# Patient Record
Sex: FEMALE | Race: WHITE | NOT HISPANIC OR LATINO | Employment: FULL TIME | URBAN - METROPOLITAN AREA
[De-identification: names, ages, dates, MRNs, and addresses within clinical notes are randomized per-mention and may not be internally consistent; named-entity substitution may affect disease eponyms.]

---

## 2023-10-21 PROCEDURE — 87077 CULTURE AEROBIC IDENTIFY: CPT | Performed by: NURSE PRACTITIONER

## 2023-10-21 PROCEDURE — 87086 URINE CULTURE/COLONY COUNT: CPT | Performed by: NURSE PRACTITIONER

## 2023-10-21 PROCEDURE — 87186 SC STD MICRODIL/AGAR DIL: CPT | Performed by: NURSE PRACTITIONER

## 2023-11-12 PROCEDURE — 87186 SC STD MICRODIL/AGAR DIL: CPT | Performed by: NURSE PRACTITIONER

## 2023-11-12 PROCEDURE — 87088 URINE BACTERIA CULTURE: CPT | Performed by: NURSE PRACTITIONER

## 2023-11-12 PROCEDURE — 87086 URINE CULTURE/COLONY COUNT: CPT | Performed by: NURSE PRACTITIONER

## 2023-11-13 ENCOUNTER — APPOINTMENT (OUTPATIENT)
Dept: GENERAL RADIOLOGY | Facility: HOSPITAL | Age: 63
End: 2023-11-13
Payer: COMMERCIAL

## 2023-11-13 ENCOUNTER — HOSPITAL ENCOUNTER (OUTPATIENT)
Facility: HOSPITAL | Age: 63
Setting detail: OBSERVATION
Discharge: HOME | End: 2023-11-14
Attending: EMERGENCY MEDICINE | Admitting: INTERNAL MEDICINE
Payer: COMMERCIAL

## 2023-11-13 DIAGNOSIS — N39.0 URINARY TRACT INFECTION WITHOUT HEMATURIA, SITE UNSPECIFIED: Primary | ICD-10-CM

## 2023-11-13 DIAGNOSIS — A41.9 SEPSIS, DUE TO UNSPECIFIED ORGANISM, UNSPECIFIED WHETHER ACUTE ORGAN DYSFUNCTION PRESENT: ICD-10-CM

## 2023-11-13 LAB
ALBUMIN SERPL-MCNC: 3.1 G/DL (ref 3.5–5.2)
ALBUMIN/GLOB SERPL: 0.9 G/DL
ALP SERPL-CCNC: 96 U/L (ref 39–117)
ALT SERPL W P-5'-P-CCNC: 9 U/L (ref 1–33)
ANION GAP SERPL CALCULATED.3IONS-SCNC: 14 MMOL/L (ref 5–15)
ANION GAP SERPL CALCULATED.3IONS-SCNC: 14 MMOL/L (ref 5–15)
AST SERPL-CCNC: 15 U/L (ref 1–32)
BACTERIA UR QL AUTO: ABNORMAL /HPF
BILIRUB SERPL-MCNC: 0.4 MG/DL (ref 0–1.2)
BILIRUB UR QL STRIP: NEGATIVE
BUN SERPL-MCNC: 12 MG/DL (ref 8–23)
BUN SERPL-MCNC: 12 MG/DL (ref 8–23)
BUN/CREAT SERPL: 12.1 (ref 7–25)
BUN/CREAT SERPL: 12.8 (ref 7–25)
CALCIUM SPEC-SCNC: 8.8 MG/DL (ref 8.6–10.5)
CALCIUM SPEC-SCNC: 9.3 MG/DL (ref 8.6–10.5)
CHLORIDE SERPL-SCNC: 102 MMOL/L (ref 98–107)
CHLORIDE SERPL-SCNC: 99 MMOL/L (ref 98–107)
CLARITY UR: ABNORMAL
CO2 SERPL-SCNC: 19 MMOL/L (ref 22–29)
CO2 SERPL-SCNC: 22 MMOL/L (ref 22–29)
COLOR UR: ABNORMAL
CREAT SERPL-MCNC: 0.94 MG/DL (ref 0.57–1)
CREAT SERPL-MCNC: 0.99 MG/DL (ref 0.57–1)
D-LACTATE SERPL-SCNC: 1.9 MMOL/L (ref 0.3–2)
DEPRECATED RDW RBC AUTO: 45.9 FL (ref 37–54)
EGFRCR SERPLBLD CKD-EPI 2021: 64.6 ML/MIN/1.73
EGFRCR SERPLBLD CKD-EPI 2021: 68.7 ML/MIN/1.73
ERYTHROCYTE [DISTWIDTH] IN BLOOD BY AUTOMATED COUNT: 14.3 % (ref 12.3–15.4)
GLOBULIN UR ELPH-MCNC: 3.5 GM/DL
GLUCOSE SERPL-MCNC: 122 MG/DL (ref 65–99)
GLUCOSE SERPL-MCNC: 148 MG/DL (ref 65–99)
GLUCOSE UR STRIP-MCNC: NEGATIVE MG/DL
HCT VFR BLD AUTO: 33.6 % (ref 34–46.6)
HGB BLD-MCNC: 11.3 G/DL (ref 12–15.9)
HGB UR QL STRIP.AUTO: ABNORMAL
HOLD SPECIMEN: NORMAL
HYALINE CASTS UR QL AUTO: ABNORMAL /LPF
KETONES UR QL STRIP: ABNORMAL
LEUKOCYTE ESTERASE UR QL STRIP.AUTO: ABNORMAL
LYMPHOCYTES # BLD MANUAL: 0.98 10*3/MM3 (ref 0.7–3.1)
MCH RBC QN AUTO: 30.8 PG (ref 26.6–33)
MCHC RBC AUTO-ENTMCNC: 33.6 G/DL (ref 31.5–35.7)
MCV RBC AUTO: 91.7 FL (ref 79–97)
METAMYELOCYTES NFR BLD MANUAL: 3 % (ref 0–0)
NEUTROPHILS # BLD AUTO: 10.95 10*3/MM3 (ref 1.7–7)
NEUTROPHILS NFR BLD MANUAL: 79 % (ref 42.7–76)
NEUTS BAND NFR BLD MANUAL: 10 % (ref 0–5)
NITRITE UR QL STRIP: POSITIVE
PH UR STRIP.AUTO: 6.5 [PH] (ref 5–8)
PLATELET # BLD AUTO: 491 10*3/MM3 (ref 140–450)
PMV BLD AUTO: 6.7 FL (ref 6–12)
POLYCHROMASIA BLD QL SMEAR: ABNORMAL
POTASSIUM SERPL-SCNC: 3.8 MMOL/L (ref 3.5–5.2)
POTASSIUM SERPL-SCNC: 4.1 MMOL/L (ref 3.5–5.2)
PROT SERPL-MCNC: 6.6 G/DL (ref 6–8.5)
PROT UR QL STRIP: ABNORMAL
RBC # BLD AUTO: 3.66 10*6/MM3 (ref 3.77–5.28)
RBC # UR STRIP: ABNORMAL /HPF
REF LAB TEST METHOD: ABNORMAL
SCAN SLIDE: NORMAL
SMALL PLATELETS BLD QL SMEAR: ABNORMAL
SODIUM SERPL-SCNC: 135 MMOL/L (ref 136–145)
SODIUM SERPL-SCNC: 135 MMOL/L (ref 136–145)
SP GR UR STRIP: 1.01 (ref 1–1.03)
SQUAMOUS #/AREA URNS HPF: ABNORMAL /HPF
STOMATOCYTES BLD QL SMEAR: ABNORMAL
UROBILINOGEN UR QL STRIP: ABNORMAL
VARIANT LYMPHS NFR BLD MANUAL: 2 % (ref 0–5)
VARIANT LYMPHS NFR BLD MANUAL: 6 % (ref 19.6–45.3)
WBC # UR STRIP: ABNORMAL /HPF
WBC MORPH BLD: NORMAL
WBC NRBC COR # BLD: 12.3 10*3/MM3 (ref 3.4–10.8)
WHOLE BLOOD HOLD COAG: NORMAL
WHOLE BLOOD HOLD COAG: NORMAL
WHOLE BLOOD HOLD SPECIMEN: NORMAL

## 2023-11-13 PROCEDURE — 87040 BLOOD CULTURE FOR BACTERIA: CPT | Performed by: EMERGENCY MEDICINE

## 2023-11-13 PROCEDURE — 93005 ELECTROCARDIOGRAM TRACING: CPT | Performed by: EMERGENCY MEDICINE

## 2023-11-13 PROCEDURE — G0378 HOSPITAL OBSERVATION PER HR: HCPCS

## 2023-11-13 PROCEDURE — 25810000003 SODIUM CHLORIDE 0.9 % SOLUTION: Performed by: EMERGENCY MEDICINE

## 2023-11-13 PROCEDURE — 87086 URINE CULTURE/COLONY COUNT: CPT | Performed by: EMERGENCY MEDICINE

## 2023-11-13 PROCEDURE — 36415 COLL VENOUS BLD VENIPUNCTURE: CPT

## 2023-11-13 PROCEDURE — 81001 URINALYSIS AUTO W/SCOPE: CPT | Performed by: EMERGENCY MEDICINE

## 2023-11-13 PROCEDURE — 85025 COMPLETE CBC W/AUTO DIFF WBC: CPT | Performed by: EMERGENCY MEDICINE

## 2023-11-13 PROCEDURE — 83605 ASSAY OF LACTIC ACID: CPT

## 2023-11-13 PROCEDURE — 85007 BL SMEAR W/DIFF WBC COUNT: CPT | Performed by: EMERGENCY MEDICINE

## 2023-11-13 PROCEDURE — 96365 THER/PROPH/DIAG IV INF INIT: CPT

## 2023-11-13 PROCEDURE — 25010000002 CEFTRIAXONE PER 250 MG: Performed by: EMERGENCY MEDICINE

## 2023-11-13 PROCEDURE — 80053 COMPREHEN METABOLIC PANEL: CPT | Performed by: EMERGENCY MEDICINE

## 2023-11-13 PROCEDURE — 99284 EMERGENCY DEPT VISIT MOD MDM: CPT

## 2023-11-13 PROCEDURE — 71045 X-RAY EXAM CHEST 1 VIEW: CPT

## 2023-11-13 RX ORDER — IBUPROFEN 400 MG/1
400 TABLET ORAL EVERY 6 HOURS PRN
Status: DISCONTINUED | OUTPATIENT
Start: 2023-11-13 | End: 2023-11-14 | Stop reason: HOSPADM

## 2023-11-13 RX ORDER — POLYETHYLENE GLYCOL 3350 17 G/17G
17 POWDER, FOR SOLUTION ORAL DAILY PRN
Status: DISCONTINUED | OUTPATIENT
Start: 2023-11-13 | End: 2023-11-14 | Stop reason: HOSPADM

## 2023-11-13 RX ORDER — ONDANSETRON 2 MG/ML
4 INJECTION INTRAMUSCULAR; INTRAVENOUS EVERY 6 HOURS PRN
Status: DISCONTINUED | OUTPATIENT
Start: 2023-11-13 | End: 2023-11-14 | Stop reason: HOSPADM

## 2023-11-13 RX ORDER — BISACODYL 5 MG/1
5 TABLET, DELAYED RELEASE ORAL DAILY PRN
Status: DISCONTINUED | OUTPATIENT
Start: 2023-11-13 | End: 2023-11-14 | Stop reason: HOSPADM

## 2023-11-13 RX ORDER — SODIUM CHLORIDE 9 MG/ML
100 INJECTION, SOLUTION INTRAVENOUS CONTINUOUS
Status: DISCONTINUED | OUTPATIENT
Start: 2023-11-13 | End: 2023-11-14 | Stop reason: HOSPADM

## 2023-11-13 RX ORDER — IBUPROFEN 600 MG/1
600 TABLET ORAL ONCE
Status: COMPLETED | OUTPATIENT
Start: 2023-11-13 | End: 2023-11-13

## 2023-11-13 RX ORDER — CETIRIZINE HYDROCHLORIDE 10 MG/1
10 TABLET ORAL DAILY
Status: DISCONTINUED | OUTPATIENT
Start: 2023-11-13 | End: 2023-11-14 | Stop reason: HOSPADM

## 2023-11-13 RX ORDER — ACETAMINOPHEN 325 MG/1
TABLET ORAL
Status: COMPLETED
Start: 2023-11-13 | End: 2023-11-13

## 2023-11-13 RX ORDER — AMOXICILLIN 250 MG
2 CAPSULE ORAL 2 TIMES DAILY
Status: DISCONTINUED | OUTPATIENT
Start: 2023-11-13 | End: 2023-11-14 | Stop reason: HOSPADM

## 2023-11-13 RX ORDER — CETIRIZINE HYDROCHLORIDE 10 MG/1
10 TABLET ORAL DAILY
Status: ON HOLD | COMMUNITY

## 2023-11-13 RX ORDER — ONDANSETRON 4 MG/1
4 TABLET, FILM COATED ORAL EVERY 6 HOURS PRN
Status: DISCONTINUED | OUTPATIENT
Start: 2023-11-13 | End: 2023-11-14 | Stop reason: HOSPADM

## 2023-11-13 RX ORDER — SODIUM CHLORIDE 0.9 % (FLUSH) 0.9 %
10 SYRINGE (ML) INJECTION AS NEEDED
Status: DISCONTINUED | OUTPATIENT
Start: 2023-11-13 | End: 2023-11-14 | Stop reason: HOSPADM

## 2023-11-13 RX ORDER — ACETAMINOPHEN 325 MG/1
650 TABLET ORAL EVERY 6 HOURS PRN
Status: DISCONTINUED | OUTPATIENT
Start: 2023-11-13 | End: 2023-11-14 | Stop reason: HOSPADM

## 2023-11-13 RX ORDER — SODIUM CHLORIDE 9 MG/ML
40 INJECTION, SOLUTION INTRAVENOUS AS NEEDED
Status: DISCONTINUED | OUTPATIENT
Start: 2023-11-13 | End: 2023-11-14 | Stop reason: HOSPADM

## 2023-11-13 RX ORDER — IBUPROFEN 600 MG/1
TABLET ORAL
Status: COMPLETED
Start: 2023-11-13 | End: 2023-11-13

## 2023-11-13 RX ORDER — BISACODYL 10 MG
10 SUPPOSITORY, RECTAL RECTAL DAILY PRN
Status: DISCONTINUED | OUTPATIENT
Start: 2023-11-13 | End: 2023-11-14 | Stop reason: HOSPADM

## 2023-11-13 RX ORDER — PHENAZOPYRIDINE HYDROCHLORIDE 100 MG/1
100 TABLET, FILM COATED ORAL 3 TIMES DAILY
Status: DISCONTINUED | OUTPATIENT
Start: 2023-11-13 | End: 2023-11-14 | Stop reason: HOSPADM

## 2023-11-13 RX ORDER — HYDROCODONE BITARTRATE AND ACETAMINOPHEN 5; 325 MG/1; MG/1
1 TABLET ORAL EVERY 4 HOURS PRN
Status: DISCONTINUED | OUTPATIENT
Start: 2023-11-13 | End: 2023-11-14 | Stop reason: HOSPADM

## 2023-11-13 RX ORDER — SODIUM CHLORIDE 0.9 % (FLUSH) 0.9 %
10 SYRINGE (ML) INJECTION EVERY 12 HOURS SCHEDULED
Status: DISCONTINUED | OUTPATIENT
Start: 2023-11-13 | End: 2023-11-14 | Stop reason: HOSPADM

## 2023-11-13 RX ORDER — FAMOTIDINE 20 MG/1
40 TABLET, FILM COATED ORAL DAILY
Status: DISCONTINUED | OUTPATIENT
Start: 2023-11-13 | End: 2023-11-14 | Stop reason: HOSPADM

## 2023-11-13 RX ORDER — IBUPROFEN 400 MG/1
400 TABLET ORAL EVERY 6 HOURS PRN
Status: ON HOLD | COMMUNITY

## 2023-11-13 RX ADMIN — PHENAZOPYRIDINE HYDROCHLORIDE 100 MG: 100 TABLET ORAL at 20:20

## 2023-11-13 RX ADMIN — CETIRIZINE HYDROCHLORIDE 10 MG: 10 TABLET, FILM COATED ORAL at 16:23

## 2023-11-13 RX ADMIN — FAMOTIDINE 40 MG: 20 TABLET, FILM COATED ORAL at 16:23

## 2023-11-13 RX ADMIN — ACETAMINOPHEN 650 MG: 325 TABLET ORAL at 09:19

## 2023-11-13 RX ADMIN — ACETAMINOPHEN 650 MG: 325 TABLET, FILM COATED ORAL at 09:19

## 2023-11-13 RX ADMIN — PHENAZOPYRIDINE HYDROCHLORIDE 100 MG: 100 TABLET ORAL at 16:23

## 2023-11-13 RX ADMIN — CEFTRIAXONE 2 G: 2 INJECTION, POWDER, FOR SOLUTION INTRAMUSCULAR; INTRAVENOUS at 11:22

## 2023-11-13 RX ADMIN — SODIUM CHLORIDE 1000 ML: 9 INJECTION, SOLUTION INTRAVENOUS at 09:16

## 2023-11-13 RX ADMIN — Medication 10 ML: at 20:20

## 2023-11-13 RX ADMIN — IBUPROFEN 600 MG: 600 TABLET, FILM COATED ORAL at 13:18

## 2023-11-13 RX ADMIN — IBUPROFEN 600 MG: 600 TABLET ORAL at 13:18

## 2023-11-13 RX ADMIN — SODIUM CHLORIDE 1000 ML: 9 INJECTION, SOLUTION INTRAVENOUS at 13:08

## 2023-11-13 NOTE — H&P
[facility] Medicine Admission      Date of Admission: 11/13/2023      Primary Care Physician: Provider, No Known      Chief Complaint: dysuria     HPI:    63 yr old female with burning with urination.  She was being treated with cefdinir at home which she started yesterday.  She also has UTI last month as well. She denies any fevers.  She does have generalized weakness.       Past Medical History: Hypertension     Past Surgical History: no surgeries     Family History: father has hypertension    Social History:  reports that she has been smoking cigarettes. She has been smoking an average of .5 packs per day. She has never been exposed to tobacco smoke. She has never used smokeless tobacco. She reports that she does not drink alcohol.    Allergies:   Allergies   Allergen Reactions    Other Other (See Comments)     tetramycin    Tetracyclines & Related Swelling       Medications: Scheduled Meds:sodium chloride, 1,000 mL, Intravenous, Once      Continuous Infusions:   PRN Meds:.  acetaminophen    [COMPLETED] Insert Peripheral IV **AND** sodium chloride    sodium chloride  No current facility-administered medications on file prior to encounter.     Current Outpatient Medications on File Prior to Encounter   Medication Sig Dispense Refill    cefdinir (OMNICEF) 300 MG capsule Take 1 capsule by mouth 2 (Two) Times a Day for 7 days. 14 capsule 0    cetirizine (zyrTEC) 10 MG tablet Take 1 tablet by mouth Daily.      ibuprofen (ADVIL,MOTRIN) 400 MG tablet Take 1 tablet by mouth Every 6 (Six) Hours As Needed for Mild Pain.      phenazopyridine (Pyridium) 100 MG tablet Take 1 tablet by mouth 3 (Three) Times a Day. 6 tablet 0       Review of Systems:  Review of Systems   Respiratory:  Negative for shortness of breath.    Cardiovascular:  Negative for chest pain.      Otherwise complete ROS is negative except as mentioned above.    Physical Exam:   Temp:  [98.7 °F (37.1 °C)-101 °F (38.3 °C)] 101 °F  (38.3 °C)  Heart Rate:  [113-144] 135  Resp:  [20] 20  BP: (101-147)/(56-90) 101/56  Physical Exam  Vitals and nursing note reviewed.   Constitutional:       General: She is not in acute distress.     Appearance: She is well-developed. She is not diaphoretic.   HENT:      Head: Normocephalic and atraumatic.   Cardiovascular:      Rate and Rhythm: Normal rate.   Pulmonary:      Effort: Pulmonary effort is normal. No respiratory distress.      Breath sounds: No wheezing.   Abdominal:      General: There is no distension.      Palpations: Abdomen is soft.   Musculoskeletal:         General: Normal range of motion.   Skin:     General: Skin is warm and dry.   Neurological:      Mental Status: She is alert.      Cranial Nerves: No cranial nerve deficit.   Psychiatric:         Behavior: Behavior normal.         Thought Content: Thought content normal.         Judgment: Judgment normal.           Results Reviewed:  I have personally reviewed current lab, radiology, and data and agree with results.  Lab Results (last 24 hours)       Procedure Component Value Units Date/Time    Blood Culture - Blood, Arm, Left [[id number]] Collected: 11/13/23 1307    Specimen: Blood from Arm, Left Updated: 11/13/23 1316    Lavender Top [[id number]] Collected: 11/13/23 1307    Specimen: Blood Updated: 11/13/23 1314    Gold Top - [facility] [[id number]] Collected: 11/13/23 1307    Specimen: Blood Updated: 11/13/23 1314    Light Blue Top [[id number]] Collected: 11/13/23 1307    Specimen: Blood Updated: 11/13/23 1314    Comprehensive Metabolic Panel [[id number]] Collected: 11/13/23 1307    Specimen: Blood Updated: 11/13/23 1314    Economy Draw [[id number]] Collected: 11/13/23 1307    Specimen: Blood Updated: 11/13/23 1314    Narrative:      The following orders were created for panel order Economy Draw.  Procedure                               Abnormality         Status                     ---------                               -----------         ------                      Green Top (Gel)[[id number]]                                  In process                 Lavender Top[[id number]]                                     In process                 Gold Top - SST[[id number]]                                   In process                 Light Blue Top[[id number]]                                   In process                   Please view results for these tests on the individual orders.    Green Top (Gel) [[id number]] Collected: 11/13/23 1307    Specimen: Blood Updated: 11/13/23 1314    POC Lactate [[id number]]  (Normal) Collected: 11/13/23 1234    Specimen: Blood Updated: 11/13/23 1235     Lactate 1.9 mmol/L      Comment: Serial Number: [id number]:  [id number]       Blood Culture - Blood, Arm, Left [[id number]] Collected: 11/13/23 1231    Specimen: Blood from Arm, Left Updated: 11/13/23 1235    Extra Tubes [[id number]] Collected: 11/13/23 0909    Specimen: Blood from Arm, Right Updated: 11/13/23 1015    Narrative:      The following orders were created for panel order Extra Tubes.  Procedure                               Abnormality         Status                     ---------                               -----------         ------                     Gold Top - SST[[id number]]                                   Final result               Light Blue Top[[id number]]                                   Final result                 Please view results for these tests on the individual orders.    Gold Top - SST [[id number]] Collected: 11/13/23 0909    Specimen: Blood from Arm, Right Updated: 11/13/23 1015     Extra Tube Hold for add-ons.     Comment: Auto resulted.       Light Blue Top [[id number]] Collected: 11/13/23 0909    Specimen: Blood from Arm, Right Updated: 11/13/23 1015     Extra Tube Hold for add-ons.     Comment: Auto resulted       Urinalysis, Microscopic Only - Urine, Clean Catch [[id number]]  (Abnormal) Collected: 11/13/23 0905    Specimen: Urine, Clean Catch Updated:  11/13/23 0946     RBC, UA Too Numerous to Count /HPF      WBC, UA 6-10 /HPF      Bacteria, UA 1+ /HPF      Squamous Epithelial Cells, UA 13-20 /HPF      Hyaline Casts, UA 0-2 /LPF      Methodology Manual Light Microscopy    Urine Culture - Urine, Urine, Clean Catch [[id number]] Collected: 11/13/23 0905    Specimen: Urine, Clean Catch Updated: 11/13/23 0946    CBC & Differential [[id number]]  (Abnormal) Collected: 11/13/23 0909    Specimen: Blood from Arm, Right Updated: 11/13/23 0945    Narrative:      The following orders were created for panel order CBC & Differential.  Procedure                               Abnormality         Status                     ---------                               -----------         ------                     CBC Auto Differential[[id number]]        Abnormal            Final result               Scan Slide[[id number]]                                       Final result                 Please view results for these tests on the individual orders.    CBC Auto Differential [[id number]]  (Abnormal) Collected: 11/13/23 0909    Specimen: Blood from Arm, Right Updated: 11/13/23 0945     WBC 12.30 10*3/mm3      RBC 3.66 10*6/mm3      Hemoglobin 11.3 g/dL      Hematocrit 33.6 %      MCV 91.7 fL      MCH 30.8 pg      MCHC 33.6 g/dL      RDW 14.3 %      RDW-SD 45.9 fl      MPV 6.7 fL      Platelets 491 10*3/mm3     Narrative:      The previously reported component NRBC is no longer being reported. Previous result was 0.0 /100 WBC (Reference Range: 0.0-0.2 /100 WBC) on 11/13/2023 at 0918 EST.    Scan Slide [[id number]] Collected: 11/13/23 0909    Specimen: Blood from Arm, Right Updated: 11/13/23 0945     Scan Slide --     Comment: See Manual Differential Results       Manual Differential [[id number]]  (Abnormal) Collected: 11/13/23 0909    Specimen: Blood from Arm, Right Updated: 11/13/23 0945     Neutrophil % 79.0 %      Lymphocyte % 6.0 %      Bands %  10.0 %      Metamyelocyte % 3.0 %      Atypical  Lymphocyte % 2.0 %      Neutrophils Absolute 10.95 10*3/mm3      Lymphocytes Absolute 0.98 10*3/mm3      Polychromasia Slight/1+     Stomatocytes Slight/1+     WBC Morphology Normal     Platelet Estimate Increased    Basic Metabolic Panel [[id number]]  (Abnormal) Collected: 11/13/23 0909    Specimen: Blood from Arm, Right Updated: 11/13/23 0938     Glucose 148 mg/dL      BUN 12 mg/dL      Creatinine 0.99 mg/dL      Sodium 135 mmol/L      Potassium 4.1 mmol/L      Chloride 99 mmol/L      CO2 22.0 mmol/L      Calcium 9.3 mg/dL      BUN/Creatinine Ratio 12.1     Anion Gap 14.0 mmol/L      eGFR 64.6 mL/min/1.73     Narrative:      GFR Normal >60  Chronic Kidney Disease <60  Kidney Failure <15      Urinalysis With Culture If Indicated - Urine, Clean Catch [[id number]]  (Abnormal) Collected: 11/13/23 0905    Specimen: Urine, Clean Catch Updated: 11/13/23 0932     Color, UA Orange     Comment: Any Substance that causes an abnormal urine color can alter the accuracy of the chemical reactions.        Appearance, UA Cloudy     pH, UA 6.5     Specific Gravity, UA 1.013     Glucose, UA Negative     Ketones, UA Trace     Bilirubin, UA Negative     Blood, UA Large (3+)     Protein, UA 30 mg/dL (1+)     Leuk Esterase, UA Trace     Nitrite, UA Positive     Urobilinogen, UA 1.0 E.U./dL    Narrative:      In absence of clinical symptoms, the presence of pyuria, bacteria, and/or nitrites on the urinalysis result does not correlate with infection.          Imaging Results (Last 24 Hours)       Procedure Component Value Units Date/Time    XR Chest 1 View [[id number]] Collected: 11/13/23 1247     Updated: 11/13/23 1250    Narrative:      XR CHEST 1 VW    Date of Exam: 11/13/2023 12:45 PM EST    Indication: Fever    Comparison: None available.    Findings:  Mild cardiomegaly. Nonspecific diffuse mild interstitial opacities without discrete lobar consolidation. No large effusion or pneumothorax. Osseous structures grossly unremarkable.       Impression:      Impression:  Cardiomegaly with increased interstitial opacities, which can be seen with both edema and atypical infection. Negative for lobar consolidation.      Electronically Signed: Shane Cobos MD    11/13/2023 12:48 PM EST    Workstation ID: [id number]              Assessment:    Active Hospital Problems    Diagnosis     **Urinary tract infection          UTI/sepsis - IV fluids and IV rocephin started. Will monitor cultures    Tachycardia - will give IV fluids    DVT prophylaxis - SCD    Patient is full code    Gonzalo Mccullough MD  11/13/23  13:23 EST

## 2023-11-13 NOTE — ED PROVIDER NOTES
Subjective   History of Present Illness  Patient is a 61-year-old female who was diagnosed with UTI approximately 2 weeks ago.  She finished 1 week course of antibiotics.  She had recurrent symptoms over the past few days.  She was seen in the urgent care center yesterday and restarted on antibiotics.  She states she is feeling worse today.  She has no other complaints other than dysuria achiness and weakness.      Review of Systems    No past medical history on file.    Allergies   Allergen Reactions    Other Other (See Comments)     tetramycin       No past surgical history on file.    No family history on file.    Social History     Socioeconomic History    Marital status: Single   Tobacco Use    Smoking status: Every Day     Packs/day: .5     Types: Cigarettes     Passive exposure: Never    Smokeless tobacco: Never   Vaping Use    Vaping Use: Never used   Substance and Sexual Activity    Alcohol use: Never           Objective   Physical Exam  HEENT exam shows TMs to be clear.  Oropharynx clear.  Neck adenopathy.  Lungs are clear without retraction.  Abdomen is soft nontender.  Back has no CVA tenderness.  Lungs are clear.  Mental exam is unremarkable.  Procedures     My EKG interpretation shows sinus tachycardia at a rate of 135 with no acute ST change      ED Course      Results for orders placed or performed during the hospital encounter of 11/13/23   Basic Metabolic Panel    Specimen: Arm, Right; Blood   Result Value Ref Range    Glucose 148 (H) 65 - 99 mg/dL    BUN 12 8 - 23 mg/dL    Creatinine 0.99 0.57 - 1.00 mg/dL    Sodium 135 (L) 136 - 145 mmol/L    Potassium 4.1 3.5 - 5.2 mmol/L    Chloride 99 98 - 107 mmol/L    CO2 22.0 22.0 - 29.0 mmol/L    Calcium 9.3 8.6 - 10.5 mg/dL    BUN/Creatinine Ratio 12.1 7.0 - 25.0    Anion Gap 14.0 5.0 - 15.0 mmol/L    eGFR 64.6 >60.0 mL/min/1.73   Urinalysis With Culture If Indicated - Urine, Clean Catch    Specimen: Urine, Clean Catch   Result Value Ref Range    Color,  UA Orange (A) Yellow, Straw    Appearance, UA Cloudy (A) Clear    pH, UA 6.5 5.0 - 8.0    Specific Gravity, UA 1.013 1.005 - 1.030    Glucose, UA Negative Negative    Ketones, UA Trace (A) Negative    Bilirubin, UA Negative Negative    Blood, UA Large (3+) (A) Negative    Protein, UA 30 mg/dL (1+) (A) Negative    Leuk Esterase, UA Trace (A) Negative    Nitrite, UA Positive (A) Negative    Urobilinogen, UA 1.0 E.U./dL 0.2 - 1.0 E.U./dL   CBC Auto Differential    Specimen: Arm, Right; Blood   Result Value Ref Range    WBC 12.30 (H) 3.40 - 10.80 10*3/mm3    RBC 3.66 (L) 3.77 - 5.28 10*6/mm3    Hemoglobin 11.3 (L) 12.0 - 15.9 g/dL    Hematocrit 33.6 (L) 34.0 - 46.6 %    MCV 91.7 79.0 - 97.0 fL    MCH 30.8 26.6 - 33.0 pg    MCHC 33.6 31.5 - 35.7 g/dL    RDW 14.3 12.3 - 15.4 %    RDW-SD 45.9 37.0 - 54.0 fl    MPV 6.7 6.0 - 12.0 fL    Platelets 491 (H) 140 - 450 10*3/mm3   Scan Slide    Specimen: Arm, Right; Blood   Result Value Ref Range    Scan Slide     Urinalysis, Microscopic Only - Urine, Clean Catch    Specimen: Urine, Clean Catch   Result Value Ref Range    RBC, UA Too Numerous to Count (A) None Seen, 0-2 /HPF    WBC, UA 6-10 (A) None Seen, 0-2 /HPF    Bacteria, UA 1+ (A) None Seen /HPF    Squamous Epithelial Cells, UA 13-20 (A) None Seen, 0-2 /HPF    Hyaline Casts, UA 0-2 None Seen /LPF    Methodology Manual Light Microscopy    Manual Differential    Specimen: Arm, Right; Blood   Result Value Ref Range    Neutrophil % 79.0 (H) 42.7 - 76.0 %    Lymphocyte % 6.0 (L) 19.6 - 45.3 %    Bands %  10.0 (H) 0.0 - 5.0 %    Metamyelocyte % 3.0 (H) 0.0 - 0.0 %    Atypical Lymphocyte % 2.0 0.0 - 5.0 %    Neutrophils Absolute 10.95 (H) 1.70 - 7.00 10*3/mm3    Lymphocytes Absolute 0.98 0.70 - 3.10 10*3/mm3    Polychromasia Slight/1+ None Seen    Stomatocytes Slight/1+ None Seen    WBC Morphology Normal Normal    Platelet Estimate Increased Normal   POC Lactate    Specimen: Blood   Result Value Ref Range    Lactate 1.9 0.3 - 2.0  mmol/L   ECG 12 Lead Tachycardia   Result Value Ref Range    QT Interval 284 ms    QTC Interval 426 ms   Gold Top - SST   Result Value Ref Range    Extra Tube Hold for add-ons.    Light Blue Top   Result Value Ref Range    Extra Tube Hold for add-ons.      XR Chest 1 View    Result Date: 11/13/2023  Impression: Cardiomegaly with increased interstitial opacities, which can be seen with both edema and atypical infection. Negative for lobar consolidation. Electronically Signed: Shane Cobos MD  11/13/2023 12:48 PM EST  Workstation ID: [id number]                                        Medical Decision Making  Chest x-ray interpretation shows no infiltrate pleural effusion or other abnormality.  BMP shows no renal insufficiency or electrolyte abnormality.  UA shows 1+ bacteria.  Urine culture obtained.  Patient has a white count of 12,000 with differential showing left shift.  Blood cultures were obtained as the patient triggered simple sepsis.  She was given IV fluids 2 L IV fluid bolus.  Lactate is normal.  Blood cultures were obtained.  Patient was given IV Rocephin.  Patient will be admitted for further IV fluids and antibiotics.  I did speak the on-call hospitalist.    Amount and/or Complexity of Data Reviewed  Labs: ordered. Decision-making details documented in ED Course.  Radiology: ordered and independent interpretation performed.  ECG/medicine tests: ordered and independent interpretation performed.    Risk  OTC drugs.  Prescription drug management.  Decision regarding hospitalization.        Final diagnoses:   Urinary tract infection without hematuria, site unspecified   Sepsis, due to unspecified organism, unspecified whether acute organ dysfunction present       ED Disposition  ED Disposition       ED Disposition   Decision to Admit    Condition   --    Comment   --               No follow-up provider specified.       Medication List      No changes were made to your prescriptions during this visit.             Kev Hines MD  11/13/23 7056

## 2023-11-14 ENCOUNTER — TELEPHONE (OUTPATIENT)
Dept: FAMILY MEDICINE CLINIC | Facility: CLINIC | Age: 63
End: 2023-11-14

## 2023-11-14 VITALS
TEMPERATURE: 98.6 F | DIASTOLIC BLOOD PRESSURE: 85 MMHG | HEART RATE: 119 BPM | RESPIRATION RATE: 24 BRPM | SYSTOLIC BLOOD PRESSURE: 144 MMHG | OXYGEN SATURATION: 96 % | HEIGHT: 68 IN | BODY MASS INDEX: 33.18 KG/M2 | WEIGHT: 218.92 LBS

## 2023-11-14 PROBLEM — N39.0 UTI (URINARY TRACT INFECTION): Status: ACTIVE | Noted: 2023-11-14

## 2023-11-14 LAB
ANION GAP SERPL CALCULATED.3IONS-SCNC: 12 MMOL/L (ref 5–15)
BACTERIA SPEC AEROBE CULT: NO GROWTH
BASOPHILS # BLD AUTO: 0.1 10*3/MM3 (ref 0–0.2)
BASOPHILS NFR BLD AUTO: 0.6 % (ref 0–1.5)
BUN SERPL-MCNC: 13 MG/DL (ref 8–23)
BUN/CREAT SERPL: 12.7 (ref 7–25)
CALCIUM SPEC-SCNC: 9 MG/DL (ref 8.6–10.5)
CHLORIDE SERPL-SCNC: 104 MMOL/L (ref 98–107)
CO2 SERPL-SCNC: 21 MMOL/L (ref 22–29)
CREAT SERPL-MCNC: 1.02 MG/DL (ref 0.57–1)
DEPRECATED RDW RBC AUTO: 49.9 FL (ref 37–54)
EGFRCR SERPLBLD CKD-EPI 2021: 62.3 ML/MIN/1.73
EOSINOPHIL # BLD AUTO: 0.1 10*3/MM3 (ref 0–0.4)
EOSINOPHIL NFR BLD AUTO: 0.6 % (ref 0.3–6.2)
ERYTHROCYTE [DISTWIDTH] IN BLOOD BY AUTOMATED COUNT: 14.7 % (ref 12.3–15.4)
GLUCOSE SERPL-MCNC: 106 MG/DL (ref 65–99)
HCT VFR BLD AUTO: 31.8 % (ref 34–46.6)
HGB BLD-MCNC: 10.7 G/DL (ref 12–15.9)
LYMPHOCYTES # BLD AUTO: 1.7 10*3/MM3 (ref 0.7–3.1)
LYMPHOCYTES NFR BLD AUTO: 10.6 % (ref 19.6–45.3)
MAGNESIUM SERPL-MCNC: 2 MG/DL (ref 1.6–2.4)
MCH RBC QN AUTO: 31 PG (ref 26.6–33)
MCHC RBC AUTO-ENTMCNC: 33.6 G/DL (ref 31.5–35.7)
MCV RBC AUTO: 92.2 FL (ref 79–97)
MONOCYTES # BLD AUTO: 0.9 10*3/MM3 (ref 0.1–0.9)
MONOCYTES NFR BLD AUTO: 5.9 % (ref 5–12)
NEUTROPHILS NFR BLD AUTO: 13.2 10*3/MM3 (ref 1.7–7)
NEUTROPHILS NFR BLD AUTO: 82.3 % (ref 42.7–76)
NRBC BLD AUTO-RTO: 0.1 /100 WBC (ref 0–0.2)
PLATELET # BLD AUTO: 340 10*3/MM3 (ref 140–450)
PMV BLD AUTO: 7.8 FL (ref 6–12)
POTASSIUM SERPL-SCNC: 4.3 MMOL/L (ref 3.5–5.2)
QT INTERVAL: 284 MS
QTC INTERVAL: 426 MS
RBC # BLD AUTO: 3.45 10*6/MM3 (ref 3.77–5.28)
SODIUM SERPL-SCNC: 137 MMOL/L (ref 136–145)
WBC NRBC COR # BLD: 16 10*3/MM3 (ref 3.4–10.8)

## 2023-11-14 PROCEDURE — 25010000002 CEFTRIAXONE PER 250 MG: Performed by: FAMILY MEDICINE

## 2023-11-14 PROCEDURE — 96366 THER/PROPH/DIAG IV INF ADDON: CPT

## 2023-11-14 PROCEDURE — 80048 BASIC METABOLIC PNL TOTAL CA: CPT | Performed by: FAMILY MEDICINE

## 2023-11-14 PROCEDURE — 85025 COMPLETE CBC W/AUTO DIFF WBC: CPT | Performed by: FAMILY MEDICINE

## 2023-11-14 PROCEDURE — G0378 HOSPITAL OBSERVATION PER HR: HCPCS

## 2023-11-14 PROCEDURE — 83735 ASSAY OF MAGNESIUM: CPT | Performed by: FAMILY MEDICINE

## 2023-11-14 RX ADMIN — CETIRIZINE HYDROCHLORIDE 10 MG: 10 TABLET, FILM COATED ORAL at 09:50

## 2023-11-14 RX ADMIN — CEFTRIAXONE 2000 MG: 2 INJECTION, POWDER, FOR SOLUTION INTRAMUSCULAR; INTRAVENOUS at 11:59

## 2023-11-14 RX ADMIN — Medication 10 ML: at 09:50

## 2023-11-14 RX ADMIN — FAMOTIDINE 40 MG: 20 TABLET, FILM COATED ORAL at 09:50

## 2023-11-14 RX ADMIN — PHENAZOPYRIDINE HYDROCHLORIDE 100 MG: 100 TABLET ORAL at 09:50

## 2023-11-14 NOTE — TELEPHONE ENCOUNTER
Hub staff attempted to follow warm transfer process and was unsuccessful     Caller: Nicole Everett    Relationship to patient: Emergency Contact    Best call back number: [phone]    Patient is needing:     NEEDING TO SCHEDULE A HOSPITAL FOLLOW UP AND A NEW PATIENT APPOINTMENT.     COULD NOT FIND ANY APPT UNTIL 12.14.23.    PLEASE CALL TO SCHEDULE.     SEEN AT [facility] FOR REPEAT UTI'S D/C 11.14.23

## 2023-11-14 NOTE — DISCHARGE SUMMARY
Discharge Summary    Date of Service: 2023  Patient Name: Neva Kurtz  : 1960  MRN: [id number]    Date of Admission: 2023  Discharge Diagnosis recurrent UTI  Leukocytosis  Mild anemia  Date of Discharge:    Primary Care Physician: Provider, No Known      Presenting Problem:   Urinary tract infection [N39.0]  Urinary tract infection without hematuria, site unspecified [N39.0]  Sepsis, due to unspecified organism, unspecified whether acute organ dysfunction present [A41.9]    Active and Resolved Hospital Problems:  Active Hospital Problems    Diagnosis POA    **Urinary tract infection [N39.0] Yes      Resolved Hospital Problems   No resolved problems to display.         Hospital Course     Hospital Course:  Neva Kurtz is a 63 y.o. female admitted for recurrent UTI  Patient was kept in observation awaiting urine culture  Patient f started cefdinir at home the day prior to admission        DISCHARGE Follow Up Recommendations for labs and diagnostics: Follow-up with primary care physician in 3 to 5 days      Reasons For Change In Medications and Indications for New Medications:      Day of Discharge     Vital Signs:  Temp:  [97.8 °F (36.6 °C)-101 °F (38.3 °C)] 97.8 °F (36.6 °C)  Heart Rate:  [] 116  Resp:  [20-24] 20  BP: (101-150)/(56-90) 150/83  Flow (L/min):  [2] 2    Physical Exam:  Physical Exam   Awake alert oriented x3  HEENT exam is normal  Neck supple  Chest is clear to auscultation bilaterally  Heart S1-2 no murmur  Abdomen soft benign nontender bowel sounds positive  Extremities no edema      Pertinent  and/or Most Recent Results     LAB RESULTS:      Lab 23  1234 23  0909   WBC  --  12.30*   HEMOGLOBIN  --  11.3*   HEMATOCRIT  --  33.6*   PLATELETS  --  491*   NEUTROS ABS  --  10.95*   MCV  --  91.7   LACTATE 1.9  --          Lab 23  1307 23  0909   SODIUM 135* 135*   POTASSIUM 3.8 4.1   CHLORIDE 102 99   CO2 19.0* 22.0   ANION GAP 14.0  14.0   BUN 12 12   CREATININE 0.94 0.99   EGFR 68.7 64.6   GLUCOSE 122* 148*   CALCIUM 8.8 9.3         Lab 11/13/23  1307   TOTAL PROTEIN 6.6   ALBUMIN 3.1*   GLOBULIN 3.5   ALT (SGPT) 9   AST (SGOT) 15   BILIRUBIN 0.4   ALK PHOS 96                     Brief Urine Lab Results  (Last result in the past 365 days)        Color   Clarity   Blood   Leuk Est   Nitrite   Protein   CREAT   Urine HCG        11/13/23 0905 Orange  Comment: Any Substance that causes an abnormal urine color can alter the accuracy of the chemical reactions.   Cloudy   Large (3+)   Trace   Positive   30 mg/dL (1+)                 Microbiology Results (last 10 days)       Procedure Component Value - Date/Time    Urine Culture - Urine, Urine, Clean Catch [[id number]]  (Abnormal)  (Susceptibility) Collected: 11/12/23 1215    Lab Status: Final result Specimen: Urine, Clean Catch Updated: 11/14/23 0352     Urine Culture >100,000 CFU/mL Escherichia coli    Narrative:      Colonization of the urinary tract without infection is common. Treatment is discouraged unless the patient is symptomatic, pregnant, or undergoing an invasive urologic procedure.    Susceptibility        Escherichia coli      RAGHAVENDRA      Ampicillin Susceptible      Ampicillin + Sulbactam Susceptible      Cefazolin Susceptible      Cefepime Susceptible      Ceftazidime Susceptible      Ceftriaxone Susceptible      Gentamicin Susceptible      Levofloxacin Susceptible      Nitrofurantoin Susceptible      Piperacillin + Tazobactam Susceptible      Trimethoprim + Sulfamethoxazole Susceptible                                   XR Chest 1 View    Result Date: 11/13/2023  Impression: Impression: Cardiomegaly with increased interstitial opacities, which can be seen with both edema and atypical infection. Negative for lobar consolidation. Electronically Signed: Shane Cobos MD  11/13/2023 12:48 PM EST  Workstation ID: [id number]                 Labs Pending at Discharge:  Pending Labs       Order  Current Status    Blood Culture - Blood, Arm, Left In process    Blood Culture - Blood, Arm, Left In process    Urine Culture - Urine, Urine, Clean Catch In process            Procedures Performed           Consults:   Consults       Date and Time Order Name Status Description    11/13/2023 12:43 PM Hospitalist (on-call MD unless specified)                Discharge Details        Discharge Medications        Continue These Medications        Instructions Start Date   cefdinir 300 MG capsule  Commonly known as: OMNICEF   300 mg, Oral, 2 Times Daily      phenazopyridine 100 MG tablet  Commonly known as: Pyridium   100 mg, Oral, 3 Times Daily             ASK your doctor about these medications        Instructions Start Date   cetirizine 10 MG tablet  Commonly known as: zyrTEC   10 mg, Oral, Daily      ibuprofen 400 MG tablet  Commonly known as: ADVIL,MOTRIN   400 mg, Oral, Every 6 Hours PRN               Allergies   Allergen Reactions    Other Other (See Comments)     tetramycin    Tetracyclines & Related Swelling         Discharge Disposition: Home      Diet:  Hospital:  Diet Order   Procedures    Diet: Cardiac Diets; Healthy Heart (2-3 Na+); Texture: Regular Texture (IDDSI 7); Fluid Consistency: Thin (IDDSI 0)         Discharge Activity:     As tolerated    CODE STATUS:  Code Status and Medical Interventions:   Ordered at: 11/13/23 1321     Code Status (Patient has no pulse and is not breathing):    CPR (Attempt to Resuscitate)     Medical Interventions (Patient has pulse or is breathing):    Full Support         No future appointments.        Time spent on Discharge including face to face service:  24m minutes        Signature: Electronically signed by Chelsea Gallo MD, 11/14/23, 09:20 [facility]  [facility] Hospitalist Team

## 2023-11-14 NOTE — PLAN OF CARE
Goal Outcome Evaluation:         Patient is doing well. Has been up to the bathroom multiple times tonight. No complaints of pain. Care continuing.

## 2023-11-15 NOTE — CASE MANAGEMENT/SOCIAL WORK
Case Management Discharge Note      Final Note: Routine home         Selected Continued Care - Discharged on 11/14/2023 Admission date: 11/13/2023 - Discharge disposition: Home or Self Care         Transportation Services  Private: Car    Final Discharge Disposition Code: 01 - home or self-care

## 2023-11-18 LAB
BACTERIA SPEC AEROBE CULT: NORMAL
BACTERIA SPEC AEROBE CULT: NORMAL

## 2023-11-19 ENCOUNTER — ANESTHESIA (OUTPATIENT)
Dept: PERIOP | Facility: HOSPITAL | Age: 63
End: 2023-11-19
Payer: COMMERCIAL

## 2023-11-19 ENCOUNTER — APPOINTMENT (OUTPATIENT)
Dept: CARDIOLOGY | Facility: HOSPITAL | Age: 63
End: 2023-11-19
Payer: COMMERCIAL

## 2023-11-19 ENCOUNTER — APPOINTMENT (OUTPATIENT)
Dept: CT IMAGING | Facility: HOSPITAL | Age: 63
End: 2023-11-19
Payer: COMMERCIAL

## 2023-11-19 ENCOUNTER — ANESTHESIA EVENT (OUTPATIENT)
Dept: PERIOP | Facility: HOSPITAL | Age: 63
End: 2023-11-19
Payer: COMMERCIAL

## 2023-11-19 ENCOUNTER — HOSPITAL ENCOUNTER (INPATIENT)
Facility: HOSPITAL | Age: 63
LOS: 3 days | Discharge: HOME | End: 2023-11-22
Attending: EMERGENCY MEDICINE | Admitting: HOSPITALIST
Payer: COMMERCIAL

## 2023-11-19 ENCOUNTER — APPOINTMENT (OUTPATIENT)
Dept: GENERAL RADIOLOGY | Facility: HOSPITAL | Age: 63
End: 2023-11-19
Payer: COMMERCIAL

## 2023-11-19 DIAGNOSIS — I51.3 THROMBUS OF ATRIAL APPENDAGE: ICD-10-CM

## 2023-11-19 DIAGNOSIS — R09.02 HYPOXIA: ICD-10-CM

## 2023-11-19 DIAGNOSIS — N39.0 ACUTE UTI: ICD-10-CM

## 2023-11-19 DIAGNOSIS — R06.00 DYSPNEA, UNSPECIFIED TYPE: Primary | ICD-10-CM

## 2023-11-19 DIAGNOSIS — N30.00 ACUTE CYSTITIS WITHOUT HEMATURIA: ICD-10-CM

## 2023-11-19 DIAGNOSIS — J81.0 ACUTE PULMONARY EDEMA: ICD-10-CM

## 2023-11-19 PROBLEM — I50.9 ACUTE EXACERBATION OF CHF (CONGESTIVE HEART FAILURE): Status: ACTIVE | Noted: 2023-11-19

## 2023-11-19 LAB
ALBUMIN SERPL-MCNC: 3.5 G/DL (ref 3.5–5.2)
ALBUMIN/GLOB SERPL: 0.9 G/DL
ALP SERPL-CCNC: 92 U/L (ref 39–117)
ALT SERPL W P-5'-P-CCNC: 10 U/L (ref 1–33)
ANION GAP SERPL CALCULATED.3IONS-SCNC: 14 MMOL/L (ref 5–15)
APTT PPP: 26.9 SECONDS (ref 61–76.5)
AST SERPL-CCNC: 13 U/L (ref 1–32)
BACTERIA UR QL AUTO: NORMAL /HPF
BH CV LOWER VASCULAR LEFT COMMON FEMORAL AUGMENT: NORMAL
BH CV LOWER VASCULAR LEFT COMMON FEMORAL COMPETENT: NORMAL
BH CV LOWER VASCULAR LEFT COMMON FEMORAL COMPRESS: NORMAL
BH CV LOWER VASCULAR LEFT COMMON FEMORAL PHASIC: NORMAL
BH CV LOWER VASCULAR LEFT COMMON FEMORAL SPONT: NORMAL
BH CV LOWER VASCULAR LEFT DISTAL FEMORAL COMPRESS: NORMAL
BH CV LOWER VASCULAR LEFT GASTRONEMIUS COMPRESS: NORMAL
BH CV LOWER VASCULAR LEFT GREATER SAPH AK COMPRESS: NORMAL
BH CV LOWER VASCULAR LEFT GREATER SAPH BK COMPRESS: NORMAL
BH CV LOWER VASCULAR LEFT LESSER SAPH COMPRESS: NORMAL
BH CV LOWER VASCULAR LEFT MID FEMORAL AUGMENT: NORMAL
BH CV LOWER VASCULAR LEFT MID FEMORAL COMPETENT: NORMAL
BH CV LOWER VASCULAR LEFT MID FEMORAL COMPRESS: NORMAL
BH CV LOWER VASCULAR LEFT MID FEMORAL PHASIC: NORMAL
BH CV LOWER VASCULAR LEFT MID FEMORAL SPONT: NORMAL
BH CV LOWER VASCULAR LEFT PERONEAL COMPRESS: NORMAL
BH CV LOWER VASCULAR LEFT POPLITEAL AUGMENT: NORMAL
BH CV LOWER VASCULAR LEFT POPLITEAL COMPETENT: NORMAL
BH CV LOWER VASCULAR LEFT POPLITEAL COMPRESS: NORMAL
BH CV LOWER VASCULAR LEFT POPLITEAL PHASIC: NORMAL
BH CV LOWER VASCULAR LEFT POPLITEAL SPONT: NORMAL
BH CV LOWER VASCULAR LEFT POSTERIOR TIBIAL COMPRESS: NORMAL
BH CV LOWER VASCULAR LEFT PROXIMAL FEMORAL COMPRESS: NORMAL
BH CV LOWER VASCULAR LEFT SAPHENOFEMORAL JUNCTION COMPRESS: NORMAL
BH CV LOWER VASCULAR RIGHT COMMON FEMORAL AUGMENT: NORMAL
BH CV LOWER VASCULAR RIGHT COMMON FEMORAL COMPETENT: NORMAL
BH CV LOWER VASCULAR RIGHT COMMON FEMORAL COMPRESS: NORMAL
BH CV LOWER VASCULAR RIGHT COMMON FEMORAL PHASIC: NORMAL
BH CV LOWER VASCULAR RIGHT COMMON FEMORAL SPONT: NORMAL
BH CV LOWER VASCULAR RIGHT DISTAL FEMORAL COMPRESS: NORMAL
BH CV LOWER VASCULAR RIGHT GASTRONEMIUS COMPRESS: NORMAL
BH CV LOWER VASCULAR RIGHT GREATER SAPH AK COMPRESS: NORMAL
BH CV LOWER VASCULAR RIGHT GREATER SAPH BK COMPRESS: NORMAL
BH CV LOWER VASCULAR RIGHT LESSER SAPH COMPRESS: NORMAL
BH CV LOWER VASCULAR RIGHT MID FEMORAL AUGMENT: NORMAL
BH CV LOWER VASCULAR RIGHT MID FEMORAL COMPETENT: NORMAL
BH CV LOWER VASCULAR RIGHT MID FEMORAL COMPRESS: NORMAL
BH CV LOWER VASCULAR RIGHT MID FEMORAL PHASIC: NORMAL
BH CV LOWER VASCULAR RIGHT MID FEMORAL SPONT: NORMAL
BH CV LOWER VASCULAR RIGHT PERONEAL COMPRESS: NORMAL
BH CV LOWER VASCULAR RIGHT POPLITEAL AUGMENT: NORMAL
BH CV LOWER VASCULAR RIGHT POPLITEAL COMPETENT: NORMAL
BH CV LOWER VASCULAR RIGHT POPLITEAL COMPRESS: NORMAL
BH CV LOWER VASCULAR RIGHT POPLITEAL PHASIC: NORMAL
BH CV LOWER VASCULAR RIGHT POPLITEAL SPONT: NORMAL
BH CV LOWER VASCULAR RIGHT POSTERIOR TIBIAL COMPRESS: NORMAL
BH CV LOWER VASCULAR RIGHT PROXIMAL FEMORAL COMPRESS: NORMAL
BH CV LOWER VASCULAR RIGHT SAPHENOFEMORAL JUNCTION COMPRESS: NORMAL
BILIRUB SERPL-MCNC: 0.3 MG/DL (ref 0–1.2)
BILIRUB UR QL STRIP: NEGATIVE
BUN SERPL-MCNC: 13 MG/DL (ref 8–23)
BUN/CREAT SERPL: 11.8 (ref 7–25)
CALCIUM SPEC-SCNC: 9.4 MG/DL (ref 8.6–10.5)
CHLORIDE SERPL-SCNC: 102 MMOL/L (ref 98–107)
CLARITY UR: CLEAR
CO2 SERPL-SCNC: 23 MMOL/L (ref 22–29)
COLOR UR: ABNORMAL
CREAT SERPL-MCNC: 1.1 MG/DL (ref 0.57–1)
D-LACTATE SERPL-SCNC: 0.8 MMOL/L (ref 0.5–2)
D-LACTATE SERPL-SCNC: 0.9 MMOL/L (ref 0.3–2)
DEPRECATED RDW RBC AUTO: 46.8 FL (ref 37–54)
EGFRCR SERPLBLD CKD-EPI 2021: 56.9 ML/MIN/1.73
EOSINOPHIL # BLD MANUAL: 0.33 10*3/MM3 (ref 0–0.4)
EOSINOPHIL NFR BLD MANUAL: 2 % (ref 0.3–6.2)
ERYTHROCYTE [DISTWIDTH] IN BLOOD BY AUTOMATED COUNT: 14.7 % (ref 12.3–15.4)
GEN 5 2HR TROPONIN T REFLEX: 32 NG/L
GLOBULIN UR ELPH-MCNC: 3.7 GM/DL
GLUCOSE SERPL-MCNC: 152 MG/DL (ref 65–99)
GLUCOSE UR STRIP-MCNC: NEGATIVE MG/DL
HCT VFR BLD AUTO: 34.5 % (ref 34–46.6)
HGB BLD-MCNC: 11.5 G/DL (ref 12–15.9)
HGB UR QL STRIP.AUTO: NEGATIVE
HYALINE CASTS UR QL AUTO: NORMAL /LPF
INR PPP: 0.98 (ref 0.93–1.1)
KETONES UR QL STRIP: NEGATIVE
L PNEUMO1 AG UR QL IA: NEGATIVE
LEUKOCYTE ESTERASE UR QL STRIP.AUTO: ABNORMAL
LYMPHOCYTES # BLD MANUAL: 1.3 10*3/MM3 (ref 0.7–3.1)
LYMPHOCYTES NFR BLD MANUAL: 1 % (ref 5–12)
MCH RBC QN AUTO: 30.5 PG (ref 26.6–33)
MCHC RBC AUTO-ENTMCNC: 33.5 G/DL (ref 31.5–35.7)
MCV RBC AUTO: 91.3 FL (ref 79–97)
MONOCYTES # BLD: 0.16 10*3/MM3 (ref 0.1–0.9)
MRSA DNA SPEC QL NAA+PROBE: NORMAL
NEUTROPHILS # BLD AUTO: 14.51 10*3/MM3 (ref 1.7–7)
NEUTROPHILS NFR BLD MANUAL: 89 % (ref 42.7–76)
NITRITE UR QL STRIP: POSITIVE
NT-PROBNP SERPL-MCNC: 9877 PG/ML (ref 0–900)
PH UR STRIP.AUTO: 6.5 [PH] (ref 5–8)
PLATELET # BLD AUTO: 618 10*3/MM3 (ref 140–450)
PMV BLD AUTO: 7 FL (ref 6–12)
POTASSIUM SERPL-SCNC: 4 MMOL/L (ref 3.5–5.2)
PROCALCITONIN SERPL-MCNC: 1.41 NG/ML (ref 0–0.25)
PROT SERPL-MCNC: 7.2 G/DL (ref 6–8.5)
PROT UR QL STRIP: NEGATIVE
PROTHROMBIN TIME: 10.7 SECONDS (ref 9.6–11.7)
QT INTERVAL: 291 MS
QTC INTERVAL: 443 MS
RBC # BLD AUTO: 3.78 10*6/MM3 (ref 3.77–5.28)
RBC # UR STRIP: NORMAL /HPF
RBC MORPH BLD: NORMAL
REF LAB TEST METHOD: NORMAL
S PNEUM AG SPEC QL LA: NEGATIVE
SCAN SLIDE: NORMAL
SMALL PLATELETS BLD QL SMEAR: ABNORMAL
SODIUM SERPL-SCNC: 139 MMOL/L (ref 136–145)
SP GR UR STRIP: 1.02 (ref 1–1.03)
SQUAMOUS #/AREA URNS HPF: NORMAL /HPF
TROPONIN T DELTA: 5 NG/L
TROPONIN T SERPL HS-MCNC: 27 NG/L
TROPONIN T SERPL HS-MCNC: 27 NG/L
TROPONIN T SERPL HS-MCNC: 34 NG/L
TROPONIN T SERPL HS-MCNC: 53 NG/L
UROBILINOGEN UR QL STRIP: ABNORMAL
VARIANT LYMPHS NFR BLD MANUAL: 8 % (ref 19.6–45.3)
WBC # UR STRIP: NORMAL /HPF
WBC MORPH BLD: NORMAL
WBC NRBC COR # BLD AUTO: 16.3 10*3/MM3 (ref 3.4–10.8)

## 2023-11-19 PROCEDURE — 83605 ASSAY OF LACTIC ACID: CPT | Performed by: NURSE PRACTITIONER

## 2023-11-19 PROCEDURE — 25010000002 FENTANYL CITRATE (PF) 100 MCG/2ML SOLUTION: Performed by: ANESTHESIOLOGY

## 2023-11-19 PROCEDURE — 85025 COMPLETE CBC W/AUTO DIFF WBC: CPT | Performed by: NURSE PRACTITIONER

## 2023-11-19 PROCEDURE — 25810000003 SODIUM CHLORIDE 0.9 % SOLUTION 250 ML FLEX CONT: Performed by: UROLOGY

## 2023-11-19 PROCEDURE — 87086 URINE CULTURE/COLONY COUNT: CPT | Performed by: NURSE PRACTITIONER

## 2023-11-19 PROCEDURE — 71275 CT ANGIOGRAPHY CHEST: CPT

## 2023-11-19 PROCEDURE — 87040 BLOOD CULTURE FOR BACTERIA: CPT | Performed by: NURSE PRACTITIONER

## 2023-11-19 PROCEDURE — 87449 NOS EACH ORGANISM AG IA: CPT | Performed by: NURSE PRACTITIONER

## 2023-11-19 PROCEDURE — 85610 PROTHROMBIN TIME: CPT | Performed by: NURSE PRACTITIONER

## 2023-11-19 PROCEDURE — 93005 ELECTROCARDIOGRAM TRACING: CPT | Performed by: EMERGENCY MEDICINE

## 2023-11-19 PROCEDURE — 99285 EMERGENCY DEPT VISIT HI MDM: CPT

## 2023-11-19 PROCEDURE — 71045 X-RAY EXAM CHEST 1 VIEW: CPT

## 2023-11-19 PROCEDURE — 83605 ASSAY OF LACTIC ACID: CPT

## 2023-11-19 PROCEDURE — 81001 URINALYSIS AUTO W/SCOPE: CPT | Performed by: NURSE PRACTITIONER

## 2023-11-19 PROCEDURE — 0 IOHEXOL 300 MG/ML SOLUTION: Performed by: UROLOGY

## 2023-11-19 PROCEDURE — 85007 BL SMEAR W/DIFF WBC COUNT: CPT | Performed by: NURSE PRACTITIONER

## 2023-11-19 PROCEDURE — 36415 COLL VENOUS BLD VENIPUNCTURE: CPT

## 2023-11-19 PROCEDURE — 85730 THROMBOPLASTIN TIME PARTIAL: CPT | Performed by: NURSE PRACTITIONER

## 2023-11-19 PROCEDURE — BT141ZZ FLUOROSCOPY OF KIDNEYS, URETERS AND BLADDER USING LOW OSMOLAR CONTRAST: ICD-10-PCS | Performed by: UROLOGY

## 2023-11-19 PROCEDURE — 25010000002 CEFEPIME PER 500 MG: Performed by: ANESTHESIOLOGY

## 2023-11-19 PROCEDURE — 74176 CT ABD & PELVIS W/O CONTRAST: CPT

## 2023-11-19 PROCEDURE — 25010000002 FUROSEMIDE PER 20 MG: Performed by: NURSE PRACTITIONER

## 2023-11-19 PROCEDURE — 25010000002 VANCOMYCIN HCL IN NACL 1.75-0.9 GM/500ML-% SOLUTION: Performed by: NURSE PRACTITIONER

## 2023-11-19 PROCEDURE — 25010000002 VANCOMYCIN 750 MG RECONSTITUTED SOLUTION 1 EACH VIAL: Performed by: UROLOGY

## 2023-11-19 PROCEDURE — 84145 PROCALCITONIN (PCT): CPT | Performed by: NURSE PRACTITIONER

## 2023-11-19 PROCEDURE — 25010000002 MIDAZOLAM PER 1 MG: Performed by: ANESTHESIOLOGY

## 2023-11-19 PROCEDURE — 84484 ASSAY OF TROPONIN QUANT: CPT | Performed by: NURSE PRACTITIONER

## 2023-11-19 PROCEDURE — 80053 COMPREHEN METABOLIC PANEL: CPT | Performed by: NURSE PRACTITIONER

## 2023-11-19 PROCEDURE — 25510000001 IOPAMIDOL PER 1 ML: Performed by: EMERGENCY MEDICINE

## 2023-11-19 PROCEDURE — 0T788DZ DILATION OF BILATERAL URETERS WITH INTRALUMINAL DEVICE, VIA NATURAL OR ARTIFICIAL OPENING ENDOSCOPIC: ICD-10-PCS | Performed by: UROLOGY

## 2023-11-19 PROCEDURE — C2617 STENT, NON-COR, TEM W/O DEL: HCPCS | Performed by: UROLOGY

## 2023-11-19 PROCEDURE — 84484 ASSAY OF TROPONIN QUANT: CPT | Performed by: EMERGENCY MEDICINE

## 2023-11-19 PROCEDURE — 87899 AGENT NOS ASSAY W/OPTIC: CPT | Performed by: NURSE PRACTITIONER

## 2023-11-19 PROCEDURE — 36415 COLL VENOUS BLD VENIPUNCTURE: CPT | Performed by: NURSE PRACTITIONER

## 2023-11-19 PROCEDURE — 93970 EXTREMITY STUDY: CPT

## 2023-11-19 PROCEDURE — 25010000002 CEFEPIME PER 500 MG: Performed by: NURSE PRACTITIONER

## 2023-11-19 PROCEDURE — 25010000002 CEFEPIME PER 500 MG: Performed by: UROLOGY

## 2023-11-19 PROCEDURE — 85025 COMPLETE CBC W/AUTO DIFF WBC: CPT | Performed by: UROLOGY

## 2023-11-19 PROCEDURE — 25010000002 PROPOFOL 200 MG/20ML EMULSION: Performed by: ANESTHESIOLOGY

## 2023-11-19 PROCEDURE — 25810000003 LACTATED RINGERS PER 1000 ML: Performed by: ANESTHESIOLOGY

## 2023-11-19 PROCEDURE — 87641 MR-STAPH DNA AMP PROBE: CPT | Performed by: NURSE PRACTITIONER

## 2023-11-19 PROCEDURE — C1769 GUIDE WIRE: HCPCS | Performed by: UROLOGY

## 2023-11-19 PROCEDURE — 83880 ASSAY OF NATRIURETIC PEPTIDE: CPT | Performed by: NURSE PRACTITIONER

## 2023-11-19 DEVICE — URETERAL STENT
Type: IMPLANTABLE DEVICE | Site: URETER | Status: FUNCTIONAL
Brand: PERCUFLEX™ PLUS

## 2023-11-19 RX ORDER — BISACODYL 5 MG/1
5 TABLET, DELAYED RELEASE ORAL DAILY PRN
Status: DISCONTINUED | OUTPATIENT
Start: 2023-11-19 | End: 2023-11-22 | Stop reason: HOSPADM

## 2023-11-19 RX ORDER — FENTANYL CITRATE 50 UG/ML
INJECTION, SOLUTION INTRAMUSCULAR; INTRAVENOUS AS NEEDED
Status: DISCONTINUED | OUTPATIENT
Start: 2023-11-19 | End: 2023-11-19 | Stop reason: SURG

## 2023-11-19 RX ORDER — ENOXAPARIN SODIUM 100 MG/ML
40 INJECTION SUBCUTANEOUS DAILY
Status: DISCONTINUED | OUTPATIENT
Start: 2023-11-19 | End: 2023-11-21

## 2023-11-19 RX ORDER — IPRATROPIUM BROMIDE AND ALBUTEROL SULFATE 2.5; .5 MG/3ML; MG/3ML
3 SOLUTION RESPIRATORY (INHALATION) EVERY 4 HOURS PRN
Status: DISCONTINUED | OUTPATIENT
Start: 2023-11-19 | End: 2023-11-22 | Stop reason: HOSPADM

## 2023-11-19 RX ORDER — MULTIPLE VITAMINS W/ MINERALS TAB 9MG-400MCG
1 TAB ORAL DAILY
COMMUNITY

## 2023-11-19 RX ORDER — CEFEPIME HYDROCHLORIDE 2 G/1
INJECTION, POWDER, FOR SOLUTION INTRAVENOUS AS NEEDED
Status: DISCONTINUED | OUTPATIENT
Start: 2023-11-19 | End: 2023-11-19 | Stop reason: SURG

## 2023-11-19 RX ORDER — FUROSEMIDE 10 MG/ML
40 INJECTION INTRAMUSCULAR; INTRAVENOUS ONCE
Status: COMPLETED | OUTPATIENT
Start: 2023-11-19 | End: 2023-11-19

## 2023-11-19 RX ORDER — SODIUM CHLORIDE 0.9 % (FLUSH) 0.9 %
10 SYRINGE (ML) INJECTION AS NEEDED
Status: DISCONTINUED | OUTPATIENT
Start: 2023-11-19 | End: 2023-11-19 | Stop reason: SDUPTHER

## 2023-11-19 RX ORDER — SODIUM CHLORIDE, SODIUM LACTATE, POTASSIUM CHLORIDE, CALCIUM CHLORIDE 600; 310; 30; 20 MG/100ML; MG/100ML; MG/100ML; MG/100ML
INJECTION, SOLUTION INTRAVENOUS CONTINUOUS PRN
Status: DISCONTINUED | OUTPATIENT
Start: 2023-11-19 | End: 2023-11-19 | Stop reason: SURG

## 2023-11-19 RX ORDER — NICOTINE 21 MG/24HR
1 PATCH, TRANSDERMAL 24 HOURS TRANSDERMAL
Status: DISCONTINUED | OUTPATIENT
Start: 2023-11-19 | End: 2023-11-22 | Stop reason: HOSPADM

## 2023-11-19 RX ORDER — PROPOFOL 10 MG/ML
INJECTION, EMULSION INTRAVENOUS AS NEEDED
Status: DISCONTINUED | OUTPATIENT
Start: 2023-11-19 | End: 2023-11-19 | Stop reason: SURG

## 2023-11-19 RX ORDER — ACETAMINOPHEN 160 MG/5ML
650 SOLUTION ORAL EVERY 4 HOURS PRN
Status: DISCONTINUED | OUTPATIENT
Start: 2023-11-19 | End: 2023-11-22 | Stop reason: HOSPADM

## 2023-11-19 RX ORDER — ALUMINA, MAGNESIA, AND SIMETHICONE 2400; 2400; 240 MG/30ML; MG/30ML; MG/30ML
15 SUSPENSION ORAL EVERY 6 HOURS PRN
Status: DISCONTINUED | OUTPATIENT
Start: 2023-11-19 | End: 2023-11-22 | Stop reason: HOSPADM

## 2023-11-19 RX ORDER — VANCOMYCIN 1.75 GRAM/500 ML IN 0.9 % SODIUM CHLORIDE INTRAVENOUS
1750 ONCE
Qty: 500 ML | Refills: 0 | Status: COMPLETED | OUTPATIENT
Start: 2023-11-19 | End: 2023-11-19

## 2023-11-19 RX ORDER — SODIUM CHLORIDE 0.9 % (FLUSH) 0.9 %
10 SYRINGE (ML) INJECTION AS NEEDED
Status: DISCONTINUED | OUTPATIENT
Start: 2023-11-19 | End: 2023-11-22 | Stop reason: HOSPADM

## 2023-11-19 RX ORDER — CHOLECALCIFEROL (VITAMIN D3) 125 MCG
5 CAPSULE ORAL NIGHTLY PRN
Status: DISCONTINUED | OUTPATIENT
Start: 2023-11-19 | End: 2023-11-22 | Stop reason: HOSPADM

## 2023-11-19 RX ORDER — ACETAMINOPHEN 650 MG/1
650 SUPPOSITORY RECTAL EVERY 4 HOURS PRN
Status: DISCONTINUED | OUTPATIENT
Start: 2023-11-19 | End: 2023-11-22 | Stop reason: HOSPADM

## 2023-11-19 RX ORDER — POLYETHYLENE GLYCOL 3350 17 G/17G
17 POWDER, FOR SOLUTION ORAL DAILY PRN
Status: DISCONTINUED | OUTPATIENT
Start: 2023-11-19 | End: 2023-11-22 | Stop reason: HOSPADM

## 2023-11-19 RX ORDER — METOPROLOL TARTRATE 1 MG/ML
5 INJECTION, SOLUTION INTRAVENOUS ONCE
Status: COMPLETED | OUTPATIENT
Start: 2023-11-19 | End: 2023-11-19

## 2023-11-19 RX ORDER — BISACODYL 10 MG
10 SUPPOSITORY, RECTAL RECTAL DAILY PRN
Status: DISCONTINUED | OUTPATIENT
Start: 2023-11-19 | End: 2023-11-22 | Stop reason: HOSPADM

## 2023-11-19 RX ORDER — PHENYLEPHRINE HCL IN 0.9% NACL 1 MG/10 ML
SYRINGE (ML) INTRAVENOUS AS NEEDED
Status: DISCONTINUED | OUTPATIENT
Start: 2023-11-19 | End: 2023-11-19 | Stop reason: SURG

## 2023-11-19 RX ORDER — MIDAZOLAM HYDROCHLORIDE 1 MG/ML
INJECTION INTRAMUSCULAR; INTRAVENOUS AS NEEDED
Status: DISCONTINUED | OUTPATIENT
Start: 2023-11-19 | End: 2023-11-19 | Stop reason: SURG

## 2023-11-19 RX ORDER — SODIUM CHLORIDE 0.9 % (FLUSH) 0.9 %
10 SYRINGE (ML) INJECTION EVERY 12 HOURS SCHEDULED
Status: DISCONTINUED | OUTPATIENT
Start: 2023-11-19 | End: 2023-11-22 | Stop reason: HOSPADM

## 2023-11-19 RX ORDER — ONDANSETRON 4 MG/1
4 TABLET, FILM COATED ORAL EVERY 6 HOURS PRN
Status: DISCONTINUED | OUTPATIENT
Start: 2023-11-19 | End: 2023-11-22 | Stop reason: HOSPADM

## 2023-11-19 RX ORDER — ACETAMINOPHEN 325 MG/1
650 TABLET ORAL EVERY 4 HOURS PRN
Status: DISCONTINUED | OUTPATIENT
Start: 2023-11-19 | End: 2023-11-22 | Stop reason: HOSPADM

## 2023-11-19 RX ORDER — FUROSEMIDE 10 MG/ML
40 INJECTION INTRAMUSCULAR; INTRAVENOUS EVERY 12 HOURS
Status: DISCONTINUED | OUTPATIENT
Start: 2023-11-19 | End: 2023-11-20

## 2023-11-19 RX ORDER — FENTANYL CITRATE 50 UG/ML
50 INJECTION, SOLUTION INTRAMUSCULAR; INTRAVENOUS
Status: DISCONTINUED | OUTPATIENT
Start: 2023-11-19 | End: 2023-11-19 | Stop reason: HOSPADM

## 2023-11-19 RX ORDER — AMOXICILLIN 250 MG
2 CAPSULE ORAL 2 TIMES DAILY
Status: DISCONTINUED | OUTPATIENT
Start: 2023-11-19 | End: 2023-11-22 | Stop reason: HOSPADM

## 2023-11-19 RX ORDER — ONDANSETRON 2 MG/ML
4 INJECTION INTRAMUSCULAR; INTRAVENOUS EVERY 6 HOURS PRN
Status: DISCONTINUED | OUTPATIENT
Start: 2023-11-19 | End: 2023-11-22 | Stop reason: HOSPADM

## 2023-11-19 RX ORDER — SODIUM CHLORIDE 9 MG/ML
40 INJECTION, SOLUTION INTRAVENOUS AS NEEDED
Status: DISCONTINUED | OUTPATIENT
Start: 2023-11-19 | End: 2023-11-22 | Stop reason: HOSPADM

## 2023-11-19 RX ADMIN — Medication 10 ML: at 20:43

## 2023-11-19 RX ADMIN — CEFEPIME 2000 MG: 2 INJECTION, POWDER, FOR SOLUTION INTRAVENOUS at 04:01

## 2023-11-19 RX ADMIN — SODIUM CHLORIDE, SODIUM LACTATE, POTASSIUM CHLORIDE, AND CALCIUM CHLORIDE: .6; .31; .03; .02 INJECTION, SOLUTION INTRAVENOUS at 13:37

## 2023-11-19 RX ADMIN — VANCOMYCIN HYDROCHLORIDE 750 MG: 750 INJECTION, POWDER, LYOPHILIZED, FOR SOLUTION INTRAVENOUS at 20:43

## 2023-11-19 RX ADMIN — MIDAZOLAM 2 MG: 1 INJECTION INTRAMUSCULAR; INTRAVENOUS at 13:38

## 2023-11-19 RX ADMIN — FENTANYL CITRATE 100 MCG: 50 INJECTION, SOLUTION INTRAMUSCULAR; INTRAVENOUS at 13:38

## 2023-11-19 RX ADMIN — FUROSEMIDE 40 MG: 10 INJECTION, SOLUTION INTRAMUSCULAR; INTRAVENOUS at 04:01

## 2023-11-19 RX ADMIN — Medication 10 ML: at 08:58

## 2023-11-19 RX ADMIN — CEFEPIME 2000 MG: 2 INJECTION, POWDER, FOR SOLUTION INTRAVENOUS at 22:15

## 2023-11-19 RX ADMIN — SENNOSIDES AND DOCUSATE SODIUM 2 TABLET: 50; 8.6 TABLET ORAL at 20:55

## 2023-11-19 RX ADMIN — METOPROLOL TARTRATE 5 MG: 1 INJECTION, SOLUTION INTRAVENOUS at 04:46

## 2023-11-19 RX ADMIN — IOPAMIDOL 100 ML: 755 INJECTION, SOLUTION INTRAVENOUS at 03:18

## 2023-11-19 RX ADMIN — VANCOMYCIN 1.75 GRAM/500 ML IN 0.9 % SODIUM CHLORIDE INTRAVENOUS 1750 MG: at 05:01

## 2023-11-19 RX ADMIN — Medication 1 PATCH: at 08:58

## 2023-11-19 RX ADMIN — Medication 200 MCG: at 14:01

## 2023-11-19 RX ADMIN — CEFEPIME 2 G: 2 INJECTION, POWDER, FOR SOLUTION INTRAVENOUS at 13:45

## 2023-11-19 RX ADMIN — PROPOFOL 200 MG: 10 INJECTION, EMULSION INTRAVENOUS at 13:41

## 2023-11-19 NOTE — ANESTHESIA PROCEDURE NOTES
Airway  Date/Time: 11/19/2023 1:42 PM  Airway not difficult    General Information and Staff    Patient location during procedure: OR  Anesthesiologist: Hector Nguyen MD    Indications and Patient Condition  Indications for airway management: airway protection    Preoxygenated: yes  MILS maintained throughout  Mask difficulty assessment: 0 - not attempted    Final Airway Details  Final airway type: supraglottic airway      Successful airway: I-gel  Size 4     Number of attempts at approach: 1  Assessment: lips, teeth, and gum same as pre-op and atraumatic intubation

## 2023-11-19 NOTE — H&P
[facility] Medicine Services    Hospitalist History and Physical     Neva Kurtz : 1960 MRN:[id number] LOS:0 ROOM:      Reason for admission: New onset CHF, Sinus tachycardia, Pneumonia    Assessment / Plan     New onset CHF exacerbation  Sinus tachycardia  - CT PE Protocol: no PE, interlobular septal thickening, peribronchial thickening and mosaic attenuation in the lungs with small bilateral pleural effusions. This is favored to reflect pulmonary edema. There are a few focal areas of airspace disease in the lungs that could be related to edema or pneumonia.   - proBNP 9877, HS troponin 27, repeat troponins   - type of CHF unknown due to no previous echo  - 40mg IV lasix given in ED and will continue q12 hours  - give 5mg IV metoprolol now, discussed with attending  - pt has a history of SVT   - check 2D echo  - strict Is & Os  - check venous duplex r/o DVTs  - consult cardiology for further recommendations     Pneumonia  - CT as above showed there are a few focal areas of airspace disease in the lungs that could be related to edema or pneumonia.   - WBC 16.30  - subjective fever, no fever currently  - IV cefepime, vancomycin  - requiring 1L O2 via NC  - follow blood cultures, check procalcitonin, urine antigens   - continuous pulse oximetry  - bronchodilators     Severe left sided hydronephrosis  Obstructive uropathy  - CT: severe left-sided hydronephrosis and diminished enhancement of the left renal parenchyma, which is only partially seen on this study, but concerning for obstructive uropathy of indeterminate age.   - recent recurrent UTI, pansensitive treated with cefdinir  - IV antibiotics as above  - bladder scan only showed 160  - consult urology for further recommendations    Thrombocytosis  - plts 618  - start VTE lovenox    History of SVT  - pt is on no home medications for SVT as this was over 20 years ago and she had some type of procedure done for the SVT but unsure what. She  believes she was on metoprolol in the past      Tobacco abuse  - encourage cessation  - nicotine patch     DVT prophylaxis:  Medical DVT prophylaxis orders are present.     History of Present illness     Neva Kurtz is a 63 y.o. female with PMH of SVT not currently on medications who presented to [facility] ED 11/19/2023 with complaints of shortness of breath around 1am this morning. She woke up and felt like she could not take in a deep breath. She felt pressure in her mid chest. She was just discharged from here on 11/14 after treatment for a UTI. She has been taking her cefdinir. Daughter who works in healthcare at the bedside stated her HR was high during that admission 120s-130s and it was thought to be secondary to her infection. She has had lower extremity swelling since her discharge. She stated she started to urinate better after being discharged but then today had difficulty urinating again.   She has no PCP and does not routinely see a physician.     In the ED labs showed proBNP 9877, high-sensitivity troponin 27, creatinine 1.10, glucose 152, WBC 16.3, Hgb 11.5, platelets 618.  CXR showed increased interstitial disease that could reflect interstitial pulmonary edema.  CT PE protocol showed no PE, interlobular septal thickening, peribronchial thickening and mosaic attenuation in the lungs with small bilateral pleural effusions. This is favored to reflect pulmonary edema. There are a few focal areas of airspace disease in the lungs that could be related to edema or pneumonia. There is severe left-sided hydronephrosis and diminished enhancement of the left renal parenchyma, which is only partially seen on this study, but concerning for obstructive uropathy of indeterminate age. 5.28 mm left thyroid lobe nodule. This could be evaluated with ultrasound. Fat-containing hiatal hernia. Evidence of prior granulomatous infection.  EKG showed sinus tachycardia rate 139. She was afebrile but with chills, elevated  respiratory rate, blood pressure 150/92, mildly hypoxic placed on 1L O2 via nasal cannula with improvement in oxygen saturation 95%. She was given IV cefepime, IV vancomycin, 40 mg IV Lasix.  She was admitted for further treatment of pneumonia with hypoxia and new onset CHF.    Family history: Father  in his 40s from blood clot       Subjective / Review of systems     Review of Systems   Constitutional:  Positive for chills.   HENT: Negative.     Eyes: Negative.    Respiratory:  Positive for chest tightness and shortness of breath.    Cardiovascular:  Positive for chest pain, palpitations and leg swelling.   Gastrointestinal: Negative.    Genitourinary: Negative.    Musculoskeletal: Negative.    Skin: Negative.    Neurological: Negative.    Psychiatric/Behavioral: Negative.          Past Medical/Surgical/Social/Family History & Allergies     Past Medical History:   Diagnosis Date    SVT (supraventricular tachycardia)       Past Surgical History:   Procedure Laterality Date    BREAST BIOPSY        Social History     Socioeconomic History    Marital status: Single   Tobacco Use    Smoking status: Every Day     Packs/day: 1     Types: Cigarettes     Passive exposure: Never    Smokeless tobacco: Never   Vaping Use    Vaping Use: Never used   Substance and Sexual Activity    Alcohol use: Never    Drug use: Never    Sexual activity: Defer      No family history on file.   Allergies   Allergen Reactions    Other Other (See Comments)     tetramycin    Tetracyclines & Related Swelling      Social Determinants of Health     Tobacco Use: High Risk (2023)    Patient History     Smoking Tobacco Use: Every Day     Smokeless Tobacco Use: Never     Passive Exposure: Never   Alcohol Use: Not At Risk (2023)    AUDIT-C     Frequency of Alcohol Consumption: Never     Average Number of Drinks: Patient does not drink     Frequency of Binge Drinking: Never   Financial Resource Strain: Not on file   Food Insecurity: Not on  file   Transportation Needs: Not on file   Physical Activity: Not on file   Stress: Not on file   Social Connections: Unknown (10/12/2023)    Family and Community Support     Help with Day-to-Day Activities: Not on file     Lonely or Isolated: Not on file   Interpersonal Safety: Not At Risk (11/19/2023)    Abuse Screen     Unsafe at Home or Work/School: no     Feels Threatened by Someone?: no     Does Anyone Keep You from Contacting Others or Doint Things Outside the Home?: no     Physical Sign of Abuse Present: no   Depression: Not on file   Housing Stability: Unknown (11/13/2023)    Housing Stability     Current Living Arrangements: home     Potentially Unsafe Housing Conditions: Not on file   Utilities: Not on file   Health Literacy: Unknown (10/12/2023)    Education     Help with school or training?: Not on file     Preferred Language: Not on file   Employment: Unknown (10/12/2023)    Employment     Do you want help finding or keeping work or a job?: Not on file   Disabilities: Not At Risk (11/13/2023)    Disabilities     Concentrating, Remembering, or Making Decisions Difficulty: no     Doing Errands Independently Difficulty: no        Home Medications     Prior to Admission medications    Medication Sig Start Date End Date Taking? Authorizing Provider   cefdinir (OMNICEF) 300 MG capsule Take 1 capsule by mouth 2 (Two) Times a Day for 7 days. 11/12/23 11/19/23  Claudia Sr APRN   cetirizine (zyrTEC) 10 MG tablet Take 1 tablet by mouth Daily.    ProviderDenis MD   ibuprofen (ADVIL,MOTRIN) 400 MG tablet Take 1 tablet by mouth Every 6 (Six) Hours As Needed for Mild Pain.    ProviderDenis MD   phenazopyridine (Pyridium) 100 MG tablet Take 1 tablet by mouth 3 (Three) Times a Day. 11/12/23   Claudia Sr APRN        Objective / Physical Exam     Vital signs:  Temp: 98.6 °F (37 °C)  BP: 158/92  Heart Rate: (!) 150  Resp: 20  SpO2: 95 %  Weight: 99.2 kg (218 lb 11.1 oz)    Admission Weight:  Weight: 99.2 kg (218 lb 11.1 oz)    Physical Exam  Vitals and nursing note reviewed.   HENT:      Head: Normocephalic and atraumatic.   Eyes:      Extraocular Movements: Extraocular movements intact.      Pupils: Pupils are equal, round, and reactive to light.   Cardiovascular:      Rate and Rhythm: Regular rhythm. Tachycardia present.      Pulses: Normal pulses.      Heart sounds: Normal heart sounds.   Pulmonary:      Effort: Tachypnea present.      Breath sounds: Examination of the right-lower field reveals rales. Examination of the left-lower field reveals rales.   Abdominal:      General: Bowel sounds are normal.      Palpations: Abdomen is soft.      Tenderness: There is no abdominal tenderness.   Musculoskeletal:         General: Normal range of motion.      Right lower le+ Edema present.      Left lower le+ Edema present.   Skin:     General: Skin is warm and dry.   Neurological:      Mental Status: She is alert and oriented to person, place, and time.   Psychiatric:         Mood and Affect: Mood normal.         Behavior: Behavior normal.          Labs     Results from last 7 days   Lab Units 23  0207 23  1014 23  0909   WBC 10*3/mm3 16.30* 16.00* 12.30*   HEMOGLOBIN g/dL 11.5* 10.7* 11.3*   HEMATOCRIT % 34.5 31.8* 33.6*   PLATELETS 10*3/mm3 618* 340 491*      Results from last 7 days   Lab Units 23  0207 23  1307   ALK PHOS U/L 92 96   AST (SGOT) U/L 13 15   ALT (SGPT) U/L 10 9      Results from last 7 days   Lab Units 23  0207   PROTIME Seconds 10.7   INR  0.98   APTT seconds 26.9*      Results from last 7 days   Lab Units 23  0207 23  1014 23  1307 23  0909   SODIUM mmol/L 139 137 135* 135*   POTASSIUM mmol/L 4.0 4.3 3.8 4.1   CHLORIDE mmol/L 102 104 102 99   CO2 mmol/L 23.0 21.0* 19.0* 22.0   BUN mg/dL 13 13 12 12   CREATININE mg/dL 1.10* 1.02* 0.94 0.99   GLUCOSE mg/dL 152* 106* 122* 148*        Imaging     CT Angiogram Chest Pulmonary  Embolism    Result Date: 11/19/2023  CT ANGIOGRAM CHEST PULMONARY EMBOLISM Date of Exam: 11/19/2023 3:08 AM EST Indication: Pulmonary embolism (PE) suspected, high prob. Comparison: Chest radiograph performed earlier today. Technique: Axial CT images were obtained of the chest after the uneventful intravenous administration of iodinated contrast utilizing pulmonary embolism protocol.  Sagittal and coronal reconstructions were performed.  Automated exposure control and iterative reconstruction methods were used. Findings: There is interlobular septal thickening noted throughout the lungs. There is associated peribronchial thickening and mosaic attenuation in the lungs. There are small bilateral pleural effusions. There are a few focal areas of airspace disease in the lungs including in the right upper lobe on image 64, where there is a nodular density with central aeration or cavitation measuring 18 mm diameter. The left thyroid lobe appears enlarged likely due to a nodule measuring 28 mm. The aorta exhibits mild atherosclerotic plaque. There are a few shelflike plaques in the aorta most pronounced on image 142. The heart size is normal. No pericardial effusion.  There is a fat-containing hiatal hernia. There are multiple calcified mediastinal granulomas. There is no evidence of pulmonary embolism. There are mildly enlarged bilateral hilar and subcarinal lymph nodes. There are no acute findings in the superficial soft tissues. There is severe left-sided hydronephrosis and diminished enhancement of the left renal parenchyma, which is only partially seen on this study, but concerning for obstructive uropathy of indeterminate age. There are no acute osseous abnormalities or destructive bone lesions. There are mild diffuse thoracic degenerative changes.     Impression: 1.No evidence of pulmonary embolism. 2.There is interlobular septal thickening, peribronchial thickening and mosaic attenuation in the lungs with small  bilateral pleural effusions. This is favored to reflect pulmonary edema. 3.There are a few focal areas of airspace disease in the lungs that could be related to edema or pneumonia. 4.There is severe left-sided hydronephrosis and diminished enhancement of the left renal parenchyma, which is only partially seen on this study, but concerning for obstructive uropathy of indeterminate age. 5.28 mm left thyroid lobe nodule. This could be evaluated with ultrasound. 6.Fat-containing hiatal hernia. 7.Evidence of prior granulomatous infection. Electronically Signed: Salvador Love MD  11/19/2023 3:26 AM EST  Workstation ID: [id number]    XR Chest 1 View    Result Date: 11/19/2023  XR CHEST 1 VW Date of Exam: 11/19/2023 2:06 AM EST Indication: dyspnea Comparison: 11/13/2023. Findings: There is increased interstitial disease with curly B lines. Cardiac silhouette is unchanged. Pulmonary vasculature is partially indistinct. No pneumothorax or pleural effusion. No lobar consolidation. No acute osseous abnormality.     Impression: Increased interstitial disease that could reflect interstitial pulmonary edema. Electronically Signed: Salvador Love MD  11/19/2023 2:35 AM EST  Workstation ID: [id number]        Current Medications     Scheduled Meds:  enoxaparin, 40 mg, Subcutaneous, Daily  furosemide, 40 mg, Intravenous, Q12H  nicotine, 1 patch, Transdermal, Q24H  senna-docusate sodium, 2 tablet, Oral, BID  sodium chloride, 10 mL, Intravenous, Q12H  vancomycin, 1,750 mg, Intravenous, Once        ZAN Galdamez   [facility]  11/19/23   04:54 EST

## 2023-11-19 NOTE — ANESTHESIA POSTPROCEDURE EVALUATION
Patient: Neva Kurtz    Procedure Summary       Date: 11/19/23 Room / Location: [facility] OR 08 / [facility] MAIN OR    Anesthesia Start: 1337 Anesthesia Stop: 1407    Procedure: CYSTOSCOPY URETEROSCOPY RETROGRADE PYELOGRAM STENT INSERTION (Bilateral) Diagnosis:     Surgeons: Alan Rodriguez MD Provider: Hecotr Nguyen MD    Anesthesia Type: general ASA Status: 3            Anesthesia Type: general    Vitals  Vitals Value Taken Time   /57 11/19/23 1438   Temp 98.6 °F (37 °C) 11/19/23 1408   Pulse 84 11/19/23 1439   Resp 18 11/19/23 1423   SpO2 95 % 11/19/23 1439   Vitals shown include unfiled device data.        Post Anesthesia Care and Evaluation    Patient location during evaluation: PACU  Patient participation: complete - patient participated  Level of consciousness: awake  Pain scale: See nurse's notes for pain score.  Pain management: adequate    Airway patency: patent  Anesthetic complications: No anesthetic complications  PONV Status: none  Cardiovascular status: acceptable  Respiratory status: acceptable and spontaneous ventilation  Hydration status: acceptable    Comments: Patient seen and examined postoperatively; vital signs stable; SpO2 greater than or equal to 90%; cardiopulmonary status stable; nausea/vomiting adequately controlled; pain adequately controlled; no apparent anesthesia complications; patient discharged from anesthesia care when discharge criteria were met

## 2023-11-19 NOTE — OP NOTE
CYSTOSCOPY URETEROSCOPY RETROGRADE PYELOGRAM STENT INSERTION  Procedure Report    Patient Name:  Neva Kurtz  YOB: 1960    Date of Surgery:  11/19/2023     Indications: 63-year-old woman with bilateral hydronephrosis.  She has an apparent left UPJ obstruction on the left side.  There is abnormal insertion of the ureter on the right side with hydronephrosis all the way down to the bladder on the right side.    Pre-op Diagnosis:   Bilateral hydronephrosis       Post-Op Diagnosis Codes:  Bilateral hydronephrosis    Procedure/CPT® Codes:      Procedure(s):  CYSTOSCOPY, bilateral RETROGRADE PYELOGRAM, bilateral ureteral STENT INSERTION    Staff:  Surgeon(s):  Alan Rodriguez MD            was responsible for performing the following activities:  None  and their skilled assistance was necessary for the success of this case.    Anesthesia: General    Estimated Blood Loss: none    Implants:    Implant Name Type Inv. Item Serial No.  Lot No. LRB No. Used Action   STNT PERCUFLX NO GW [id number] - [id number] Stent STNT PERCUFLX NO GW [id number]  [employer] [id number] Right 1 Implanted   STNT PERCUFLX NO GW 6X26 - [id number] Stent STNT PERCUFLX NO GW [id number]  [employer] [id number] Left 1 Implanted       Specimen:          None      Findings: Severe pelvic prolapse.  Right retrograde pyelogram interpretation is severe hydroureteronephrosis down to the bladder likely due to to her severe prolapse.  Left retrograde pyelogram revealed severe left hydronephrosis to the UPJ where there is some narrowing.    Complications: None    Description of Procedure: Patient induced with general anesthesia and placed in dorsolithotomy position.  Prepped and draped in sterile fashion.  22 Moldovan cystoscope introduced under direct vision after the pelvic prolapse had been reduced using a sterile moistened towel.  Urethra is within normal limits.  Bladder itself appears normal with no tumor, stone, foreign  body.  Difficult to see the ureteral orifices because of prolapse but I was able to find them eventually.  Guidewires passed through the cystoscope and the right ureteral orifice intubated.  Opening catheter was passed over the wire and retrograde pyelogram was performed with the above-noted findings.  I was able to maneuver the wire into the right renal pelvis.  A 6 Bangladeshi by 26 cm ureteral stent was passed over the wire and up into the right kidney under fluoroscopy.  The wire was removed.  A good curl was seen in the right kidney under fluoroscopy good curl seen the bladder endoscopically.  Attention was then turned to the left side.  Again I was able to find the ureteral orifice and wire was used to intubate the left ureteral orifice.  The opening catheter was passed over the the wire and the retrograde pyelogram was performed with the above-noted findings.  The wire was advanced into the renal pelvis and there was drainage of a large amount of purulent urine.  A 6 Bangladeshi by 26 cm ureteral stent was passed over the wire and up in the left kidney under fluoroscopy.  The wire was removed.  A good curl was seen in the left kidney under fluoroscopy good curl seen in the bladder endoscopically.  Again there continue to be a large amount of purulent drainage from the left side.  Patient's bladder was emptied and the cystoscope was removed.  The Tylenol pessary was then removed.  The patient was taken out of the dorsolithotomy position and awakened from general anesthesia.  She was transported to the postanesthesia care unit in stable condition having tolerated the procedure well without any complications.    Patient will need to see gynecologist for a possible hysterectomy and repair of her prolapse.  She will then need further evaluation of her ureters particularly at the left ureteropelvic junction.  This would be part of a planned staged procedure.      Alan Rodriguez MD     Date: 11/19/2023  Time: 14:43  EST

## 2023-11-19 NOTE — PROGRESS NOTES
Hospital Medicine Services   Daily Progress Note    Patient Name: Neva Kurtz  : 1960  MRN: [id number]  Primary Care Physician:  Provider, No Known  Date of admission: 2023  Date of service 2023      Subjective      Chief Complaint: Shortness of breath    Patient Reportsmild shortness of breath.  Feeling better.  Shortness of breath is improved.  Mild leg swelling.  Leg swelling is improved.  No chest pain currently.  No nausea vomiting.  No dizziness no lightheadedness.    ROS A 12 point review of system was done and was negative except as mentioned above      Objective      Vitals:   Temp:  [98.6 °F (37 °C)] 98.6 °F (37 °C)  Heart Rate:  [104-150] 104  Resp:  [20] 20  BP: (156-168)/(82-92) 158/92    Physical Exam  Constitutional:       Appearance: Normal appearance.   HENT:      Head: Normocephalic and atraumatic.      Nose: Nose normal.      Mouth/Throat:      Mouth: Mucous membranes are moist.   Cardiovascular:      Rate and Rhythm: Normal rate.   Pulmonary:      Effort: Pulmonary effort is normal. No respiratory distress.      Breath sounds: Normal breath sounds. No stridor. No wheezing, rhonchi or rales.   Chest:      Chest wall: No tenderness.   Abdominal:      General: Abdomen is flat. There is no distension.      Palpations: Abdomen is soft. There is no mass.      Tenderness: There is no abdominal tenderness.   Musculoskeletal:      Right lower leg: Edema present.      Left lower leg: Edema present.      Comments: Trace edema present both lower legs.  No calf tenderness bilaterally.   Neurological:      General: No focal deficit present.      Mental Status: She is alert.             Result Review    Result Review:  I have personally reviewed the results from the time of this admission to 2023 10:54 EST and agree with these findings:  [x]  Laboratory  []  Microbiology  [x]  Radiology  [x]  EKG/Telemetry   []  Cardiology/Vascular   []  Pathology  []  Old records  []   Other:            Assessment & Plan      Brief Patient Summary:  Neva Kurtz is a 63 y.o. female with PMH of SVT not currently on medications who presented to [facility] ED 11/19/2023 with complaints of shortness of breath around 1am this morning. She woke up and felt like she could not take in a deep breath. She felt pressure in her mid chest. She was just discharged from here on 11/14 after treatment for a UTI. She has been taking her cefdinir. HR was high during that admission 120s-130s and it was thought to be secondary to her infection. She has had lower extremity swelling     cefepime, 2,000 mg, Intravenous, Q8H  enoxaparin, 40 mg, Subcutaneous, Daily  furosemide, 40 mg, Intravenous, Q12H  nicotine, 1 patch, Transdermal, Q24H  senna-docusate sodium, 2 tablet, Oral, BID  sodium chloride, 10 mL, Intravenous, Q12H  vancomycin, 750 mg, Intravenous, Q12H       Pharmacy to dose vancomycin,          Active Hospital Problems:  Active Hospital Problems    Diagnosis     **Acute exacerbation of CHF (congestive heart failure)      Plan:   Patient is secondary to CHF exacerbation  Sinus tachycardia  - CT PE Protocol: no PE, interlobular septal thickening, peribronchial thickening and mosaic attenuation in the lungs with small bilateral pleural effusions. This is favored to reflect pulmonary edema. There are a few focal areas of airspace disease in the lungs that could be related to edema or pneumonia.   - proBNP 9877, HS troponin 27, repeat troponins   - type of CHF unknown due to no previous echo  - 40mg IV lasix given in ED and will continue q12 hours  - give 5mg IV metoprolol now, discussed with attending  - pt has a history of SVT   - check 2D echo  - strict Is & Os  -Consulted cardiology     Pneumonia  - CT as above showed there are a few focal areas of airspace disease in the lungs that could be related to edema or pneumonia.   - WBC 16.30  - subjective fever, no fever currently  - IV antibiotics as ordered.  -  requiring 1L O2 via NC  - follow blood cultures, check procalcitonin, urine antigens   - continuous pulse oximetry  - bronchodilators follow-up chest x-ray  -     Severe left sided hydronephrosis  Obstructive uropathy  - CT: severe left-sided hydronephrosis and diminished enhancement of the left renal parenchyma, which is only partially seen on this study, but concerning for obstructive uropathy of indeterminate age.   - recent recurrent UTI, pansensitive treated with cefdinir  - IV antibiotics as above  - bladder scan only showed 160  - consulted urology for further recommendations     Thrombocytosis  - plts 618  - start VTE lovenox  -Follow-up CBC     History of SVT  - pt is on no home medications for SVT as this was over 20 years ago and she had some type of procedure done for the SVT but unsure what. She believes she was on metoprolol in the past    -Cardiology to see the patient     Tobacco abuse  - encourage cessation  - nicotine patch     DVT prophylaxis:  Medical DVT prophylaxis orders are present.    CODE STATUS:    Level Of Support Discussed With: Patient  Code Status (Patient has no pulse and is not breathing): CPR (Attempt to Resuscitate)  Medical Interventions (Patient has pulse or is breathing): Full Support      Disposition:  I expect patient to be discharged 1 to 2 days    Patient and daughter at bedside was updated with plan of care.  All questions answered      Electronically signed by Josh Robles MD, 11/19/23, 10:54 EST.  [facility]

## 2023-11-19 NOTE — PROGRESS NOTES
"Pharmacy Antimicrobial Dosing Service    Subjective:  Neva Kurtz is a 63 y.o.female admitted with SOA. Pharmacy has been consulted to dose Vancomycin for possible pneumonia.      Assessment/Plan    1. Day #1 Vancomycin: Goal -600 mcg*h/mL. 1750mg (~22mg/kg DBW) IV x1 dose followed by 750mg (~10mg/kg DBW) IV q12h.  Pk level ordered for 11/20 at 0900.  Tr level ordered for 11/20 at 1600 prior to fourth total dose.    2. Day #1 Cefepime: 2000mg IV q8h for estCrCl > 60 mL/min.    Will continue to monitor drug levels, renal function, culture and sensitivities, and patient clinical status.       Objective:  Relevant clinical data and objective history reviewed:  172.7 cm (68\")   99.2 kg (218 lb 11.1 oz)   Ideal body weight: 63.9 kg (140 lb 14 oz)  Adjusted ideal body weight: 78 kg (172 lb)  Body mass index is 33.25 kg/m².        Results from last 7 days   Lab Units 11/19/23  0207 11/14/23  1014 11/13/23  1307   CREATININE mg/dL 1.10* 1.02* 0.94     Estimated Creatinine Clearance: 65.3 mL/min (A) (by C-G formula based on SCr of 1.1 mg/dL (H)).  No intake/output data recorded.    Results from last 7 days   Lab Units 11/19/23  0207 11/14/23  1014 11/13/23  0909   WBC 10*3/mm3 16.30* 16.00* 12.30*     Temperature    11/19/23 0154   Temp: 98.6 °F (37 °C)     Baseline culture/source/susceptibility:  Microbiology Results (last 10 days)       Procedure Component Value - Date/Time    Blood Culture - Blood, Arm, Left [[id number]]  (Normal) Collected: 11/13/23 1307    Lab Status: Final result Specimen: Blood from Arm, Left Updated: 11/18/23 1317     Blood Culture No growth at 5 days    Blood Culture - Blood, Arm, Left [[id number]]  (Normal) Collected: 11/13/23 1231    Lab Status: Final result Specimen: Blood from Arm, Left Updated: 11/18/23 1246     Blood Culture No growth at 5 days    Narrative:      Less than seven (7) mL's of blood was collected.  Insufficient quantity may yield false negative results.    Urine Culture " - Urine, Urine, Clean Catch [[id number]]  (Normal) Collected: 11/13/23 0905    Lab Status: Final result Specimen: Urine, Clean Catch Updated: 11/14/23 1033     Urine Culture No growth    Urine Culture - Urine, Urine, Clean Catch [[id number]]  (Abnormal)  (Susceptibility) Collected: 11/12/23 1215    Lab Status: Final result Specimen: Urine, Clean Catch Updated: 11/14/23 0352     Urine Culture >100,000 CFU/mL Escherichia coli    Narrative:      Colonization of the urinary tract without infection is common. Treatment is discouraged unless the patient is symptomatic, pregnant, or undergoing an invasive urologic procedure.    Susceptibility        Escherichia coli      RAGHAVENDRA      Ampicillin Susceptible      Ampicillin + Sulbactam Susceptible      Cefazolin Susceptible      Cefepime Susceptible      Ceftazidime Susceptible      Ceftriaxone Susceptible      Gentamicin Susceptible      Levofloxacin Susceptible      Nitrofurantoin Susceptible      Piperacillin + Tazobactam Susceptible      Trimethoprim + Sulfamethoxazole Susceptible                                   James Coley  11/19/23 05:25 EST

## 2023-11-19 NOTE — CONSULTS
Cardiology [place]        Subjective:     Encounter Date:11/19/2023      Patient ID: Neva Kurtz is a 63 y.o. female.    Chief Complaint: shortness of breath  Cardiology Consult: CHF    Referring Physician: Aida Apple APRN     HPI:  Neva Kurtz is a 63 y.o. female who presents with shortness of breath. Ms. Kurtz does not rotuinely see a cardiologist. Pmh includes h/o SVT currently not on any cardiac medications. She is a smoker.     She was recently discharged from hospital with antibiotics tx for UTI. She presented back to ED with complaints of shortness of breath and increased lower extremity edema. She reprots around 1am she woke up and felt as if she coudlnt' get her breath.   Cardiology consulted with concern for CHF  CT scan showed severe right hydroureteronephrosis with suggestion of atypical attachment of right ureter to urinary bladder. There is also severe left hydronephrosis with abrupt transition to normal caliber left ureter, suggesting left UPJ stenosis. The connection of the left ureter to the urinary bladder is not well identified.  2.5 cm cystic lesion within left pelvis posterior to urinary bladder, of unclear etiology but could represent a ureterocele.  Urology seeing patient and planning stent placement today  Patient has been sinus tachycardia, she has leukocytosis and mildly elevated troponin, no chest pain.     Past Medical History:   Diagnosis Date    SVT (supraventricular tachycardia)        Past Surgical History:   Procedure Laterality Date    BREAST BIOPSY         No family history on file.    Social History     Socioeconomic History    Marital status: Single   Tobacco Use    Smoking status: Every Day     Packs/day: 1     Types: Cigarettes     Passive exposure: Never    Smokeless tobacco: Never   Vaping Use    Vaping Use: Never used   Substance and Sexual Activity    Alcohol use: Never    Drug use: Never    Sexual activity: Defer         Allergies   Allergen  "Reactions    Other Other (See Comments)     tetramycin    Tetracyclines & Related Swelling       Current Medications:   Scheduled Meds:cefepime, 2,000 mg, Intravenous, Q8H  enoxaparin, 40 mg, Subcutaneous, Daily  furosemide, 40 mg, Intravenous, Q12H  nicotine, 1 patch, Transdermal, Q24H  senna-docusate sodium, 2 tablet, Oral, BID  sodium chloride, 10 mL, Intravenous, Q12H  vancomycin, 750 mg, Intravenous, Q12H      Continuous Infusions:Pharmacy to dose vancomycin,         Review of Systems   Constitutional: Negative for chills, diaphoresis and malaise/fatigue.   Cardiovascular:  Positive for dyspnea on exertion, leg swelling and paroxysmal nocturnal dyspnea. Negative for chest pain, irregular heartbeat, near-syncope, orthopnea, palpitations and syncope.   Respiratory:  Positive for shortness of breath. Negative for cough, sleep disturbances due to breathing and sputum production.    Gastrointestinal:  Negative for change in bowel habit.   Genitourinary:  Negative for urgency.   Neurological:  Negative for dizziness and headaches.   Psychiatric/Behavioral:  Negative for altered mental status.             Objective:         /92   Pulse 104   Temp 98.6 °F (37 °C) (Oral)   Resp 20   Ht 172.7 cm (68\")   Wt 99.2 kg (218 lb 11.1 oz)   SpO2 93%   BMI 33.25 kg/m²     Physical Exam:  General Appearance:    Alert, cooperative, in no acute distress                                Head: Atraumatic, normocephalic, PERRLA               Neck:   supple, trachea midline, no thyromegaly, no carotid bruit, no JVD   Lungs:     Clear to auscultation,respirations regular, even and               unlabored    Heart:    Regular rhythm and tachycardic rate, normal S1 and S2   Abdomen:     Normal bowel sounds, no masses, no organomegaly, soft  nontender, nondistended, no guarding, no rebound  tenderness   Extremities:   Moves all extremities well, edema, no cyanosis, no  redness   Pulses:   Pulses palpable and equal bilaterally " "  Skin:   No bleeding, bruising or rash   Neurologic:   Awake, alert, oriented x3                 ASCVD Risk Score::  [unfilled]      Lab Review:     Results from last 7 days   Lab Units 11/19/23  0207 11/14/23  1014 11/13/23  1307   SODIUM mmol/L 139 137 135*   POTASSIUM mmol/L 4.0 4.3 3.8   CHLORIDE mmol/L 102 104 102   CO2 mmol/L 23.0 21.0* 19.0*   BUN mg/dL 13 13 12   CREATININE mg/dL 1.10* 1.02* 0.94   GLUCOSE mg/dL 152* 106* 122*   CALCIUM mg/dL 9.4 9.0 8.8   AST (SGOT) U/L 13  --  15   ALT (SGPT) U/L 10  --  9     Results from last 7 days   Lab Units 11/19/23  1003 11/19/23  0353 11/19/23  0207   HSTROP T ng/L 53* 32* 27*  27*     Results from last 7 days   Lab Units 11/19/23  0207 11/14/23  1014   WBC 10*3/mm3 16.30* 16.00*   HEMOGLOBIN g/dL 11.5* 10.7*   HEMATOCRIT % 34.5 31.8*   PLATELETS 10*3/mm3 618* 340     Results from last 7 days   Lab Units 11/19/23  0207   INR  0.98   APTT seconds 26.9*     Results from last 7 days   Lab Units 11/14/23  1014   MAGNESIUM mg/dL 2.0           Invalid input(s): \"LDLCALC\"  Results from last 7 days   Lab Units 11/19/23  0207   PROBNP pg/mL 9,877.0*           Recent Radiology:  Imaging Results (Most Recent)       Procedure Component Value Units Date/Time    CT Abdomen Pelvis Stone Protocol [[id number]] Collected: 11/19/23 0819     Updated: 11/19/23 0831    Narrative:      CT ABDOMEN PELVIS STONE PROTOCOL    Date of Exam: 11/19/2023 8:04 AM EST    Indication: Left hydronephrosis.    Comparison: CT chest from earlier today    Technique: Axial CT images were obtained of the abdomen and pelvis without the administration of contrast. Sagittal and coronal reconstructions were performed.  Automated exposure control and iterative reconstruction methods were used.      Findings:  Within the lung visualized lung bases is no significant change from recent prior CT chest.    The liver, gallbladder, adrenal glands, spleen, and pancreas are unremarkable. There is severe right " hydroureteronephrosis with suggestion of atypical attachment of the right ureter to the urinary bladder. There is severe left hydronephrosis with abrupt   transition to normal in caliber left ureter, with the connection of the left ureter to the urinary bladder not well identified. No obstructing ureteral stone is identified on either side.    The stomach appears normal. The small bowel appears normal in caliber and configuration. The colon appears normal. The appendix appears normal. There is no ascites or loculated collection. No abnormally enlarged lymph nodes are identified.    The rectum and urinary bladder are unremarkable. The uterus is surgically absent. There is a 5 cm cystic lesion within the left pelvis posterior to the urinary bladder. There is pelvic floor laxity.    No aggressive osseous lesions are identified.      Impression:      Impression:  1.Severe right hydroureteronephrosis with suggestion of atypical attachment of right ureter to urinary bladder. There is also severe left hydronephrosis with abrupt transition to normal caliber left ureter, suggesting left UPJ stenosis. The connection of   the left ureter to the urinary bladder is not well identified.  2.5 cm cystic lesion within left pelvis posterior to urinary bladder, of unclear etiology but could represent a ureterocele.            Electronically Signed: Naresh Dorantes MD    11/19/2023 8:29 AM EST    Workstation ID: [id number]    CT Angiogram Chest Pulmonary Embolism [[id number]] Collected: 11/19/23 0322     Updated: 11/19/23 0328    Narrative:      CT ANGIOGRAM CHEST PULMONARY EMBOLISM    Date of Exam: 11/19/2023 3:08 AM EST    Indication: Pulmonary embolism (PE) suspected, high prob.    Comparison: Chest radiograph performed earlier today.    Technique: Axial CT images were obtained of the chest after the uneventful intravenous administration of iodinated contrast utilizing pulmonary embolism protocol.  Sagittal and coronal reconstructions  were performed.  Automated exposure control and   iterative reconstruction methods were used.      Findings:  There is interlobular septal thickening noted throughout the lungs. There is associated peribronchial thickening and mosaic attenuation in the lungs. There are small bilateral pleural effusions. There are a few focal areas of airspace disease in the   lungs including in the right upper lobe on image 64, where there is a nodular density with central aeration or cavitation measuring 18 mm diameter.    The left thyroid lobe appears enlarged likely due to a nodule measuring 28 mm. The aorta exhibits mild atherosclerotic plaque. There are a few shelflike plaques in the aorta most pronounced on image 142. The heart size is normal. No pericardial effusion.   There is a fat-containing hiatal hernia. There are multiple calcified mediastinal granulomas. There is no evidence of pulmonary embolism. There are mildly enlarged bilateral hilar and subcarinal lymph nodes.    There are no acute findings in the superficial soft tissues. There is severe left-sided hydronephrosis and diminished enhancement of the left renal parenchyma, which is only partially seen on this study, but concerning for obstructive uropathy of   indeterminate age. There are no acute osseous abnormalities or destructive bone lesions. There are mild diffuse thoracic degenerative changes.      Impression:      Impression:  1.No evidence of pulmonary embolism.  2.There is interlobular septal thickening, peribronchial thickening and mosaic attenuation in the lungs with small bilateral pleural effusions. This is favored to reflect pulmonary edema.  3.There are a few focal areas of airspace disease in the lungs that could be related to edema or pneumonia.  4.There is severe left-sided hydronephrosis and diminished enhancement of the left renal parenchyma, which is only partially seen on this study, but concerning for obstructive uropathy of indeterminate  age.  5.28 mm left thyroid lobe nodule. This could be evaluated with ultrasound.  6.Fat-containing hiatal hernia.  7.Evidence of prior granulomatous infection.        Electronically Signed: Salvador Love MD    11/19/2023 3:26 AM EST    Workstation ID: [id number]    XR Chest 1 View [[id number]] Collected: 11/19/23 0234     Updated: 11/19/23 0237    Narrative:      XR CHEST 1 VW    Date of Exam: 11/19/2023 2:06 AM EST    Indication: dyspnea    Comparison: 11/13/2023.    Findings:  There is increased interstitial disease with curly B lines. Cardiac silhouette is unchanged. Pulmonary vasculature is partially indistinct. No pneumothorax or pleural effusion. No lobar consolidation. No acute osseous abnormality.      Impression:      Impression:  Increased interstitial disease that could reflect interstitial pulmonary edema.        Electronically Signed: Salvador Love MD    11/19/2023 2:35 AM EST    Workstation ID: [id number]              ECHOCARDIOGRAM:                    Assessment:         Active Hospital Problems    Diagnosis  POA    **Acute exacerbation of CHF (congestive heart failure) [I50.9]  Yes     Shortness of breath / lower extremity edema  Hydronephrosis  Elevated troponin (27, 32, 53)  LENO  Leukocytosis  Concern for pneumonia  H/o SVT  Tobacco use     Plan:   Check 2D ECHO  Patient was started on IV diuresis  She is going for stent placement today for hydronephrosis  No chest pain, consider ischemic evaluation after acute illness resolved  Sinus tachycardia likely compensatory for underlying infection, low dose beta blockers  Additional work up/ recommendations pending above testing               ZAN Monaco  11/19/23  11:25 EST

## 2023-11-19 NOTE — CONSULTS
Urology Consult Note    Patient:Neva Kurtz :1960  Room:  Admit Date2023  Age:63 y.o.     SEX:female     DOS:2023     MR:[id number]     Visit:[id number]       Attending: Josh Robles MD  Referring Provider: Dr Robles  Reason for Consultation: Left hydronephrosis    Patient Care Team:  Provider, No Known as PCP - General    Chief complaint shortness of breath    Subjective .     History of present illness: 63-year-old woman who presented with acute onset shortness of breath.  Concern for pulmonary embolus prompted CT scan.  Incidentally this shows severe left hydronephrosis but only shows the top of the kidney.  Patient does not report any history of difficulties with the kidneys.  She denies any voiding complaints.  As far she knows she has never had any evaluation of her kidneys radiographically.  Serum creatinine is mildly elevated to 1.10.    Review of Systems  10 point review of systems were reviewed and are negative except for:  Constitution:  positive for See HPI    History  Past Medical History:   Diagnosis Date    SVT (supraventricular tachycardia)      Past Surgical History:   Procedure Laterality Date    BREAST BIOPSY       Social History     Socioeconomic History    Marital status: Single   Tobacco Use    Smoking status: Every Day     Packs/day: 1     Types: Cigarettes     Passive exposure: Never    Smokeless tobacco: Never   Vaping Use    Vaping Use: Never used   Substance and Sexual Activity    Alcohol use: Never    Drug use: Never    Sexual activity: Defer     No family history on file.  Allergy  Allergies   Allergen Reactions    Other Other (See Comments)     tetramycin    Tetracyclines & Related Swelling     Prior to Admission medications    Medication Sig Start Date End Date Taking? Authorizing Provider   cefdinir (OMNICEF) 300 MG capsule Take 1 capsule by mouth 2 (Two) Times a Day for 7 days. 23 Yes Claudia Sr APRN   cetirizine (zyrTEC) 10 MG  tablet Take 1 tablet by mouth Daily.   Yes Provider, MD Denis   ibuprofen (ADVIL,MOTRIN) 400 MG tablet Take 1 tablet by mouth Every 6 (Six) Hours As Needed for Mild Pain.   Yes Provider, MD Denis   multivitamin with minerals tablet tablet Take 1 tablet by mouth Daily.   Yes Provider, MD Denis   phenazopyridine (Pyridium) 100 MG tablet Take 1 tablet by mouth 3 (Three) Times a Day. 23  Yes Claudia Sr APRN         Objective     tMax 24 hours:  Temp (24hrs), Av.6 °F (37 °C), Min:98.6 °F (37 °C), Max:98.6 °F (37 °C)    Vital Sign Ranges:  Temp:  [98.6 °F (37 °C)] 98.6 °F (37 °C)  Heart Rate:  [104-150] 104  Resp:  [20] 20  BP: (156-168)/(82-92) 158/92  Intake and Output Last 3 Shifts:  I/O last 3 completed shifts:  In: -   Out: 700 [Urine:700]      Physical Exam:   General Appearance: alert, appears stated age, and cooperative  Head: normocephalic, without obvious abnormality and atraumatic  Abdomen: soft non-tender, no guarding, and no rebound tenderness  Skin: no bleeding, bruising or rash  Neurologic: Mental Status orientated to person, place, time and situation    Results Review:     Lab Results (last 24 hours)       Procedure Component Value Units Date/Time    MRSA Screen, PCR (Inpatient) - Swab, Nares [[id number]]  (Normal) Collected: 23 0951    Specimen: Swab from Nares Updated: 23 1122     MRSA PCR No MRSA Detected    Narrative:      The negative predictive value of this diagnostic test is high and should only be used to consider de-escalating anti-MRSA therapy. A positive result may indicate colonization with MRSA and must be correlated clinically.    Lactic Acid, Plasma [[id number]]  (Normal) Collected: 23 1003    Specimen: Blood Updated: 23 1031     Lactate 0.8 mmol/L     High Sensitivity Troponin T [[id number]]  (Abnormal) Collected: 23 1003    Specimen: Blood Updated: 23 1030     HS Troponin T 53 ng/L     Narrative:      High Sensitive  "Troponin T Reference Range:  <14.0 ng/L- Negative Female for AMI  <22.0 ng/L- Negative Male for AMI  >=14 - Abnormal Female indicating possible myocardial injury.  >=22 - Abnormal Male indicating possible myocardial injury.   Clinicians would have to utilize clinical acumen, EKG, Troponin, and serial changes to determine if it is an Acute Myocardial Infarction or myocardial injury due to an underlying chronic condition.         Procalcitonin [[id number]]  (Abnormal) Collected: 11/19/23 0353    Specimen: Blood Updated: 11/19/23 0704     Procalcitonin 1.41 ng/mL     Narrative:      As a Marker for Sepsis (Non-Neonates):    1. <0.5 ng/mL represents a low risk of severe sepsis and/or septic shock.  2. >2 ng/mL represents a high risk of severe sepsis and/or septic shock.    As a Marker for Lower Respiratory Tract Infections that require antibiotic therapy:    PCT on Admission    Antibiotic Therapy       6-12 Hrs later    >0.5                Strongly Recommended  >0.25 - <0.5        Recommended   0.1 - 0.25          Discouraged              Remeasure/reassess PCT  <0.1                Strongly Discouraged     Remeasure/reassess PCT    As 28 day mortality risk marker: \"Change in Procalcitonin Result\" (>80% or <=80%) if Day 0 (or Day 1) and Day 4 values are available. Refer to http://www.[facility]-pct-calculator.com    Change in PCT <=80%  A decrease of PCT levels below or equal to 80% defines a positive change in PCT test result representing a higher risk for 28-day all-cause mortality of patients diagnosed with severe sepsis for septic shock.    Change in PCT >80%  A decrease of PCT levels of more than 80% defines a negative change in PCT result representing a lower risk for 28-day all-cause mortality of patients diagnosed with severe sepsis or septic shock.       S. Pneumo Ag Urine or CSF - Urine, Urine, Clean Catch [[id number]]  (Normal) Collected: 11/19/23 0403    Specimen: Urine, Clean Catch Updated: 11/19/23 0627     Strep " Pneumo Ag Negative    Legionella Antigen, Urine - Urine, Urine, Clean Catch [[id number]]  (Normal) Collected: 11/19/23 0403    Specimen: Urine, Clean Catch Updated: 11/19/23 0627     LEGIONELLA ANTIGEN, URINE Negative    High Sensitivity Troponin T 2Hr [[id number]]  (Abnormal) Collected: 11/19/23 0353    Specimen: Blood Updated: 11/19/23 0505     HS Troponin T 32 ng/L      Troponin T Delta 5 ng/L     Narrative:      High Sensitive Troponin T Reference Range:  <14.0 ng/L- Negative Female for AMI  <22.0 ng/L- Negative Male for AMI  >=14 - Abnormal Female indicating possible myocardial injury.  >=22 - Abnormal Male indicating possible myocardial injury.   Clinicians would have to utilize clinical acumen, EKG, Troponin, and serial changes to determine if it is an Acute Myocardial Infarction or myocardial injury due to an underlying chronic condition.         Urine Culture - Urine, Urine, Clean Catch [[id number]] Collected: 11/19/23 0403    Specimen: Urine, Clean Catch Updated: 11/19/23 0424    Urinalysis With Microscopic If Indicated (No Culture) - Urine, Clean Catch [[id number]]  (Abnormal) Collected: 11/19/23 0403    Specimen: Urine, Clean Catch Updated: 11/19/23 0416     Color, UA Dark Yellow     Appearance, UA Clear     pH, UA 6.5     Specific Gravity, UA 1.021     Glucose, UA Negative     Ketones, UA Negative     Bilirubin, UA Negative     Blood, UA Negative     Protein, UA Negative     Leuk Esterase, UA Trace     Nitrite, UA Positive     Urobilinogen, UA 0.2 E.U./dL    Urinalysis, Microscopic Only - Urine, Clean Catch [[id number]] Collected: 11/19/23 0403    Specimen: Urine, Clean Catch Updated: 11/19/23 0416     RBC, UA 0-2 /HPF      WBC, UA 0-2 /HPF      Bacteria, UA None Seen /HPF      Squamous Epithelial Cells, UA 0-2 /HPF      Hyaline Casts, UA None Seen /LPF      Methodology Automated Microscopy    Blood Culture - Blood, Arm, Left [[id number]] Collected: 11/19/23 0353    Specimen: Blood from Arm, Left Updated:  11/19/23 0406    Blood Culture - Blood, Arm, Right [[id number]] Collected: 11/19/23 0353    Specimen: Blood from Arm, Right Updated: 11/19/23 0406    POC Lactate [[id number]]  (Normal) Collected: 11/19/23 0355    Specimen: Blood Updated: 11/19/23 0358     Lactate 0.9 mmol/L      Comment: Serial Number: [id number]  [id number]       CBC & Differential [[id number]]  (Abnormal) Collected: 11/19/23 0207    Specimen: Blood Updated: 11/19/23 0307    Narrative:      The following orders were created for panel order CBC & Differential.  Procedure                               Abnormality         Status                     ---------                               -----------         ------                     CBC Auto Differential[[id number]]        Abnormal            Final result               Scan Slide[[id number]]                                       Final result                 Please view results for these tests on the individual orders.    CBC Auto Differential [[id number]]  (Abnormal) Collected: 11/19/23 0207    Specimen: Blood Updated: 11/19/23 0307     WBC 16.30 10*3/mm3      RBC 3.78 10*6/mm3      Hemoglobin 11.5 g/dL      Hematocrit 34.5 %      MCV 91.3 fL      MCH 30.5 pg      MCHC 33.5 g/dL      RDW 14.7 %      RDW-SD 46.8 fl      MPV 7.0 fL      Platelets 618 10*3/mm3     Narrative:      The previously reported component NRBC is no longer being reported. Previous result was 0.0 /100 WBC (Reference Range: 0.0-0.2 /100 WBC) on 11/19/2023 at 0241 EST.    Scan Slide [[id number]] Collected: 11/19/23 0207    Specimen: Blood Updated: 11/19/23 0307     Scan Slide --     Comment: See Manual Differential Results       Manual Differential [[id number]]  (Abnormal) Collected: 11/19/23 0207    Specimen: Blood Updated: 11/19/23 0307     Neutrophil % 89.0 %      Lymphocyte % 8.0 %      Monocyte % 1.0 %      Eosinophil % 2.0 %      Neutrophils Absolute 14.51 10*3/mm3      Lymphocytes Absolute 1.30 10*3/mm3      Monocytes  Absolute 0.16 10*3/mm3      Eosinophils Absolute 0.33 10*3/mm3      RBC Morphology Normal     WBC Morphology Normal     Platelet Estimate Increased    aPTT [[id number]]  (Abnormal) Collected: 11/19/23 0207    Specimen: Blood Updated: 11/19/23 0242     PTT 26.9 seconds     Protime-INR [[id number]]  (Normal) Collected: 11/19/23 0207    Specimen: Blood Updated: 11/19/23 0242     Protime 10.7 Seconds      INR 0.98    High Sensitivity Troponin T [[id number]]  (Abnormal) Collected: 11/19/23 0207    Specimen: Blood Updated: 11/19/23 0235     HS Troponin T 27 ng/L     Narrative:      High Sensitive Troponin T Reference Range:  <14.0 ng/L- Negative Female for AMI  <22.0 ng/L- Negative Male for AMI  >=14 - Abnormal Female indicating possible myocardial injury.  >=22 - Abnormal Male indicating possible myocardial injury.   Clinicians would have to utilize clinical acumen, EKG, Troponin, and serial changes to determine if it is an Acute Myocardial Infarction or myocardial injury due to an underlying chronic condition.         Comprehensive Metabolic Panel [[id number]]  (Abnormal) Collected: 11/19/23 0207    Specimen: Blood Updated: 11/19/23 0235     Glucose 152 mg/dL      BUN 13 mg/dL      Creatinine 1.10 mg/dL      Sodium 139 mmol/L      Potassium 4.0 mmol/L      Chloride 102 mmol/L      CO2 23.0 mmol/L      Calcium 9.4 mg/dL      Total Protein 7.2 g/dL      Albumin 3.5 g/dL      ALT (SGPT) 10 U/L      AST (SGOT) 13 U/L      Alkaline Phosphatase 92 U/L      Total Bilirubin 0.3 mg/dL      Globulin 3.7 gm/dL      A/G Ratio 0.9 g/dL      BUN/Creatinine Ratio 11.8     Anion Gap 14.0 mmol/L      eGFR 56.9 mL/min/1.73     Narrative:      GFR Normal >60  Chronic Kidney Disease <60  Kidney Failure <15      BNP [[id number]]  (Abnormal) Collected: 11/19/23 0207    Specimen: Blood Updated: 11/19/23 0235     proBNP 9,877.0 pg/mL     Narrative:      This assay is used as an aid in the diagnosis of individuals suspected of having heart failure.  "It can be used as an aid in the diagnosis of acute decompensated heart failure (ADHF) in patients presenting with signs and symptoms of ADHF to the emergency department (ED). In addition, NT-proBNP of <300 pg/mL indicates ADHF is not likely.    Age Range Result Interpretation  NT-proBNP Concentration (pg/mL:      <50             Positive            >450                   Gray                 300-450                    Negative             <300    50-75           Positive            >900                  Gray                300-900                  Negative            <300      >75             Positive            >1800                  Gray                300-1800                  Negative            <300    Single High Sensitivity Troponin T [[id number]]  (Abnormal) Collected: 11/19/23 0207    Specimen: Blood Updated: 11/19/23 0235     HS Troponin T 27 ng/L     Narrative:      High Sensitive Troponin T Reference Range:  <14.0 ng/L- Negative Female for AMI  <22.0 ng/L- Negative Male for AMI  >=14 - Abnormal Female indicating possible myocardial injury.  >=22 - Abnormal Male indicating possible myocardial injury.   Clinicians would have to utilize clinical acumen, EKG, Troponin, and serial changes to determine if it is an Acute Myocardial Infarction or myocardial injury due to an underlying chronic condition.                No results found for: \"URINECX\"     Imaging Results (Last 7 Days)       Procedure Component Value Units Date/Time    CT Abdomen Pelvis Stone Protocol [[id number]] Collected: 11/19/23 0819     Updated: 11/19/23 0831    Narrative:      CT ABDOMEN PELVIS STONE PROTOCOL    Date of Exam: 11/19/2023 8:04 AM EST    Indication: Left hydronephrosis.    Comparison: CT chest from earlier today    Technique: Axial CT images were obtained of the abdomen and pelvis without the administration of contrast. Sagittal and coronal reconstructions were performed.  Automated exposure control and iterative reconstruction " methods were used.      Findings:  Within the lung visualized lung bases is no significant change from recent prior CT chest.    The liver, gallbladder, adrenal glands, spleen, and pancreas are unremarkable. There is severe right hydroureteronephrosis with suggestion of atypical attachment of the right ureter to the urinary bladder. There is severe left hydronephrosis with abrupt   transition to normal in caliber left ureter, with the connection of the left ureter to the urinary bladder not well identified. No obstructing ureteral stone is identified on either side.    The stomach appears normal. The small bowel appears normal in caliber and configuration. The colon appears normal. The appendix appears normal. There is no ascites or loculated collection. No abnormally enlarged lymph nodes are identified.    The rectum and urinary bladder are unremarkable. The uterus is surgically absent. There is a 5 cm cystic lesion within the left pelvis posterior to the urinary bladder. There is pelvic floor laxity.    No aggressive osseous lesions are identified.      Impression:      Impression:  1.Severe right hydroureteronephrosis with suggestion of atypical attachment of right ureter to urinary bladder. There is also severe left hydronephrosis with abrupt transition to normal caliber left ureter, suggesting left UPJ stenosis. The connection of   the left ureter to the urinary bladder is not well identified.  2.5 cm cystic lesion within left pelvis posterior to urinary bladder, of unclear etiology but could represent a ureterocele.            Electronically Signed: Naresh Dorantes MD    11/19/2023 8:29 AM EST    Workstation ID: [id number]    CT Angiogram Chest Pulmonary Embolism [[id number]] Collected: 11/19/23 0322     Updated: 11/19/23 0328    Narrative:      CT ANGIOGRAM CHEST PULMONARY EMBOLISM    Date of Exam: 11/19/2023 3:08 AM EST    Indication: Pulmonary embolism (PE) suspected, high prob.    Comparison: Chest  radiograph performed earlier today.    Technique: Axial CT images were obtained of the chest after the uneventful intravenous administration of iodinated contrast utilizing pulmonary embolism protocol.  Sagittal and coronal reconstructions were performed.  Automated exposure control and   iterative reconstruction methods were used.      Findings:  There is interlobular septal thickening noted throughout the lungs. There is associated peribronchial thickening and mosaic attenuation in the lungs. There are small bilateral pleural effusions. There are a few focal areas of airspace disease in the   lungs including in the right upper lobe on image 64, where there is a nodular density with central aeration or cavitation measuring 18 mm diameter.    The left thyroid lobe appears enlarged likely due to a nodule measuring 28 mm. The aorta exhibits mild atherosclerotic plaque. There are a few shelflike plaques in the aorta most pronounced on image 142. The heart size is normal. No pericardial effusion.   There is a fat-containing hiatal hernia. There are multiple calcified mediastinal granulomas. There is no evidence of pulmonary embolism. There are mildly enlarged bilateral hilar and subcarinal lymph nodes.    There are no acute findings in the superficial soft tissues. There is severe left-sided hydronephrosis and diminished enhancement of the left renal parenchyma, which is only partially seen on this study, but concerning for obstructive uropathy of   indeterminate age. There are no acute osseous abnormalities or destructive bone lesions. There are mild diffuse thoracic degenerative changes.      Impression:      Impression:  1.No evidence of pulmonary embolism.  2.There is interlobular septal thickening, peribronchial thickening and mosaic attenuation in the lungs with small bilateral pleural effusions. This is favored to reflect pulmonary edema.  3.There are a few focal areas of airspace disease in the lungs that could  be related to edema or pneumonia.  4.There is severe left-sided hydronephrosis and diminished enhancement of the left renal parenchyma, which is only partially seen on this study, but concerning for obstructive uropathy of indeterminate age.  5.28 mm left thyroid lobe nodule. This could be evaluated with ultrasound.  6.Fat-containing hiatal hernia.  7.Evidence of prior granulomatous infection.        Electronically Signed: Salvador Love MD    11/19/2023 3:26 AM EST    Workstation ID: [id number]    XR Chest 1 View [[id number]] Collected: 11/19/23 0234     Updated: 11/19/23 0237    Narrative:      XR CHEST 1 VW    Date of Exam: 11/19/2023 2:06 AM EST    Indication: dyspnea    Comparison: 11/13/2023.    Findings:  There is increased interstitial disease with curly B lines. Cardiac silhouette is unchanged. Pulmonary vasculature is partially indistinct. No pneumothorax or pleural effusion. No lobar consolidation. No acute osseous abnormality.      Impression:      Impression:  Increased interstitial disease that could reflect interstitial pulmonary edema.        Electronically Signed: Salvador Love MD    11/19/2023 2:35 AM EST    Workstation ID: [id number]            Inpatient Meds:   Scheduled Meds:cefepime, 2,000 mg, Intravenous, Q8H  enoxaparin, 40 mg, Subcutaneous, Daily  furosemide, 40 mg, Intravenous, Q12H  nicotine, 1 patch, Transdermal, Q24H  senna-docusate sodium, 2 tablet, Oral, BID  sodium chloride, 10 mL, Intravenous, Q12H  vancomycin, 750 mg, Intravenous, Q12H       Continuous Infusions:Pharmacy to dose vancomycin,        PRN Meds:.  acetaminophen **OR** acetaminophen **OR** acetaminophen    aluminum-magnesium hydroxide-simethicone    senna-docusate sodium **AND** polyethylene glycol **AND** bisacodyl **AND** bisacodyl    Calcium Replacement - Follow Nurse / BPA Driven Protocol    ipratropium-albuterol    Magnesium Standard Dose Replacement - Follow Nurse / BPA Driven Protocol    melatonin    ondansetron **OR**  ondansetron    Pharmacy to dose vancomycin    Phosphorus Replacement - Follow Nurse / BPA Driven Protocol    Potassium Replacement - Follow Nurse / BPA Driven Protocol    [COMPLETED] Insert Peripheral IV **AND** sodium chloride    sodium chloride    sodium chloride      Assessment & Plan     Principal Problem:    Acute exacerbation of CHF (congestive heart failure)    Left hydronephrosis of unknown etiology  Mild left flank pain  Acute renal insufficiency    Plan  We will obtain CT scan stone protocol to further evaluate kidneys and ureter.  We will consider placing stent if there is continued significant obstruction.      I discussed the patient's findings and my recommendations with patient and family    Thank you for this  consult    Alan Rodriguez MD  11/19/23  11:58 EST

## 2023-11-19 NOTE — Clinical Note
Level of Care: Telemetry [5]   Admitting Physician: CHRISTIANO PEÑA [[id number]]   Attending Physician: CHRISTIANO PEÑA [[id number]]   Bed Request Comments: u

## 2023-11-19 NOTE — CASE MANAGEMENT/SOCIAL WORK
Discharge Planning Assessment  [facility]     Patient Name: Neva Kurtz  MRN: [id number]  Today's Date: 11/19/2023    Admit Date: 11/19/2023    Plan: Home   Discharge Needs Assessment       Row Name 11/19/23 1040       Living Environment    People in Home alone    Current Living Arrangements home    Potentially Unsafe Housing Conditions none    Primary Care Provided by self    Provides Primary Care For no one, unable/limited ability to care for self    Family Caregiver if Needed child(abbey), adult    Family Caregiver Names Candy Rivera    Quality of Family Relationships supportive;helpful    Able to Return to Prior Arrangements yes       Resource/Environmental Concerns    Resource/Environmental Concerns none    Transportation Concerns none       Transition Planning    Patient/Family Anticipates Transition to home    Patient/Family Anticipated Services at Transition none       Discharge Needs Assessment    Equipment Currently Used at Home none    Concerns to be Addressed denies needs/concerns at this time    Anticipated Changes Related to Illness none    Equipment Needed After Discharge none    Current Discharge Risk lives alone                   Discharge Plan       Row Name 11/19/23 1041       Plan    Plan Home    Plan Comments Pt reports she lives alone and is IADLs. Has new patient appointment to establish PCP with Dr. Diaz on 12/14/23. She wants enrolled in meds to bed. Denies difficulty obtaining transportation or medications. Daughter Nicole lives next door to her and is able to assist her if needed. Nicole will provide transportation at [place]. Pt intends to return home and denies dc needs                  Continued Care and Services - Admitted Since 11/19/2023    Coordination has not been started for this encounter.          Demographic Summary       Row Name 11/19/23 1039       General Information    Admission Type inpatient    Arrived From home    Referral Source admission list    Reason for  Consult discharge planning    Preferred Language English       Contact Information    Permission Granted to Share Info With ;family/designee                   Functional Status       Row Name 11/19/23 1039       Functional Status    Usual Activity Tolerance good    Current Activity Tolerance good       Functional Status, IADL    Medications independent    Meal Preparation independent    Housekeeping independent    Laundry independent    Shopping independent                Raina James RN, [facility]  Office: [phone]  Fax: [phone]  [email or web address]      I met with patient in room wearing PPE: mask and glasses     Maintained distance greater than six feet and spent </=15 minutes in the room      Raina James RN

## 2023-11-19 NOTE — ED PROVIDER NOTES
Subjective   Chief Complaint   Patient presents with    Shortness of Breath     Provider, No Known      History provided by:  Patient and relative    Patient is a 63-year-old female presents the ED with a sudden onset of shortness of breath.  She reports she woke up with this today.  Denies any fevers.  No chest pain.  No episodes of syncope.  She does report lower extremity edema.  Review of Systems   Constitutional:  Negative for chills and fever.   Respiratory:  Positive for chest tightness and shortness of breath.    Cardiovascular:  Positive for palpitations and leg swelling. Negative for chest pain.   Musculoskeletal:  Negative for back pain and neck pain.   Skin:  Negative for color change and rash.   Neurological:  Negative for dizziness, syncope, weakness and light-headedness.       Past Medical History:   Diagnosis Date    SVT (supraventricular tachycardia)        Allergies   Allergen Reactions    Other Other (See Comments)     tetramycin    Tetracyclines & Related Swelling       Past Surgical History:   Procedure Laterality Date    BREAST BIOPSY         No family history on file.    Social History     Socioeconomic History    Marital status: Single   Tobacco Use    Smoking status: Every Day     Packs/day: 1     Types: Cigarettes     Passive exposure: Never    Smokeless tobacco: Never   Vaping Use    Vaping Use: Never used   Substance and Sexual Activity    Alcohol use: Never    Drug use: Never    Sexual activity: Defer           Objective   Physical Exam  Vitals reviewed.   Constitutional:       General: She is in acute distress.   HENT:      Head: Normocephalic and atraumatic.      Mouth/Throat:      Mouth: Mucous membranes are moist.      Pharynx: Oropharynx is clear.   Eyes:      Extraocular Movements: Extraocular movements intact.      Pupils: Pupils are equal, round, and reactive to light.   Cardiovascular:      Rate and Rhythm: Regular rhythm. Tachycardia present.      Heart sounds: No murmur  "heard.     No friction rub. No gallop.   Abdominal:      General: Bowel sounds are normal.      Palpations: Abdomen is soft.   Musculoskeletal:      Cervical back: Normal range of motion and neck supple.      Right lower leg: Edema present.      Left lower leg: Edema present.   Skin:     General: Skin is warm and dry.      Capillary Refill: Capillary refill takes less than 2 seconds.   Neurological:      General: No focal deficit present.      Mental Status: She is alert.         Procedures           ED Course  ED Course as of 11/19/23 0418   Sun Nov 19, 2023   0240 Pt placed on 2L NC per nursing [LB]   0350 EKG is sinus tachycardia rate 139.  Compared to previous 11/13/2023 tachycardia also present.  No acute ST changes reported with ED attending physician [LB]      ED Course User Index  [LB] Ana Laura Hernandez, ZAN      /92   Pulse (!) 131   Temp 98.6 °F (37 °C) (Oral)   Resp 20   Ht 172.7 cm (68\")   Wt 99.2 kg (218 lb 11.1 oz)   SpO2 95%   BMI 33.25 kg/m²   Medications   sodium chloride 0.9 % flush 10 mL (has no administration in time range)   cefepime 2 gm IVPB in 100 ml NS (MBP) (2,000 mg Intravenous New Bag 11/19/23 0401)   vancomycin IVPB 1750 mg in 0.9% Sodium Chloride (premix) 500 mL (has no administration in time range)   iopamidol (ISOVUE-370) 76 % injection 100 mL (100 mL Intravenous Given 11/19/23 0318)   furosemide (LASIX) injection 40 mg (40 mg Intravenous Given 11/19/23 0401)     CT Angiogram Chest Pulmonary Embolism    Result Date: 11/19/2023  Impression: 1.No evidence of pulmonary embolism. 2.There is interlobular septal thickening, peribronchial thickening and mosaic attenuation in the lungs with small bilateral pleural effusions. This is favored to reflect pulmonary edema. 3.There are a few focal areas of airspace disease in the lungs that could be related to edema or pneumonia. 4.There is severe left-sided hydronephrosis and diminished enhancement of the left renal " parenchyma, which is only partially seen on this study, but concerning for obstructive uropathy of indeterminate age. 5.28 mm left thyroid lobe nodule. This could be evaluated with ultrasound. 6.Fat-containing hiatal hernia. 7.Evidence of prior granulomatous infection. Electronically Signed: Salvador Love MD  11/19/2023 3:26 AM EST  Workstation ID: [id number]    XR Chest 1 View    Result Date: 11/19/2023  Impression: Increased interstitial disease that could reflect interstitial pulmonary edema. Electronically Signed: Salvador Love MD  11/19/2023 2:35 AM EST  Workstation ID: [id number]   Lab Results (last 24 hours)       Procedure Component Value Units Date/Time    High Sensitivity Troponin T [[id number]]  (Abnormal) Collected: 11/19/23 0207    Specimen: Blood Updated: 11/19/23 0235     HS Troponin T 27 ng/L     Narrative:      High Sensitive Troponin T Reference Range:  <14.0 ng/L- Negative Female for AMI  <22.0 ng/L- Negative Male for AMI  >=14 - Abnormal Female indicating possible myocardial injury.  >=22 - Abnormal Male indicating possible myocardial injury.   Clinicians would have to utilize clinical acumen, EKG, Troponin, and serial changes to determine if it is an Acute Myocardial Infarction or myocardial injury due to an underlying chronic condition.         CBC & Differential [[id number]]  (Abnormal) Collected: 11/19/23 0207    Specimen: Blood Updated: 11/19/23 0307    Narrative:      The following orders were created for panel order CBC & Differential.  Procedure                               Abnormality         Status                     ---------                               -----------         ------                     CBC Auto Differential[[id number]]        Abnormal            Final result               Scan Slide[[id number]]                                       Final result                 Please view results for these tests on the individual orders.    Comprehensive Metabolic Panel [[id number]]   (Abnormal) Collected: 11/19/23 0207    Specimen: Blood Updated: 11/19/23 0235     Glucose 152 mg/dL      BUN 13 mg/dL      Creatinine 1.10 mg/dL      Sodium 139 mmol/L      Potassium 4.0 mmol/L      Chloride 102 mmol/L      CO2 23.0 mmol/L      Calcium 9.4 mg/dL      Total Protein 7.2 g/dL      Albumin 3.5 g/dL      ALT (SGPT) 10 U/L      AST (SGOT) 13 U/L      Alkaline Phosphatase 92 U/L      Total Bilirubin 0.3 mg/dL      Globulin 3.7 gm/dL      A/G Ratio 0.9 g/dL      BUN/Creatinine Ratio 11.8     Anion Gap 14.0 mmol/L      eGFR 56.9 mL/min/1.73     Narrative:      GFR Normal >60  Chronic Kidney Disease <60  Kidney Failure <15      aPTT [[id number]]  (Abnormal) Collected: 11/19/23 0207    Specimen: Blood Updated: 11/19/23 0242     PTT 26.9 seconds     Protime-INR [[id number]]  (Normal) Collected: 11/19/23 0207    Specimen: Blood Updated: 11/19/23 0242     Protime 10.7 Seconds      INR 0.98    BNP [[id number]]  (Abnormal) Collected: 11/19/23 0207    Specimen: Blood Updated: 11/19/23 0235     proBNP 9,877.0 pg/mL     Narrative:      This assay is used as an aid in the diagnosis of individuals suspected of having heart failure. It can be used as an aid in the diagnosis of acute decompensated heart failure (ADHF) in patients presenting with signs and symptoms of ADHF to the emergency department (ED). In addition, NT-proBNP of <300 pg/mL indicates ADHF is not likely.    Age Range Result Interpretation  NT-proBNP Concentration (pg/mL:      <50             Positive            >450                   Gray                 300-450                    Negative             <300    50-75           Positive            >900                  Gray                300-900                  Negative            <300      >75             Positive            >1800                  Gray                300-1800                  Negative            <300    Single High Sensitivity Troponin T [[id number]]  (Abnormal) Collected: 11/19/23 0207     Specimen: Blood Updated: 11/19/23 0235     HS Troponin T 27 ng/L     Narrative:      High Sensitive Troponin T Reference Range:  <14.0 ng/L- Negative Female for AMI  <22.0 ng/L- Negative Male for AMI  >=14 - Abnormal Female indicating possible myocardial injury.  >=22 - Abnormal Male indicating possible myocardial injury.   Clinicians would have to utilize clinical acumen, EKG, Troponin, and serial changes to determine if it is an Acute Myocardial Infarction or myocardial injury due to an underlying chronic condition.         CBC Auto Differential [[id number]]  (Abnormal) Collected: 11/19/23 0207    Specimen: Blood Updated: 11/19/23 0307     WBC 16.30 10*3/mm3      RBC 3.78 10*6/mm3      Hemoglobin 11.5 g/dL      Hematocrit 34.5 %      MCV 91.3 fL      MCH 30.5 pg      MCHC 33.5 g/dL      RDW 14.7 %      RDW-SD 46.8 fl      MPV 7.0 fL      Platelets 618 10*3/mm3     Narrative:      The previously reported component NRBC is no longer being reported. Previous result was 0.0 /100 WBC (Reference Range: 0.0-0.2 /100 WBC) on 11/19/2023 at 0241 EST.    Scan Slide [[id number]] Collected: 11/19/23 0207    Specimen: Blood Updated: 11/19/23 0307     Scan Slide --     Comment: See Manual Differential Results       Manual Differential [[id number]]  (Abnormal) Collected: 11/19/23 0207    Specimen: Blood Updated: 11/19/23 0307     Neutrophil % 89.0 %      Lymphocyte % 8.0 %      Monocyte % 1.0 %      Eosinophil % 2.0 %      Neutrophils Absolute 14.51 10*3/mm3      Lymphocytes Absolute 1.30 10*3/mm3      Monocytes Absolute 0.16 10*3/mm3      Eosinophils Absolute 0.33 10*3/mm3      RBC Morphology Normal     WBC Morphology Normal     Platelet Estimate Increased    High Sensitivity Troponin T 2Hr [[id number]] Collected: 11/19/23 0353    Specimen: Blood Updated: 11/19/23 0405    Blood Culture - Blood, Arm, Left [[id number]] Collected: 11/19/23 0353    Specimen: Blood from Arm, Left Updated: 11/19/23 0406    Blood Culture - Blood, Arm, Right  [[id number]] Collected: 11/19/23 0353    Specimen: Blood from Arm, Right Updated: 11/19/23 0406    POC Lactate [[id number]]  (Normal) Collected: 11/19/23 0355    Specimen: Blood Updated: 11/19/23 0358     Lactate 0.9 mmol/L      Comment: Serial Number: [id number]:  [id number]       Urinalysis With Microscopic If Indicated (No Culture) - Urine, Clean Catch [[id number]]  (Abnormal) Collected: 11/19/23 0403    Specimen: Urine, Clean Catch Updated: 11/19/23 0416     Color, UA Dark Yellow     Appearance, UA Clear     pH, UA 6.5     Specific Gravity, UA 1.021     Glucose, UA Negative     Ketones, UA Negative     Bilirubin, UA Negative     Blood, UA Negative     Protein, UA Negative     Leuk Esterase, UA Trace     Nitrite, UA Positive     Urobilinogen, UA 0.2 E.U./dL    Urinalysis, Microscopic Only - Urine, Clean Catch [[id number]] Collected: 11/19/23 0403    Specimen: Urine, Clean Catch Updated: 11/19/23 0416     RBC, UA 0-2 /HPF      WBC, UA 0-2 /HPF      Bacteria, UA None Seen /HPF      Squamous Epithelial Cells, UA 0-2 /HPF      Hyaline Casts, UA None Seen /LPF      Methodology Automated Microscopy                                               Medical Decision Making  Chart Review: Discharge summary 11/14/2023 for UTI, sepsis.    Labs: CBC White blood cell count 16.3, BNP noted be 9877.  Initial troponin noted be 27.  Repeat troponin pending.  CMP reviewed.  Lactate 0.9.  Urinalysis pending at time of admission.    Imaging: CT chest was obtained given patient's dyspnea, tachycardia, hypoxia with O2 sats 90% on room air, and recent hospitalization patient feels be high risk for PE.  No pulmonary embolus noted.  Parabronchial thickening noted bilateral pleural effusion, pulmonary edema.  Patient noted to have left thyroid nodule and she is aware.  Incidentally left-sided hydronephrosis, concerning for obstructive uropathy of indeterminate age.  Patient denies any flank pain.    Discussion/Consultation with other  providers: Discussed with hospitalist ZAN Parra    63-year-old female presents the emergency department with complaint of dyspnea, tachycardia.  Differential diagnoses considered for patient presentation, this list is not all inclusive of diagnoses considered: Pulmonary embolus, pulmonary edema, pneumonia.  There was concern for PE so CT chest was ordered.  Reviewed as above.  Findings concerning for pulmonary edema versus pneumonia.  Will treat with antibiotics, as well as Lasix, patient's been having lower extremity swelling.  Consider the patient's finding of hydronephrosis on CT, she is not having any flank pain.  She is not having any urinary symptoms.  Will defer at this time and cover her pneumonia and concern for CHF.  Urinalysis is pending.  Patient is tolerating tootle cannula well.  Her map is greater than 65.  I see no indication for IV fluids, and there is concern for fluid overload.  Patient will be admitted to the hospitalist for further evaluation.  Disposition: I discussed with the patient their test results, work-up here in the emergency department, and need for admission and further evaluation. Patient is agreeable to the plan of care. At time of disposition patient's VS are reviewed. Opportunity was provided for questions at the bedside, all questions and concerns were addressed.   Note Disclaimer: At [facility], we believe that sharing information builds trust and better relationships. You are receiving this note because you recently visited [facility]. It is possible you will see health information before a provider has talked with you about it. This kind of information can be easy to misunderstand. To help you fully understand what it means for your health, we urge you to discuss this note with your provider.Note dictated utilizing Dragon Dictation. Appropriate PPE worn during patient interactions.        Problems Addressed:  Acute pulmonary edema: complicated acute illness or  injury  Dyspnea, unspecified type: complicated acute illness or injury  Hypoxia: complicated acute illness or injury    Amount and/or Complexity of Data Reviewed  Labs: ordered.  Radiology: ordered.  ECG/medicine tests: ordered.    Risk  Prescription drug management.  Decision regarding hospitalization.        Final diagnoses:   Dyspnea, unspecified type   Acute pulmonary edema   Hypoxia       ED Disposition  ED Disposition       ED Disposition   Decision to Admit    Condition   --    Comment   Level of Care: Telemetry [5]   Admitting Physician: CHRISTIANO PEÑA [[id number]]   Attending Physician: CHRISTIANO PEÑA [[id number]]                 No follow-up provider specified.       Medication List      No changes were made to your prescriptions during this visit.            Ana Laura Hernandez, APRN  11/19/23 0418       Ana Laura Hernandez, APRTOMASA  11/19/23 0434

## 2023-11-19 NOTE — ANESTHESIA PREPROCEDURE EVALUATION
Anesthesia Evaluation     NPO Solid Status: > 8 hours  NPO Liquid Status: > 8 hours           Airway   Mallampati: I  TM distance: >3 FB  Neck ROM: full  No difficulty expected  Dental - normal exam     Pulmonary - normal exam   Cardiovascular - normal exam    (+) dysrhythmias Tachycardia, CHF       Neuro/Psych  GI/Hepatic/Renal/Endo      Musculoskeletal     Abdominal  - normal exam    Bowel sounds: normal.   Substance History      OB/GYN          Other                    Anesthesia Plan    ASA 3     general     intravenous induction     Anesthetic plan, risks, benefits, and alternatives have been provided, discussed and informed consent has been obtained with: patient.  Pre-procedure education provided    CODE STATUS:    Level Of Support Discussed With: Patient  Code Status (Patient has no pulse and is not breathing): CPR (Attempt to Resuscitate)  Medical Interventions (Patient has pulse or is breathing): Full Support

## 2023-11-20 ENCOUNTER — APPOINTMENT (OUTPATIENT)
Dept: CARDIOLOGY | Facility: HOSPITAL | Age: 63
End: 2023-11-20
Payer: COMMERCIAL

## 2023-11-20 ENCOUNTER — APPOINTMENT (OUTPATIENT)
Dept: GENERAL RADIOLOGY | Facility: HOSPITAL | Age: 63
End: 2023-11-20
Payer: COMMERCIAL

## 2023-11-20 LAB
ANION GAP SERPL CALCULATED.3IONS-SCNC: 10 MMOL/L (ref 5–15)
BACTERIA SPEC AEROBE CULT: NO GROWTH
BASOPHILS # BLD AUTO: 0.1 10*3/MM3 (ref 0–0.2)
BASOPHILS NFR BLD AUTO: 0.6 % (ref 0–1.5)
BH CV ECHO MEAS - ACS: 1.96 CM
BH CV ECHO MEAS - AO MAX PG: 12.1 MMHG
BH CV ECHO MEAS - AO MEAN PG: 6.9 MMHG
BH CV ECHO MEAS - AO ROOT DIAM: 3.1 CM
BH CV ECHO MEAS - AO V2 MAX: 174.2 CM/SEC
BH CV ECHO MEAS - AO V2 VTI: 31.9 CM
BH CV ECHO MEAS - AVA(I,D): 2.17 CM2
BH CV ECHO MEAS - EDV(CUBED): 194.6 ML
BH CV ECHO MEAS - EDV(MOD-SP4): 108.7 ML
BH CV ECHO MEAS - EF(MOD-BP): 41 %
BH CV ECHO MEAS - EF(MOD-SP4): 41.3 %
BH CV ECHO MEAS - ESV(CUBED): 90.2 ML
BH CV ECHO MEAS - ESV(MOD-SP4): 63.9 ML
BH CV ECHO MEAS - FS: 22.6 %
BH CV ECHO MEAS - IVS/LVPW: 1.03 CM
BH CV ECHO MEAS - IVSD: 0.99 CM
BH CV ECHO MEAS - LA DIMENSION: 4 CM
BH CV ECHO MEAS - LV DIASTOLIC VOL/BSA (35-75): 51.9 CM2
BH CV ECHO MEAS - LV MASS(C)D: 224.7 GRAMS
BH CV ECHO MEAS - LV MAX PG: 5.1 MMHG
BH CV ECHO MEAS - LV MEAN PG: 2.8 MMHG
BH CV ECHO MEAS - LV SYSTOLIC VOL/BSA (12-30): 30.5 CM2
BH CV ECHO MEAS - LV V1 MAX: 113.2 CM/SEC
BH CV ECHO MEAS - LV V1 VTI: 17.9 CM
BH CV ECHO MEAS - LVIDD: 5.8 CM
BH CV ECHO MEAS - LVIDS: 4.5 CM
BH CV ECHO MEAS - LVOT AREA: 3.9 CM2
BH CV ECHO MEAS - LVOT DIAM: 2.22 CM
BH CV ECHO MEAS - LVPWD: 0.96 CM
BH CV ECHO MEAS - MR MAX PG: 65.9 MMHG
BH CV ECHO MEAS - MR MAX VEL: 404.6 CM/SEC
BH CV ECHO MEAS - MV A MAX VEL: 127.4 CM/SEC
BH CV ECHO MEAS - MV DEC SLOPE: 2174 CM/SEC2
BH CV ECHO MEAS - MV DEC TIME: 0.04 SEC
BH CV ECHO MEAS - MV E MAX VEL: 91.4 CM/SEC
BH CV ECHO MEAS - MV E/A: 0.72
BH CV ECHO MEAS - MV MAX PG: 9.5 MMHG
BH CV ECHO MEAS - MV MEAN PG: 5.2 MMHG
BH CV ECHO MEAS - MV V2 VTI: 27.1 CM
BH CV ECHO MEAS - MVA(VTI): 2.6 CM2
BH CV ECHO MEAS - PA ACC TIME: 0.12 SEC
BH CV ECHO MEAS - PA V2 MAX: 96.6 CM/SEC
BH CV ECHO MEAS - PI END-D VEL: 156.6 CM/SEC
BH CV ECHO MEAS - PULM DIAS VEL: 47.3 CM/SEC
BH CV ECHO MEAS - PULM S/D: 1.19
BH CV ECHO MEAS - PULM SYS VEL: 56.2 CM/SEC
BH CV ECHO MEAS - RAP SYSTOLE: 3 MMHG
BH CV ECHO MEAS - RV MAX PG: 3.6 MMHG
BH CV ECHO MEAS - RV V1 MAX: 95.1 CM/SEC
BH CV ECHO MEAS - RV V1 VTI: 19.6 CM
BH CV ECHO MEAS - RVDD: 2.9 CM
BH CV ECHO MEAS - RVSP: 20.7 MMHG
BH CV ECHO MEAS - SI(MOD-SP4): 21.4 ML/M2
BH CV ECHO MEAS - SV(LVOT): 69.2 ML
BH CV ECHO MEAS - SV(MOD-SP4): 44.9 ML
BH CV ECHO MEAS - TR MAX PG: 17.7 MMHG
BH CV ECHO MEAS - TR MAX VEL: 210.3 CM/SEC
BUN SERPL-MCNC: 18 MG/DL (ref 8–23)
BUN/CREAT SERPL: 15.7 (ref 7–25)
CALCIUM SPEC-SCNC: 8.4 MG/DL (ref 8.6–10.5)
CHLORIDE SERPL-SCNC: 105 MMOL/L (ref 98–107)
CO2 SERPL-SCNC: 25 MMOL/L (ref 22–29)
CREAT SERPL-MCNC: 1.15 MG/DL (ref 0.57–1)
DEPRECATED RDW RBC AUTO: 48.1 FL (ref 37–54)
EGFRCR SERPLBLD CKD-EPI 2021: 54 ML/MIN/1.73
EOSINOPHIL # BLD AUTO: 0 10*3/MM3 (ref 0–0.4)
EOSINOPHIL NFR BLD AUTO: 0 % (ref 0.3–6.2)
ERYTHROCYTE [DISTWIDTH] IN BLOOD BY AUTOMATED COUNT: 14.9 % (ref 12.3–15.4)
GLUCOSE SERPL-MCNC: 122 MG/DL (ref 65–99)
HCT VFR BLD AUTO: 28.2 % (ref 34–46.6)
HGB BLD-MCNC: 9.2 G/DL (ref 12–15.9)
LYMPHOCYTES # BLD AUTO: 1.5 10*3/MM3 (ref 0.7–3.1)
LYMPHOCYTES NFR BLD AUTO: 12.7 % (ref 19.6–45.3)
MCH RBC QN AUTO: 30.1 PG (ref 26.6–33)
MCHC RBC AUTO-ENTMCNC: 32.8 G/DL (ref 31.5–35.7)
MCV RBC AUTO: 91.9 FL (ref 79–97)
MONOCYTES # BLD AUTO: 1.1 10*3/MM3 (ref 0.1–0.9)
MONOCYTES NFR BLD AUTO: 9.2 % (ref 5–12)
NEUTROPHILS NFR BLD AUTO: 77.5 % (ref 42.7–76)
NEUTROPHILS NFR BLD AUTO: 8.9 10*3/MM3 (ref 1.7–7)
NRBC BLD AUTO-RTO: 0 /100 WBC (ref 0–0.2)
PLATELET # BLD AUTO: 535 10*3/MM3 (ref 140–450)
PMV BLD AUTO: 7.2 FL (ref 6–12)
POTASSIUM SERPL-SCNC: 4 MMOL/L (ref 3.5–5.2)
RBC # BLD AUTO: 3.06 10*6/MM3 (ref 3.77–5.28)
SODIUM SERPL-SCNC: 140 MMOL/L (ref 136–145)
WBC NRBC COR # BLD AUTO: 11.5 10*3/MM3 (ref 3.4–10.8)

## 2023-11-20 PROCEDURE — 93306 TTE W/DOPPLER COMPLETE: CPT | Performed by: INTERNAL MEDICINE

## 2023-11-20 PROCEDURE — 25010000002 CEFTRIAXONE PER 250 MG: Performed by: INTERNAL MEDICINE

## 2023-11-20 PROCEDURE — 71046 X-RAY EXAM CHEST 2 VIEWS: CPT

## 2023-11-20 PROCEDURE — 36415 COLL VENOUS BLD VENIPUNCTURE: CPT | Performed by: UROLOGY

## 2023-11-20 PROCEDURE — 80048 BASIC METABOLIC PNL TOTAL CA: CPT | Performed by: UROLOGY

## 2023-11-20 PROCEDURE — 99232 SBSQ HOSP IP/OBS MODERATE 35: CPT | Performed by: NURSE PRACTITIONER

## 2023-11-20 PROCEDURE — 25010000002 ENOXAPARIN PER 10 MG: Performed by: UROLOGY

## 2023-11-20 PROCEDURE — 87040 BLOOD CULTURE FOR BACTERIA: CPT | Performed by: INTERNAL MEDICINE

## 2023-11-20 PROCEDURE — 25810000003 SODIUM CHLORIDE 0.9 % SOLUTION 250 ML FLEX CONT: Performed by: UROLOGY

## 2023-11-20 PROCEDURE — 25010000002 CEFEPIME PER 500 MG: Performed by: UROLOGY

## 2023-11-20 PROCEDURE — 25010000002 VANCOMYCIN 750 MG RECONSTITUTED SOLUTION 1 EACH VIAL: Performed by: UROLOGY

## 2023-11-20 PROCEDURE — 85025 COMPLETE CBC W/AUTO DIFF WBC: CPT | Performed by: UROLOGY

## 2023-11-20 PROCEDURE — 25010000002 FUROSEMIDE PER 20 MG: Performed by: UROLOGY

## 2023-11-20 PROCEDURE — 93306 TTE W/DOPPLER COMPLETE: CPT

## 2023-11-20 RX ORDER — CEFDINIR 300 MG/1
300 CAPSULE ORAL 2 TIMES DAILY
Qty: 20 CAPSULE | Refills: 0 | Status: SHIPPED | OUTPATIENT
Start: 2023-11-20 | End: 2023-11-21 | Stop reason: SDUPTHER

## 2023-11-20 RX ORDER — METOPROLOL SUCCINATE 25 MG/1
25 TABLET, EXTENDED RELEASE ORAL
Status: DISCONTINUED | OUTPATIENT
Start: 2023-11-20 | End: 2023-11-22 | Stop reason: HOSPADM

## 2023-11-20 RX ORDER — FUROSEMIDE 40 MG/1
40 TABLET ORAL
Status: DISCONTINUED | OUTPATIENT
Start: 2023-11-20 | End: 2023-11-22 | Stop reason: HOSPADM

## 2023-11-20 RX ADMIN — SENNOSIDES AND DOCUSATE SODIUM 2 TABLET: 50; 8.6 TABLET ORAL at 09:23

## 2023-11-20 RX ADMIN — Medication 1 PATCH: at 09:22

## 2023-11-20 RX ADMIN — SENNOSIDES AND DOCUSATE SODIUM 2 TABLET: 50; 8.6 TABLET ORAL at 20:03

## 2023-11-20 RX ADMIN — VANCOMYCIN HYDROCHLORIDE 750 MG: 750 INJECTION, POWDER, LYOPHILIZED, FOR SOLUTION INTRAVENOUS at 05:41

## 2023-11-20 RX ADMIN — CEFTRIAXONE 2000 MG: 2 INJECTION, POWDER, FOR SOLUTION INTRAMUSCULAR; INTRAVENOUS at 09:22

## 2023-11-20 RX ADMIN — FUROSEMIDE 40 MG: 10 INJECTION, SOLUTION INTRAMUSCULAR; INTRAVENOUS at 05:41

## 2023-11-20 RX ADMIN — Medication 10 ML: at 09:22

## 2023-11-20 RX ADMIN — METOPROLOL SUCCINATE 25 MG: 25 TABLET, EXTENDED RELEASE ORAL at 14:25

## 2023-11-20 RX ADMIN — CEFEPIME 2000 MG: 2 INJECTION, POWDER, FOR SOLUTION INTRAVENOUS at 07:07

## 2023-11-20 RX ADMIN — Medication 10 ML: at 20:03

## 2023-11-20 RX ADMIN — ENOXAPARIN SODIUM 40 MG: 100 INJECTION SUBCUTANEOUS at 17:01

## 2023-11-20 RX ADMIN — FUROSEMIDE 40 MG: 40 TABLET ORAL at 17:01

## 2023-11-20 NOTE — PROGRESS NOTES
[facility] CARDIOLOGY  [facility]        LOS:  LOS: 1 day   Patient Name: Neva Kurtz  Age/Sex: 63 y.o. female  : 1960  MRN: [id number]    Day of Service: 23   Length of Stay: 1  Encounter Provider: ZAN Lynn  Place of Service: [facility] CARDIOLOGY  Patient Care Team:  Provider, No Known as PCP - General    Subjective:     Chief Complaint:  F/U SOA, tachycardia    Subjective:   Patient reports SOA has resolved.    Current Medications:   Scheduled Meds:cefTRIAXone, 2,000 mg, Intravenous, Q24H  enoxaparin, 40 mg, Subcutaneous, Daily  furosemide, 40 mg, Intravenous, Q12H  nicotine, 1 patch, Transdermal, Q24H  senna-docusate sodium, 2 tablet, Oral, BID  sodium chloride, 10 mL, Intravenous, Q12H      Continuous Infusions:     Allergies:  Allergies   Allergen Reactions    Other Other (See Comments)     tetramycin    Tetracyclines & Related Swelling       ROS    Objective:     Temp:  [98.1 °F (36.7 °C)-98.6 °F (37 °C)] 98.1 °F (36.7 °C)  Heart Rate:  [] 98  Resp:  [13-24] 16  BP: ()/(37-83) 141/83     Intake/Output Summary (Last 24 hours) at 2023 1307  Last data filed at 2023 0934  Gross per 24 hour   Intake 840 ml   Output --   Net 840 ml     Body mass index is 32.23 kg/m².      23  0154 23  0500 23  0934   Weight: 99.2 kg (218 lb 11.1 oz) 96.4 kg (212 lb 8.4 oz) 96.2 kg (212 lb)         Physical Exam:  Neuro:  CV:  Resp:  GI:  Ext:  Tele: AAOx3, no gross deficits  S1S2 RRR, no murmur  Nonlabored, CTA / somewhat diminished bases  BS+, abd soft  Pedal pulses palp, 1+ bilateral ankle edema  SR                                                   Lab Review:   Results from last 7 days   Lab Units 23  2318 23  0207   SODIUM mmol/L 140 139   POTASSIUM mmol/L 4.0 4.0   CHLORIDE mmol/L 105 102   CO2 mmol/L 25.0 23.0   BUN mg/dL 18 13   CREATININE mg/dL 1.15* 1.10*   GLUCOSE mg/dL  "122* 152*   CALCIUM mg/dL 8.4* 9.4   AST (SGOT) U/L  --  13   ALT (SGPT) U/L  --  10     Results from last 7 days   Lab Units 11/19/23 2018 11/19/23  1003 11/19/23  0353 11/19/23  0207   HSTROP T ng/L 34* 53* 32* 27*  27*     Results from last 7 days   Lab Units 11/19/23  2318 11/19/23  0207   WBC 10*3/mm3 11.50* 16.30*   HEMOGLOBIN g/dL 9.2* 11.5*   HEMATOCRIT % 28.2* 34.5   PLATELETS 10*3/mm3 535* 618*     Results from last 7 days   Lab Units 11/19/23  0207   INR  0.98   APTT seconds 26.9*     Results from last 7 days   Lab Units 11/14/23  1014   MAGNESIUM mg/dL 2.0           Invalid input(s): \"LDLCALC\"  Results from last 7 days   Lab Units 11/19/23  0207   PROBNP pg/mL 9,877.0*           Recent Radiology:  Imaging Results (Most Recent)       Procedure Component Value Units Date/Time    XR Chest PA & Lateral [[id number]] Collected: 11/20/23 0817     Updated: 11/20/23 0831    Narrative:      XR CHEST PA AND LATERAL    Date of Exam: 11/20/2023 8:14 AM EST    Indication: Pneumonia    Comparison: 11/19/2023  Findings:  Previously noted lung infiltrates have largely cleared with minimal infiltrates remaining. There are minimal pleural effusions. There is borderline cardiomegaly.      Impression:      Impression:  Significant interval improvement with minimal infiltrates remaining.      Electronically Signed: Marni Cohen MD    11/20/2023 8:17 AM EST    Workstation ID: [id number]    CT Abdomen Pelvis Stone Protocol [[id number]] Collected: 11/19/23 0819     Updated: 11/19/23 0831    Narrative:      CT ABDOMEN PELVIS STONE PROTOCOL    Date of Exam: 11/19/2023 8:04 AM EST    Indication: Left hydronephrosis.    Comparison: CT chest from earlier today    Technique: Axial CT images were obtained of the abdomen and pelvis without the administration of contrast. Sagittal and coronal reconstructions were performed.  Automated exposure control and iterative reconstruction methods were used.      Findings:  Within the lung " visualized lung bases is no significant change from recent prior CT chest.    The liver, gallbladder, adrenal glands, spleen, and pancreas are unremarkable. There is severe right hydroureteronephrosis with suggestion of atypical attachment of the right ureter to the urinary bladder. There is severe left hydronephrosis with abrupt   transition to normal in caliber left ureter, with the connection of the left ureter to the urinary bladder not well identified. No obstructing ureteral stone is identified on either side.    The stomach appears normal. The small bowel appears normal in caliber and configuration. The colon appears normal. The appendix appears normal. There is no ascites or loculated collection. No abnormally enlarged lymph nodes are identified.    The rectum and urinary bladder are unremarkable. The uterus is surgically absent. There is a 5 cm cystic lesion within the left pelvis posterior to the urinary bladder. There is pelvic floor laxity.    No aggressive osseous lesions are identified.      Impression:      Impression:  1.Severe right hydroureteronephrosis with suggestion of atypical attachment of right ureter to urinary bladder. There is also severe left hydronephrosis with abrupt transition to normal caliber left ureter, suggesting left UPJ stenosis. The connection of   the left ureter to the urinary bladder is not well identified.  2.5 cm cystic lesion within left pelvis posterior to urinary bladder, of unclear etiology but could represent a ureterocele.            Electronically Signed: Naresh Dorantes MD    11/19/2023 8:29 AM EST    Workstation ID: [id number]    CT Angiogram Chest Pulmonary Embolism [[id number]] Collected: 11/19/23 0322     Updated: 11/19/23 0328    Narrative:      CT ANGIOGRAM CHEST PULMONARY EMBOLISM    Date of Exam: 11/19/2023 3:08 AM EST    Indication: Pulmonary embolism (PE) suspected, high prob.    Comparison: Chest radiograph performed earlier today.    Technique: Axial CT  images were obtained of the chest after the uneventful intravenous administration of iodinated contrast utilizing pulmonary embolism protocol.  Sagittal and coronal reconstructions were performed.  Automated exposure control and   iterative reconstruction methods were used.      Findings:  There is interlobular septal thickening noted throughout the lungs. There is associated peribronchial thickening and mosaic attenuation in the lungs. There are small bilateral pleural effusions. There are a few focal areas of airspace disease in the   lungs including in the right upper lobe on image 64, where there is a nodular density with central aeration or cavitation measuring 18 mm diameter.    The left thyroid lobe appears enlarged likely due to a nodule measuring 28 mm. The aorta exhibits mild atherosclerotic plaque. There are a few shelflike plaques in the aorta most pronounced on image 142. The heart size is normal. No pericardial effusion.   There is a fat-containing hiatal hernia. There are multiple calcified mediastinal granulomas. There is no evidence of pulmonary embolism. There are mildly enlarged bilateral hilar and subcarinal lymph nodes.    There are no acute findings in the superficial soft tissues. There is severe left-sided hydronephrosis and diminished enhancement of the left renal parenchyma, which is only partially seen on this study, but concerning for obstructive uropathy of   indeterminate age. There are no acute osseous abnormalities or destructive bone lesions. There are mild diffuse thoracic degenerative changes.      Impression:      Impression:  1.No evidence of pulmonary embolism.  2.There is interlobular septal thickening, peribronchial thickening and mosaic attenuation in the lungs with small bilateral pleural effusions. This is favored to reflect pulmonary edema.  3.There are a few focal areas of airspace disease in the lungs that could be related to edema or pneumonia.  4.There is severe  left-sided hydronephrosis and diminished enhancement of the left renal parenchyma, which is only partially seen on this study, but concerning for obstructive uropathy of indeterminate age.  5.28 mm left thyroid lobe nodule. This could be evaluated with ultrasound.  6.Fat-containing hiatal hernia.  7.Evidence of prior granulomatous infection.        Electronically Signed: Salvador Love MD    11/19/2023 3:26 AM EST    Workstation ID: [id number]    XR Chest 1 View [[id number]] Collected: 11/19/23 0234     Updated: 11/19/23 0237    Narrative:      XR CHEST 1 VW    Date of Exam: 11/19/2023 2:06 AM EST    Indication: dyspnea    Comparison: 11/13/2023.    Findings:  There is increased interstitial disease with curly B lines. Cardiac silhouette is unchanged. Pulmonary vasculature is partially indistinct. No pneumothorax or pleural effusion. No lobar consolidation. No acute osseous abnormality.      Impression:      Impression:  Increased interstitial disease that could reflect interstitial pulmonary edema.        Electronically Signed: Salvador Love MD    11/19/2023 2:35 AM EST    Workstation ID: [id number]            ECHOCARDIOGRAM:          I reviewed the patient's new clinical results.    EKG:      Assessment:       Acute exacerbation of CHF (congestive heart failure)    1) BLE edema / SOA  - BNP 9877  - CXR shows infiltrates vs edema  - CTA chest shows no PE  - on IV lasix  - 2D echo pending    2) tachycardia / hx PSVT  - sinus tachycardia, likely hemodynamically appropriate in setting PNA  - s/p dose IV beta blocker    3) Hydronephrosis s/p bilateral stenting 11/19  - urology following    4) LENO  - Cr 1.15    5) PNA  - per primary team    6) tobacco dependence    7) pelvic prolapse  - planned for OP GYN eval      Plan:   Tele shows SR/ST.  Continue on low dose beta blocker given hx PSVT.  Patient appears compensated.  Cr trending up.  Will transition IV lasix to PO.  2D echo pending.          Electronically signed by  ZAN Lynn, 11/20/23, 1:36 PM EST.     Addendum: 2D echo shows EF = 45-50%.  On BB.  NO ACEi/ARB given recent hydronephrosis.  As d/w Dr. Hwang, will plan outpatient stress test.    Electronically signed by ZAN Lynn, 11/20/23, 4:11 PM EST.

## 2023-11-20 NOTE — CASE MANAGEMENT/SOCIAL WORK
Continued Stay Note  KRISTINE Quinones     Patient Name: Neva Kurtz  MRN: [id number]  Today's Date: 11/20/2023    Admit Date: 11/19/2023    Plan: Home.   Discharge Plan       Row Name 11/20/23 1250       Plan    Plan Home.    Plan Comments Barriers to discharge: Echo today.   Cardio and urology following.             Expected Discharge Date and Time       Expected Discharge Date Expected Discharge Time    Nov 21, 2023         Phone communication or documentation only - no physical contact with patient or family.   Gillian Dodson RN     Office Phone [phone]  Office Cell [phone]

## 2023-11-20 NOTE — PROGRESS NOTES
Hospital Medicine Services   Daily Progress Note    Patient Name: Neva Kurtz  : 1960  MRN: [id number]  Primary Care Physician:  Provider, No Known  Date of admission: 2023  Date of service 2023      Subjective      Chief Complaint: Shortness of breath    Neva Kurtz is a 63 y.o. female with PMH of SVT not currently on medications who presented to [facility] ED 2023 with complaints of shortness of breath around 1am this morning. She woke up and felt like she could not take in a deep breath. She felt pressure in her mid chest. She was just discharged from here on  after treatment for a UTI. She has been taking her cefdinir. Daughter who works in healthcare at the bedside stated her HR was high during that admission 120s-130s and it was thought to be secondary to her infection. She has had lower extremity swelling since her discharge. She stated she started to urinate better after being discharged but then today had difficulty urinating again.   She has no PCP and does not routinely see a physician.      In the ED labs showed proBNP 9877, high-sensitivity troponin 27, creatinine 1.10, glucose 152, WBC 16.3, Hgb 11.5, platelets 618.  CXR showed increased interstitial disease that could reflect interstitial pulmonary edema.  CT PE protocol showed no PE, interlobular septal thickening, peribronchial thickening and mosaic attenuation in the lungs with small bilateral pleural effusions. This is favored to reflect pulmonary edema. There are a few focal areas of airspace disease in the lungs that could be related to edema or pneumonia. There is severe left-sided hydronephrosis and diminished enhancement of the left renal parenchyma, which is only partially seen on this study, but concerning for obstructive uropathy of indeterminate age. 5.28 mm left thyroid lobe nodule. This could be evaluated with ultrasound. Fat-containing hiatal hernia. Evidence of prior granulomatous infection.  EKG  showed sinus tachycardia rate 139. She was afebrile but with chills, elevated respiratory rate, blood pressure 150/92, mildly hypoxic placed on 1L O2 via nasal cannula with improvement in oxygen saturation 95%. She was given IV cefepime, IV vancomycin, 40 mg IV Lasix.  She was admitted for further treatment of pneumonia with hypoxia and new onset CHF.     Family history: Father  in his 40s from blood clot     23-Patient Reportsmild shortness of breath.  Feeling better.  Shortness of breath is improved.  Mild leg swelling.  Leg swelling is improved.  No chest pain currently.  No nausea vomiting.  No dizziness no lightheadedness.'  23 seen in bed NAD, no new complaints, says feels better Sats 95% on room air, MARKO RN,     JERMAN A 12 point review of system was done and was negative except as mentioned above    Objective      Vitals:   Temp:  [98.1 °F (36.7 °C)-98.6 °F (37 °C)] 98.6 °F (37 °C)  Heart Rate:  [] 106  Resp:  [13-24] 18  BP: ()/(37-76) 148/74  Flow (L/min):  [4-10] 4    Physical Exam  Constitutional:       Appearance: Normal appearance.   HENT:      Head: Normocephalic and atraumatic.      Nose: Nose normal.      Mouth/Throat:      Mouth: Mucous membranes are moist.   Cardiovascular:      Rate and Rhythm: Normal rate.   Pulmonary:      Effort: Pulmonary effort is normal. No respiratory distress.      Breath sounds: Normal breath sounds. No stridor. No wheezing, rhonchi or rales.   Chest:      Chest wall: No tenderness.   Abdominal:      General: Abdomen is flat. There is no distension.      Palpations: Abdomen is soft. There is no mass.      Tenderness: There is no abdominal tenderness.   Musculoskeletal:      Right lower leg: Edema present.      Left lower leg: Edema present.      Comments: Trace edema present both lower legs.  No calf tenderness bilaterally.   Neurological:      General: No focal deficit present.      Mental Status: She is alert.             Result Review    Result  Review:  I have personally reviewed the results from the time of this admission to 11/20/2023 07:46 EST and agree with these findings:  [x]  Laboratory  []  Microbiology  [x]  Radiology  [x]  EKG/Telemetry   []  Cardiology/Vascular   []  Pathology  []  Old records  []  Other:    CBC:      Lab 11/19/23 2318 11/19/23  0207 11/14/23  1014 11/13/23  0909   WBC 11.50* 16.30* 16.00* 12.30*   HEMOGLOBIN 9.2* 11.5* 10.7* 11.3*   HEMATOCRIT 28.2* 34.5 31.8* 33.6*   PLATELETS 535* 618* 340 491*   NEUTROS ABS 8.90* 14.51* 13.20* 10.95*   LYMPHS ABS 1.50  --  1.70  --    MONOS ABS 1.10*  --  0.90  --    EOS ABS 0.00 0.33 0.10  --    MCV 91.9 91.3 92.2 91.7    CMP:        Lab 11/19/23 2318 11/19/23  0207 11/14/23  1014 11/13/23  1307 11/13/23  0909   SODIUM 140 139 137 135* 135*   POTASSIUM 4.0 4.0 4.3 3.8 4.1   CHLORIDE 105 102 104 102 99   CO2 25.0 23.0 21.0* 19.0* 22.0   ANION GAP 10.0 14.0 12.0 14.0 14.0   BUN 18 13 13 12 12   CREATININE 1.15* 1.10* 1.02* 0.94 0.99   EGFR 54.0* 56.9* 62.3 68.7 64.6   GLUCOSE 122* 152* 106* 122* 148*   CALCIUM 8.4* 9.4 9.0 8.8 9.3   MAGNESIUM  --   --  2.0  --   --    TOTAL PROTEIN  --  7.2  --  6.6  --    ALBUMIN  --  3.5  --  3.1*  --    GLOBULIN  --  3.7  --  3.5  --    ALT (SGPT)  --  10  --  9  --    AST (SGOT)  --  13  --  15  --    BILIRUBIN  --  0.3  --  0.4  --    ALK PHOS  --  92  --  96  --             Assessment & Plan      Brief Patient Summary:  Neva Kurtz is a 63 y.o. female with PMH of SVT not currently on medications who presented to [facility] ED 11/19/2023 with complaints of shortness of breath around 1am this morning. She woke up and felt like she could not take in a deep breath. She felt pressure in her mid chest. She was just discharged from here on 11/14 after treatment for a UTI. She has been taking her cefdinir. HR was high during that admission 120s-130s and it was thought to be secondary to her infection. She has had lower extremity swelling     cefepime, 2,000 mg,  Intravenous, Q8H  enoxaparin, 40 mg, Subcutaneous, Daily  furosemide, 40 mg, Intravenous, Q12H  nicotine, 1 patch, Transdermal, Q24H  senna-docusate sodium, 2 tablet, Oral, BID  sodium chloride, 10 mL, Intravenous, Q12H  vancomycin, 750 mg, Intravenous, Q12H       Pharmacy to dose vancomycin,          Active Hospital Problems:  Active Hospital Problems    Diagnosis     **Acute exacerbation of CHF (congestive heart failure)      Plan:   Patient is secondary to CHF exacerbation  Sinus tachycardia  - CT PE Protocol: no PE, interlobular septal thickening, peribronchial thickening and mosaic attenuation in the lungs with small bilateral pleural effusions. This is favored to reflect pulmonary edema. There are a few focal areas of airspace disease in the lungs that could be related to edema or pneumonia.   - proBNP 9877, HS troponin 27, repeat troponins   - type of CHF unknown due to no previous echo  - 40mg IV lasix given in ED and will continue q12 hours  - give 5mg IV metoprolol now, discussed with attending  - pt has a history of SVT   - check 2D echo  - strict Is & Os  -Consulted cardiology     Pneumonia----afebrile/WBC--11.5,  - CT as above showed there are a few focal areas of airspace disease in the lungs that could be related to edema or pneumonia.   - WBC 16.30  - subjective fever, no fever currently  - IV antibiotics as ordered.rocephin added. Dc vanc/cefep  - requiring 1L O2 via NC  - follow blood cultures, check procalcitonin, urine antigens   - continuous pulse oximetry  - bronchodilators follow-up chest x-ray   Severe left sided hydronephrosis  Obstructive uropathy  - CT: severe left-sided hydronephrosis and diminished enhancement of the left renal parenchyma, which is only partially seen on this study, but concerning for obstructive uropathy of indeterminate age.   - recent recurrent UTI, pansensitive treated with cefdinir  - IV antibiotics as above  - bladder scan only showed 160  - consulted urology for  further recommendations     Thrombocytosis  - plts 618  - start VTE lovenox  -Follow-up CBC     History of SVT  - pt is on no home medications for SVT as this was over 20 years ago and she had some type of procedure done for the SVT but unsure what. She believes she was on metoprolol in the past    -Cardiology to see the patient     Tobacco abuse  - encourage cessation  - nicotine patch     DVT prophylaxis:  Medical and mechanical DVT prophylaxis orders are present.    CODE STATUS:    Level Of Support Discussed With: Patient  Code Status (Patient has no pulse and is not breathing): CPR (Attempt to Resuscitate)  Medical Interventions (Patient has pulse or is breathing): Full Support      Disposition:  I expect patient to be discharged 1 to 2 days    Patient and daughter at bedside was updated with plan of care.  All questions answered      Electronically signed by Jai Null MD, 11/20/23, 07:46 EST.  [facility] Hospitalist Team

## 2023-11-20 NOTE — PROGRESS NOTES
Urology Progress Note    Patient Identification:  Name:  Neva Kurtz  Age:  63 y.o.  Sex:  female  :  1960  MRN:  [id number]    Chief Complaint:  Patient without complaint    History of Present Illness: Patient feels better.  Her abdominal pain has resolved.  She is tolerating the stents well.    Problem List:    Acute exacerbation of CHF (congestive heart failure)     Past Medical History:  Past Medical History:   Diagnosis Date    SVT (supraventricular tachycardia)      Past Surgical History:  Past Surgical History:   Procedure Laterality Date    BREAST BIOPSY       Home Meds:  Medications Prior to Admission   Medication Sig Dispense Refill Last Dose    [] cefdinir (OMNICEF) 300 MG capsule Take 1 capsule by mouth 2 (Two) Times a Day for 7 days. 14 capsule 0     cetirizine (zyrTEC) 10 MG tablet Take 1 tablet by mouth Daily.       ibuprofen (ADVIL,MOTRIN) 400 MG tablet Take 1 tablet by mouth Every 6 (Six) Hours As Needed for Mild Pain.       multivitamin with minerals tablet tablet Take 1 tablet by mouth Daily.       phenazopyridine (Pyridium) 100 MG tablet Take 1 tablet by mouth 3 (Three) Times a Day. 6 tablet 0      Current Meds:    Current Facility-Administered Medications:     acetaminophen (TYLENOL) tablet 650 mg, 650 mg, Oral, Q4H PRN **OR** acetaminophen (TYLENOL) 160 MG/5ML oral solution 650 mg, 650 mg, Oral, Q4H PRN **OR** acetaminophen (TYLENOL) suppository 650 mg, 650 mg, Rectal, Q4H PRN, Alan Rodriguez MD    aluminum-magnesium hydroxide-simethicone (MAALOX MAX) 400-400-40 MG/5ML suspension 15 mL, 15 mL, Oral, Q6H PRN, Alan Rodriguez MD    sennosides-docusate (PERICOLACE) 8.6-50 MG per tablet 2 tablet, 2 tablet, Oral, BID, 2 tablet at 23 **AND** polyethylene glycol (MIRALAX) packet 17 g, 17 g, Oral, Daily PRN **AND** bisacodyl (DULCOLAX) EC tablet 5 mg, 5 mg, Oral, Daily PRN **AND** bisacodyl (DULCOLAX) suppository 10 mg, 10 mg, Rectal, Daily PRN, Alan Rodriguez MD     Calcium Replacement - Follow Nurse / BPA Driven Protocol, , Does not apply, PRNMichael Dennis N., MD    cefepime 2 gm IVPB in 100 ml NS (MBP), 2,000 mg, Intravenous, Q8H, Alan Rodriguez MD, 2,000 mg at 23 0707    Enoxaparin Sodium (LOVENOX) syringe 40 mg, 40 mg, Subcutaneous, Daily, Alan Rodriguez MD    furosemide (LASIX) injection 40 mg, 40 mg, Intravenous, Q12H, Alan Rodriguez MD, 40 mg at 23 0541    ipratropium-albuterol (DUO-NEB) nebulizer solution 3 mL, 3 mL, Nebulization, Q4H PRN, Alan Rodriguez MD    Magnesium Standard Dose Replacement - Follow Nurse / BPA Driven Protocol, , Does not apply, PRMichael RANDOLPH Dennis N., MD    melatonin tablet 5 mg, 5 mg, Oral, Nightly PRN, Alan Rodriguez MD    nicotine (NICODERM CQ) 21 MG/24HR patch 1 patch, 1 patch, Transdermal, Q24H, Alan Rodriguez MD, 1 patch at 23 0858    ondansetron (ZOFRAN) tablet 4 mg, 4 mg, Oral, Q6H PRN **OR** ondansetron (ZOFRAN) injection 4 mg, 4 mg, Intravenous, Q6H PRN, Alan Rodriguez MD    Pharmacy to dose vancomycin, , Does not apply, Continuous PRMichael RANDOLPH Dennis N., MD    Phosphorus Replacement - Follow Nurse / BPA Driven Protocol, , Does not apply, PRMichael RANDOLPH Dennis N., MD    Potassium Replacement - Follow Nurse / BPA Driven Protocol, , Does not apply, Michael HOFFMAN Dennis N., MD    [COMPLETED] Insert Peripheral IV, , , Once **AND** sodium chloride 0.9 % flush 10 mL, 10 mL, Intravenous, PRN, Alan Rodriguez MD    sodium chloride 0.9 % flush 10 mL, 10 mL, Intravenous, Q12H, Alan Rodriguez MD, 10 mL at 23    sodium chloride 0.9 % infusion 40 mL, 40 mL, Intravenous, PRN, Alan Rodriguez MD    vancomycin 750 mg in sodium chloride 0.9 % 250 mL IVPB-VTB, 750 mg, Intravenous, Q12H, Alan Rodriguez MD, Last Rate: 333.3 mL/hr at 23 05, 750 mg at 23  Allergies:  Other and Tetracyclines & related    Review of Systems     Objective:  tMax 24 hours:  Temp (24hrs), Av.4 °F (36.9  "°C), Min:98.1 °F (36.7 °C), Max:98.6 °F (37 °C)    Vital Sign Ranges:  Temp:  [98.1 °F (36.7 °C)-98.6 °F (37 °C)] 98.6 °F (37 °C)  Heart Rate:  [] 106  Resp:  [13-24] 18  BP: ()/(37-76) 148/74  Intake and Output Last 3 Shifts:  I/O last 3 completed shifts:  In: -   Out: 700 [Urine:700]    Exam:  /74 (BP Location: Left arm, Patient Position: Lying)   Pulse 106   Temp 98.6 °F (37 °C) (Oral)   Resp 18   Ht 172.7 cm (68\")   Wt 96.4 kg (212 lb 8.4 oz)   SpO2 94%   BMI 32.31 kg/m²    General Appearance:    Alert, cooperative, no acute distress, general         appearance is normal   Head:    Normocephalic, without obvious abnormality, atraumatic   Eyes:            Pupils/Irises normal. Exterior inspection conjunctivae       and lids normal.   Ears:    Normal external inspection   Nose:   Exterior inspection of nose is normal   Throat:   Lips, mucosa, and tongue normal   Lungs:     Respirations unlabored; normal effort, no audible     abnormality   CV:   Regular rhythm and normal rate, no edema   Abdomen:     examination of the abdomen is normal with     no masses, tenderness, or distension    :        Data Review:  All labs (24hrs):    Lab Results (last 24 hours)       Procedure Component Value Units Date/Time    Blood Culture - Blood, Arm, Left [[id number]]  (Normal) Collected: 11/19/23 0353    Specimen: Blood from Arm, Left Updated: 11/20/23 0416     Blood Culture No growth at 24 hours    Blood Culture - Blood, Arm, Right [[id number]]  (Normal) Collected: 11/19/23 0353    Specimen: Blood from Arm, Right Updated: 11/20/23 0416     Blood Culture No growth at 24 hours    Narrative:      Less than seven (7) mL's of blood was collected.  Insufficient quantity may yield false negative results.    Basic Metabolic Panel [[id number]]  (Abnormal) Collected: 11/19/23 2318    Specimen: Blood Updated: 11/20/23 0044     Glucose 122 mg/dL      BUN 18 mg/dL      Creatinine 1.15 mg/dL      Sodium 140 mmol/L      " Potassium 4.0 mmol/L      Chloride 105 mmol/L      CO2 25.0 mmol/L      Calcium 8.4 mg/dL      BUN/Creatinine Ratio 15.7     Anion Gap 10.0 mmol/L      eGFR 54.0 mL/min/1.73     Narrative:      GFR Normal >60  Chronic Kidney Disease <60  Kidney Failure <15      CBC & Differential [[id number]]  (Abnormal) Collected: 11/19/23 2318    Specimen: Blood Updated: 11/20/23 0013    Narrative:      The following orders were created for panel order CBC & Differential.  Procedure                               Abnormality         Status                     ---------                               -----------         ------                     CBC Auto Differential[[id number]]        Abnormal            Final result                 Please view results for these tests on the individual orders.    CBC Auto Differential [[id number]]  (Abnormal) Collected: 11/19/23 2318    Specimen: Blood Updated: 11/20/23 0013     WBC 11.50 10*3/mm3      RBC 3.06 10*6/mm3      Hemoglobin 9.2 g/dL      Comment: Result checked          Hematocrit 28.2 %      MCV 91.9 fL      MCH 30.1 pg      MCHC 32.8 g/dL      RDW 14.9 %      RDW-SD 48.1 fl      MPV 7.2 fL      Platelets 535 10*3/mm3      Neutrophil % 77.5 %      Lymphocyte % 12.7 %      Monocyte % 9.2 %      Eosinophil % 0.0 %      Basophil % 0.6 %      Neutrophils, Absolute 8.90 10*3/mm3      Lymphocytes, Absolute 1.50 10*3/mm3      Monocytes, Absolute 1.10 10*3/mm3      Eosinophils, Absolute 0.00 10*3/mm3      Basophils, Absolute 0.10 10*3/mm3      nRBC 0.0 /100 WBC     High Sensitivity Troponin T [[id number]]  (Abnormal) Collected: 11/19/23 2018    Specimen: Blood Updated: 11/19/23 2057     HS Troponin T 34 ng/L     Narrative:      High Sensitive Troponin T Reference Range:  <14.0 ng/L- Negative Female for AMI  <22.0 ng/L- Negative Male for AMI  >=14 - Abnormal Female indicating possible myocardial injury.  >=22 - Abnormal Male indicating possible myocardial injury.   Clinicians would have to utilize  clinical acumen, EKG, Troponin, and serial changes to determine if it is an Acute Myocardial Infarction or myocardial injury due to an underlying chronic condition.         MRSA Screen, PCR (Inpatient) - Swab, Nares [[id number]]  (Normal) Collected: 11/19/23 0951    Specimen: Swab from Nares Updated: 11/19/23 1122     MRSA PCR No MRSA Detected    Narrative:      The negative predictive value of this diagnostic test is high and should only be used to consider de-escalating anti-MRSA therapy. A positive result may indicate colonization with MRSA and must be correlated clinically.    Lactic Acid, Plasma [[id number]]  (Normal) Collected: 11/19/23 1003    Specimen: Blood Updated: 11/19/23 1031     Lactate 0.8 mmol/L     High Sensitivity Troponin T [[id number]]  (Abnormal) Collected: 11/19/23 1003    Specimen: Blood Updated: 11/19/23 1030     HS Troponin T 53 ng/L     Narrative:      High Sensitive Troponin T Reference Range:  <14.0 ng/L- Negative Female for AMI  <22.0 ng/L- Negative Male for AMI  >=14 - Abnormal Female indicating possible myocardial injury.  >=22 - Abnormal Male indicating possible myocardial injury.   Clinicians would have to utilize clinical acumen, EKG, Troponin, and serial changes to determine if it is an Acute Myocardial Infarction or myocardial injury due to an underlying chronic condition.               Radiology:   Imaging Results (Last 72 Hours)       Procedure Component Value Units Date/Time    CT Abdomen Pelvis Stone Protocol [[id number]] Collected: 11/19/23 0819     Updated: 11/19/23 0831    Narrative:      CT ABDOMEN PELVIS STONE PROTOCOL    Date of Exam: 11/19/2023 8:04 AM EST    Indication: Left hydronephrosis.    Comparison: CT chest from earlier today    Technique: Axial CT images were obtained of the abdomen and pelvis without the administration of contrast. Sagittal and coronal reconstructions were performed.  Automated exposure control and iterative reconstruction methods were  used.      Findings:  Within the lung visualized lung bases is no significant change from recent prior CT chest.    The liver, gallbladder, adrenal glands, spleen, and pancreas are unremarkable. There is severe right hydroureteronephrosis with suggestion of atypical attachment of the right ureter to the urinary bladder. There is severe left hydronephrosis with abrupt   transition to normal in caliber left ureter, with the connection of the left ureter to the urinary bladder not well identified. No obstructing ureteral stone is identified on either side.    The stomach appears normal. The small bowel appears normal in caliber and configuration. The colon appears normal. The appendix appears normal. There is no ascites or loculated collection. No abnormally enlarged lymph nodes are identified.    The rectum and urinary bladder are unremarkable. The uterus is surgically absent. There is a 5 cm cystic lesion within the left pelvis posterior to the urinary bladder. There is pelvic floor laxity.    No aggressive osseous lesions are identified.      Impression:      Impression:  1.Severe right hydroureteronephrosis with suggestion of atypical attachment of right ureter to urinary bladder. There is also severe left hydronephrosis with abrupt transition to normal caliber left ureter, suggesting left UPJ stenosis. The connection of   the left ureter to the urinary bladder is not well identified.  2.5 cm cystic lesion within left pelvis posterior to urinary bladder, of unclear etiology but could represent a ureterocele.            Electronically Signed: Naresh Dorantes MD    11/19/2023 8:29 AM EST    Workstation ID: [id number]    CT Angiogram Chest Pulmonary Embolism [[id number]] Collected: 11/19/23 0322     Updated: 11/19/23 0328    Narrative:      CT ANGIOGRAM CHEST PULMONARY EMBOLISM    Date of Exam: 11/19/2023 3:08 AM EST    Indication: Pulmonary embolism (PE) suspected, high prob.    Comparison: Chest radiograph performed  earlier today.    Technique: Axial CT images were obtained of the chest after the uneventful intravenous administration of iodinated contrast utilizing pulmonary embolism protocol.  Sagittal and coronal reconstructions were performed.  Automated exposure control and   iterative reconstruction methods were used.      Findings:  There is interlobular septal thickening noted throughout the lungs. There is associated peribronchial thickening and mosaic attenuation in the lungs. There are small bilateral pleural effusions. There are a few focal areas of airspace disease in the   lungs including in the right upper lobe on image 64, where there is a nodular density with central aeration or cavitation measuring 18 mm diameter.    The left thyroid lobe appears enlarged likely due to a nodule measuring 28 mm. The aorta exhibits mild atherosclerotic plaque. There are a few shelflike plaques in the aorta most pronounced on image 142. The heart size is normal. No pericardial effusion.   There is a fat-containing hiatal hernia. There are multiple calcified mediastinal granulomas. There is no evidence of pulmonary embolism. There are mildly enlarged bilateral hilar and subcarinal lymph nodes.    There are no acute findings in the superficial soft tissues. There is severe left-sided hydronephrosis and diminished enhancement of the left renal parenchyma, which is only partially seen on this study, but concerning for obstructive uropathy of   indeterminate age. There are no acute osseous abnormalities or destructive bone lesions. There are mild diffuse thoracic degenerative changes.      Impression:      Impression:  1.No evidence of pulmonary embolism.  2.There is interlobular septal thickening, peribronchial thickening and mosaic attenuation in the lungs with small bilateral pleural effusions. This is favored to reflect pulmonary edema.  3.There are a few focal areas of airspace disease in the lungs that could be related to edema  or pneumonia.  4.There is severe left-sided hydronephrosis and diminished enhancement of the left renal parenchyma, which is only partially seen on this study, but concerning for obstructive uropathy of indeterminate age.  5.28 mm left thyroid lobe nodule. This could be evaluated with ultrasound.  6.Fat-containing hiatal hernia.  7.Evidence of prior granulomatous infection.        Electronically Signed: Salvador Love MD    11/19/2023 3:26 AM EST    Workstation ID: [id number]    XR Chest 1 View [[id number]] Collected: 11/19/23 0234     Updated: 11/19/23 0237    Narrative:      XR CHEST 1 VW    Date of Exam: 11/19/2023 2:06 AM EST    Indication: dyspnea    Comparison: 11/13/2023.    Findings:  There is increased interstitial disease with curly B lines. Cardiac silhouette is unchanged. Pulmonary vasculature is partially indistinct. No pneumothorax or pleural effusion. No lobar consolidation. No acute osseous abnormality.      Impression:      Impression:  Increased interstitial disease that could reflect interstitial pulmonary edema.        Electronically Signed: Salvador Love MD    11/19/2023 2:35 AM EST    Workstation ID: [id number]            Assessment/Plan:    Principal Problem:    Acute exacerbation of CHF (congestive heart failure)    Bilateral hydronephrosis status post bilateral stent placement  Evidence of left UPJ obstruction with infected urine in the collecting system  Right hydronephrosis appears to be due to ureteral kinking due to severe uterine prolapse    Plan  Okay for discharge from urology standpoint  Patient to see a gynecologist for hysterectomy and suspension of vaginal vault  Patient to follow-up with me in approximately 3 weeks  Nothing further to add so I will sign off.  Please let me know if I can be of further assistance      Alan Rodriguez MD  11/20/2023  07:36 EST

## 2023-11-20 NOTE — PLAN OF CARE
Goal Outcome Evaluation:      Pt A&O x 4. Up independently. Denies SOA, remains on RA for duration of shift. LSCTA. Trace edema noted to BLE. HR elevated, but responds to metoprolol. Echo completed, pending results.

## 2023-11-20 NOTE — PAYOR COMM NOTE
"    INPATIENT HOSPITAL MEDICAL AUTH REQUEST      FAX  [phone]    Please advise if additional clinical is needed to process this request.  Thank you!    Crystal Mcnamara  Utilization Review Coordinator  [address]  [address]  Ph: [phone]  Fx: [phone]             Neva Wise (63 y.o. Female)       Date of Birth   1960    Social Security Number       Address   [address] [place] [address]    Home Phone   [phone]    MRN   [id number]       [facility]   [facility]    Marital Status   Single                            Admission Date   11/19/23    Admission Type   Emergency    Admitting Provider   Leona Mckinney MD    Attending Provider   Jai Null MD    Department, Room/Bed   [facility] PROGRESS CARE, 2104/1       Discharge Date       Discharge Disposition       Discharge Destination                                 Attending Provider: Jai Null MD    Allergies: Other, Tetracyclines & Related    Isolation: None   Infection: None   Code Status: CPR    Ht: 172.7 cm (68\")   Wt: 96.2 kg (212 lb)    Admission Cmt: None   Principal Problem: Acute exacerbation of CHF (congestive heart failure) [I50.9]                   Active Insurance as of 11/19/2023       Primary Coverage       Payor Plan Insurance Group Employer/Plan Group    [facility] [employer] [facility] [employer] [facility] PPO [id number]       Payor Plan Address Payor Plan Phone Number Payor Plan Fax Number Effective Dates    [address] [phone]  7/1/2023 - None Entered    [place] [address]         Subscriber Name Subscriber Birth Date Member ID       NEVA WISE 1960 [id number]                     Emergency Contacts        (Rel.) Home Phone Work Phone Mobile Phone    Nicole Everett (Daughter) -- -- [phone]    NUNO CASTELLANO (Daughter) -- -- [phone]             Pneumonia Clinical Indications for Admission to Inpatient Care     "   Indications Met   Last updated by Raina James RN on 11/19/2023 0829     Review Status Created By   Primary Completed Raina James RN      Criteria Review   Pneumonia Clinical Indications for Admission to Inpatient Care     Overall Determination: Indications Met     Criteria:  [×] Admission is indicated for  1 or more  of the following  [A] [B] (1) (2) (9) (10) (11) (12):      [×] Moderate-risk-category or high-risk-category patient [C] (Pneumonia Severity Index class IV or V, or CURB-65 score of 3 or greater)           11/19/2023  8:29 AM              -- 11/19/2023  8:29 AM by Raina James RN --                  Score 92; Class IV (Age, female, chf, renal, hr, pleural effusions)     Notes:  -- 11/19/2023  8:29 AM by Raina James RN --      98.6      93%RA      168/89      142      20                  HS Troponin 27;32      proBNP 9877.0      Cr 1.10      Procalcitonin 1.41      WBC 16.30      blood culture      urine culture                              CTA CHEST: 1.No evidence of pulmonary embolism.      2.There is interlobular septal thickening, peribronchial thickening and mosaic attenuation in the lungs with small bilateral pleural effusions. This is favored to reflect pulmonary edema.      3.There are a few focal areas of airspace disease in the lungs that could be related to edema or pneumonia.      4.There is severe left-sided hydronephrosis and diminished enhancement of the left renal parenchyma, which is only partially seen on this study, but concerning for obstructive uropathy of indeterminate age.      5.28 mm left thyroid lobe nodule. This could be evaluated with ultrasound.      6.Fat-containing hiatal hernia.      7.Evidence of prior granulomatous infection.            IV Cefepime      Furosemide IV q 12 hours      Lopressor 5mg IV x 1      IV Vanc                  Consult: Urology;Cardiology      ECHO      CT Abd/Pelvis      Lower Extremity Duplex                History & Physical         Aida Apple, ZAN at 23 8029       Attestation signed by Leona Mckinney MD at 23 4946    I have reviewed this documentation and agree.                      [facility] Medicine Services    Hospitalist History and Physical     Neva Kurtz : 1960 MRN:[id number] LOS:0 ROOM:      Reason for admission: New onset CHF, Sinus tachycardia, Pneumonia    Assessment / Plan     New onset CHF exacerbation  Sinus tachycardia  - CT PE Protocol: no PE, interlobular septal thickening, peribronchial thickening and mosaic attenuation in the lungs with small bilateral pleural effusions. This is favored to reflect pulmonary edema. There are a few focal areas of airspace disease in the lungs that could be related to edema or pneumonia.   - proBNP 9877, HS troponin 27, repeat troponins   - type of CHF unknown due to no previous echo  - 40mg IV lasix given in ED and will continue q12 hours  - give 5mg IV metoprolol now, discussed with attending  - pt has a history of SVT   - check 2D echo  - strict Is & Os  - check venous duplex r/o DVTs  - consult cardiology for further recommendations     Pneumonia  - CT as above showed there are a few focal areas of airspace disease in the lungs that could be related to edema or pneumonia.   - WBC 16.30  - subjective fever, no fever currently  - IV cefepime, vancomycin  - requiring 1L O2 via NC  - follow blood cultures, check procalcitonin, urine antigens   - continuous pulse oximetry  - bronchodilators     Severe left sided hydronephrosis  Obstructive uropathy  - CT: severe left-sided hydronephrosis and diminished enhancement of the left renal parenchyma, which is only partially seen on this study, but concerning for obstructive uropathy of indeterminate age.   - recent recurrent UTI, pansensitive treated with cefdinir  - IV antibiotics as above  - bladder scan only showed 160  - consult urology for further recommendations    Thrombocytosis  - plts 618  -  start VTE lovenox    History of SVT  - pt is on no home medications for SVT as this was over 20 years ago and she had some type of procedure done for the SVT but unsure what. She believes she was on metoprolol in the past      Tobacco abuse  - encourage cessation  - nicotine patch     DVT prophylaxis:  Medical DVT prophylaxis orders are present.     History of Present illness     Neva Kurtz is a 63 y.o. female with PMH of SVT not currently on medications who presented to [facility] ED 11/19/2023 with complaints of shortness of breath around 1am this morning. She woke up and felt like she could not take in a deep breath. She felt pressure in her mid chest. She was just discharged from here on 11/14 after treatment for a UTI. She has been taking her cefdinir. Daughter who works in healthcare at the bedside stated her HR was high during that admission 120s-130s and it was thought to be secondary to her infection. She has had lower extremity swelling since her discharge. She stated she started to urinate better after being discharged but then today had difficulty urinating again.   She has no PCP and does not routinely see a physician.     In the ED labs showed proBNP 9877, high-sensitivity troponin 27, creatinine 1.10, glucose 152, WBC 16.3, Hgb 11.5, platelets 618.  CXR showed increased interstitial disease that could reflect interstitial pulmonary edema.  CT PE protocol showed no PE, interlobular septal thickening, peribronchial thickening and mosaic attenuation in the lungs with small bilateral pleural effusions. This is favored to reflect pulmonary edema. There are a few focal areas of airspace disease in the lungs that could be related to edema or pneumonia. There is severe left-sided hydronephrosis and diminished enhancement of the left renal parenchyma, which is only partially seen on this study, but concerning for obstructive uropathy of indeterminate age. 5.28 mm left thyroid lobe nodule. This could be evaluated  with ultrasound. Fat-containing hiatal hernia. Evidence of prior granulomatous infection.  EKG showed sinus tachycardia rate 139. She was afebrile but with chills, elevated respiratory rate, blood pressure 150/92, mildly hypoxic placed on 1L O2 via nasal cannula with improvement in oxygen saturation 95%. She was given IV cefepime, IV vancomycin, 40 mg IV Lasix.  She was admitted for further treatment of pneumonia with hypoxia and new onset CHF.    Family history: Father  in his 40s from blood clot       Subjective / Review of systems     Review of Systems   Constitutional:  Positive for chills.   HENT: Negative.     Eyes: Negative.    Respiratory:  Positive for chest tightness and shortness of breath.    Cardiovascular:  Positive for chest pain, palpitations and leg swelling.   Gastrointestinal: Negative.    Genitourinary: Negative.    Musculoskeletal: Negative.    Skin: Negative.    Neurological: Negative.    Psychiatric/Behavioral: Negative.          Past Medical/Surgical/Social/Family History & Allergies     Past Medical History:   Diagnosis Date    SVT (supraventricular tachycardia)       Past Surgical History:   Procedure Laterality Date    BREAST BIOPSY        Social History     Socioeconomic History    Marital status: Single   Tobacco Use    Smoking status: Every Day     Packs/day: 1     Types: Cigarettes     Passive exposure: Never    Smokeless tobacco: Never   Vaping Use    Vaping Use: Never used   Substance and Sexual Activity    Alcohol use: Never    Drug use: Never    Sexual activity: Defer      No family history on file.   Allergies   Allergen Reactions    Other Other (See Comments)     tetramycin    Tetracyclines & Related Swelling      Social Determinants of Health     Tobacco Use: High Risk (2023)    Patient History     Smoking Tobacco Use: Every Day     Smokeless Tobacco Use: Never     Passive Exposure: Never   Alcohol Use: Not At Risk (2023)    AUDIT-C     Frequency of Alcohol  Consumption: Never     Average Number of Drinks: Patient does not drink     Frequency of Binge Drinking: Never   Financial Resource Strain: Not on file   Food Insecurity: Not on file   Transportation Needs: Not on file   Physical Activity: Not on file   Stress: Not on file   Social Connections: Unknown (10/12/2023)    Family and Community Support     Help with Day-to-Day Activities: Not on file     Lonely or Isolated: Not on file   Interpersonal Safety: Not At Risk (11/19/2023)    Abuse Screen     Unsafe at Home or Work/School: no     Feels Threatened by Someone?: no     Does Anyone Keep You from Contacting Others or Doint Things Outside the Home?: no     Physical Sign of Abuse Present: no   Depression: Not on file   Housing Stability: Unknown (11/13/2023)    Housing Stability     Current Living Arrangements: home     Potentially Unsafe Housing Conditions: Not on file   Utilities: Not on file   Health Literacy: Unknown (10/12/2023)    Education     Help with school or training?: Not on file     Preferred Language: Not on file   Employment: Unknown (10/12/2023)    Employment     Do you want help finding or keeping work or a job?: Not on file   Disabilities: Not At Risk (11/13/2023)    Disabilities     Concentrating, Remembering, or Making Decisions Difficulty: no     Doing Errands Independently Difficulty: no        Home Medications     Prior to Admission medications    Medication Sig Start Date End Date Taking? Authorizing Provider   cefdinir (OMNICEF) 300 MG capsule Take 1 capsule by mouth 2 (Two) Times a Day for 7 days. 11/12/23 11/19/23  Claudia Sr APRN   cetirizine (zyrTEC) 10 MG tablet Take 1 tablet by mouth Daily.    ProviderDenis MD   ibuprofen (ADVIL,MOTRIN) 400 MG tablet Take 1 tablet by mouth Every 6 (Six) Hours As Needed for Mild Pain.    ProviderDenis MD   phenazopyridine (Pyridium) 100 MG tablet Take 1 tablet by mouth 3 (Three) Times a Day. 11/12/23   Claudia Sr APRN         Objective / Physical Exam     Vital signs:  Temp: 98.6 °F (37 °C)  BP: 158/92  Heart Rate: (!) 150  Resp: 20  SpO2: 95 %  Weight: 99.2 kg (218 lb 11.1 oz)    Admission Weight: Weight: 99.2 kg (218 lb 11.1 oz)    Physical Exam  Vitals and nursing note reviewed.   HENT:      Head: Normocephalic and atraumatic.   Eyes:      Extraocular Movements: Extraocular movements intact.      Pupils: Pupils are equal, round, and reactive to light.   Cardiovascular:      Rate and Rhythm: Regular rhythm. Tachycardia present.      Pulses: Normal pulses.      Heart sounds: Normal heart sounds.   Pulmonary:      Effort: Tachypnea present.      Breath sounds: Examination of the right-lower field reveals rales. Examination of the left-lower field reveals rales.   Abdominal:      General: Bowel sounds are normal.      Palpations: Abdomen is soft.      Tenderness: There is no abdominal tenderness.   Musculoskeletal:         General: Normal range of motion.      Right lower le+ Edema present.      Left lower le+ Edema present.   Skin:     General: Skin is warm and dry.   Neurological:      Mental Status: She is alert and oriented to person, place, and time.   Psychiatric:         Mood and Affect: Mood normal.         Behavior: Behavior normal.          Labs     Results from last 7 days   Lab Units 23  1014 23  0909   WBC 10*3/mm3 16.30* 16.00* 12.30*   HEMOGLOBIN g/dL 11.5* 10.7* 11.3*   HEMATOCRIT % 34.5 31.8* 33.6*   PLATELETS 10*3/mm3 618* 340 491*      Results from last 7 days   Lab Units 23  0207 23  1307   ALK PHOS U/L 92 96   AST (SGOT) U/L 13 15   ALT (SGPT) U/L 10 9      Results from last 7 days   Lab Units 23   PROTIME Seconds 10.7   INR  0.98   APTT seconds 26.9*      Results from last 7 days   Lab Units 237 23  1014 23  1307 23  0909   SODIUM mmol/L 139 137 135* 135*   POTASSIUM mmol/L 4.0 4.3 3.8 4.1   CHLORIDE mmol/L 102 104 102 99    CO2 mmol/L 23.0 21.0* 19.0* 22.0   BUN mg/dL 13 13 12 12   CREATININE mg/dL 1.10* 1.02* 0.94 0.99   GLUCOSE mg/dL 152* 106* 122* 148*        Imaging     CT Angiogram Chest Pulmonary Embolism    Result Date: 11/19/2023  CT ANGIOGRAM CHEST PULMONARY EMBOLISM Date of Exam: 11/19/2023 3:08 AM EST Indication: Pulmonary embolism (PE) suspected, high prob. Comparison: Chest radiograph performed earlier today. Technique: Axial CT images were obtained of the chest after the uneventful intravenous administration of iodinated contrast utilizing pulmonary embolism protocol.  Sagittal and coronal reconstructions were performed.  Automated exposure control and iterative reconstruction methods were used. Findings: There is interlobular septal thickening noted throughout the lungs. There is associated peribronchial thickening and mosaic attenuation in the lungs. There are small bilateral pleural effusions. There are a few focal areas of airspace disease in the lungs including in the right upper lobe on image 64, where there is a nodular density with central aeration or cavitation measuring 18 mm diameter. The left thyroid lobe appears enlarged likely due to a nodule measuring 28 mm. The aorta exhibits mild atherosclerotic plaque. There are a few shelflike plaques in the aorta most pronounced on image 142. The heart size is normal. No pericardial effusion.  There is a fat-containing hiatal hernia. There are multiple calcified mediastinal granulomas. There is no evidence of pulmonary embolism. There are mildly enlarged bilateral hilar and subcarinal lymph nodes. There are no acute findings in the superficial soft tissues. There is severe left-sided hydronephrosis and diminished enhancement of the left renal parenchyma, which is only partially seen on this study, but concerning for obstructive uropathy of indeterminate age. There are no acute osseous abnormalities or destructive bone lesions. There are mild diffuse thoracic  degenerative changes.     Impression: 1.No evidence of pulmonary embolism. 2.There is interlobular septal thickening, peribronchial thickening and mosaic attenuation in the lungs with small bilateral pleural effusions. This is favored to reflect pulmonary edema. 3.There are a few focal areas of airspace disease in the lungs that could be related to edema or pneumonia. 4.There is severe left-sided hydronephrosis and diminished enhancement of the left renal parenchyma, which is only partially seen on this study, but concerning for obstructive uropathy of indeterminate age. 5.28 mm left thyroid lobe nodule. This could be evaluated with ultrasound. 6.Fat-containing hiatal hernia. 7.Evidence of prior granulomatous infection. Electronically Signed: Salvador Love MD  11/19/2023 3:26 AM EST  Workstation ID: [id number]    XR Chest 1 View    Result Date: 11/19/2023  XR CHEST 1 VW Date of Exam: 11/19/2023 2:06 AM EST Indication: dyspnea Comparison: 11/13/2023. Findings: There is increased interstitial disease with curly B lines. Cardiac silhouette is unchanged. Pulmonary vasculature is partially indistinct. No pneumothorax or pleural effusion. No lobar consolidation. No acute osseous abnormality.     Impression: Increased interstitial disease that could reflect interstitial pulmonary edema. Electronically Signed: Salvador Love MD  11/19/2023 2:35 AM EST  Workstation ID: [id number]        Current Medications     Scheduled Meds:  enoxaparin, 40 mg, Subcutaneous, Daily  furosemide, 40 mg, Intravenous, Q12H  nicotine, 1 patch, Transdermal, Q24H  senna-docusate sodium, 2 tablet, Oral, BID  sodium chloride, 10 mL, Intravenous, Q12H  vancomycin, 1,750 mg, Intravenous, Once        [facility]  11/19/23   04:54 EST     Electronically signed by Leona Mckinney MD at 11/19/23 0559          Emergency Department Notes        Kalina Burrell, PCT at 11/19/23 0817          Radioed team to take pt to  Vascular.    Electronically signed by Kalina Burrell PCT at 11/19/23 0827       Kalina Burrell PCT at 11/19/23 0758          Radioed team to take pt to CT 2.    Electronically signed by Kalina Burrell PCT at 11/19/23 0758       Ana Laura Hernandez APRN at 11/19/23 0202       Attestation signed by Kev Hines MD at 11/19/23 0658        NON FACE TO FACE: This visit was performed by BOTH a physician and an APC. I performed all aspects of the MDM as documented.  Kev Hines MD 11/19/2023 06:58 EST                         Subjective   Chief Complaint   Patient presents with    Shortness of Breath     Provider, No Known      History provided by:  Patient and relative    Patient is a 63-year-old female presents the ED with a sudden onset of shortness of breath.  She reports she woke up with this today.  Denies any fevers.  No chest pain.  No episodes of syncope.  She does report lower extremity edema.  Review of Systems   Constitutional:  Negative for chills and fever.   Respiratory:  Positive for chest tightness and shortness of breath.    Cardiovascular:  Positive for palpitations and leg swelling. Negative for chest pain.   Musculoskeletal:  Negative for back pain and neck pain.   Skin:  Negative for color change and rash.   Neurological:  Negative for dizziness, syncope, weakness and light-headedness.       Past Medical History:   Diagnosis Date    SVT (supraventricular tachycardia)        Allergies   Allergen Reactions    Other Other (See Comments)     tetramycin    Tetracyclines & Related Swelling       Past Surgical History:   Procedure Laterality Date    BREAST BIOPSY         No family history on file.    Social History     Socioeconomic History    Marital status: Single   Tobacco Use    Smoking status: Every Day     Packs/day: 1     Types: Cigarettes     Passive exposure: Never    Smokeless tobacco: Never   Vaping Use    Vaping Use: Never used   Substance and Sexual Activity    Alcohol use: Never  "   Drug use: Never    Sexual activity: Defer           Objective   Physical Exam  Vitals reviewed.   Constitutional:       General: She is in acute distress.   HENT:      Head: Normocephalic and atraumatic.      Mouth/Throat:      Mouth: Mucous membranes are moist.      Pharynx: Oropharynx is clear.   Eyes:      Extraocular Movements: Extraocular movements intact.      Pupils: Pupils are equal, round, and reactive to light.   Cardiovascular:      Rate and Rhythm: Regular rhythm. Tachycardia present.      Heart sounds: No murmur heard.     No friction rub. No gallop.   Abdominal:      General: Bowel sounds are normal.      Palpations: Abdomen is soft.   Musculoskeletal:      Cervical back: Normal range of motion and neck supple.      Right lower leg: Edema present.      Left lower leg: Edema present.   Skin:     General: Skin is warm and dry.      Capillary Refill: Capillary refill takes less than 2 seconds.   Neurological:      General: No focal deficit present.      Mental Status: She is alert.         Procedures          ED Course  ED Course as of 11/19/23 0418   Sun Nov 19, 2023   0240 Pt placed on 2L NC per nursing [LB]   0350 EKG is sinus tachycardia rate 139.  Compared to previous 11/13/2023 tachycardia also present.  No acute ST changes reported with ED attending physician [LB]      ED Course User Index  [LB] Ana Laura Hernandez, ZAN      /92   Pulse (!) 131   Temp 98.6 °F (37 °C) (Oral)   Resp 20   Ht 172.7 cm (68\")   Wt 99.2 kg (218 lb 11.1 oz)   SpO2 95%   BMI 33.25 kg/m²   Medications   sodium chloride 0.9 % flush 10 mL (has no administration in time range)   cefepime 2 gm IVPB in 100 ml NS (MBP) (2,000 mg Intravenous New Bag 11/19/23 0401)   vancomycin IVPB 1750 mg in 0.9% Sodium Chloride (premix) 500 mL (has no administration in time range)   iopamidol (ISOVUE-370) 76 % injection 100 mL (100 mL Intravenous Given 11/19/23 0318)   furosemide (LASIX) injection 40 mg (40 mg Intravenous " Given 11/19/23 0401)     CT Angiogram Chest Pulmonary Embolism    Result Date: 11/19/2023  Impression: 1.No evidence of pulmonary embolism. 2.There is interlobular septal thickening, peribronchial thickening and mosaic attenuation in the lungs with small bilateral pleural effusions. This is favored to reflect pulmonary edema. 3.There are a few focal areas of airspace disease in the lungs that could be related to edema or pneumonia. 4.There is severe left-sided hydronephrosis and diminished enhancement of the left renal parenchyma, which is only partially seen on this study, but concerning for obstructive uropathy of indeterminate age. 5.28 mm left thyroid lobe nodule. This could be evaluated with ultrasound. 6.Fat-containing hiatal hernia. 7.Evidence of prior granulomatous infection. Electronically Signed: Salvador Love MD  11/19/2023 3:26 AM EST  Workstation ID: [id number]    XR Chest 1 View    Result Date: 11/19/2023  Impression: Increased interstitial disease that could reflect interstitial pulmonary edema. Electronically Signed: Salvador Love MD  11/19/2023 2:35 AM EST  Workstation ID: [id number]   Lab Results (last 24 hours)       Procedure Component Value Units Date/Time    High Sensitivity Troponin T [[id number]]  (Abnormal) Collected: 11/19/23 0207    Specimen: Blood Updated: 11/19/23 0235     HS Troponin T 27 ng/L     Narrative:      High Sensitive Troponin T Reference Range:  <14.0 ng/L- Negative Female for AMI  <22.0 ng/L- Negative Male for AMI  >=14 - Abnormal Female indicating possible myocardial injury.  >=22 - Abnormal Male indicating possible myocardial injury.   Clinicians would have to utilize clinical acumen, EKG, Troponin, and serial changes to determine if it is an Acute Myocardial Infarction or myocardial injury due to an underlying chronic condition.         CBC & Differential [[id number]]  (Abnormal) Collected: 11/19/23 0207    Specimen: Blood Updated: 11/19/23 0307    Narrative:      The  following orders were created for panel order CBC & Differential.  Procedure                               Abnormality         Status                     ---------                               -----------         ------                     CBC Auto Differential[[id number]]        Abnormal            Final result               Scan Slide[[id number]]                                       Final result                 Please view results for these tests on the individual orders.    Comprehensive Metabolic Panel [[id number]]  (Abnormal) Collected: 11/19/23 0207    Specimen: Blood Updated: 11/19/23 0235     Glucose 152 mg/dL      BUN 13 mg/dL      Creatinine 1.10 mg/dL      Sodium 139 mmol/L      Potassium 4.0 mmol/L      Chloride 102 mmol/L      CO2 23.0 mmol/L      Calcium 9.4 mg/dL      Total Protein 7.2 g/dL      Albumin 3.5 g/dL      ALT (SGPT) 10 U/L      AST (SGOT) 13 U/L      Alkaline Phosphatase 92 U/L      Total Bilirubin 0.3 mg/dL      Globulin 3.7 gm/dL      A/G Ratio 0.9 g/dL      BUN/Creatinine Ratio 11.8     Anion Gap 14.0 mmol/L      eGFR 56.9 mL/min/1.73     Narrative:      GFR Normal >60  Chronic Kidney Disease <60  Kidney Failure <15      aPTT [[id number]]  (Abnormal) Collected: 11/19/23 0207    Specimen: Blood Updated: 11/19/23 0242     PTT 26.9 seconds     Protime-INR [[id number]]  (Normal) Collected: 11/19/23 0207    Specimen: Blood Updated: 11/19/23 0242     Protime 10.7 Seconds      INR 0.98    BNP [[id number]]  (Abnormal) Collected: 11/19/23 0207    Specimen: Blood Updated: 11/19/23 0235     proBNP 9,877.0 pg/mL     Narrative:      This assay is used as an aid in the diagnosis of individuals suspected of having heart failure. It can be used as an aid in the diagnosis of acute decompensated heart failure (ADHF) in patients presenting with signs and symptoms of ADHF to the emergency department (ED). In addition, NT-proBNP of <300 pg/mL indicates ADHF is not likely.    Age Range Result Interpretation   NT-proBNP Concentration (pg/mL:      <50             Positive            >450                   Gray                 300-450                    Negative             <300    50-75           Positive            >900                  Gray                300-900                  Negative            <300      >75             Positive            >1800                  Gray                300-1800                  Negative            <300    Single High Sensitivity Troponin T [[id number]]  (Abnormal) Collected: 11/19/23 0207    Specimen: Blood Updated: 11/19/23 0235     HS Troponin T 27 ng/L     Narrative:      High Sensitive Troponin T Reference Range:  <14.0 ng/L- Negative Female for AMI  <22.0 ng/L- Negative Male for AMI  >=14 - Abnormal Female indicating possible myocardial injury.  >=22 - Abnormal Male indicating possible myocardial injury.   Clinicians would have to utilize clinical acumen, EKG, Troponin, and serial changes to determine if it is an Acute Myocardial Infarction or myocardial injury due to an underlying chronic condition.         CBC Auto Differential [[id number]]  (Abnormal) Collected: 11/19/23 0207    Specimen: Blood Updated: 11/19/23 0307     WBC 16.30 10*3/mm3      RBC 3.78 10*6/mm3      Hemoglobin 11.5 g/dL      Hematocrit 34.5 %      MCV 91.3 fL      MCH 30.5 pg      MCHC 33.5 g/dL      RDW 14.7 %      RDW-SD 46.8 fl      MPV 7.0 fL      Platelets 618 10*3/mm3     Narrative:      The previously reported component NRBC is no longer being reported. Previous result was 0.0 /100 WBC (Reference Range: 0.0-0.2 /100 WBC) on 11/19/2023 at 0241 EST.    Scan Slide [[id number]] Collected: 11/19/23 0207    Specimen: Blood Updated: 11/19/23 0307     Scan Slide --     Comment: See Manual Differential Results       Manual Differential [[id number]]  (Abnormal) Collected: 11/19/23 0207    Specimen: Blood Updated: 11/19/23 0307     Neutrophil % 89.0 %      Lymphocyte % 8.0 %      Monocyte % 1.0 %      Eosinophil % 2.0  %      Neutrophils Absolute 14.51 10*3/mm3      Lymphocytes Absolute 1.30 10*3/mm3      Monocytes Absolute 0.16 10*3/mm3      Eosinophils Absolute 0.33 10*3/mm3      RBC Morphology Normal     WBC Morphology Normal     Platelet Estimate Increased    High Sensitivity Troponin T 2Hr [[id number]] Collected: 11/19/23 0353    Specimen: Blood Updated: 11/19/23 0405    Blood Culture - Blood, Arm, Left [[id number]] Collected: 11/19/23 0353    Specimen: Blood from Arm, Left Updated: 11/19/23 0406    Blood Culture - Blood, Arm, Right [[id number]] Collected: 11/19/23 0353    Specimen: Blood from Arm, Right Updated: 11/19/23 0406    POC Lactate [[id number]]  (Normal) Collected: 11/19/23 0355    Specimen: Blood Updated: 11/19/23 0358     Lactate 0.9 mmol/L      Comment: Serial Number: [id number]:  [id number]       Urinalysis With Microscopic If Indicated (No Culture) - Urine, Clean Catch [[id number]]  (Abnormal) Collected: 11/19/23 0403    Specimen: Urine, Clean Catch Updated: 11/19/23 0416     Color, UA Dark Yellow     Appearance, UA Clear     pH, UA 6.5     Specific Gravity, UA 1.021     Glucose, UA Negative     Ketones, UA Negative     Bilirubin, UA Negative     Blood, UA Negative     Protein, UA Negative     Leuk Esterase, UA Trace     Nitrite, UA Positive     Urobilinogen, UA 0.2 E.U./dL    Urinalysis, Microscopic Only - Urine, Clean Catch [[id number]] Collected: 11/19/23 0403    Specimen: Urine, Clean Catch Updated: 11/19/23 0416     RBC, UA 0-2 /HPF      WBC, UA 0-2 /HPF      Bacteria, UA None Seen /HPF      Squamous Epithelial Cells, UA 0-2 /HPF      Hyaline Casts, UA None Seen /LPF      Methodology Automated Microscopy                                               Medical Decision Making  Chart Review: Discharge summary 11/14/2023 for UTI, sepsis.    Labs: CBC White blood cell count 16.3, BNP noted be 9877.  Initial troponin noted be 27.  Repeat troponin pending.  CMP reviewed.  Lactate 0.9.  Urinalysis pending at time  of admission.    Imaging: CT chest was obtained given patient's dyspnea, tachycardia, hypoxia with O2 sats 90% on room air, and recent hospitalization patient feels be high risk for PE.  No pulmonary embolus noted.  Parabronchial thickening noted bilateral pleural effusion, pulmonary edema.  Patient noted to have left thyroid nodule and she is aware.  Incidentally left-sided hydronephrosis, concerning for obstructive uropathy of indeterminate age.  Patient denies any flank pain.    Discussion/Consultation with other providers: Discussed with hospitalist ZAN Parra    63-year-old female presents the emergency department with complaint of dyspnea, tachycardia.  Differential diagnoses considered for patient presentation, this list is not all inclusive of diagnoses considered: Pulmonary embolus, pulmonary edema, pneumonia.  There was concern for PE so CT chest was ordered.  Reviewed as above.  Findings concerning for pulmonary edema versus pneumonia.  Will treat with antibiotics, as well as Lasix, patient's been having lower extremity swelling.  Consider the patient's finding of hydronephrosis on CT, she is not having any flank pain.  She is not having any urinary symptoms.  Will defer at this time and cover her pneumonia and concern for CHF.  Urinalysis is pending.  Patient is tolerating tootle cannula well.  Her map is greater than 65.  I see no indication for IV fluids, and there is concern for fluid overload.  Patient will be admitted to the hospitalist for further evaluation.  Disposition: I discussed with the patient their test results, work-up here in the emergency department, and need for admission and further evaluation. Patient is agreeable to the plan of care. At time of disposition patient's VS are reviewed. Opportunity was provided for questions at the bedside, all questions and concerns were addressed.   Note Disclaimer: At [facility], we believe that sharing information builds trust and better  relationships. You are receiving this note because you recently visited [facility]. It is possible you will see health information before a provider has talked with you about it. This kind of information can be easy to misunderstand. To help you fully understand what it means for your health, we urge you to discuss this note with your provider.Note dictated utilizing Dragon Dictation. Appropriate PPE worn during patient interactions.        Problems Addressed:  Acute pulmonary edema: complicated acute illness or injury  Dyspnea, unspecified type: complicated acute illness or injury  Hypoxia: complicated acute illness or injury    Amount and/or Complexity of Data Reviewed  Labs: ordered.  Radiology: ordered.  ECG/medicine tests: ordered.    Risk  Prescription drug management.  Decision regarding hospitalization.        Final diagnoses:   Dyspnea, unspecified type   Acute pulmonary edema   Hypoxia       ED Disposition  ED Disposition       ED Disposition   Decision to Admit    Condition   --    Comment   Level of Care: Telemetry [5]   Admitting Physician: CHRISTIANO PEÑA [[id number]]   Attending Physician: CHRISTIANO PEÑA [[id number]]                 No follow-up provider specified.       Medication List      No changes were made to your prescriptions during this visit.            Ana Laura Hernandez, ZAN  11/19/23 0418       Ana Laura Hernandez APRN  11/19/23 0434      Electronically signed by Kev Hines MD at 11/19/23 0658          Operative/Procedure Notes (last 48 hours)        Alan Rodriguez MD at 11/19/23 1348          CYSTOSCOPY URETEROSCOPY RETROGRADE PYELOGRAM STENT INSERTION  Procedure Report    Patient Name:  Neva Kurtz  YOB: 1960    Date of Surgery:  11/19/2023     Indications: 63-year-old woman with bilateral hydronephrosis.  She has an apparent left UPJ obstruction on the left side.  There is abnormal insertion of the ureter on the right side with hydronephrosis all  the way down to the bladder on the right side.    Pre-op Diagnosis:   Bilateral hydronephrosis       Post-Op Diagnosis Codes:  Bilateral hydronephrosis    Procedure/CPT® Codes:      Procedure(s):  CYSTOSCOPY, bilateral RETROGRADE PYELOGRAM, bilateral ureteral STENT INSERTION    Staff:  Surgeon(s):  Alan Rodriguez MD            was responsible for performing the following activities:  None  and their skilled assistance was necessary for the success of this case.    Anesthesia: General    Estimated Blood Loss: none    Implants:    Implant Name Type Inv. Item Serial No.  Lot No. LRB No. Used Action   STNT PERCUFLX NO GW [id number] - [id number] Stent STNT PERCUFLX NO GW [id number]  ROXIMITY [id number] Right 1 Implanted   STNT PERCUFLX NO GW 6X26 - [id number] Stent STNT PERCUFLX NO GW 6X26  ROXIMITY [id number] Left 1 Implanted       Specimen:          None      Findings: Severe pelvic prolapse.  Right retrograde pyelogram interpretation is severe hydroureteronephrosis down to the bladder likely due to to her severe prolapse.  Left retrograde pyelogram revealed severe left hydronephrosis to the UPJ where there is some narrowing.    Complications: None    Description of Procedure: Patient induced with general anesthesia and placed in dorsolithotomy position.  Prepped and draped in sterile fashion.  22 Eritrean cystoscope introduced under direct vision after the pelvic prolapse had been reduced using a sterile moistened towel.  Urethra is within normal limits.  Bladder itself appears normal with no tumor, stone, foreign body.  Difficult to see the ureteral orifices because of prolapse but I was able to find them eventually.  Guidewires passed through the cystoscope and the right ureteral orifice intubated.  Opening catheter was passed over the wire and retrograde pyelogram was performed with the above-noted findings.  I was able to maneuver the wire into the right renal pelvis.  A 6 Eritrean by 26 cm  ureteral stent was passed over the wire and up into the right kidney under fluoroscopy.  The wire was removed.  A good curl was seen in the right kidney under fluoroscopy good curl seen the bladder endoscopically.  Attention was then turned to the left side.  Again I was able to find the ureteral orifice and wire was used to intubate the left ureteral orifice.  The opening catheter was passed over the the wire and the retrograde pyelogram was performed with the above-noted findings.  The wire was advanced into the renal pelvis and there was drainage of a large amount of purulent urine.  A 6 Martiniquais by 26 cm ureteral stent was passed over the wire and up in the left kidney under fluoroscopy.  The wire was removed.  A good curl was seen in the left kidney under fluoroscopy good curl seen in the bladder endoscopically.  Again there continue to be a large amount of purulent drainage from the left side.  Patient's bladder was emptied and the cystoscope was removed.  The Tylenol pessary was then removed.  The patient was taken out of the dorsolithotomy position and awakened from general anesthesia.  She was transported to the postanesthesia care unit in stable condition having tolerated the procedure well without any complications.    Patient will need to see gynecologist for a possible hysterectomy and repair of her prolapse.  She will then need further evaluation of her ureters particularly at the left ureteropelvic junction.  This would be part of a planned staged procedure.      Alan Rodriguez MD     Date: 2023  Time: 14:43 EST        Electronically signed by Alan Rodriguez MD at 23 1448          Physician Progress Notes (last 48 hours)        Jai Null MD at 23 0757               [facility] Medicine Services   Daily Progress Note    Patient Name: Neva Kurtz  : 1960  MRN: [id number]  Primary Care Physician:  Provider, No Known  Date of admission: 2023  Date of service  11/19/2023      Subjective      Chief Complaint: Shortness of breath    Neva Kurtz is a 63 y.o. female with PMH of SVT not currently on medications who presented to [facility] ED 11/19/2023 with complaints of shortness of breath around 1am this morning. She woke up and felt like she could not take in a deep breath. She felt pressure in her mid chest. She was just discharged from here on 11/14 after treatment for a UTI. She has been taking her cefdinir. Daughter who works in healthcare at the bedside stated her HR was high during that admission 120s-130s and it was thought to be secondary to her infection. She has had lower extremity swelling since her discharge. She stated she started to urinate better after being discharged but then today had difficulty urinating again.   She has no PCP and does not routinely see a physician.      In the ED labs showed proBNP 9877, high-sensitivity troponin 27, creatinine 1.10, glucose 152, WBC 16.3, Hgb 11.5, platelets 618.  CXR showed increased interstitial disease that could reflect interstitial pulmonary edema.  CT PE protocol showed no PE, interlobular septal thickening, peribronchial thickening and mosaic attenuation in the lungs with small bilateral pleural effusions. This is favored to reflect pulmonary edema. There are a few focal areas of airspace disease in the lungs that could be related to edema or pneumonia. There is severe left-sided hydronephrosis and diminished enhancement of the left renal parenchyma, which is only partially seen on this study, but concerning for obstructive uropathy of indeterminate age. 5.28 mm left thyroid lobe nodule. This could be evaluated with ultrasound. Fat-containing hiatal hernia. Evidence of prior granulomatous infection.  EKG showed sinus tachycardia rate 139. She was afebrile but with chills, elevated respiratory rate, blood pressure 150/92, mildly hypoxic placed on 1L O2 via nasal cannula with improvement in oxygen saturation 95%. She  was given IV cefepime, IV vancomycin, 40 mg IV Lasix.  She was admitted for further treatment of pneumonia with hypoxia and new onset CHF.     Family history: Father  in his 40s from blood clot     23-Patient Reportsmild shortness of breath.  Feeling better.  Shortness of breath is improved.  Mild leg swelling.  Leg swelling is improved.  No chest pain currently.  No nausea vomiting.  No dizziness no lightheadedness.'  23 seen in bed NAD, no new complaints, says feels better Sats 95% on room air, MARKO RN,     ROS A 12 point review of system was done and was negative except as mentioned above    Objective      Vitals:   Temp:  [98.1 °F (36.7 °C)-98.6 °F (37 °C)] 98.6 °F (37 °C)  Heart Rate:  [] 106  Resp:  [13-24] 18  BP: ()/(37-76) 148/74  Flow (L/min):  [4-10] 4    Physical Exam  Constitutional:       Appearance: Normal appearance.   HENT:      Head: Normocephalic and atraumatic.      Nose: Nose normal.      Mouth/Throat:      Mouth: Mucous membranes are moist.   Cardiovascular:      Rate and Rhythm: Normal rate.   Pulmonary:      Effort: Pulmonary effort is normal. No respiratory distress.      Breath sounds: Normal breath sounds. No stridor. No wheezing, rhonchi or rales.   Chest:      Chest wall: No tenderness.   Abdominal:      General: Abdomen is flat. There is no distension.      Palpations: Abdomen is soft. There is no mass.      Tenderness: There is no abdominal tenderness.   Musculoskeletal:      Right lower leg: Edema present.      Left lower leg: Edema present.      Comments: Trace edema present both lower legs.  No calf tenderness bilaterally.   Neurological:      General: No focal deficit present.      Mental Status: She is alert.             Result Review    Result Review:  I have personally reviewed the results from the time of this admission to 2023 07:46 EST and agree with these findings:  [x]  Laboratory  []  Microbiology  [x]  Radiology  [x]  EKG/Telemetry   []   Cardiology/Vascular   []  Pathology  []  Old records  []  Other:    CBC:      Lab 11/19/23 2318 11/19/23  0207 11/14/23  1014 11/13/23  0909   WBC 11.50* 16.30* 16.00* 12.30*   HEMOGLOBIN 9.2* 11.5* 10.7* 11.3*   HEMATOCRIT 28.2* 34.5 31.8* 33.6*   PLATELETS 535* 618* 340 491*   NEUTROS ABS 8.90* 14.51* 13.20* 10.95*   LYMPHS ABS 1.50  --  1.70  --    MONOS ABS 1.10*  --  0.90  --    EOS ABS 0.00 0.33 0.10  --    MCV 91.9 91.3 92.2 91.7    CMP:        Lab 11/19/23 2318 11/19/23  0207 11/14/23  1014 11/13/23  1307 11/13/23  0909   SODIUM 140 139 137 135* 135*   POTASSIUM 4.0 4.0 4.3 3.8 4.1   CHLORIDE 105 102 104 102 99   CO2 25.0 23.0 21.0* 19.0* 22.0   ANION GAP 10.0 14.0 12.0 14.0 14.0   BUN 18 13 13 12 12   CREATININE 1.15* 1.10* 1.02* 0.94 0.99   EGFR 54.0* 56.9* 62.3 68.7 64.6   GLUCOSE 122* 152* 106* 122* 148*   CALCIUM 8.4* 9.4 9.0 8.8 9.3   MAGNESIUM  --   --  2.0  --   --    TOTAL PROTEIN  --  7.2  --  6.6  --    ALBUMIN  --  3.5  --  3.1*  --    GLOBULIN  --  3.7  --  3.5  --    ALT (SGPT)  --  10  --  9  --    AST (SGOT)  --  13  --  15  --    BILIRUBIN  --  0.3  --  0.4  --    ALK PHOS  --  92  --  96  --             Assessment & Plan      Brief Patient Summary:  Neva Kurtz is a 63 y.o. female with PMH of SVT not currently on medications who presented to [facility] ED 11/19/2023 with complaints of shortness of breath around 1am this morning. She woke up and felt like she could not take in a deep breath. She felt pressure in her mid chest. She was just discharged from here on 11/14 after treatment for a UTI. She has been taking her cefdinir. HR was high during that admission 120s-130s and it was thought to be secondary to her infection. She has had lower extremity swelling     cefepime, 2,000 mg, Intravenous, Q8H  enoxaparin, 40 mg, Subcutaneous, Daily  furosemide, 40 mg, Intravenous, Q12H  nicotine, 1 patch, Transdermal, Q24H  senna-docusate sodium, 2 tablet, Oral, BID  sodium chloride, 10 mL,  Intravenous, Q12H  vancomycin, 750 mg, Intravenous, Q12H       Pharmacy to dose vancomycin,          Active Hospital Problems:  Active Hospital Problems    Diagnosis     **Acute exacerbation of CHF (congestive heart failure)      Plan:   Patient is secondary to CHF exacerbation  Sinus tachycardia  - CT PE Protocol: no PE, interlobular septal thickening, peribronchial thickening and mosaic attenuation in the lungs with small bilateral pleural effusions. This is favored to reflect pulmonary edema. There are a few focal areas of airspace disease in the lungs that could be related to edema or pneumonia.   - proBNP 9877, HS troponin 27, repeat troponins   - type of CHF unknown due to no previous echo  - 40mg IV lasix given in ED and will continue q12 hours  - give 5mg IV metoprolol now, discussed with attending  - pt has a history of SVT   - check 2D echo  - strict Is & Os  -Consulted cardiology     Pneumonia----afebrile/WBC--11.5,  - CT as above showed there are a few focal areas of airspace disease in the lungs that could be related to edema or pneumonia.   - WBC 16.30  - subjective fever, no fever currently  - IV antibiotics as ordered.rocephin added. Dc vanc/cefep  - requiring 1L O2 via NC  - follow blood cultures, check procalcitonin, urine antigens   - continuous pulse oximetry  - bronchodilators follow-up chest x-ray   Severe left sided hydronephrosis  Obstructive uropathy  - CT: severe left-sided hydronephrosis and diminished enhancement of the left renal parenchyma, which is only partially seen on this study, but concerning for obstructive uropathy of indeterminate age.   - recent recurrent UTI, pansensitive treated with cefdinir  - IV antibiotics as above  - bladder scan only showed 160  - consulted urology for further recommendations     Thrombocytosis  - plts 618  - start VTE lovenox  -Follow-up CBC     History of SVT  - pt is on no home medications for SVT as this was over 20 years ago and she had some type  of procedure done for the SVT but unsure what. She believes she was on metoprolol in the past    -Cardiology to see the patient     Tobacco abuse  - encourage cessation  - nicotine patch     DVT prophylaxis:  Medical and mechanical DVT prophylaxis orders are present.    CODE STATUS:    Level Of Support Discussed With: Patient  Code Status (Patient has no pulse and is not breathing): CPR (Attempt to Resuscitate)  Medical Interventions (Patient has pulse or is breathing): Full Support      Disposition:  I expect patient to be discharged 1 to 2 days    Patient and daughter at bedside was updated with plan of care.  All questions answered      Electronically signed by Jai Null MD, 23, 07:46 EST.  [facility] Hospitalist Team             Electronically signed by Jai Null MD at 23 [id number]       Alan Rodriguez MD at 23 7536          Urology Progress Note    Patient Identification:  Name:  Neva Kurtz  Age:  63 y.o.  Sex:  female  :  1960  MRN:  [id number]    Chief Complaint:  Patient without complaint    History of Present Illness: Patient feels better.  Her abdominal pain has resolved.  She is tolerating the stents well.    Problem List:    Acute exacerbation of CHF (congestive heart failure)     Past Medical History:  Past Medical History:   Diagnosis Date    SVT (supraventricular tachycardia)      Past Surgical History:  Past Surgical History:   Procedure Laterality Date    BREAST BIOPSY       Home Meds:  Medications Prior to Admission   Medication Sig Dispense Refill Last Dose    [] cefdinir (OMNICEF) 300 MG capsule Take 1 capsule by mouth 2 (Two) Times a Day for 7 days. 14 capsule 0     cetirizine (zyrTEC) 10 MG tablet Take 1 tablet by mouth Daily.       ibuprofen (ADVIL,MOTRIN) 400 MG tablet Take 1 tablet by mouth Every 6 (Six) Hours As Needed for Mild Pain.       multivitamin with minerals tablet tablet Take 1 tablet by mouth Daily.       phenazopyridine  (Pyridium) 100 MG tablet Take 1 tablet by mouth 3 (Three) Times a Day. 6 tablet 0      Current Meds:    Current Facility-Administered Medications:     acetaminophen (TYLENOL) tablet 650 mg, 650 mg, Oral, Q4H PRN **OR** acetaminophen (TYLENOL) 160 MG/5ML oral solution 650 mg, 650 mg, Oral, Q4H PRN **OR** acetaminophen (TYLENOL) suppository 650 mg, 650 mg, Rectal, Q4H PRN, Alan Rodriguez MD    aluminum-magnesium hydroxide-simethicone (MAALOX MAX) 400-400-40 MG/5ML suspension 15 mL, 15 mL, Oral, Q6H PRN, Alan Rodriguez MD    sennosides-docusate (PERICOLACE) 8.6-50 MG per tablet 2 tablet, 2 tablet, Oral, BID, 2 tablet at 11/19/23 2055 **AND** polyethylene glycol (MIRALAX) packet 17 g, 17 g, Oral, Daily PRN **AND** bisacodyl (DULCOLAX) EC tablet 5 mg, 5 mg, Oral, Daily PRN **AND** bisacodyl (DULCOLAX) suppository 10 mg, 10 mg, Rectal, Daily PRN, Alan Rodriguez MD    Calcium Replacement - Follow Nurse / BPA Driven Protocol, , Does not apply, PRMichael RANDOLPH Dennis N., MD    cefepime 2 gm IVPB in 100 ml NS (MBP), 2,000 mg, Intravenous, Q8H, Alan Rodriguez MD, 2,000 mg at 11/20/23 0707    Enoxaparin Sodium (LOVENOX) syringe 40 mg, 40 mg, Subcutaneous, Daily, Alan Rodriguez MD    furosemide (LASIX) injection 40 mg, 40 mg, Intravenous, Q12H, Alan Rodriguez MD, 40 mg at 11/20/23 0541    ipratropium-albuterol (DUO-NEB) nebulizer solution 3 mL, 3 mL, Nebulization, Q4H PRN, Alan Rodriguez MD    Magnesium Standard Dose Replacement - Follow Nurse / BPA Driven Protocol, , Does not apply, PRNMichael Dennis N., MD    melatonin tablet 5 mg, 5 mg, Oral, Nightly PRN, Alan Rodriguez MD    nicotine (NICODERM CQ) 21 MG/24HR patch 1 patch, 1 patch, Transdermal, Q24H, Alan Rodriguez MD, 1 patch at 11/19/23 0858    ondansetron (ZOFRAN) tablet 4 mg, 4 mg, Oral, Q6H PRN **OR** ondansetron (ZOFRAN) injection 4 mg, 4 mg, Intravenous, Q6H PRN, Alan Rodriguez MD    Pharmacy to dose vancomycin, , Does not apply, Continuous  "Michael HOFFMAN Dennis N., MD    Phosphorus Replacement - Follow Nurse / BPA Driven Protocol, , Does not apply, Michael HOFFMAN Dennis N., MD    Potassium Replacement - Follow Nurse / BPA Driven Protocol, , Does not apply, Michael HOFFMAN Dennis N., MD    [COMPLETED] Insert Peripheral IV, , , Once **AND** sodium chloride 0.9 % flush 10 mL, 10 mL, Intravenous, Michael HOFFMAN Dennis N., MD    sodium chloride 0.9 % flush 10 mL, 10 mL, Intravenous, Q12H, Alan Rodriguez MD, 10 mL at 23    sodium chloride 0.9 % infusion 40 mL, 40 mL, Intravenous, Michael HOFFMAN Dennis N., MD    vancomycin 750 mg in sodium chloride 0.9 % 250 mL IVPB-VTB, 750 mg, Intravenous, Q12H, Alan Rodriguez MD, Last Rate: 333.3 mL/hr at 23 0541, 750 mg at 23 0541  Allergies:  Other and Tetracyclines & related    Review of Systems     Objective:  tMax 24 hours:  Temp (24hrs), Av.4 °F (36.9 °C), Min:98.1 °F (36.7 °C), Max:98.6 °F (37 °C)    Vital Sign Ranges:  Temp:  [98.1 °F (36.7 °C)-98.6 °F (37 °C)] 98.6 °F (37 °C)  Heart Rate:  [] 106  Resp:  [13-24] 18  BP: ()/(37-76) 148/74  Intake and Output Last 3 Shifts:  I/O last 3 completed shifts:  In: -   Out: 700 [Urine:700]    Exam:  /74 (BP Location: Left arm, Patient Position: Lying)   Pulse 106   Temp 98.6 °F (37 °C) (Oral)   Resp 18   Ht 172.7 cm (68\")   Wt 96.4 kg (212 lb 8.4 oz)   SpO2 94%   BMI 32.31 kg/m²    General Appearance:    Alert, cooperative, no acute distress, general         appearance is normal   Head:    Normocephalic, without obvious abnormality, atraumatic   Eyes:            Pupils/Irises normal. Exterior inspection conjunctivae       and lids normal.   Ears:    Normal external inspection   Nose:   Exterior inspection of nose is normal   Throat:   Lips, mucosa, and tongue normal   Lungs:     Respirations unlabored; normal effort, no audible     abnormality   CV:   Regular rhythm and normal rate, no edema   Abdomen:     examination of the " abdomen is normal with     no masses, tenderness, or distension    :        Data Review:  All labs (24hrs):    Lab Results (last 24 hours)       Procedure Component Value Units Date/Time    Blood Culture - Blood, Arm, Left [[id number]]  (Normal) Collected: 11/19/23 0353    Specimen: Blood from Arm, Left Updated: 11/20/23 0416     Blood Culture No growth at 24 hours    Blood Culture - Blood, Arm, Right [[id number]]  (Normal) Collected: 11/19/23 0353    Specimen: Blood from Arm, Right Updated: 11/20/23 0416     Blood Culture No growth at 24 hours    Narrative:      Less than seven (7) mL's of blood was collected.  Insufficient quantity may yield false negative results.    Basic Metabolic Panel [[id number]]  (Abnormal) Collected: 11/19/23 2318    Specimen: Blood Updated: 11/20/23 0044     Glucose 122 mg/dL      BUN 18 mg/dL      Creatinine 1.15 mg/dL      Sodium 140 mmol/L      Potassium 4.0 mmol/L      Chloride 105 mmol/L      CO2 25.0 mmol/L      Calcium 8.4 mg/dL      BUN/Creatinine Ratio 15.7     Anion Gap 10.0 mmol/L      eGFR 54.0 mL/min/1.73     Narrative:      GFR Normal >60  Chronic Kidney Disease <60  Kidney Failure <15      CBC & Differential [[id number]]  (Abnormal) Collected: 11/19/23 2318    Specimen: Blood Updated: 11/20/23 0013    Narrative:      The following orders were created for panel order CBC & Differential.  Procedure                               Abnormality         Status                     ---------                               -----------         ------                     CBC Auto Differential[[id number]]        Abnormal            Final result                 Please view results for these tests on the individual orders.    CBC Auto Differential [[id number]]  (Abnormal) Collected: 11/19/23 2318    Specimen: Blood Updated: 11/20/23 0013     WBC 11.50 10*3/mm3      RBC 3.06 10*6/mm3      Hemoglobin 9.2 g/dL      Comment: Result checked          Hematocrit 28.2 %      MCV 91.9 fL      MCH 30.1  pg      MCHC 32.8 g/dL      RDW 14.9 %      RDW-SD 48.1 fl      MPV 7.2 fL      Platelets 535 10*3/mm3      Neutrophil % 77.5 %      Lymphocyte % 12.7 %      Monocyte % 9.2 %      Eosinophil % 0.0 %      Basophil % 0.6 %      Neutrophils, Absolute 8.90 10*3/mm3      Lymphocytes, Absolute 1.50 10*3/mm3      Monocytes, Absolute 1.10 10*3/mm3      Eosinophils, Absolute 0.00 10*3/mm3      Basophils, Absolute 0.10 10*3/mm3      nRBC 0.0 /100 WBC     High Sensitivity Troponin T [[id number]]  (Abnormal) Collected: 11/19/23 2018    Specimen: Blood Updated: 11/19/23 2057     HS Troponin T 34 ng/L     Narrative:      High Sensitive Troponin T Reference Range:  <14.0 ng/L- Negative Female for AMI  <22.0 ng/L- Negative Male for AMI  >=14 - Abnormal Female indicating possible myocardial injury.  >=22 - Abnormal Male indicating possible myocardial injury.   Clinicians would have to utilize clinical acumen, EKG, Troponin, and serial changes to determine if it is an Acute Myocardial Infarction or myocardial injury due to an underlying chronic condition.         MRSA Screen, PCR (Inpatient) - Swab, Nares [[id number]]  (Normal) Collected: 11/19/23 0951    Specimen: Swab from Nares Updated: 11/19/23 1122     MRSA PCR No MRSA Detected    Narrative:      The negative predictive value of this diagnostic test is high and should only be used to consider de-escalating anti-MRSA therapy. A positive result may indicate colonization with MRSA and must be correlated clinically.    Lactic Acid, Plasma [[id number]]  (Normal) Collected: 11/19/23 1003    Specimen: Blood Updated: 11/19/23 1031     Lactate 0.8 mmol/L     High Sensitivity Troponin T [[id number]]  (Abnormal) Collected: 11/19/23 1003    Specimen: Blood Updated: 11/19/23 1030     HS Troponin T 53 ng/L     Narrative:      High Sensitive Troponin T Reference Range:  <14.0 ng/L- Negative Female for AMI  <22.0 ng/L- Negative Male for AMI  >=14 - Abnormal Female indicating possible myocardial  injury.  >=22 - Abnormal Male indicating possible myocardial injury.   Clinicians would have to utilize clinical acumen, EKG, Troponin, and serial changes to determine if it is an Acute Myocardial Infarction or myocardial injury due to an underlying chronic condition.               Radiology:   Imaging Results (Last 72 Hours)       Procedure Component Value Units Date/Time    CT Abdomen Pelvis Stone Protocol [[id number]] Collected: 11/19/23 0819     Updated: 11/19/23 0831    Narrative:      CT ABDOMEN PELVIS STONE PROTOCOL    Date of Exam: 11/19/2023 8:04 AM EST    Indication: Left hydronephrosis.    Comparison: CT chest from earlier today    Technique: Axial CT images were obtained of the abdomen and pelvis without the administration of contrast. Sagittal and coronal reconstructions were performed.  Automated exposure control and iterative reconstruction methods were used.      Findings:  Within the lung visualized lung bases is no significant change from recent prior CT chest.    The liver, gallbladder, adrenal glands, spleen, and pancreas are unremarkable. There is severe right hydroureteronephrosis with suggestion of atypical attachment of the right ureter to the urinary bladder. There is severe left hydronephrosis with abrupt   transition to normal in caliber left ureter, with the connection of the left ureter to the urinary bladder not well identified. No obstructing ureteral stone is identified on either side.    The stomach appears normal. The small bowel appears normal in caliber and configuration. The colon appears normal. The appendix appears normal. There is no ascites or loculated collection. No abnormally enlarged lymph nodes are identified.    The rectum and urinary bladder are unremarkable. The uterus is surgically absent. There is a 5 cm cystic lesion within the left pelvis posterior to the urinary bladder. There is pelvic floor laxity.    No aggressive osseous lesions are identified.       Impression:      Impression:  1.Severe right hydroureteronephrosis with suggestion of atypical attachment of right ureter to urinary bladder. There is also severe left hydronephrosis with abrupt transition to normal caliber left ureter, suggesting left UPJ stenosis. The connection of   the left ureter to the urinary bladder is not well identified.  2.5 cm cystic lesion within left pelvis posterior to urinary bladder, of unclear etiology but could represent a ureterocele.            Electronically Signed: Naresh Dorantes MD    11/19/2023 8:29 AM EST    Workstation ID: [id number]    CT Angiogram Chest Pulmonary Embolism [[id number]] Collected: 11/19/23 0322     Updated: 11/19/23 0328    Narrative:      CT ANGIOGRAM CHEST PULMONARY EMBOLISM    Date of Exam: 11/19/2023 3:08 AM EST    Indication: Pulmonary embolism (PE) suspected, high prob.    Comparison: Chest radiograph performed earlier today.    Technique: Axial CT images were obtained of the chest after the uneventful intravenous administration of iodinated contrast utilizing pulmonary embolism protocol.  Sagittal and coronal reconstructions were performed.  Automated exposure control and   iterative reconstruction methods were used.      Findings:  There is interlobular septal thickening noted throughout the lungs. There is associated peribronchial thickening and mosaic attenuation in the lungs. There are small bilateral pleural effusions. There are a few focal areas of airspace disease in the   lungs including in the right upper lobe on image 64, where there is a nodular density with central aeration or cavitation measuring 18 mm diameter.    The left thyroid lobe appears enlarged likely due to a nodule measuring 28 mm. The aorta exhibits mild atherosclerotic plaque. There are a few shelflike plaques in the aorta most pronounced on image 142. The heart size is normal. No pericardial effusion.   There is a fat-containing hiatal hernia. There are multiple calcified  mediastinal granulomas. There is no evidence of pulmonary embolism. There are mildly enlarged bilateral hilar and subcarinal lymph nodes.    There are no acute findings in the superficial soft tissues. There is severe left-sided hydronephrosis and diminished enhancement of the left renal parenchyma, which is only partially seen on this study, but concerning for obstructive uropathy of   indeterminate age. There are no acute osseous abnormalities or destructive bone lesions. There are mild diffuse thoracic degenerative changes.      Impression:      Impression:  1.No evidence of pulmonary embolism.  2.There is interlobular septal thickening, peribronchial thickening and mosaic attenuation in the lungs with small bilateral pleural effusions. This is favored to reflect pulmonary edema.  3.There are a few focal areas of airspace disease in the lungs that could be related to edema or pneumonia.  4.There is severe left-sided hydronephrosis and diminished enhancement of the left renal parenchyma, which is only partially seen on this study, but concerning for obstructive uropathy of indeterminate age.  5.28 mm left thyroid lobe nodule. This could be evaluated with ultrasound.  6.Fat-containing hiatal hernia.  7.Evidence of prior granulomatous infection.        Electronically Signed: Salvador Love MD    11/19/2023 3:26 AM EST    Workstation ID: [id number]    XR Chest 1 View [[id number]] Collected: 11/19/23 0234     Updated: 11/19/23 0237    Narrative:      XR CHEST 1 VW    Date of Exam: 11/19/2023 2:06 AM EST    Indication: dyspnea    Comparison: 11/13/2023.    Findings:  There is increased interstitial disease with curly B lines. Cardiac silhouette is unchanged. Pulmonary vasculature is partially indistinct. No pneumothorax or pleural effusion. No lobar consolidation. No acute osseous abnormality.      Impression:      Impression:  Increased interstitial disease that could reflect interstitial pulmonary  edema.        Electronically Signed: Salvador Love MD    2023 2:35 AM EST    Workstation ID: [id number]            Assessment/Plan:    Principal Problem:    Acute exacerbation of CHF (congestive heart failure)    Bilateral hydronephrosis status post bilateral stent placement  Evidence of left UPJ obstruction with infected urine in the collecting system  Right hydronephrosis appears to be due to ureteral kinking due to severe uterine prolapse    Plan  Okay for discharge from urology standpoint  Patient to see a gynecologist for hysterectomy and suspension of vaginal vault  Patient to follow-up with me in approximately 3 weeks  Nothing further to add so I will sign off.  Please let me know if I can be of further assistance      Alan Rodriguez MD  2023  07:36 EST        Electronically signed by Alan Rodriguez MD at 23 0737       Josh Robles MD at 23 1054               [facility]   Daily Progress Note    Patient Name: Neva Kurtz  : 1960  MRN: [id number]  Primary Care Physician:  Provider, No Known  Date of admission: 2023  Date of service 2023      Subjective      Chief Complaint: Shortness of breath    Patient Reportsmild shortness of breath.  Feeling better.  Shortness of breath is improved.  Mild leg swelling.  Leg swelling is improved.  No chest pain currently.  No nausea vomiting.  No dizziness no lightheadedness.    ROS A 12 point review of system was done and was negative except as mentioned above      Objective      Vitals:   Temp:  [98.6 °F (37 °C)] 98.6 °F (37 °C)  Heart Rate:  [104-150] 104  Resp:  [20] 20  BP: (156-168)/(82-92) 158/92    Physical Exam  Constitutional:       Appearance: Normal appearance.   HENT:      Head: Normocephalic and atraumatic.      Nose: Nose normal.      Mouth/Throat:      Mouth: Mucous membranes are moist.   Cardiovascular:      Rate and Rhythm: Normal rate.   Pulmonary:      Effort: Pulmonary effort is normal.  No respiratory distress.      Breath sounds: Normal breath sounds. No stridor. No wheezing, rhonchi or rales.   Chest:      Chest wall: No tenderness.   Abdominal:      General: Abdomen is flat. There is no distension.      Palpations: Abdomen is soft. There is no mass.      Tenderness: There is no abdominal tenderness.   Musculoskeletal:      Right lower leg: Edema present.      Left lower leg: Edema present.      Comments: Trace edema present both lower legs.  No calf tenderness bilaterally.   Neurological:      General: No focal deficit present.      Mental Status: She is alert.             Result Review    Result Review:  I have personally reviewed the results from the time of this admission to 11/19/2023 10:54 EST and agree with these findings:  [x]  Laboratory  []  Microbiology  [x]  Radiology  [x]  EKG/Telemetry   []  Cardiology/Vascular   []  Pathology  []  Old records  []  Other:            Assessment & Plan      Brief Patient Summary:  Neva Kurtz is a 63 y.o. female with PMH of SVT not currently on medications who presented to [facility] ED 11/19/2023 with complaints of shortness of breath around 1am this morning. She woke up and felt like she could not take in a deep breath. She felt pressure in her mid chest. She was just discharged from here on 11/14 after treatment for a UTI. She has been taking her cefdinir. HR was high during that admission 120s-130s and it was thought to be secondary to her infection. She has had lower extremity swelling     cefepime, 2,000 mg, Intravenous, Q8H  enoxaparin, 40 mg, Subcutaneous, Daily  furosemide, 40 mg, Intravenous, Q12H  nicotine, 1 patch, Transdermal, Q24H  senna-docusate sodium, 2 tablet, Oral, BID  sodium chloride, 10 mL, Intravenous, Q12H  vancomycin, 750 mg, Intravenous, Q12H       Pharmacy to dose vancomycin,          Active Hospital Problems:  Active Hospital Problems    Diagnosis     **Acute exacerbation of CHF (congestive heart failure)      Plan:   Patient  is secondary to CHF exacerbation  Sinus tachycardia  - CT PE Protocol: no PE, interlobular septal thickening, peribronchial thickening and mosaic attenuation in the lungs with small bilateral pleural effusions. This is favored to reflect pulmonary edema. There are a few focal areas of airspace disease in the lungs that could be related to edema or pneumonia.   - proBNP 9877, HS troponin 27, repeat troponins   - type of CHF unknown due to no previous echo  - 40mg IV lasix given in ED and will continue q12 hours  - give 5mg IV metoprolol now, discussed with attending  - pt has a history of SVT   - check 2D echo  - strict Is & Os  -Consulted cardiology     Pneumonia  - CT as above showed there are a few focal areas of airspace disease in the lungs that could be related to edema or pneumonia.   - WBC 16.30  - subjective fever, no fever currently  - IV antibiotics as ordered.  - requiring 1L O2 via NC  - follow blood cultures, check procalcitonin, urine antigens   - continuous pulse oximetry  - bronchodilators follow-up chest x-ray  -     Severe left sided hydronephrosis  Obstructive uropathy  - CT: severe left-sided hydronephrosis and diminished enhancement of the left renal parenchyma, which is only partially seen on this study, but concerning for obstructive uropathy of indeterminate age.   - recent recurrent UTI, pansensitive treated with cefdinir  - IV antibiotics as above  - bladder scan only showed 160  - consulted urology for further recommendations     Thrombocytosis  - plts 618  - start VTE lovenox  -Follow-up CBC     History of SVT  - pt is on no home medications for SVT as this was over 20 years ago and she had some type of procedure done for the SVT but unsure what. She believes she was on metoprolol in the past    -Cardiology to see the patient     Tobacco abuse  - encourage cessation  - nicotine patch     DVT prophylaxis:  Medical DVT prophylaxis orders are present.    CODE STATUS:    Level Of Support  Discussed With: Patient  Code Status (Patient has no pulse and is not breathing): CPR (Attempt to Resuscitate)  Medical Interventions (Patient has pulse or is breathing): Full Support      Disposition:  I expect patient to be discharged 1 to 2 days    Patient and daughter at bedside was updated with plan of care.  All questions answered      Electronically signed by Josh Robles MD, 23, 10:54 EST.  [facility] [place]             Electronically signed by Josh Robles MD at 23 1058          Consult Notes (last 48 hours)        Alan Rodriguez MD at 23 1157        Consult Orders    1. Inpatient Urology Consult [[id number]] ordered by Aida Apple APRN at 23 0426                 Urology Consult Note    Patient:Neva Kurtz :1960  Room:  Admit Date2023  Age:63 y.o.     SEX:female     DOS:2023     MR:[id number]     Visit:[id number]       Attending: Josh Robles MD  Referring Provider: Dr Robles  Reason for Consultation: Left hydronephrosis    Patient Care Team:  Provider, No Known as PCP - General    Chief complaint shortness of breath    Subjective .     History of present illness: 63-year-old woman who presented with acute onset shortness of breath.  Concern for pulmonary embolus prompted CT scan.  Incidentally this shows severe left hydronephrosis but only shows the top of the kidney.  Patient does not report any history of difficulties with the kidneys.  She denies any voiding complaints.  As far she knows she has never had any evaluation of her kidneys radiographically.  Serum creatinine is mildly elevated to 1.10.    Review of Systems  10 point review of systems were reviewed and are negative except for:  Constitution:  positive for See HPI    History  Past Medical History:   Diagnosis Date    SVT (supraventricular tachycardia)      Past Surgical History:   Procedure Laterality Date    BREAST BIOPSY       Social History     Socioeconomic  History    Marital status: Single   Tobacco Use    Smoking status: Every Day     Packs/day: 1     Types: Cigarettes     Passive exposure: Never    Smokeless tobacco: Never   Vaping Use    Vaping Use: Never used   Substance and Sexual Activity    Alcohol use: Never    Drug use: Never    Sexual activity: Defer     No family history on file.  Allergy  Allergies   Allergen Reactions    Other Other (See Comments)     tetramycin    Tetracyclines & Related Swelling     Prior to Admission medications    Medication Sig Start Date End Date Taking? Authorizing Provider   cefdinir (OMNICEF) 300 MG capsule Take 1 capsule by mouth 2 (Two) Times a Day for 7 days. 23 Yes Claudia Sr APRN   cetirizine (zyrTEC) 10 MG tablet Take 1 tablet by mouth Daily.   Yes ProviderDenis MD   ibuprofen (ADVIL,MOTRIN) 400 MG tablet Take 1 tablet by mouth Every 6 (Six) Hours As Needed for Mild Pain.   Yes ProviderDenis MD   multivitamin with minerals tablet tablet Take 1 tablet by mouth Daily.   Yes ProviderDenis MD   phenazopyridine (Pyridium) 100 MG tablet Take 1 tablet by mouth 3 (Three) Times a Day. 23  Yes Claudia Sr APRN         Objective     tMax 24 hours:  Temp (24hrs), Av.6 °F (37 °C), Min:98.6 °F (37 °C), Max:98.6 °F (37 °C)    Vital Sign Ranges:  Temp:  [98.6 °F (37 °C)] 98.6 °F (37 °C)  Heart Rate:  [104-150] 104  Resp:  [20] 20  BP: (156-168)/(82-92) 158/92  Intake and Output Last 3 Shifts:  I/O last 3 completed shifts:  In: -   Out: 700 [Urine:700]      Physical Exam:   General Appearance: alert, appears stated age, and cooperative  Head: normocephalic, without obvious abnormality and atraumatic  Abdomen: soft non-tender, no guarding, and no rebound tenderness  Skin: no bleeding, bruising or rash  Neurologic: Mental Status orientated to person, place, time and situation    Results Review:     Lab Results (last 24 hours)       Procedure Component Value Units Date/Time    MRSA  "Screen, PCR (Inpatient) - Swab, Nares [[id number]]  (Normal) Collected: 11/19/23 0951    Specimen: Swab from Nares Updated: 11/19/23 1122     MRSA PCR No MRSA Detected    Narrative:      The negative predictive value of this diagnostic test is high and should only be used to consider de-escalating anti-MRSA therapy. A positive result may indicate colonization with MRSA and must be correlated clinically.    Lactic Acid, Plasma [[id number]]  (Normal) Collected: 11/19/23 1003    Specimen: Blood Updated: 11/19/23 1031     Lactate 0.8 mmol/L     High Sensitivity Troponin T [[id number]]  (Abnormal) Collected: 11/19/23 1003    Specimen: Blood Updated: 11/19/23 1030     HS Troponin T 53 ng/L     Narrative:      High Sensitive Troponin T Reference Range:  <14.0 ng/L- Negative Female for AMI  <22.0 ng/L- Negative Male for AMI  >=14 - Abnormal Female indicating possible myocardial injury.  >=22 - Abnormal Male indicating possible myocardial injury.   Clinicians would have to utilize clinical acumen, EKG, Troponin, and serial changes to determine if it is an Acute Myocardial Infarction or myocardial injury due to an underlying chronic condition.         Procalcitonin [[id number]]  (Abnormal) Collected: 11/19/23 0353    Specimen: Blood Updated: 11/19/23 0704     Procalcitonin 1.41 ng/mL     Narrative:      As a Marker for Sepsis (Non-Neonates):    1. <0.5 ng/mL represents a low risk of severe sepsis and/or septic shock.  2. >2 ng/mL represents a high risk of severe sepsis and/or septic shock.    As a Marker for Lower Respiratory Tract Infections that require antibiotic therapy:    PCT on Admission    Antibiotic Therapy       6-12 Hrs later    >0.5                Strongly Recommended  >0.25 - <0.5        Recommended   0.1 - 0.25          Discouraged              Remeasure/reassess PCT  <0.1                Strongly Discouraged     Remeasure/reassess PCT    As 28 day mortality risk marker: \"Change in Procalcitonin Result\" (>80% or " <=80%) if Day 0 (or Day 1) and Day 4 values are available. Refer to http://www.[facility]-pct-calculator.com    Change in PCT <=80%  A decrease of PCT levels below or equal to 80% defines a positive change in PCT test result representing a higher risk for 28-day all-cause mortality of patients diagnosed with severe sepsis for septic shock.    Change in PCT >80%  A decrease of PCT levels of more than 80% defines a negative change in PCT result representing a lower risk for 28-day all-cause mortality of patients diagnosed with severe sepsis or septic shock.       S. Pneumo Ag Urine or CSF - Urine, Urine, Clean Catch [[id number]]  (Normal) Collected: 11/19/23 0403    Specimen: Urine, Clean Catch Updated: 11/19/23 0627     Strep Pneumo Ag Negative    Legionella Antigen, Urine - Urine, Urine, Clean Catch [[id number]]  (Normal) Collected: 11/19/23 0403    Specimen: Urine, Clean Catch Updated: 11/19/23 0627     LEGIONELLA ANTIGEN, URINE Negative    High Sensitivity Troponin T 2Hr [[id number]]  (Abnormal) Collected: 11/19/23 0353    Specimen: Blood Updated: 11/19/23 0505     HS Troponin T 32 ng/L      Troponin T Delta 5 ng/L     Narrative:      High Sensitive Troponin T Reference Range:  <14.0 ng/L- Negative Female for AMI  <22.0 ng/L- Negative Male for AMI  >=14 - Abnormal Female indicating possible myocardial injury.  >=22 - Abnormal Male indicating possible myocardial injury.   Clinicians would have to utilize clinical acumen, EKG, Troponin, and serial changes to determine if it is an Acute Myocardial Infarction or myocardial injury due to an underlying chronic condition.         Urine Culture - Urine, Urine, Clean Catch [[id number]] Collected: 11/19/23 0403    Specimen: Urine, Clean Catch Updated: 11/19/23 0424    Urinalysis With Microscopic If Indicated (No Culture) - Urine, Clean Catch [[id number]]  (Abnormal) Collected: 11/19/23 0403    Specimen: Urine, Clean Catch Updated: 11/19/23 0416     Color, UA Dark Yellow      Appearance, UA Clear     pH, UA 6.5     Specific Gravity, UA 1.021     Glucose, UA Negative     Ketones, UA Negative     Bilirubin, UA Negative     Blood, UA Negative     Protein, UA Negative     Leuk Esterase, UA Trace     Nitrite, UA Positive     Urobilinogen, UA 0.2 E.U./dL    Urinalysis, Microscopic Only - Urine, Clean Catch [[id number]] Collected: 11/19/23 0403    Specimen: Urine, Clean Catch Updated: 11/19/23 0416     RBC, UA 0-2 /HPF      WBC, UA 0-2 /HPF      Bacteria, UA None Seen /HPF      Squamous Epithelial Cells, UA 0-2 /HPF      Hyaline Casts, UA None Seen /LPF      Methodology Automated Microscopy    Blood Culture - Blood, Arm, Left [[id number]] Collected: 11/19/23 0353    Specimen: Blood from Arm, Left Updated: 11/19/23 0406    Blood Culture - Blood, Arm, Right [[id number]] Collected: 11/19/23 0353    Specimen: Blood from Arm, Right Updated: 11/19/23 0406    POC Lactate [[id number]]  (Normal) Collected: 11/19/23 0355    Specimen: Blood Updated: 11/19/23 0358     Lactate 0.9 mmol/L      Comment: Serial Number: [id number]:  [id number]       CBC & Differential [[id number]]  (Abnormal) Collected: 11/19/23 0207    Specimen: Blood Updated: 11/19/23 0307    Narrative:      The following orders were created for panel order CBC & Differential.  Procedure                               Abnormality         Status                     ---------                               -----------         ------                     CBC Auto Differential[[id number]]        Abnormal            Final result               Scan Slide[[id number]]                                       Final result                 Please view results for these tests on the individual orders.    CBC Auto Differential [[id number]]  (Abnormal) Collected: 11/19/23 0207    Specimen: Blood Updated: 11/19/23 0307     WBC 16.30 10*3/mm3      RBC 3.78 10*6/mm3      Hemoglobin 11.5 g/dL      Hematocrit 34.5 %      MCV 91.3 fL      MCH 30.5 pg      MCHC 33.5 g/dL       RDW 14.7 %      RDW-SD 46.8 fl      MPV 7.0 fL      Platelets 618 10*3/mm3     Narrative:      The previously reported component NRBC is no longer being reported. Previous result was 0.0 /100 WBC (Reference Range: 0.0-0.2 /100 WBC) on 11/19/2023 at 0241 EST.    Scan Slide [[id number]] Collected: 11/19/23 0207    Specimen: Blood Updated: 11/19/23 0307     Scan Slide --     Comment: See Manual Differential Results       Manual Differential [[id number]]  (Abnormal) Collected: 11/19/23 0207    Specimen: Blood Updated: 11/19/23 0307     Neutrophil % 89.0 %      Lymphocyte % 8.0 %      Monocyte % 1.0 %      Eosinophil % 2.0 %      Neutrophils Absolute 14.51 10*3/mm3      Lymphocytes Absolute 1.30 10*3/mm3      Monocytes Absolute 0.16 10*3/mm3      Eosinophils Absolute 0.33 10*3/mm3      RBC Morphology Normal     WBC Morphology Normal     Platelet Estimate Increased    aPTT [[id number]]  (Abnormal) Collected: 11/19/23 0207    Specimen: Blood Updated: 11/19/23 0242     PTT 26.9 seconds     Protime-INR [[id number]]  (Normal) Collected: 11/19/23 0207    Specimen: Blood Updated: 11/19/23 0242     Protime 10.7 Seconds      INR 0.98    High Sensitivity Troponin T [[id number]]  (Abnormal) Collected: 11/19/23 0207    Specimen: Blood Updated: 11/19/23 0235     HS Troponin T 27 ng/L     Narrative:      High Sensitive Troponin T Reference Range:  <14.0 ng/L- Negative Female for AMI  <22.0 ng/L- Negative Male for AMI  >=14 - Abnormal Female indicating possible myocardial injury.  >=22 - Abnormal Male indicating possible myocardial injury.   Clinicians would have to utilize clinical acumen, EKG, Troponin, and serial changes to determine if it is an Acute Myocardial Infarction or myocardial injury due to an underlying chronic condition.         Comprehensive Metabolic Panel [[id number]]  (Abnormal) Collected: 11/19/23 0207    Specimen: Blood Updated: 11/19/23 0235     Glucose 152 mg/dL      BUN 13 mg/dL      Creatinine 1.10 mg/dL       Sodium 139 mmol/L      Potassium 4.0 mmol/L      Chloride 102 mmol/L      CO2 23.0 mmol/L      Calcium 9.4 mg/dL      Total Protein 7.2 g/dL      Albumin 3.5 g/dL      ALT (SGPT) 10 U/L      AST (SGOT) 13 U/L      Alkaline Phosphatase 92 U/L      Total Bilirubin 0.3 mg/dL      Globulin 3.7 gm/dL      A/G Ratio 0.9 g/dL      BUN/Creatinine Ratio 11.8     Anion Gap 14.0 mmol/L      eGFR 56.9 mL/min/1.73     Narrative:      GFR Normal >60  Chronic Kidney Disease <60  Kidney Failure <15      BNP [[id number]]  (Abnormal) Collected: 11/19/23 0207    Specimen: Blood Updated: 11/19/23 0235     proBNP 9,877.0 pg/mL     Narrative:      This assay is used as an aid in the diagnosis of individuals suspected of having heart failure. It can be used as an aid in the diagnosis of acute decompensated heart failure (ADHF) in patients presenting with signs and symptoms of ADHF to the emergency department (ED). In addition, NT-proBNP of <300 pg/mL indicates ADHF is not likely.    Age Range Result Interpretation  NT-proBNP Concentration (pg/mL:      <50             Positive            >450                   Gray                 300-450                    Negative             <300    50-75           Positive            >900                  Gray                300-900                  Negative            <300      >75             Positive            >1800                  Gray                300-1800                  Negative            <300    Single High Sensitivity Troponin T [[id number]]  (Abnormal) Collected: 11/19/23 0207    Specimen: Blood Updated: 11/19/23 0235     HS Troponin T 27 ng/L     Narrative:      High Sensitive Troponin T Reference Range:  <14.0 ng/L- Negative Female for AMI  <22.0 ng/L- Negative Male for AMI  >=14 - Abnormal Female indicating possible myocardial injury.  >=22 - Abnormal Male indicating possible myocardial injury.   Clinicians would have to utilize clinical acumen, EKG, Troponin, and serial changes to  "determine if it is an Acute Myocardial Infarction or myocardial injury due to an underlying chronic condition.                No results found for: \"URINECX\"     Imaging Results (Last 7 Days)       Procedure Component Value Units Date/Time    CT Abdomen Pelvis Stone Protocol [[id number]] Collected: 11/19/23 0819     Updated: 11/19/23 0831    Narrative:      CT ABDOMEN PELVIS STONE PROTOCOL    Date of Exam: 11/19/2023 8:04 AM EST    Indication: Left hydronephrosis.    Comparison: CT chest from earlier today    Technique: Axial CT images were obtained of the abdomen and pelvis without the administration of contrast. Sagittal and coronal reconstructions were performed.  Automated exposure control and iterative reconstruction methods were used.      Findings:  Within the lung visualized lung bases is no significant change from recent prior CT chest.    The liver, gallbladder, adrenal glands, spleen, and pancreas are unremarkable. There is severe right hydroureteronephrosis with suggestion of atypical attachment of the right ureter to the urinary bladder. There is severe left hydronephrosis with abrupt   transition to normal in caliber left ureter, with the connection of the left ureter to the urinary bladder not well identified. No obstructing ureteral stone is identified on either side.    The stomach appears normal. The small bowel appears normal in caliber and configuration. The colon appears normal. The appendix appears normal. There is no ascites or loculated collection. No abnormally enlarged lymph nodes are identified.    The rectum and urinary bladder are unremarkable. The uterus is surgically absent. There is a 5 cm cystic lesion within the left pelvis posterior to the urinary bladder. There is pelvic floor laxity.    No aggressive osseous lesions are identified.      Impression:      Impression:  1.Severe right hydroureteronephrosis with suggestion of atypical attachment of right ureter to urinary bladder. " There is also severe left hydronephrosis with abrupt transition to normal caliber left ureter, suggesting left UPJ stenosis. The connection of   the left ureter to the urinary bladder is not well identified.  2.5 cm cystic lesion within left pelvis posterior to urinary bladder, of unclear etiology but could represent a ureterocele.            Electronically Signed: Naresh Dorantes MD    11/19/2023 8:29 AM EST    Workstation ID: [id number]    CT Angiogram Chest Pulmonary Embolism [[id number]] Collected: 11/19/23 0322     Updated: 11/19/23 0328    Narrative:      CT ANGIOGRAM CHEST PULMONARY EMBOLISM    Date of Exam: 11/19/2023 3:08 AM EST    Indication: Pulmonary embolism (PE) suspected, high prob.    Comparison: Chest radiograph performed earlier today.    Technique: Axial CT images were obtained of the chest after the uneventful intravenous administration of iodinated contrast utilizing pulmonary embolism protocol.  Sagittal and coronal reconstructions were performed.  Automated exposure control and   iterative reconstruction methods were used.      Findings:  There is interlobular septal thickening noted throughout the lungs. There is associated peribronchial thickening and mosaic attenuation in the lungs. There are small bilateral pleural effusions. There are a few focal areas of airspace disease in the   lungs including in the right upper lobe on image 64, where there is a nodular density with central aeration or cavitation measuring 18 mm diameter.    The left thyroid lobe appears enlarged likely due to a nodule measuring 28 mm. The aorta exhibits mild atherosclerotic plaque. There are a few shelflike plaques in the aorta most pronounced on image 142. The heart size is normal. No pericardial effusion.   There is a fat-containing hiatal hernia. There are multiple calcified mediastinal granulomas. There is no evidence of pulmonary embolism. There are mildly enlarged bilateral hilar and subcarinal lymph  nodes.    There are no acute findings in the superficial soft tissues. There is severe left-sided hydronephrosis and diminished enhancement of the left renal parenchyma, which is only partially seen on this study, but concerning for obstructive uropathy of   indeterminate age. There are no acute osseous abnormalities or destructive bone lesions. There are mild diffuse thoracic degenerative changes.      Impression:      Impression:  1.No evidence of pulmonary embolism.  2.There is interlobular septal thickening, peribronchial thickening and mosaic attenuation in the lungs with small bilateral pleural effusions. This is favored to reflect pulmonary edema.  3.There are a few focal areas of airspace disease in the lungs that could be related to edema or pneumonia.  4.There is severe left-sided hydronephrosis and diminished enhancement of the left renal parenchyma, which is only partially seen on this study, but concerning for obstructive uropathy of indeterminate age.  5.28 mm left thyroid lobe nodule. This could be evaluated with ultrasound.  6.Fat-containing hiatal hernia.  7.Evidence of prior granulomatous infection.        Electronically Signed: Salvador Love MD    11/19/2023 3:26 AM EST    Workstation ID: [id number]    XR Chest 1 View [[id number]] Collected: 11/19/23 0234     Updated: 11/19/23 0237    Narrative:      XR CHEST 1 VW    Date of Exam: 11/19/2023 2:06 AM EST    Indication: dyspnea    Comparison: 11/13/2023.    Findings:  There is increased interstitial disease with curly B lines. Cardiac silhouette is unchanged. Pulmonary vasculature is partially indistinct. No pneumothorax or pleural effusion. No lobar consolidation. No acute osseous abnormality.      Impression:      Impression:  Increased interstitial disease that could reflect interstitial pulmonary edema.        Electronically Signed: Salvador Love MD    11/19/2023 2:35 AM EST    Workstation ID: [id number]            Inpatient Meds:   Scheduled  Meds:cefepime, 2,000 mg, Intravenous, Q8H  enoxaparin, 40 mg, Subcutaneous, Daily  furosemide, 40 mg, Intravenous, Q12H  nicotine, 1 patch, Transdermal, Q24H  senna-docusate sodium, 2 tablet, Oral, BID  sodium chloride, 10 mL, Intravenous, Q12H  vancomycin, 750 mg, Intravenous, Q12H       Continuous Infusions:Pharmacy to dose vancomycin,        PRN Meds:.  acetaminophen **OR** acetaminophen **OR** acetaminophen    aluminum-magnesium hydroxide-simethicone    senna-docusate sodium **AND** polyethylene glycol **AND** bisacodyl **AND** bisacodyl    Calcium Replacement - Follow Nurse / BPA Driven Protocol    ipratropium-albuterol    Magnesium Standard Dose Replacement - Follow Nurse / BPA Driven Protocol    melatonin    ondansetron **OR** ondansetron    Pharmacy to dose vancomycin    Phosphorus Replacement - Follow Nurse / BPA Driven Protocol    Potassium Replacement - Follow Nurse / BPA Driven Protocol    [COMPLETED] Insert Peripheral IV **AND** sodium chloride    sodium chloride    sodium chloride      Assessment & Plan     Principal Problem:    Acute exacerbation of CHF (congestive heart failure)    Left hydronephrosis of unknown etiology  Mild left flank pain  Acute renal insufficiency    Plan  We will obtain CT scan stone protocol to further evaluate kidneys and ureter.  We will consider placing stent if there is continued significant obstruction.      I discussed the patient's findings and my recommendations with patient and family    Thank you for this  consult    Alan Rodriguez MD  11/19/23  11:58 EST      Electronically signed by Alan Rodriguez MD at 11/19/23 1201       Viry Guillen APRN at 11/19/23 1125        Consult Orders    1. Inpatient Cardiology Consult [[id number]] ordered by Aida Apple APRN at 11/19/23 9978              Attestation signed by Basil Hwang MD at 11/19/23 4745    I have reviewed this documentation and agree.                  Cardiology [place]        Subjective:      Encounter Date:11/19/2023    Subjective  Patient ID: Neva Kurtz is a 63 y.o. female.    Chief Complaint: shortness of breath  Cardiology Consult: CHF    Referring Physician: Aida Apple APRN     HPI:  Neva Kurtz is a 63 y.o. female who presents with shortness of breath. Ms. Kurtz does not rotuinely see a cardiologist. Pmh includes h/o SVT currently not on any cardiac medications. She is a smoker.     She was recently discharged from hospital with antibiotics tx for UTI. She presented back to ED with complaints of shortness of breath and increased lower extremity edema. She reprots around 1am she woke up and felt as if she coudlnt' get her breath.   Cardiology consulted with concern for CHF  CT scan showed severe right hydroureteronephrosis with suggestion of atypical attachment of right ureter to urinary bladder. There is also severe left hydronephrosis with abrupt transition to normal caliber left ureter, suggesting left UPJ stenosis. The connection of the left ureter to the urinary bladder is not well identified.  2.5 cm cystic lesion within left pelvis posterior to urinary bladder, of unclear etiology but could represent a ureterocele.  Urology seeing patient and planning stent placement today  Patient has been sinus tachycardia, she has leukocytosis and mildly elevated troponin, no chest pain.     Past Medical History:   Diagnosis Date    SVT (supraventricular tachycardia)        Past Surgical History:   Procedure Laterality Date    BREAST BIOPSY         No family history on file.    Social History     Socioeconomic History    Marital status: Single   Tobacco Use    Smoking status: Every Day     Packs/day: 1     Types: Cigarettes     Passive exposure: Never    Smokeless tobacco: Never   Vaping Use    Vaping Use: Never used   Substance and Sexual Activity    Alcohol use: Never    Drug use: Never    Sexual activity: Defer         Allergies   Allergen Reactions    Other Other (See Comments)  "    tetramycin    Tetracyclines & Related Swelling       Current Medications:   Scheduled Meds:cefepime, 2,000 mg, Intravenous, Q8H  enoxaparin, 40 mg, Subcutaneous, Daily  furosemide, 40 mg, Intravenous, Q12H  nicotine, 1 patch, Transdermal, Q24H  senna-docusate sodium, 2 tablet, Oral, BID  sodium chloride, 10 mL, Intravenous, Q12H  vancomycin, 750 mg, Intravenous, Q12H      Continuous Infusions:Pharmacy to dose vancomycin,         Review of Systems   Constitutional: Negative for chills, diaphoresis and malaise/fatigue.   Cardiovascular:  Positive for dyspnea on exertion, leg swelling and paroxysmal nocturnal dyspnea. Negative for chest pain, irregular heartbeat, near-syncope, orthopnea, palpitations and syncope.   Respiratory:  Positive for shortness of breath. Negative for cough, sleep disturbances due to breathing and sputum production.    Gastrointestinal:  Negative for change in bowel habit.   Genitourinary:  Negative for urgency.   Neurological:  Negative for dizziness and headaches.   Psychiatric/Behavioral:  Negative for altered mental status.            Objective:   Objective      /92   Pulse 104   Temp 98.6 °F (37 °C) (Oral)   Resp 20   Ht 172.7 cm (68\")   Wt 99.2 kg (218 lb 11.1 oz)   SpO2 93%   BMI 33.25 kg/m²     Physical Exam:  General Appearance:    Alert, cooperative, in no acute distress                                Head: Atraumatic, normocephalic, PERRLA               Neck:   supple, trachea midline, no thyromegaly, no carotid bruit, no JVD   Lungs:     Clear to auscultation,respirations regular, even and               unlabored    Heart:    Regular rhythm and tachycardic rate, normal S1 and S2   Abdomen:     Normal bowel sounds, no masses, no organomegaly, soft  nontender, nondistended, no guarding, no rebound  tenderness   Extremities:   Moves all extremities well, edema, no cyanosis, no  redness   Pulses:   Pulses palpable and equal bilaterally   Skin:   No bleeding, bruising or " "rash   Neurologic:   Awake, alert, oriented x3                 ASCVD Risk Score::  [unfilled]      Lab Review:     Results from last 7 days   Lab Units 11/19/23  0207 11/14/23  1014 11/13/23  1307   SODIUM mmol/L 139 137 135*   POTASSIUM mmol/L 4.0 4.3 3.8   CHLORIDE mmol/L 102 104 102   CO2 mmol/L 23.0 21.0* 19.0*   BUN mg/dL 13 13 12   CREATININE mg/dL 1.10* 1.02* 0.94   GLUCOSE mg/dL 152* 106* 122*   CALCIUM mg/dL 9.4 9.0 8.8   AST (SGOT) U/L 13  --  15   ALT (SGPT) U/L 10  --  9     Results from last 7 days   Lab Units 11/19/23  1003 11/19/23  0353 11/19/23  0207   HSTROP T ng/L 53* 32* 27*  27*     Results from last 7 days   Lab Units 11/19/23  0207 11/14/23  1014   WBC 10*3/mm3 16.30* 16.00*   HEMOGLOBIN g/dL 11.5* 10.7*   HEMATOCRIT % 34.5 31.8*   PLATELETS 10*3/mm3 618* 340     Results from last 7 days   Lab Units 11/19/23  0207   INR  0.98   APTT seconds 26.9*     Results from last 7 days   Lab Units 11/14/23  1014   MAGNESIUM mg/dL 2.0           Invalid input(s): \"LDLCALC\"  Results from last 7 days   Lab Units 11/19/23  0207   PROBNP pg/mL 9,877.0*           Recent Radiology:  Imaging Results (Most Recent)       Procedure Component Value Units Date/Time    CT Abdomen Pelvis Stone Protocol [[id number]] Collected: 11/19/23 0819     Updated: 11/19/23 0831    Narrative:      CT ABDOMEN PELVIS STONE PROTOCOL    Date of Exam: 11/19/2023 8:04 AM EST    Indication: Left hydronephrosis.    Comparison: CT chest from earlier today    Technique: Axial CT images were obtained of the abdomen and pelvis without the administration of contrast. Sagittal and coronal reconstructions were performed.  Automated exposure control and iterative reconstruction methods were used.      Findings:  Within the lung visualized lung bases is no significant change from recent prior CT chest.    The liver, gallbladder, adrenal glands, spleen, and pancreas are unremarkable. There is severe right hydroureteronephrosis with suggestion of " atypical attachment of the right ureter to the urinary bladder. There is severe left hydronephrosis with abrupt   transition to normal in caliber left ureter, with the connection of the left ureter to the urinary bladder not well identified. No obstructing ureteral stone is identified on either side.    The stomach appears normal. The small bowel appears normal in caliber and configuration. The colon appears normal. The appendix appears normal. There is no ascites or loculated collection. No abnormally enlarged lymph nodes are identified.    The rectum and urinary bladder are unremarkable. The uterus is surgically absent. There is a 5 cm cystic lesion within the left pelvis posterior to the urinary bladder. There is pelvic floor laxity.    No aggressive osseous lesions are identified.      Impression:      Impression:  1.Severe right hydroureteronephrosis with suggestion of atypical attachment of right ureter to urinary bladder. There is also severe left hydronephrosis with abrupt transition to normal caliber left ureter, suggesting left UPJ stenosis. The connection of   the left ureter to the urinary bladder is not well identified.  2.5 cm cystic lesion within left pelvis posterior to urinary bladder, of unclear etiology but could represent a ureterocele.            Electronically Signed: Naresh Dorantes MD    11/19/2023 8:29 AM EST    Workstation ID: [id number]    CT Angiogram Chest Pulmonary Embolism [[id number]] Collected: 11/19/23 0322     Updated: 11/19/23 0328    Narrative:      CT ANGIOGRAM CHEST PULMONARY EMBOLISM    Date of Exam: 11/19/2023 3:08 AM EST    Indication: Pulmonary embolism (PE) suspected, high prob.    Comparison: Chest radiograph performed earlier today.    Technique: Axial CT images were obtained of the chest after the uneventful intravenous administration of iodinated contrast utilizing pulmonary embolism protocol.  Sagittal and coronal reconstructions were performed.  Automated exposure  control and   iterative reconstruction methods were used.      Findings:  There is interlobular septal thickening noted throughout the lungs. There is associated peribronchial thickening and mosaic attenuation in the lungs. There are small bilateral pleural effusions. There are a few focal areas of airspace disease in the   lungs including in the right upper lobe on image 64, where there is a nodular density with central aeration or cavitation measuring 18 mm diameter.    The left thyroid lobe appears enlarged likely due to a nodule measuring 28 mm. The aorta exhibits mild atherosclerotic plaque. There are a few shelflike plaques in the aorta most pronounced on image 142. The heart size is normal. No pericardial effusion.   There is a fat-containing hiatal hernia. There are multiple calcified mediastinal granulomas. There is no evidence of pulmonary embolism. There are mildly enlarged bilateral hilar and subcarinal lymph nodes.    There are no acute findings in the superficial soft tissues. There is severe left-sided hydronephrosis and diminished enhancement of the left renal parenchyma, which is only partially seen on this study, but concerning for obstructive uropathy of   indeterminate age. There are no acute osseous abnormalities or destructive bone lesions. There are mild diffuse thoracic degenerative changes.      Impression:      Impression:  1.No evidence of pulmonary embolism.  2.There is interlobular septal thickening, peribronchial thickening and mosaic attenuation in the lungs with small bilateral pleural effusions. This is favored to reflect pulmonary edema.  3.There are a few focal areas of airspace disease in the lungs that could be related to edema or pneumonia.  4.There is severe left-sided hydronephrosis and diminished enhancement of the left renal parenchyma, which is only partially seen on this study, but concerning for obstructive uropathy of indeterminate age.  5.28 mm left thyroid lobe nodule.  This could be evaluated with ultrasound.  6.Fat-containing hiatal hernia.  7.Evidence of prior granulomatous infection.        Electronically Signed: Salvador Love MD    11/19/2023 3:26 AM EST    Workstation ID: [id number]    XR Chest 1 View [[id number]] Collected: 11/19/23 0234     Updated: 11/19/23 0237    Narrative:      XR CHEST 1 VW    Date of Exam: 11/19/2023 2:06 AM EST    Indication: dyspnea    Comparison: 11/13/2023.    Findings:  There is increased interstitial disease with curly B lines. Cardiac silhouette is unchanged. Pulmonary vasculature is partially indistinct. No pneumothorax or pleural effusion. No lobar consolidation. No acute osseous abnormality.      Impression:      Impression:  Increased interstitial disease that could reflect interstitial pulmonary edema.        Electronically Signed: Salvador Love MD    11/19/2023 2:35 AM EST    Workstation ID: [id number]              ECHOCARDIOGRAM:                   Assessment:   Assessment      Active Hospital Problems    Diagnosis  POA    **Acute exacerbation of CHF (congestive heart failure) [I50.9]  Yes     Shortness of breath / lower extremity edema  Hydronephrosis  Elevated troponin (27, 32, 53)  LENO  Leukocytosis  Concern for pneumonia  H/o SVT  Tobacco use    Plan:   Check 2D ECHO  Patient was started on IV diuresis  She is going for stent placement today for hydronephrosis  No chest pain, consider ischemic evaluation after acute illness resolved  Sinus tachycardia likely compensatory for underlying infection, low dose beta blockers  Additional work up/ recommendations pending above testing               ZAN Monaco  11/19/23  11:25 EST      Electronically signed by Basil Hwang MD at 11/19/23 1954

## 2023-11-21 ENCOUNTER — APPOINTMENT (OUTPATIENT)
Dept: CARDIOLOGY | Facility: HOSPITAL | Age: 63
End: 2023-11-21
Payer: COMMERCIAL

## 2023-11-21 ENCOUNTER — APPOINTMENT (OUTPATIENT)
Dept: CT IMAGING | Facility: HOSPITAL | Age: 63
End: 2023-11-21
Payer: COMMERCIAL

## 2023-11-21 ENCOUNTER — ANESTHESIA EVENT (OUTPATIENT)
Dept: PERIOP | Facility: HOSPITAL | Age: 63
End: 2023-11-21
Payer: COMMERCIAL

## 2023-11-21 ENCOUNTER — APPOINTMENT (OUTPATIENT)
Dept: GENERAL RADIOLOGY | Facility: HOSPITAL | Age: 63
End: 2023-11-21
Payer: COMMERCIAL

## 2023-11-21 ENCOUNTER — ANESTHESIA (OUTPATIENT)
Dept: PERIOP | Facility: HOSPITAL | Age: 63
End: 2023-11-21
Payer: COMMERCIAL

## 2023-11-21 PROBLEM — I50.22 CHRONIC HFREF (HEART FAILURE WITH REDUCED EJECTION FRACTION): Status: ACTIVE | Noted: 2023-11-21

## 2023-11-21 LAB
ACT BLD: 174 SECONDS (ref 89–137)
ACT BLD: 239 SECONDS (ref 89–137)
ACT BLD: 266 SECONDS (ref 89–137)
ANION GAP SERPL CALCULATED.3IONS-SCNC: 13 MMOL/L (ref 5–15)
APTT PPP: 26.1 SECONDS (ref 24–31)
APTT PPP: 26.2 SECONDS (ref 61–76.5)
BASOPHILS # BLD AUTO: 0.1 10*3/MM3 (ref 0–0.2)
BASOPHILS # BLD AUTO: 0.1 10*3/MM3 (ref 0–0.2)
BASOPHILS NFR BLD AUTO: 1 % (ref 0–1.5)
BASOPHILS NFR BLD AUTO: 1.2 % (ref 0–1.5)
BH CV UPPER VENOUS LEFT AXILLARY AUGMENT: NORMAL
BH CV UPPER VENOUS LEFT AXILLARY COMPRESS: NORMAL
BH CV UPPER VENOUS LEFT AXILLARY PHASIC: NORMAL
BH CV UPPER VENOUS LEFT AXILLARY SPONT: NORMAL
BH CV UPPER VENOUS LEFT BASILIC FOREARM COMPRESS: NORMAL
BH CV UPPER VENOUS LEFT BASILIC UPPER COLOR: 1
BH CV UPPER VENOUS LEFT BASILIC UPPER COMPRESS: NORMAL
BH CV UPPER VENOUS LEFT BASILIC UPPER THROMBUS: NORMAL
BH CV UPPER VENOUS LEFT BRACHIAL COMPRESS: NORMAL
BH CV UPPER VENOUS LEFT CEPHALIC FOREARM COMPRESS: NORMAL
BH CV UPPER VENOUS LEFT CEPHALIC UPPER COLOR: 1
BH CV UPPER VENOUS LEFT CEPHALIC UPPER COMPRESS: NORMAL
BH CV UPPER VENOUS LEFT CEPHALIC UPPER THROMBUS: NORMAL
BH CV UPPER VENOUS LEFT INTERNAL JUGULAR AUGMENT: NORMAL
BH CV UPPER VENOUS LEFT INTERNAL JUGULAR COMPRESS: NORMAL
BH CV UPPER VENOUS LEFT INTERNAL JUGULAR PHASIC: NORMAL
BH CV UPPER VENOUS LEFT INTERNAL JUGULAR SPONT: NORMAL
BH CV UPPER VENOUS LEFT RADIAL COMPRESS: NORMAL
BH CV UPPER VENOUS LEFT SUBCLAVIAN AUGMENT: NORMAL
BH CV UPPER VENOUS LEFT SUBCLAVIAN COMPRESS: NORMAL
BH CV UPPER VENOUS LEFT SUBCLAVIAN PHASIC: NORMAL
BH CV UPPER VENOUS LEFT SUBCLAVIAN SPONT: NORMAL
BH CV UPPER VENOUS LEFT ULNAR COMPRESS: NORMAL
BH CV UPPER VENOUS RIGHT INTERNAL JUGULAR AUGMENT: NORMAL
BH CV UPPER VENOUS RIGHT INTERNAL JUGULAR COMPRESS: NORMAL
BH CV UPPER VENOUS RIGHT INTERNAL JUGULAR PHASIC: NORMAL
BH CV UPPER VENOUS RIGHT INTERNAL JUGULAR SPONT: NORMAL
BH CV UPPER VENOUS RIGHT SUBCLAVIAN AUGMENT: NORMAL
BH CV UPPER VENOUS RIGHT SUBCLAVIAN COMPRESS: NORMAL
BH CV UPPER VENOUS RIGHT SUBCLAVIAN PHASIC: NORMAL
BH CV UPPER VENOUS RIGHT SUBCLAVIAN SPONT: NORMAL
BH CV VAS PRELIMINARY FINDINGS SCRIPTING: 1
BUN SERPL-MCNC: 22 MG/DL (ref 8–23)
BUN/CREAT SERPL: 15.8 (ref 7–25)
CALCIUM SPEC-SCNC: 8.9 MG/DL (ref 8.6–10.5)
CHLORIDE SERPL-SCNC: 103 MMOL/L (ref 98–107)
CO2 SERPL-SCNC: 26 MMOL/L (ref 22–29)
CREAT SERPL-MCNC: 1.39 MG/DL (ref 0.57–1)
DEPRECATED RDW RBC AUTO: 46.8 FL (ref 37–54)
DEPRECATED RDW RBC AUTO: 50.8 FL (ref 37–54)
EGFRCR SERPLBLD CKD-EPI 2021: 43 ML/MIN/1.73
EOSINOPHIL # BLD AUTO: 0.1 10*3/MM3 (ref 0–0.4)
EOSINOPHIL # BLD AUTO: 0.1 10*3/MM3 (ref 0–0.4)
EOSINOPHIL NFR BLD AUTO: 0.9 % (ref 0.3–6.2)
EOSINOPHIL NFR BLD AUTO: 1.4 % (ref 0.3–6.2)
ERYTHROCYTE [DISTWIDTH] IN BLOOD BY AUTOMATED COUNT: 14.7 % (ref 12.3–15.4)
ERYTHROCYTE [DISTWIDTH] IN BLOOD BY AUTOMATED COUNT: 15.2 % (ref 12.3–15.4)
GLUCOSE SERPL-MCNC: 103 MG/DL (ref 65–99)
HCT VFR BLD AUTO: 28.8 % (ref 34–46.6)
HCT VFR BLD AUTO: 32.7 % (ref 34–46.6)
HGB BLD-MCNC: 11 G/DL (ref 12–15.9)
HGB BLD-MCNC: 9.8 G/DL (ref 12–15.9)
INR PPP: 0.98 (ref 0.93–1.1)
LYMPHOCYTES # BLD AUTO: 1.3 10*3/MM3 (ref 0.7–3.1)
LYMPHOCYTES # BLD AUTO: 2.2 10*3/MM3 (ref 0.7–3.1)
LYMPHOCYTES NFR BLD AUTO: 17.5 % (ref 19.6–45.3)
LYMPHOCYTES NFR BLD AUTO: 27 % (ref 19.6–45.3)
MCH RBC QN AUTO: 30.4 PG (ref 26.6–33)
MCH RBC QN AUTO: 31.2 PG (ref 26.6–33)
MCHC RBC AUTO-ENTMCNC: 33.7 G/DL (ref 31.5–35.7)
MCHC RBC AUTO-ENTMCNC: 34.2 G/DL (ref 31.5–35.7)
MCV RBC AUTO: 90.3 FL (ref 79–97)
MCV RBC AUTO: 91.3 FL (ref 79–97)
MONOCYTES # BLD AUTO: 1 10*3/MM3 (ref 0.1–0.9)
MONOCYTES # BLD AUTO: 1.1 10*3/MM3 (ref 0.1–0.9)
MONOCYTES NFR BLD AUTO: 13.2 % (ref 5–12)
MONOCYTES NFR BLD AUTO: 13.6 % (ref 5–12)
NEUTROPHILS NFR BLD AUTO: 4.6 10*3/MM3 (ref 1.7–7)
NEUTROPHILS NFR BLD AUTO: 4.9 10*3/MM3 (ref 1.7–7)
NEUTROPHILS NFR BLD AUTO: 57.2 % (ref 42.7–76)
NEUTROPHILS NFR BLD AUTO: 67 % (ref 42.7–76)
NRBC BLD AUTO-RTO: 0 /100 WBC (ref 0–0.2)
NRBC BLD AUTO-RTO: 0.1 /100 WBC (ref 0–0.2)
PLATELET # BLD AUTO: 608 10*3/MM3 (ref 140–450)
PLATELET # BLD AUTO: 695 10*3/MM3 (ref 140–450)
PMV BLD AUTO: 7.3 FL (ref 6–12)
PMV BLD AUTO: 7.4 FL (ref 6–12)
POTASSIUM SERPL-SCNC: 3.6 MMOL/L (ref 3.5–5.2)
POTASSIUM SERPL-SCNC: 4.3 MMOL/L (ref 3.5–5.2)
PROTHROMBIN TIME: 10.7 SECONDS (ref 9.6–11.7)
RBC # BLD AUTO: 3.16 10*6/MM3 (ref 3.77–5.28)
RBC # BLD AUTO: 3.62 10*6/MM3 (ref 3.77–5.28)
SODIUM SERPL-SCNC: 142 MMOL/L (ref 136–145)
WBC NRBC COR # BLD AUTO: 7.3 10*3/MM3 (ref 3.4–10.8)
WBC NRBC COR # BLD AUTO: 8 10*3/MM3 (ref 3.4–10.8)

## 2023-11-21 PROCEDURE — 76000 FLUOROSCOPY <1 HR PHYS/QHP: CPT

## 2023-11-21 PROCEDURE — 25810000003 SODIUM CHLORIDE 0.9 % SOLUTION 250 ML FLEX CONT: Performed by: NURSE ANESTHETIST, CERTIFIED REGISTERED

## 2023-11-21 PROCEDURE — 88304 TISSUE EXAM BY PATHOLOGIST: CPT | Performed by: STUDENT IN AN ORGANIZED HEALTH CARE EDUCATION/TRAINING PROGRAM

## 2023-11-21 PROCEDURE — 25810000003 LACTATED RINGERS PER 1000 ML: Performed by: NURSE ANESTHETIST, CERTIFIED REGISTERED

## 2023-11-21 PROCEDURE — C1757 CATH, THROMBECTOMY/EMBOLECT: HCPCS | Performed by: STUDENT IN AN ORGANIZED HEALTH CARE EDUCATION/TRAINING PROGRAM

## 2023-11-21 PROCEDURE — 25010000002 PROPOFOL 200 MG/20ML EMULSION: Performed by: NURSE ANESTHETIST, CERTIFIED REGISTERED

## 2023-11-21 PROCEDURE — 85730 THROMBOPLASTIN TIME PARTIAL: CPT | Performed by: STUDENT IN AN ORGANIZED HEALTH CARE EDUCATION/TRAINING PROGRAM

## 2023-11-21 PROCEDURE — 93971 EXTREMITY STUDY: CPT

## 2023-11-21 PROCEDURE — 85347 COAGULATION TIME ACTIVATED: CPT

## 2023-11-21 PROCEDURE — 85025 COMPLETE CBC W/AUTO DIFF WBC: CPT | Performed by: STUDENT IN AN ORGANIZED HEALTH CARE EDUCATION/TRAINING PROGRAM

## 2023-11-21 PROCEDURE — 73206 CT ANGIO UPR EXTRM W/O&W/DYE: CPT

## 2023-11-21 PROCEDURE — 25010000002 SUCCINYLCHOLINE PER 20 MG: Performed by: NURSE ANESTHETIST, CERTIFIED REGISTERED

## 2023-11-21 PROCEDURE — 99232 SBSQ HOSP IP/OBS MODERATE 35: CPT | Performed by: NURSE PRACTITIONER

## 2023-11-21 PROCEDURE — 25010000002 HEPARIN (PORCINE) 25000-0.45 UT/250ML-% SOLUTION: Performed by: STUDENT IN AN ORGANIZED HEALTH CARE EDUCATION/TRAINING PROGRAM

## 2023-11-21 PROCEDURE — C1729 CATH, DRAINAGE: HCPCS | Performed by: STUDENT IN AN ORGANIZED HEALTH CARE EDUCATION/TRAINING PROGRAM

## 2023-11-21 PROCEDURE — C1769 GUIDE WIRE: HCPCS | Performed by: STUDENT IN AN ORGANIZED HEALTH CARE EDUCATION/TRAINING PROGRAM

## 2023-11-21 PROCEDURE — 25010000002 MIDAZOLAM PER 1 MG: Performed by: NURSE ANESTHETIST, CERTIFIED REGISTERED

## 2023-11-21 PROCEDURE — C1894 INTRO/SHEATH, NON-LASER: HCPCS | Performed by: STUDENT IN AN ORGANIZED HEALTH CARE EDUCATION/TRAINING PROGRAM

## 2023-11-21 PROCEDURE — 25010000002 HEPARIN (PORCINE) PER 1000 UNITS: Performed by: STUDENT IN AN ORGANIZED HEALTH CARE EDUCATION/TRAINING PROGRAM

## 2023-11-21 PROCEDURE — 25010000002 FENTANYL CITRATE (PF) 100 MCG/2ML SOLUTION: Performed by: NURSE ANESTHETIST, CERTIFIED REGISTERED

## 2023-11-21 PROCEDURE — 85730 THROMBOPLASTIN TIME PARTIAL: CPT | Performed by: INTERNAL MEDICINE

## 2023-11-21 PROCEDURE — 25010000002 ALBUMIN HUMAN 5% PER 50 ML: Performed by: NURSE ANESTHETIST, CERTIFIED REGISTERED

## 2023-11-21 PROCEDURE — C1887 CATHETER, GUIDING: HCPCS | Performed by: STUDENT IN AN ORGANIZED HEALTH CARE EDUCATION/TRAINING PROGRAM

## 2023-11-21 PROCEDURE — 03C80ZZ EXTIRPATION OF MATTER FROM LEFT BRACHIAL ARTERY, OPEN APPROACH: ICD-10-PCS | Performed by: STUDENT IN AN ORGANIZED HEALTH CARE EDUCATION/TRAINING PROGRAM

## 2023-11-21 PROCEDURE — 25010000002 PROTAMINE SULFATE PER 10 MG: Performed by: NURSE ANESTHETIST, CERTIFIED REGISTERED

## 2023-11-21 PROCEDURE — 25510000001 IOPAMIDOL PER 1 ML: Performed by: INTERNAL MEDICINE

## 2023-11-21 PROCEDURE — 25010000002 SUGAMMADEX 200 MG/2ML SOLUTION: Performed by: NURSE ANESTHETIST, CERTIFIED REGISTERED

## 2023-11-21 PROCEDURE — 25010000002 ONDANSETRON PER 1 MG: Performed by: NURSE ANESTHETIST, CERTIFIED REGISTERED

## 2023-11-21 PROCEDURE — 25010000002 DEXAMETHASONE PER 1 MG: Performed by: NURSE ANESTHETIST, CERTIFIED REGISTERED

## 2023-11-21 PROCEDURE — 25010000002 MAGNESIUM SULFATE PER 500 MG OF MAGNESIUM: Performed by: NURSE ANESTHETIST, CERTIFIED REGISTERED

## 2023-11-21 PROCEDURE — 85610 PROTHROMBIN TIME: CPT | Performed by: STUDENT IN AN ORGANIZED HEALTH CARE EDUCATION/TRAINING PROGRAM

## 2023-11-21 PROCEDURE — 25010000002 HEPARIN (PORCINE) PER 1000 UNITS: Performed by: NURSE ANESTHETIST, CERTIFIED REGISTERED

## 2023-11-21 PROCEDURE — 03C40ZZ EXTIRPATION OF MATTER FROM LEFT SUBCLAVIAN ARTERY, OPEN APPROACH: ICD-10-PCS | Performed by: STUDENT IN AN ORGANIZED HEALTH CARE EDUCATION/TRAINING PROGRAM

## 2023-11-21 PROCEDURE — 25010000002 PHENYLEPHRINE 10 MG/ML SOLUTION 5 ML VIAL: Performed by: NURSE ANESTHETIST, CERTIFIED REGISTERED

## 2023-11-21 PROCEDURE — B31J1ZZ FLUOROSCOPY OF LEFT UPPER EXTREMITY ARTERIES USING LOW OSMOLAR CONTRAST: ICD-10-PCS | Performed by: STUDENT IN AN ORGANIZED HEALTH CARE EDUCATION/TRAINING PROGRAM

## 2023-11-21 PROCEDURE — 25010000002 CEFAZOLIN PER 500 MG: Performed by: STUDENT IN AN ORGANIZED HEALTH CARE EDUCATION/TRAINING PROGRAM

## 2023-11-21 PROCEDURE — 84132 ASSAY OF SERUM POTASSIUM: CPT | Performed by: INTERNAL MEDICINE

## 2023-11-21 PROCEDURE — P9041 ALBUMIN (HUMAN),5%, 50ML: HCPCS | Performed by: NURSE ANESTHETIST, CERTIFIED REGISTERED

## 2023-11-21 DEVICE — LIGACLIP MCA MULTIPLE CLIP APPLIERS, 20 MEDIUM CLIPS
Type: IMPLANTABLE DEVICE | Site: ARM | Status: FUNCTIONAL
Brand: LIGACLIP

## 2023-11-21 DEVICE — LIGACLIP MCA MULTIPLE CLIP APPLIERS, 20 SMALL CLIPS
Type: IMPLANTABLE DEVICE | Site: ARM | Status: FUNCTIONAL
Brand: LIGACLIP

## 2023-11-21 RX ORDER — ASPIRIN 81 MG/1
81 TABLET ORAL DAILY
Qty: 30 TABLET | Refills: 0 | Status: SHIPPED | OUTPATIENT
Start: 2023-11-21

## 2023-11-21 RX ORDER — HEPARIN SODIUM 1000 [USP'U]/ML
INJECTION, SOLUTION INTRAVENOUS; SUBCUTANEOUS AS NEEDED
Status: DISCONTINUED | OUTPATIENT
Start: 2023-11-21 | End: 2023-11-21 | Stop reason: SURG

## 2023-11-21 RX ORDER — METOPROLOL SUCCINATE 25 MG/1
25 TABLET, EXTENDED RELEASE ORAL
Qty: 30 TABLET | Refills: 0 | Status: SHIPPED | OUTPATIENT
Start: 2023-11-21 | End: 2023-12-22

## 2023-11-21 RX ORDER — CEFDINIR 300 MG/1
300 CAPSULE ORAL 2 TIMES DAILY
Qty: 20 CAPSULE | Refills: 0 | Status: SHIPPED | OUTPATIENT
Start: 2023-11-21

## 2023-11-21 RX ORDER — SUCCINYLCHOLINE CHLORIDE 20 MG/ML
INJECTION INTRAMUSCULAR; INTRAVENOUS AS NEEDED
Status: DISCONTINUED | OUTPATIENT
Start: 2023-11-21 | End: 2023-11-21 | Stop reason: SURG

## 2023-11-21 RX ORDER — POTASSIUM CHLORIDE 750 MG/1
10 TABLET, FILM COATED, EXTENDED RELEASE ORAL 2 TIMES DAILY
Qty: 60 TABLET | Refills: 0 | Status: SHIPPED | OUTPATIENT
Start: 2023-11-21

## 2023-11-21 RX ORDER — LIDOCAINE HYDROCHLORIDE 20 MG/ML
INJECTION, SOLUTION EPIDURAL; INFILTRATION; INTRACAUDAL; PERINEURAL AS NEEDED
Status: DISCONTINUED | OUTPATIENT
Start: 2023-11-21 | End: 2023-11-21 | Stop reason: SURG

## 2023-11-21 RX ORDER — HEPARIN SODIUM 10000 [USP'U]/100ML
15.3 INJECTION, SOLUTION INTRAVENOUS
Status: DISCONTINUED | OUTPATIENT
Start: 2023-11-21 | End: 2023-11-21

## 2023-11-21 RX ORDER — MAGNESIUM SULFATE HEPTAHYDRATE 500 MG/ML
INJECTION, SOLUTION INTRAMUSCULAR; INTRAVENOUS AS NEEDED
Status: DISCONTINUED | OUTPATIENT
Start: 2023-11-21 | End: 2023-11-21 | Stop reason: SURG

## 2023-11-21 RX ORDER — HYDRALAZINE HYDROCHLORIDE 20 MG/ML
5 INJECTION INTRAMUSCULAR; INTRAVENOUS
Status: DISCONTINUED | OUTPATIENT
Start: 2023-11-21 | End: 2023-11-21 | Stop reason: HOSPADM

## 2023-11-21 RX ORDER — DIPHENHYDRAMINE HYDROCHLORIDE 50 MG/ML
12.5 INJECTION INTRAMUSCULAR; INTRAVENOUS
Status: DISCONTINUED | OUTPATIENT
Start: 2023-11-21 | End: 2023-11-21 | Stop reason: HOSPADM

## 2023-11-21 RX ORDER — FENTANYL CITRATE 50 UG/ML
INJECTION, SOLUTION INTRAMUSCULAR; INTRAVENOUS AS NEEDED
Status: DISCONTINUED | OUTPATIENT
Start: 2023-11-21 | End: 2023-11-21 | Stop reason: SURG

## 2023-11-21 RX ORDER — PROTAMINE SULFATE 10 MG/ML
INJECTION, SOLUTION INTRAVENOUS AS NEEDED
Status: DISCONTINUED | OUTPATIENT
Start: 2023-11-21 | End: 2023-11-21 | Stop reason: SURG

## 2023-11-21 RX ORDER — ONDANSETRON 2 MG/ML
4 INJECTION INTRAMUSCULAR; INTRAVENOUS ONCE AS NEEDED
Status: DISCONTINUED | OUTPATIENT
Start: 2023-11-21 | End: 2023-11-21 | Stop reason: HOSPADM

## 2023-11-21 RX ORDER — NICOTINE 21 MG/24HR
1 PATCH, TRANSDERMAL 24 HOURS TRANSDERMAL
Qty: 28 EACH | Refills: 0 | Status: SHIPPED | OUTPATIENT
Start: 2023-11-21

## 2023-11-21 RX ORDER — ATORVASTATIN CALCIUM 10 MG/1
10 TABLET, FILM COATED ORAL DAILY
Qty: 90 TABLET | Refills: 0 | Status: SHIPPED | OUTPATIENT
Start: 2023-11-21

## 2023-11-21 RX ORDER — DEXAMETHASONE SODIUM PHOSPHATE 4 MG/ML
INJECTION, SOLUTION INTRA-ARTICULAR; INTRALESIONAL; INTRAMUSCULAR; INTRAVENOUS; SOFT TISSUE AS NEEDED
Status: DISCONTINUED | OUTPATIENT
Start: 2023-11-21 | End: 2023-11-21 | Stop reason: SURG

## 2023-11-21 RX ORDER — LABETALOL HYDROCHLORIDE 5 MG/ML
5 INJECTION, SOLUTION INTRAVENOUS
Status: DISCONTINUED | OUTPATIENT
Start: 2023-11-21 | End: 2023-11-21 | Stop reason: HOSPADM

## 2023-11-21 RX ORDER — PHENYLEPHRINE HCL IN 0.9% NACL 1 MG/10 ML
SYRINGE (ML) INTRAVENOUS AS NEEDED
Status: DISCONTINUED | OUTPATIENT
Start: 2023-11-21 | End: 2023-11-21 | Stop reason: SURG

## 2023-11-21 RX ORDER — HEPARIN SODIUM 10000 [USP'U]/100ML
18 INJECTION, SOLUTION INTRAVENOUS
Status: DISCONTINUED | OUTPATIENT
Start: 2023-11-21 | End: 2023-11-22

## 2023-11-21 RX ORDER — LOSARTAN POTASSIUM 25 MG/1
25 TABLET ORAL DAILY
Qty: 30 TABLET | Refills: 0 | Status: SHIPPED | OUTPATIENT
Start: 2023-11-21

## 2023-11-21 RX ORDER — ALBUMIN, HUMAN INJ 5% 5 %
SOLUTION INTRAVENOUS CONTINUOUS PRN
Status: DISCONTINUED | OUTPATIENT
Start: 2023-11-21 | End: 2023-11-21 | Stop reason: SURG

## 2023-11-21 RX ORDER — FUROSEMIDE 20 MG/1
20 TABLET ORAL 2 TIMES DAILY
Qty: 60 TABLET | Refills: 0 | Status: SHIPPED | OUTPATIENT
Start: 2023-11-21

## 2023-11-21 RX ORDER — DEXMEDETOMIDINE HYDROCHLORIDE 100 UG/ML
INJECTION, SOLUTION INTRAVENOUS AS NEEDED
Status: DISCONTINUED | OUTPATIENT
Start: 2023-11-21 | End: 2023-11-21 | Stop reason: SURG

## 2023-11-21 RX ORDER — SODIUM CHLORIDE, SODIUM LACTATE, POTASSIUM CHLORIDE, CALCIUM CHLORIDE 600; 310; 30; 20 MG/100ML; MG/100ML; MG/100ML; MG/100ML
75 INJECTION, SOLUTION INTRAVENOUS CONTINUOUS
Status: DISPENSED | OUTPATIENT
Start: 2023-11-21 | End: 2023-11-22

## 2023-11-21 RX ORDER — IPRATROPIUM BROMIDE AND ALBUTEROL SULFATE 2.5; .5 MG/3ML; MG/3ML
3 SOLUTION RESPIRATORY (INHALATION) ONCE AS NEEDED
Status: DISCONTINUED | OUTPATIENT
Start: 2023-11-21 | End: 2023-11-21 | Stop reason: HOSPADM

## 2023-11-21 RX ORDER — HYDROCODONE BITARTRATE AND ACETAMINOPHEN 5; 325 MG/1; MG/1
1 TABLET ORAL EVERY 6 HOURS PRN
Status: DISCONTINUED | OUTPATIENT
Start: 2023-11-21 | End: 2023-11-22 | Stop reason: HOSPADM

## 2023-11-21 RX ORDER — OXYCODONE HYDROCHLORIDE 5 MG/1
5 TABLET ORAL ONCE AS NEEDED
Status: DISCONTINUED | OUTPATIENT
Start: 2023-11-21 | End: 2023-11-21 | Stop reason: HOSPADM

## 2023-11-21 RX ORDER — ROCURONIUM BROMIDE 10 MG/ML
INJECTION, SOLUTION INTRAVENOUS AS NEEDED
Status: DISCONTINUED | OUTPATIENT
Start: 2023-11-21 | End: 2023-11-21 | Stop reason: SURG

## 2023-11-21 RX ORDER — NITROGLYCERIN 0.4 MG/1
0.4 TABLET SUBLINGUAL
Status: DISCONTINUED | OUTPATIENT
Start: 2023-11-21 | End: 2023-11-22 | Stop reason: HOSPADM

## 2023-11-21 RX ORDER — MIDAZOLAM HYDROCHLORIDE 1 MG/ML
INJECTION INTRAMUSCULAR; INTRAVENOUS AS NEEDED
Status: DISCONTINUED | OUTPATIENT
Start: 2023-11-21 | End: 2023-11-21 | Stop reason: SURG

## 2023-11-21 RX ORDER — DIPHENHYDRAMINE HYDROCHLORIDE 50 MG/ML
12.5 INJECTION INTRAMUSCULAR; INTRAVENOUS ONCE AS NEEDED
Status: DISCONTINUED | OUTPATIENT
Start: 2023-11-21 | End: 2023-11-21 | Stop reason: HOSPADM

## 2023-11-21 RX ORDER — DAPAGLIFLOZIN 5 MG/1
5 TABLET, FILM COATED ORAL DAILY
Qty: 30 TABLET | Refills: 0 | Status: SHIPPED | OUTPATIENT
Start: 2023-11-21

## 2023-11-21 RX ORDER — FUROSEMIDE 40 MG/1
40 TABLET ORAL 2 TIMES DAILY
Qty: 60 TABLET | Refills: 0 | Status: SHIPPED | OUTPATIENT
Start: 2023-11-21 | End: 2023-11-21 | Stop reason: SDUPTHER

## 2023-11-21 RX ORDER — MAGNESIUM HYDROXIDE 1200 MG/15ML
LIQUID ORAL AS NEEDED
Status: DISCONTINUED | OUTPATIENT
Start: 2023-11-21 | End: 2023-11-21 | Stop reason: HOSPADM

## 2023-11-21 RX ORDER — SODIUM CHLORIDE, SODIUM LACTATE, POTASSIUM CHLORIDE, CALCIUM CHLORIDE 600; 310; 30; 20 MG/100ML; MG/100ML; MG/100ML; MG/100ML
INJECTION, SOLUTION INTRAVENOUS CONTINUOUS PRN
Status: DISCONTINUED | OUTPATIENT
Start: 2023-11-21 | End: 2023-11-21 | Stop reason: SURG

## 2023-11-21 RX ORDER — EPHEDRINE SULFATE 5 MG/ML
5 INJECTION INTRAVENOUS ONCE AS NEEDED
Status: DISCONTINUED | OUTPATIENT
Start: 2023-11-21 | End: 2023-11-21 | Stop reason: HOSPADM

## 2023-11-21 RX ORDER — ONDANSETRON 2 MG/ML
INJECTION INTRAMUSCULAR; INTRAVENOUS AS NEEDED
Status: DISCONTINUED | OUTPATIENT
Start: 2023-11-21 | End: 2023-11-21 | Stop reason: SURG

## 2023-11-21 RX ORDER — PROPOFOL 10 MG/ML
INJECTION, EMULSION INTRAVENOUS AS NEEDED
Status: DISCONTINUED | OUTPATIENT
Start: 2023-11-21 | End: 2023-11-21 | Stop reason: SURG

## 2023-11-21 RX ORDER — POTASSIUM CHLORIDE 20 MEQ/1
40 TABLET, EXTENDED RELEASE ORAL EVERY 4 HOURS
Status: COMPLETED | OUTPATIENT
Start: 2023-11-21 | End: 2023-11-21

## 2023-11-21 RX ORDER — NALOXONE HCL 0.4 MG/ML
0.4 VIAL (ML) INJECTION AS NEEDED
Status: DISCONTINUED | OUTPATIENT
Start: 2023-11-21 | End: 2023-11-21 | Stop reason: HOSPADM

## 2023-11-21 RX ORDER — SODIUM CHLORIDE, SODIUM LACTATE, POTASSIUM CHLORIDE, CALCIUM CHLORIDE 600; 310; 30; 20 MG/100ML; MG/100ML; MG/100ML; MG/100ML
20 INJECTION, SOLUTION INTRAVENOUS CONTINUOUS
Status: DISCONTINUED | OUTPATIENT
Start: 2023-11-21 | End: 2023-11-22 | Stop reason: HOSPADM

## 2023-11-21 RX ORDER — OXYCODONE HYDROCHLORIDE 5 MG/1
10 TABLET ORAL EVERY 4 HOURS PRN
Status: DISCONTINUED | OUTPATIENT
Start: 2023-11-21 | End: 2023-11-21 | Stop reason: HOSPADM

## 2023-11-21 RX ADMIN — PROPOFOL 130 MG: 10 INJECTION, EMULSION INTRAVENOUS at 15:49

## 2023-11-21 RX ADMIN — POTASSIUM CHLORIDE 40 MEQ: 1500 TABLET, EXTENDED RELEASE ORAL at 05:51

## 2023-11-21 RX ADMIN — PROTAMINE SULFATE 40 MG: 10 INJECTION, SOLUTION INTRAVENOUS at 17:14

## 2023-11-21 RX ADMIN — MAGNESIUM SULFATE HEPTAHYDRATE 0.4 G: 500 INJECTION, SOLUTION INTRAMUSCULAR; INTRAVENOUS at 17:07

## 2023-11-21 RX ADMIN — HEPARIN SODIUM 10000 UNITS: 1000 INJECTION INTRAVENOUS; SUBCUTANEOUS at 16:26

## 2023-11-21 RX ADMIN — LIDOCAINE HYDROCHLORIDE 80 MG: 20 INJECTION, SOLUTION EPIDURAL; INFILTRATION; INTRACAUDAL; PERINEURAL at 15:49

## 2023-11-21 RX ADMIN — FENTANYL CITRATE 50 MCG: 50 INJECTION, SOLUTION INTRAMUSCULAR; INTRAVENOUS at 17:04

## 2023-11-21 RX ADMIN — IOPAMIDOL 100 ML: 755 INJECTION, SOLUTION INTRAVENOUS at 15:07

## 2023-11-21 RX ADMIN — MAGNESIUM SULFATE HEPTAHYDRATE 0.4 G: 500 INJECTION, SOLUTION INTRAMUSCULAR; INTRAVENOUS at 17:04

## 2023-11-21 RX ADMIN — DEXMEDETOMIDINE HYDROCHLORIDE 2 MCG: 100 INJECTION, SOLUTION INTRAVENOUS at 16:07

## 2023-11-21 RX ADMIN — FENTANYL CITRATE 25 MCG: 50 INJECTION, SOLUTION INTRAMUSCULAR; INTRAVENOUS at 17:32

## 2023-11-21 RX ADMIN — MAGNESIUM SULFATE HEPTAHYDRATE 0.4 G: 500 INJECTION, SOLUTION INTRAMUSCULAR; INTRAVENOUS at 17:00

## 2023-11-21 RX ADMIN — SODIUM CHLORIDE, SODIUM LACTATE, POTASSIUM CHLORIDE, AND CALCIUM CHLORIDE: .6; .31; .03; .02 INJECTION, SOLUTION INTRAVENOUS at 15:43

## 2023-11-21 RX ADMIN — MIDAZOLAM 1 MG: 1 INJECTION INTRAMUSCULAR; INTRAVENOUS at 15:46

## 2023-11-21 RX ADMIN — ONDANSETRON 4 MG: 2 INJECTION INTRAMUSCULAR; INTRAVENOUS at 17:19

## 2023-11-21 RX ADMIN — Medication 100 MCG: at 16:30

## 2023-11-21 RX ADMIN — MAGNESIUM SULFATE HEPTAHYDRATE 0.4 G: 500 INJECTION, SOLUTION INTRAMUSCULAR; INTRAVENOUS at 17:10

## 2023-11-21 RX ADMIN — PHENYLEPHRINE HYDROCHLORIDE 0.3 MCG/KG/MIN: 10 INJECTION INTRAVENOUS at 16:04

## 2023-11-21 RX ADMIN — DEXMEDETOMIDINE HYDROCHLORIDE 4 MCG: 100 INJECTION, SOLUTION INTRAVENOUS at 16:13

## 2023-11-21 RX ADMIN — ROCURONIUM BROMIDE 35 MG: 10 INJECTION, SOLUTION INTRAVENOUS at 16:11

## 2023-11-21 RX ADMIN — DEXMEDETOMIDINE HYDROCHLORIDE 4 MCG: 100 INJECTION, SOLUTION INTRAVENOUS at 16:10

## 2023-11-21 RX ADMIN — HYDROCODONE BITARTRATE AND ACETAMINOPHEN 1 TABLET: 5; 325 TABLET ORAL at 12:06

## 2023-11-21 RX ADMIN — SODIUM CHLORIDE 2000 MG: 900 INJECTION INTRAVENOUS at 15:54

## 2023-11-21 RX ADMIN — ROCURONIUM BROMIDE 10 MG: 10 INJECTION, SOLUTION INTRAVENOUS at 17:06

## 2023-11-21 RX ADMIN — FUROSEMIDE 40 MG: 40 TABLET ORAL at 22:00

## 2023-11-21 RX ADMIN — METOPROLOL SUCCINATE 25 MG: 25 TABLET, EXTENDED RELEASE ORAL at 10:01

## 2023-11-21 RX ADMIN — POTASSIUM CHLORIDE 40 MEQ: 1500 TABLET, EXTENDED RELEASE ORAL at 01:43

## 2023-11-21 RX ADMIN — FUROSEMIDE 40 MG: 40 TABLET ORAL at 10:01

## 2023-11-21 RX ADMIN — Medication 1 PATCH: at 10:01

## 2023-11-21 RX ADMIN — FENTANYL CITRATE 25 MCG: 50 INJECTION, SOLUTION INTRAMUSCULAR; INTRAVENOUS at 17:26

## 2023-11-21 RX ADMIN — SUGAMMADEX 200 MG: 100 INJECTION, SOLUTION INTRAVENOUS at 17:24

## 2023-11-21 RX ADMIN — ROCURONIUM BROMIDE 5 MG: 10 INJECTION, SOLUTION INTRAVENOUS at 15:49

## 2023-11-21 RX ADMIN — Medication 10 ML: at 10:02

## 2023-11-21 RX ADMIN — HEPARIN SODIUM 18 UNITS/KG/HR: 10000 INJECTION, SOLUTION INTRAVENOUS at 22:03

## 2023-11-21 RX ADMIN — DEXMEDETOMIDINE HYDROCHLORIDE 4 MCG: 100 INJECTION, SOLUTION INTRAVENOUS at 17:18

## 2023-11-21 RX ADMIN — MIDAZOLAM 1 MG: 1 INJECTION INTRAMUSCULAR; INTRAVENOUS at 15:45

## 2023-11-21 RX ADMIN — SODIUM CHLORIDE, SODIUM LACTATE, POTASSIUM CHLORIDE, AND CALCIUM CHLORIDE: .6; .31; .03; .02 INJECTION, SOLUTION INTRAVENOUS at 17:19

## 2023-11-21 RX ADMIN — SENNOSIDES AND DOCUSATE SODIUM 2 TABLET: 50; 8.6 TABLET ORAL at 22:00

## 2023-11-21 RX ADMIN — ALBUMIN (HUMAN): 12.5 INJECTION, SOLUTION INTRAVENOUS at 16:37

## 2023-11-21 RX ADMIN — SUCCINYLCHOLINE CHLORIDE 120 MG: 20 INJECTION, SOLUTION INTRAMUSCULAR; INTRAVENOUS at 15:49

## 2023-11-21 RX ADMIN — DEXAMETHASONE SODIUM PHOSPHATE 8 MG: 4 INJECTION, SOLUTION INTRAMUSCULAR; INTRAVENOUS at 16:03

## 2023-11-21 RX ADMIN — HEPARIN SODIUM 5000 UNITS: 1000 INJECTION INTRAVENOUS; SUBCUTANEOUS at 16:41

## 2023-11-21 RX ADMIN — MAGNESIUM SULFATE HEPTAHYDRATE 0.4 G: 500 INJECTION, SOLUTION INTRAMUSCULAR; INTRAVENOUS at 17:13

## 2023-11-21 NOTE — CONSULTS
Name: Neva Kurtz ADMIT: 2023   : 1960  PCP: Provider, No Known    MRN: [id number] LOS: 2 days   AGE/SEX: 63 y.o. female  ROOM: 243/1   [facility]      Patient Care Team:  Provider, No Known as PCP - General  Chief Complaint   Patient presents with    Shortness of Breath     CC: Left arm pain    Subjective     Inpatient Vascular Surgery Consult  Consult performed by: Canelo Blackwell II, MD  Consult ordered by: Jai Null MD        Shortness of Breath  Pertinent negatives include no abdominal pain, chest pain or vomiting.     Patient is a pleasant 63-year-old lady who was admitted with an acute CHF exacerbation.  She was actually planning to be discharged today however around the time an IV was removed from her left arm and she developed sudden onset left arm pain radiating from the antecubital fossa down to her wrist and hand.  She reports she has never had pain like this before.  She was noted to have a diminished radial pulse on the left arm and so noninvasive studies were ordered which showed a likely occlusion in the axillary artery.  Patient denies any history of arrhythmia other than an episode of SVT 20 years ago.  She is never had anything like this happen before.  She is a smoker.  She is able to move her hand but it is weak.    Review of Systems   Respiratory:  Positive for shortness of breath.    Cardiovascular:  Negative for chest pain and palpitations.   Gastrointestinal:  Negative for abdominal pain, nausea and vomiting.   Musculoskeletal:         Left arm pain   Neurological:  Positive for weakness and numbness.       Past Medical History:   Diagnosis Date    SVT (supraventricular tachycardia)      Past Surgical History:   Procedure Laterality Date    BREAST BIOPSY      CYSTOSCOPY, URETEROSCOPY, RETROGRADE PYELOGRAM, STENT INSERTION Bilateral 2023    Procedure: CYSTOSCOPY URETEROSCOPY RETROGRADE PYELOGRAM STENT INSERTION;  Surgeon: Alan Rodriguez MD;  Location: [facility]  MAIN OR;  Service: Urology;  Laterality: Bilateral;     History reviewed. No pertinent family history.    Social History     Tobacco Use    Smoking status: Every Day     Packs/day: 1     Types: Cigarettes     Passive exposure: Never    Smokeless tobacco: Never   Vaping Use    Vaping Use: Never used   Substance Use Topics    Alcohol use: Never    Drug use: Never     Medications Prior to Admission   Medication Sig Dispense Refill Last Dose    [] cefdinir (OMNICEF) 300 MG capsule Take 1 capsule by mouth 2 (Two) Times a Day for 7 days. 14 capsule 0     cetirizine (zyrTEC) 10 MG tablet Take 1 tablet by mouth Daily.       ibuprofen (ADVIL,MOTRIN) 400 MG tablet Take 1 tablet by mouth Every 6 (Six) Hours As Needed for Mild Pain.       multivitamin with minerals tablet tablet Take 1 tablet by mouth Daily.       phenazopyridine (Pyridium) 100 MG tablet Take 1 tablet by mouth 3 (Three) Times a Day. 6 tablet 0      cefTRIAXone, 2,000 mg, Intravenous, Q24H  furosemide, 40 mg, Oral, BID  heparin, 80 Units/kg, Intravenous, Once  metoprolol succinate XL, 25 mg, Oral, Q24H  nicotine, 1 patch, Transdermal, Q24H  senna-docusate sodium, 2 tablet, Oral, BID  sodium chloride, 10 mL, Intravenous, Q12H      heparin, 15.3 Units/kg/hr        acetaminophen **OR** acetaminophen **OR** acetaminophen    aluminum-magnesium hydroxide-simethicone    senna-docusate sodium **AND** polyethylene glycol **AND** bisacodyl **AND** bisacodyl    Calcium Replacement - Follow Nurse / BPA Driven Protocol    heparin    heparin    HYDROcodone-acetaminophen    ipratropium-albuterol    Magnesium Standard Dose Replacement - Follow Nurse / BPA Driven Protocol    melatonin    ondansetron **OR** ondansetron    Phosphorus Replacement - Follow Nurse / BPA Driven Protocol    Potassium Replacement - Follow Nurse / BPA Driven Protocol    [COMPLETED] Insert Peripheral IV **AND** sodium chloride    sodium chloride  Other and Tetracyclines & related    Objective      Physical Exam:  Physical Exam  Vitals reviewed.   Constitutional:       General: She is not in acute distress.  HENT:      Head: Normocephalic and atraumatic.   Eyes:      General: No scleral icterus.  Cardiovascular:      Rate and Rhythm: Normal rate and regular rhythm.      Comments: Strongly palpable right radial pulse, nonpalpable left radial ulnar or brachial pulses.  Pulmonary:      Effort: Pulmonary effort is normal. No respiratory distress.   Abdominal:      Palpations: Abdomen is soft.      Tenderness: There is no abdominal tenderness.   Skin:     General: Skin is warm.      Comments: Arms are actually equally warm bilaterally down to the hand.  Fingers mildly cyanotic with delayed cap refill relative to right   Neurological:      Mental Status: She is alert and oriented to person, place, and time.      Comments: Weakened  strength left hand              Vital Signs and Labs:  Vital Signs Patient Vitals for the past 24 hrs:   BP Temp Temp src Pulse Resp SpO2 Weight   11/21/23 1100 -- 97.5 °F (36.4 °C) Oral -- 18 -- --   11/21/23 0740 143/85 98 °F (36.7 °C) Oral -- 18 -- --   11/21/23 0351 122/70 98.1 °F (36.7 °C) Oral 89 17 99 % 97.8 kg (215 lb 9.8 oz)   11/21/23 0222 137/81 97.9 °F (36.6 °C) Oral -- 18 -- --   11/20/23 2115 129/63 98.4 °F (36.9 °C) Oral 89 19 96 % --   11/20/23 1641 133/80 98.2 °F (36.8 °C) Oral 98 13 96 % --   11/20/23 1425 141/69 -- -- 109 -- -- --     I/O:  I/O last 3 completed shifts:  In: 1800 [P.O.:1800]  Out: -     CBC    Results from last 7 days   Lab Units 11/20/23  2303 11/19/23  2318 11/19/23  0207   WBC 10*3/mm3 8.00 11.50* 16.30*   HEMOGLOBIN g/dL 9.8* 9.2* 11.5*   PLATELETS 10*3/mm3 608* 535* 618*     BMP   Results from last 7 days   Lab Units 11/21/23  1020 11/20/23  2303 11/19/23  2318 11/19/23  0207   SODIUM mmol/L  --  142 140 139   POTASSIUM mmol/L 4.3 3.6 4.0 4.0   CHLORIDE mmol/L  --  103 105 102   CO2 mmol/L  --  26.0 25.0 23.0   BUN mg/dL  --  22 18 13  "  CREATININE mg/dL  --  1.39* 1.15* 1.10*   GLUCOSE mg/dL  --  103* 122* 152*     Cr Clearance Estimated Creatinine Clearance: 51.3 mL/min (A) (by C-G formula based on SCr of 1.39 mg/dL (H)).  Coag   Results from last 7 days   Lab Units 11/19/23  0207   INR  0.98   APTT seconds 26.9*     HbA1C No results found for: \"HGBA1C\"  Blood Glucose No results found for: \"POCGLU\"  Infection   Results from last 7 days   Lab Units 11/20/23  1321 11/20/23  1320 11/19/23  0403 11/19/23  0353   BLOODCX  No growth at 24 hours No growth at 24 hours  --  No growth at 2 days  No growth at 2 days   URINECX   --   --  No growth  --    PROCALCITONIN ng/mL  --   --   --  1.41*     CMP   Results from last 7 days   Lab Units 11/21/23  1020 11/20/23  2303 11/19/23  2318 11/19/23  0207   SODIUM mmol/L  --  142 140 139   POTASSIUM mmol/L 4.3 3.6 4.0 4.0   CHLORIDE mmol/L  --  103 105 102   CO2 mmol/L  --  26.0 25.0 23.0   BUN mg/dL  --  22 18 13   CREATININE mg/dL  --  1.39* 1.15* 1.10*   GLUCOSE mg/dL  --  103* 122* 152*   ALBUMIN g/dL  --   --   --  3.5   BILIRUBIN mg/dL  --   --   --  0.3   ALK PHOS U/L  --   --   --  92   AST (SGOT) U/L  --   --   --  13   ALT (SGPT) U/L  --   --   --  10     ABG      UA    Results from last 7 days   Lab Units 11/19/23  0403   NITRITE UA  Positive*   WBC UA /HPF 0-2   BACTERIA UA /HPF None Seen   SQUAM EPITHEL UA /HPF 0-2   URINECX  No growth     DURGA  No results found for: \"POCMETH\", \"POCAMPHET\", \"POCBARBITUR\", \"POCBENZO\", \"POCCOCAINE\", \"POCOPIATES\", \"POCOXYCODO\", \"POCPHENCYC\", \"POCPROPOXY\", \"POCTHC\", \"POCTRICYC\"  Lysis Labs   Results from last 7 days   Lab Units 11/20/23  2303 11/19/23  2318 11/19/23  0207   INR   --   --  0.98   APTT seconds  --   --  26.9*   HEMOGLOBIN g/dL 9.8* 9.2* 11.5*   PLATELETS 10*3/mm3 608* 535* 618*   CREATININE mg/dL 1.39* 1.15* 1.10*     Radiology(recent) XR Chest PA & Lateral    Result Date: 11/20/2023  Impression: Significant interval improvement with minimal infiltrates " remaining. Electronically Signed: Marni Cohen MD  11/20/2023 8:17 AM EST  Workstation ID: [id number]     Active Hospital Problems    Diagnosis  POA    **Acute exacerbation of CHF (congestive heart failure) [I50.9]  Yes    Chronic HFrEF (heart failure with reduced ejection fraction) [I50.22]  Yes      Resolved Hospital Problems   No resolved problems to display.     Problem Points:  4:  Patient has a new problem, with additional work-up planned  Total problem points:4 or more    Data Points:  1:  I have reviewed or order clinical lab test  1:  I have reviewed or order radiology test (except heart catheterization or echo)  2:  I have personally and independently review of image, tracing, or specimen  2:  I have reviewed and summation of old records and/or discussed the patients care with another health care provider  Total data points:4 or more    Risk Points:  High:  Major elective surgery with risk factors or emergent surgery    MDM requires 2/3 (Problem points, Data points and Risk)  MDM Prob point Data point Risk   SF 1 1 Minimal   Low 2 2 Low   Mod 3 3 Moderate   High 4 4 High     Code requires 3/3 (MDM, History and Exam)  Code MDM History Exam Time   03635 SF/Low Detailed Detailed 30   85980 Mod Comprehensive Comprehensive 50   77292 High Comprehensive Comprehensive 70     Detailed history:  4 elements HPI or status of 3 chronic problems; 2-9 system ROS  Comprehensive:  4 elements HPI or status of 3 chronic problems;  10 system ROS    Detailed Exam:  12 findings from any organ system  Comprehensive Exam:  2 findings from each of 9 systems.   93027    Assessment & Plan       Acute exacerbation of CHF (congestive heart failure)    Chronic HFrEF (heart failure with reduced ejection fraction)      63 y.o. female with acute limb ischemia left arm.  Has a pulse discrepancy and arterial images on the venous duplex shows dampened waveforms distal to the subclavian artery on the left and an occluded ulnar  artery.  Unsure the etiology of this but suspect cardioembolic.  N.p.o. now  Heparin drip has been ordered stat, with a bolus  Stat CTA of the left arm is also been ordered.  In all likelihood she will need a left arm thrombectomy today.      I discussed the patients findings and my recommendations with patient, family, and nursing staff.    Canelo Blackwell II, MD  11/21/23  13:50 EST    Please call my office with any question: [phone]

## 2023-11-21 NOTE — PROGRESS NOTES
Name: Neva Kurtz ADMIT: 2023   : 1960  PCP: Provider, No Known    MRN: [id number] LOS: 2 days   AGE/SEX: 63 y.o. female  ROOM:  [place] [address]/1       Vascular Surgery note    CTA reviewed.  Patient has some thrombus in the subclavian artery at the origin of the thyrocervical trunk that is nonocclusive and none occlusive lesion distal to this.  Appears consistent with thrombus.  Plan for left arm thrombectomy via brachial artery cutdown with angiogram.      Canelo Blackwell II, MD  23  15:17 EST  Office Number [phone]

## 2023-11-21 NOTE — NURSING NOTE
IV discontinued due to Leaking when antibiotic started this am. IV was discontinued then pt started c/o severe pain in outer Lt forearm and Lt hand feeling numb. Cardiology NP said that she thought radial pulse was weaker in the lt wrist, lt hand looks a little dusky. Ice applied to Lt arm. Hospitalist make aware of pain/numbness. Asked for US of arm to r/o DVT and request something for pain. Offered pt Tylenol but she declined saying I don't think Tylenol will be strong enough. MD ordered Norco for pain and given to pt. Waiting for US to be done. Will continue to monitor.

## 2023-11-21 NOTE — DISCHARGE SUMMARY
[facility] Medicine Services  DISCHARGE SUMMARY    Patient Name: Neva Kurtz  : 1960  MRN: [id number]    Date of Admission: 2023  Discharge Diagnosis: CHF exacerbation  Date of Discharge:  23  Primary Care Physician: Provider, No Known      Presenting Problem:   Acute pulmonary edema [J81.0]  Hypoxia [R09.02]  Acute exacerbation of CHF (congestive heart failure) [I50.9]  Dyspnea, unspecified type [R06.00]  Chronic HFrEF (heart failure with reduced ejection fraction) [I50.22]    Active and Resolved Hospital Problems:  Active Hospital Problems    Diagnosis POA    **Acute exacerbation of CHF (congestive heart failure) [I50.9] Yes    Chronic HFrEF (heart failure with reduced ejection fraction) [I50.22] Yes      Resolved Hospital Problems   No resolved problems to display.     CHF exacerbation  Sinus tachycardia  - CT PE Protocol: no PE, interlobular septal thickening, peribronchial thickening and mosaic attenuation in the lungs with small bilateral pleural effusions. This is favored to reflect pulmonary edema. There are a few focal areas of airspace disease in the lungs that could be related to edema or pneumonia.   - proBNP 9877, HS troponin 27, repeat troponins   - type of CHF unknown due to no previous echo  - 40mg IV lasix given in ED and will continue q12 hours  - give 5mg IV metoprolol now, discussed with attending  - pt has a history of SVT   - 2D echo-EF 40  - strict Is & Os  -Consulted cardiology     Pneumonia----afebrile/WBC--11.5,  - CT as above showed there are a few focal areas of airspace disease in the lungs that could be related to edema or pneumonia.   - WBC 16.30  - subjective fever, no fever currently  - IV antibiotics as ordered.rocephin added. Dc vanc/cefep  - requiring 1L O2 via NC  - follow blood cultures, check procalcitonin, urine antigens   - continuous pulse oximetry  - bronchodilators follow-up chest x-ray   Severe left sided  hydronephrosis  Obstructive uropathy  - CT: severe left-sided hydronephrosis and diminished enhancement of the left renal parenchyma, which is only partially seen on this study, but concerning for obstructive uropathy of indeterminate age.   - recent recurrent UTI, pansensitive treated with cefdinir  - IV antibiotics >PO OMNICEFT  - bladder scan only showed 160  - consulted urology for further recommendations     Thrombocytosis  - plts 618  - start VTE lovenox  -Follow-up CBC     History of SVT  - pt is on no home medications for SVT as this was over 20 years ago and she had some type of procedure done for the SVT but unsure what. She believes she was on metoprolol in the past    -Cardiology to see the patient     Tobacco abuse  - encourage cessation  - nicotine patch        Hospital Course     Hospital Course:  Neva Kurtz is a 63 y.o. female with PMH of SVT not currently on medications who presented to [facility] ED 11/19/2023 with complaints of shortness of breath around 1am this morning. She woke up and felt like she could not take in a deep breath. She felt pressure in her mid chest. She was just discharged from here on 11/14 after treatment for a UTI. She has been taking her cefdinir. Daughter who works in healthcare at the bedside stated her HR was high during that admission 120s-130s and it was thought to be secondary to her infection. She has had lower extremity swelling since her discharge. She stated she started to urinate better after being discharged but then today had difficulty urinating again.   She has no PCP and does not routinely see a physician.      In the ED labs showed proBNP 9877, high-sensitivity troponin 27, creatinine 1.10, glucose 152, WBC 16.3, Hgb 11.5, platelets 618.  CXR showed increased interstitial disease that could reflect interstitial pulmonary edema.  CT PE protocol showed no PE, interlobular septal thickening, peribronchial thickening and mosaic attenuation in the lungs with small  bilateral pleural effusions. This is favored to reflect pulmonary edema. There are a few focal areas of airspace disease in the lungs that could be related to edema or pneumonia. There is severe left-sided hydronephrosis and diminished enhancement of the left renal parenchyma, which is only partially seen on this study, but concerning for obstructive uropathy of indeterminate age. 5.28 mm left thyroid lobe nodule. This could be evaluated with ultrasound. Fat-containing hiatal hernia. Evidence of prior granulomatous infection.  EKG showed sinus tachycardia rate 139. She was afebrile but with chills, elevated respiratory rate, blood pressure 150/92, mildly hypoxic placed on 1L O2 via nasal cannula with improvement in oxygen saturation 95%. She was given IV cefepime, IV vancomycin, 40 mg IV Lasix.  She was admitted for further treatment of pneumonia with hypoxia and new onset CHF.     Family history: Father  in his 40s from blood clot      23-Patient Reportsmild shortness of breath.  Feeling better.  Shortness of breath is improved.  Mild leg swelling.  Leg swelling is improved.  No chest pain currently.  No nausea vomiting.  No dizziness no lightheadedness.'  23 seen in bed NAD, no new complaints, says feels better Sats 95% on room air, MARKO RN,   23 seen in bed NAD,doing better today, will dc home if ok with cardiology, condition on dc stable.    DISCHARGE Follow Up Recommendations for labs and diagnostics: follow with pcp in one week.    Reasons For Change In Medications and Indications for New Medications:  START taking:  aspirin   atorvastatin (Lipitor)   Farxiga (dapagliflozin)   furosemide (LASIX)   losartan (Cozaar)   metoprolol succinate XL (TOPROL-XL)   nicotine (NICODERM CQ)   potassium chloride    STOP taking:  ibuprofen 400 MG tablet (ADVIL,MOTRIN)     Day of Discharge     Vital Signs:  Temp:  [97.9 °F (36.6 °C)-98.4 °F (36.9 °C)] 98 °F (36.7 °C)  Heart Rate:  [] 89  Resp:   [13-19] 18  BP: (122-143)/(63-85) 143/85  Flow (L/min):  [2] 2    Physical Exam    Appearance: Normal appearance.   HENT:      Head: Normocephalic and atraumatic.      Nose: Nose normal.      Mouth/Throat:      Mouth: Mucous membranes are moist.   Cardiovascular:      Rate and Rhythm: Normal rate.   Pulmonary:      Effort: Pulmonary effort is normal. No respiratory distress.      Breath sounds: Normal breath sounds. No stridor. No wheezing, rhonchi or rales.   Chest:      Chest wall: No tenderness.   Abdominal:      General: Abdomen is flat. There is no distension.      Palpations: Abdomen is soft. There is no mass.      Tenderness: There is no abdominal tenderness.   Musculoskeletal:      Right lower leg: Edema present.      Left lower leg: Edema present.      Comments: Trace edema present both lower legs.  No calf tenderness bilaterally.   Neurological:      General: No focal deficit present.      Mental Status: She is alert.       Pertinent  and/or Most Recent Results     LAB RESULTS:      Lab 11/20/23  2303 11/19/23  2318 11/19/23  1003 11/19/23  0355 11/19/23  0353 11/19/23  0207 11/14/23  1014   WBC 8.00 11.50*  --   --   --  16.30* 16.00*   HEMOGLOBIN 9.8* 9.2*  --   --   --  11.5* 10.7*   HEMATOCRIT 28.8* 28.2*  --   --   --  34.5 31.8*   PLATELETS 608* 535*  --   --   --  618* 340   NEUTROS ABS 4.60 8.90*  --   --   --  14.51* 13.20*   LYMPHS ABS 2.20 1.50  --   --   --   --  1.70   MONOS ABS 1.10* 1.10*  --   --   --   --  0.90   EOS ABS 0.10 0.00  --   --   --  0.33 0.10   MCV 91.3 91.9  --   --   --  91.3 92.2   PROCALCITONIN  --   --   --   --  1.41*  --   --    LACTATE  --   --  0.8 0.9  --   --   --    PROTIME  --   --   --   --   --  10.7  --    APTT  --   --   --   --   --  26.9*  --          Lab 11/20/23  2303 11/19/23  2318 11/19/23  0207 11/14/23  1014   SODIUM 142 140 139 137   POTASSIUM 3.6 4.0 4.0 4.3   CHLORIDE 103 105 102 104   CO2 26.0 25.0 23.0 21.0*   ANION GAP 13.0 10.0 14.0 12.0   BUN  22 18 13 13   CREATININE 1.39* 1.15* 1.10* 1.02*   EGFR 43.0* 54.0* 56.9* 62.3   GLUCOSE 103* 122* 152* 106*   CALCIUM 8.9 8.4* 9.4 9.0   MAGNESIUM  --   --   --  2.0         Lab 11/19/23  0207   TOTAL PROTEIN 7.2   ALBUMIN 3.5   GLOBULIN 3.7   ALT (SGPT) 10   AST (SGOT) 13   BILIRUBIN 0.3   ALK PHOS 92         Lab 11/19/23 2018 11/19/23  1003 11/19/23  0353 11/19/23  0207   PROBNP  --   --   --  9,877.0*   HSTROP T 34* 53* 32* 27*  27*   PROTIME  --   --   --  10.7   INR  --   --   --  0.98                 Brief Urine Lab Results  (Last result in the past 365 days)        Color   Clarity   Blood   Leuk Est   Nitrite   Protein   CREAT   Urine HCG        11/19/23 0403 Dark Yellow   Clear   Negative   Trace   Positive   Negative                 Microbiology Results (last 10 days)       Procedure Component Value - Date/Time    MRSA Screen, PCR (Inpatient) - Swab, Nares [[id number]]  (Normal) Collected: 11/19/23 0951    Lab Status: Final result Specimen: Swab from Nares Updated: 11/19/23 1122     MRSA PCR No MRSA Detected    Narrative:      The negative predictive value of this diagnostic test is high and should only be used to consider de-escalating anti-MRSA therapy. A positive result may indicate colonization with MRSA and must be correlated clinically.    Urine Culture - Urine, Urine, Clean Catch [[id number]]  (Normal) Collected: 11/19/23 0403    Lab Status: Final result Specimen: Urine, Clean Catch Updated: 11/20/23 0936     Urine Culture No growth    Legionella Antigen, Urine - Urine, Urine, Clean Catch [[id number]]  (Normal) Collected: 11/19/23 0403    Lab Status: Final result Specimen: Urine, Clean Catch Updated: 11/19/23 0627     LEGIONELLA ANTIGEN, URINE Negative    S. Pneumo Ag Urine or CSF - Urine, Urine, Clean Catch [[id number]]  (Normal) Collected: 11/19/23 0403    Lab Status: Final result Specimen: Urine, Clean Catch Updated: 11/19/23 0627     Strep Pneumo Ag Negative    Blood Culture - Blood, Arm, Left  [[id number]]  (Normal) Collected: 11/19/23 0353    Lab Status: Preliminary result Specimen: Blood from Arm, Left Updated: 11/21/23 0416     Blood Culture No growth at 2 days    Blood Culture - Blood, Arm, Right [[id number]]  (Normal) Collected: 11/19/23 0353    Lab Status: Preliminary result Specimen: Blood from Arm, Right Updated: 11/21/23 0416     Blood Culture No growth at 2 days    Narrative:      Less than seven (7) mL's of blood was collected.  Insufficient quantity may yield false negative results.    Blood Culture - Blood, Arm, Left [[id number]]  (Normal) Collected: 11/13/23 1307    Lab Status: Final result Specimen: Blood from Arm, Left Updated: 11/18/23 1317     Blood Culture No growth at 5 days    Blood Culture - Blood, Arm, Left [[id number]]  (Normal) Collected: 11/13/23 1231    Lab Status: Final result Specimen: Blood from Arm, Left Updated: 11/18/23 1246     Blood Culture No growth at 5 days    Narrative:      Less than seven (7) mL's of blood was collected.  Insufficient quantity may yield false negative results.    Urine Culture - Urine, Urine, Clean Catch [[id number]]  (Normal) Collected: 11/13/23 0905    Lab Status: Final result Specimen: Urine, Clean Catch Updated: 11/14/23 1033     Urine Culture No growth    Urine Culture - Urine, Urine, Clean Catch [[id number]]  (Abnormal)  (Susceptibility) Collected: 11/12/23 1215    Lab Status: Final result Specimen: Urine, Clean Catch Updated: 11/14/23 0352     Urine Culture >100,000 CFU/mL Escherichia coli    Narrative:      Colonization of the urinary tract without infection is common. Treatment is discouraged unless the patient is symptomatic, pregnant, or undergoing an invasive urologic procedure.    Susceptibility        Escherichia coli      RAGHAVENDRA      Ampicillin Susceptible      Ampicillin + Sulbactam Susceptible      Cefazolin Susceptible      Cefepime Susceptible      Ceftazidime Susceptible      Ceftriaxone Susceptible      Gentamicin Susceptible       Levofloxacin Susceptible      Nitrofurantoin Susceptible      Piperacillin + Tazobactam Susceptible      Trimethoprim + Sulfamethoxazole Susceptible                                   XR Chest PA & Lateral    Result Date: 11/20/2023  Impression: Impression: Significant interval improvement with minimal infiltrates remaining. Electronically Signed: Marni Cohen MD  11/20/2023 8:17 AM EST  Workstation ID: [id number]    CT Abdomen Pelvis Stone Protocol    Result Date: 11/19/2023  Impression: Impression: 1.Severe right hydroureteronephrosis with suggestion of atypical attachment of right ureter to urinary bladder. There is also severe left hydronephrosis with abrupt transition to normal caliber left ureter, suggesting left UPJ stenosis. The connection of  the left ureter to the urinary bladder is not well identified. 2.5 cm cystic lesion within left pelvis posterior to urinary bladder, of unclear etiology but could represent a ureterocele. Electronically Signed: Naresh Dorantes MD  11/19/2023 8:29 AM EST  Workstation ID: [id number]    CT Angiogram Chest Pulmonary Embolism    Result Date: 11/19/2023  Impression: Impression: 1.No evidence of pulmonary embolism. 2.There is interlobular septal thickening, peribronchial thickening and mosaic attenuation in the lungs with small bilateral pleural effusions. This is favored to reflect pulmonary edema. 3.There are a few focal areas of airspace disease in the lungs that could be related to edema or pneumonia. 4.There is severe left-sided hydronephrosis and diminished enhancement of the left renal parenchyma, which is only partially seen on this study, but concerning for obstructive uropathy of indeterminate age. 5.28 mm left thyroid lobe nodule. This could be evaluated with ultrasound. 6.Fat-containing hiatal hernia. 7.Evidence of prior granulomatous infection. Electronically Signed: Salvador Love MD  11/19/2023 3:26 AM EST  Workstation ID: [id number]    XR Chest 1 View    Result  Date: 11/19/2023  Impression: Impression: Increased interstitial disease that could reflect interstitial pulmonary edema. Electronically Signed: Salvador Love MD  11/19/2023 2:35 AM EST  Workstation ID: [id number]    XR Chest 1 View    Result Date: 11/13/2023  Impression: Impression: Cardiomegaly with increased interstitial opacities, which can be seen with both edema and atypical infection. Negative for lobar consolidation. Electronically Signed: Shnae Cobos MD  11/13/2023 12:48 PM EST  Workstation ID: [id number]     Results for orders placed during the hospital encounter of 11/19/23    Duplex Venous Lower Extremity - Bilateral CAR    Interpretation Summary    Normal bilateral lower extremity venous duplex scan.      Results for orders placed during the hospital encounter of 11/19/23    Duplex Venous Lower Extremity - Bilateral CAR    Interpretation Summary    Normal bilateral lower extremity venous duplex scan.      Results for orders placed during the hospital encounter of 11/19/23    Adult Transthoracic Echo Complete W/ Cont if Necessary Per Protocol    Interpretation Summary    Left ventricular ejection fraction appears to be 41 - 45%.    The left ventricular cavity is borderline dilated.    Left ventricular wall thickness is consistent with mild concentric hypertrophy.    Left ventricular diastolic function is consistent with (grade Ia w/high LAP) impaired relaxation and age.    The left atrial cavity is mild to moderately dilated.    Estimated right ventricular systolic pressure from tricuspid regurgitation is normal (<35 mmHg).    There is a small (<1cm) pericardial effusion.    Transthoracic echocardiography reveals mild LVH with mildly dilated LV with overall EF mildly reduced measured around 45%  Diastolic dysfunction criteria noted with mild to moderate left atrial enlargement with mild MR.  Small pericardial effusion noted.      Electronically signed by Basil Hwang MD, 11/20/23, 4:09 PM  EST.      Labs Pending at Discharge:  Pending Labs       Order Current Status    Blood Culture - Blood, Arm, Right In process    Blood Culture - Blood, Hand, Right In process    Blood Culture - Blood, Arm, Left Preliminary result    Blood Culture - Blood, Arm, Right Preliminary result            Procedures Performed  Procedure(s):  CYSTOSCOPY URETEROSCOPY RETROGRADE PYELOGRAM STENT INSERTION         Consults:   Consults       Date and Time Order Name Status Description    11/19/2023  4:26 AM Inpatient Urology Consult Completed     11/19/2023  4:26 AM Inpatient Cardiology Consult Completed     11/19/2023  3:42 AM Hospitalist (on-call MD unless specified)            Assessment:        Acute exacerbation of CHF (congestive heart failure)     1) BLE edema / SOA  - BNP 9877  - CXR shows infiltrates vs edema  - CTA chest shows no PE  - on IV lasix  - 2D echo pending     2) tachycardia / hx PSVT  - sinus tachycardia, likely hemodynamically appropriate in setting PNA  - s/p dose IV beta blocker     3) Hydronephrosis s/p bilateral stenting 11/19  - urology following     4) LENO  - Cr 1.15     5) PNA  - per primary team     6) tobacco dependence     7) pelvic prolapse  - planned for OP GYN eval        Plan:   Tele shows SR/ST.  Continue on low dose beta blocker given hx PSVT.  Patient appears compensated.  Cr trending up.  Will transition IV lasix to PO.  2D echo pending.           Electronically signed by ZAN Lynn, 11/20/23, 1:36 PM EST.      Addendum: 2D echo shows EF = 45-50%.  On BB.  NO ACEi/ARB given recent hydronephrosis.  As d/w Dr. Hwang, will plan outpatient stress test.     Electronically signed by ZAN Lynn, 11/20/23, 4:11 PM EST.             Cosigned by: Basil Hwang MD at 11/20/23 1611       Discharge Details        Discharge Medications        New Medications        Instructions Start Date   aspirin 81 MG EC tablet   81 mg, Oral, Daily      atorvastatin 10 MG  tablet  Commonly known as: Lipitor   10 mg, Oral, Daily      Farxiga 5 MG tablet tablet  Generic drug: dapagliflozin   5 mg, Oral, Daily      furosemide 20 MG tablet  Commonly known as: LASIX   20 mg, Oral, 2 Times Daily      losartan 25 MG tablet  Commonly known as: Cozaar   25 mg, Oral, Daily      metoprolol succinate XL 25 MG 24 hr tablet  Commonly known as: TOPROL-XL   25 mg, Oral, Every 24 Hours Scheduled      nicotine 21 MG/24HR patch  Commonly known as: NICODERM CQ   1 patch, Transdermal, Every 24 Hours Scheduled      potassium chloride 10 MEQ CR tablet   10 mEq, Oral, 2 Times Daily             Continue These Medications        Instructions Start Date   cefdinir 300 MG capsule  Commonly known as: OMNICEF   300 mg, Oral, 2 Times Daily      cetirizine 10 MG tablet  Commonly known as: zyrTEC   10 mg, Oral, Daily      multivitamin with minerals tablet tablet   1 tablet, Oral, Daily      phenazopyridine 100 MG tablet  Commonly known as: Pyridium   100 mg, Oral, 3 Times Daily             Stop These Medications      ibuprofen 400 MG tablet  Commonly known as: ADVIL,MOTRIN              Allergies   Allergen Reactions    Other Other (See Comments)     tetramycin    Tetracyclines & Related Swelling         Discharge Disposition:   Home or Self Care    Diet:  Hospital:  Diet Order   Procedures    Diet: Regular/House Diet; Texture: Regular Texture (IDDSI 7); Fluid Consistency: Thin (IDDSI 0)         Discharge Activity:   Activity Instructions       Gradually Increase Activity Until at Pre-Hospitalization Level                CODE STATUS:  Code Status and Medical Interventions:   Ordered at: 11/19/23 0450     Level Of Support Discussed With:    Patient     Code Status (Patient has no pulse and is not breathing):    CPR (Attempt to Resuscitate)     Medical Interventions (Patient has pulse or is breathing):    Full Support     I have utilized all available, immediate resources to obtain, update, or review the patient's  current medications including all prescriptions, over-the-counter products, herbals, cannabis/cannabidiol products, and vitamin.mineral/dietary (nutritional) supplements.      Level Of Support Discussed With: Patient  Code Status (Patient has no pulse and is not breathing): CPR (Attempt to Resuscitate)  Medical Interventions (Patient has pulse or is breathing): Full Support    Next of kin of Power of :       Admission Status:  I believe this patient meets admit status.    Hospital Medicine Quality Measures for Heart Failure:  The patient has a history of heart transplant or LVAD. no  The patient has current prior documentation of left ventricular ejection fraction less than 40%.no  The patient was prescribed or IS already taking an angiotensin-converting enzyme (ACE) Inhibitor, or angiotensin receptor blocker (ARB): YES  The patient was prescribed or is already taking a beta-blocker YES      Future Appointments   Date Time Provider Department Center   12/14/2023  2:15 PM Hans Diaz MD [id number]       Additional Instructions for the Follow-ups that You Need to Schedule       Discharge Follow-up with PCP   As directed       Currently Documented PCP:    Provider, No Known    PCP Phone Number:    None     Follow Up Details: 1 week                Time spent on Discharge including face to face service:  34 minutes      Signature: Electronically signed by Jai Null MD, 11/21/23, 9:42 AM EST.

## 2023-11-21 NOTE — OP NOTE
Date of Admission:  11/19/2023  Today's Date:  11/21/23  Canelo Blackwell II, MD  [facility]    Preoperative Diagnosis:   Acute limb ischemia left arm    Postoperative Diagnosis:   Same    Procedure Performed:   Left arm thromboembolectomy via brachial artery cutdown   Left arm arteriogram    Surgeon:   Canelo Blackwell II, MD    Assistant:    Edda EUBANKS, Provided critical assistance in exposure, retraction, and suction that overall decrease blood loss and operative time.    Anesthesia:   General    Estimated Blood Loss:   150mL    Findings:    Cutdown on left brachial artery just proximal to bifurcation.  Ping mechanical thrombectomy performed with mostly acute thrombus removed.  6 Cook Islander sheath placed and angiogram performed of the brachial and subclavian and axillary arteries proximally.  There is some residual thrombus in the subclavian artery in the area of the thyrocervical trunk which was seen on the preoperative CTA.  Over-the-wire Ping mechanical thrombectomy was performed of this and some scant chronic appearing thrombus was removed.  Completion angiogram after that showed no residual defects in the subclavian or axillary arteries and a widely patent left subclavian artery origin.    Implants:    Implant Name Type Inv. Item Serial No.  Lot No. LRB No. Used Action   CLIPAPPLR M/ ENDO LIGACLIP 9 3/8IN SM - [id number] Implant CLIPAPPLR M/ ENDO LIGACLIP 9 3/8IN   [employer]  DIV OF J AND J [id number] Left 1 Implanted   CLIPAPPLR M/ ENDO LIGACLIP 20CLP 11IN MD - [id number] Implant CLIPAPPLR M/ ENDO LIGACLIP 20CLP 11IN MD  [employer]  DIV OF J AND J [id number] Left 1 Implanted       Staff:   Circulator: Jen Roque RN; Tiffany Medellin RN  Radiology Technologist: Janae Villalta  Scrub Person: Alysa Dorantes  Assistant: Edda Gallegos CSA    Specimen:   none    Complications:   none    Dispo:   to PACU    Indication for procedure:   63 y.o. female admitted  with a heart failure exacerbation and history of paroxysmal SVT.  She was planning to discharge home today but had acute onset left arm pain.  There is noted to have pulse discrepancy and duplex and CTA confirmed occlusion of the left brachial artery.  Plans were made for left brachial artery cutdown and thrombectomy.  Details of the procedure including risk benefits and alternatives were discussed with the patient and her family who all verbalized understanding and agreed to proceed.    Description of procedure:   Patient brought to the OR and placed supine on the OR table.  General endotracheal anesthesia was begun by the anesthesia service.  Her left arm was prepped with chlorhexidine and draped in sterile fashion circumferentially.  A timeout was performed.  We began procedure by making a transverse incision on the left arm just beyond the antecubital fossa.  Electrocautery was used dissect down through the subcutaneous tissue.  There is some superficial veins that we were able to retract laterally and then identified the aponeurosis overlying the brachial artery.  We able to dissect through this and down onto the brachial artery and circumferential control of the brachial artery was obtained.  We then carried our dissection proximally and distally for an appropriate distance to be able to place clamps.  We did dissect down to the bifurcation distally.  The patient was given 10,000 units of heparin and this was allowed to circulate.  A transverse arteriotomy was made with an 11 blade scalpel and there was no for bleeding or backbleeding.  The patient did require another dose of heparin before becoming therapeutic.  Ultimately her ACT was therapeutic.  We advanced a #3 Ping proximally and perform Ping mechanical thrombectomy multiple times returning mostly acute thrombus.  The proximal cap was identified.  We repeated our Ping thrombectomy until no further thrombus was returned.  We then clamped the  proximal brachial artery and then performed thrombectomy distally down each branch.  Again we returned some acute appearing thrombus.  We repeated thrombectomy until there is no more thrombus returned and the patient had really good backbleeding.  We then clamped the distal brachial artery and advanced a 6 Nigerian sheath into the proximal brachial artery over a wire.  An angiogram was performed which showed widely patent but somewhat small appearing brachial artery, widely patent axillary artery, mostly patent subclavian artery but around the area of the thyrocervical trunk there was filling defect consistent with the thrombus that had been seen on the preoperative CTA.  We attempted to perform thrombectomy under fluoroscopy here but are initial thrombectomy catheters would not reach this area.  A 0.018 Glidewire advantage was obtained and advanced into the proximal subclavian artery.  A longer over-the-wire Ping catheter was obtained and we performed Ping thrombectomy of the subclavian artery in this area.  There is only a small amount of count of chronic appearing material that returned.  We then repeated an angiogram and there is no further filling defect in the subclavian artery.  An angled glide cath was advanced over the wire and an angiogram of the proximal subclavian artery was performed through this and it showed widely patent proximal subclavian artery as well.  We then removed our wire and sheath and confirmed good pulsatile inflow and good backbleeding distally as well.  We then closed the arteriotomy with interrupted 6-0 Prolene sutures.  It was flushed prior to completing our closure.  Once we completed our arteriotomy closure the patient had palpable radial pulse.  A Doppler was used to insonate the brachial artery proximal to and distal to our closure and it sounded appropriate.  The patient had radial signal ulnar signal and deep and superficial palmar arch signals.  The patient was given 40 mg  of protamine and the wound was irrigated.  Meticulous hemostasis was obtained with electrocautery.  We then closed the incision in layers with 3-0 Vicryl suture through a deep layer and 4-0 Vicryl suture in a running subcuticular layer through the skin.  It was dressed with skin glue.  I did confirm the patient had a strong palpable radial and ulnar pulse prior to leaving the OR.  She was then aroused from anesthesia and transported to PACU in stable condition.  I discussed the outcome of the procedure with the patient's family.  The patient tolerated procedure well there are no Intra-Op complications and all wires catheters sheaths and other devices were removed and found to be whole intact prior to the conclusion of the procedure and at case completion all instrument count, needle count, and sponge counts were correct x2.    Canelo Blackwell II, MD  11/21/23     Active Hospital Problems    Diagnosis  POA    **Acute exacerbation of CHF (congestive heart failure) [I50.9]  Yes    Chronic HFrEF (heart failure with reduced ejection fraction) [I50.22]  Yes      Resolved Hospital Problems   No resolved problems to display.

## 2023-11-21 NOTE — PROGRESS NOTES
[facility] CARDIOLOGY  [facility]        LOS:  LOS: 2 days   Patient Name: Neva Kurtz  Age/Sex: 63 y.o. female  : 1960  MRN: [id number]    Day of Service: 23   Length of Stay: 2  Encounter Provider: ZAN Lynn  Place of Service: [facility] CARDIOLOGY  Patient Care Team:  Provider, No Known as PCP - General    Subjective:     Chief Complaint:  F/U HF    Subjective:   Patient denies SOA.  Only c/o discomfort in left arm/hand where a peripheral IV had been discontinued.      Current Medications:   Scheduled Meds:cefTRIAXone, 2,000 mg, Intravenous, Q24H  enoxaparin, 40 mg, Subcutaneous, Daily  furosemide, 40 mg, Oral, BID  metoprolol succinate XL, 25 mg, Oral, Q24H  nicotine, 1 patch, Transdermal, Q24H  senna-docusate sodium, 2 tablet, Oral, BID  sodium chloride, 10 mL, Intravenous, Q12H      Continuous Infusions:     Allergies:  Allergies   Allergen Reactions    Other Other (See Comments)     tetramycin    Tetracyclines & Related Swelling       ROS    Objective:     Temp:  [97.9 °F (36.6 °C)-98.4 °F (36.9 °C)] 98 °F (36.7 °C)  Heart Rate:  [] 89  Resp:  [13-19] 18  BP: (122-143)/(63-85) 143/85     Intake/Output Summary (Last 24 hours) at 2023 1139  Last data filed at 2023 1600  Gross per 24 hour   Intake 960 ml   Output --   Net 960 ml     Body mass index is 32.78 kg/m².      23  0500 23  0934 23  0351   Weight: 96.4 kg (212 lb 8.4 oz) 96.2 kg (212 lb) 97.8 kg (215 lb 9.8 oz)         Physical Exam:  Neuro:  CV:  Resp:  GI:  Ext:  Tele: AAOx3, no gross deficits  S1S2 RRR, no murmur  Nonlabored, CTA / dim bases  BS+, abd soft  Pedal pulses palp, trace non-pitting  bilateral ankle edema  Off monitor                                                   Lab Review:   Results from last 7 days   Lab Units 23  1020 23  2303 23  2318 23  0207   SODIUM mmol/L  --  142 140 139  "  POTASSIUM mmol/L 4.3 3.6 4.0 4.0   CHLORIDE mmol/L  --  103 105 102   CO2 mmol/L  --  26.0 25.0 23.0   BUN mg/dL  --  22 18 13   CREATININE mg/dL  --  1.39* 1.15* 1.10*   GLUCOSE mg/dL  --  103* 122* 152*   CALCIUM mg/dL  --  8.9 8.4* 9.4   AST (SGOT) U/L  --   --   --  13   ALT (SGPT) U/L  --   --   --  10     Results from last 7 days   Lab Units 11/19/23 2018 11/19/23  1003 11/19/23  0353 11/19/23  0207   HSTROP T ng/L 34* 53* 32* 27*  27*     Results from last 7 days   Lab Units 11/20/23  2303 11/19/23  2318   WBC 10*3/mm3 8.00 11.50*   HEMOGLOBIN g/dL 9.8* 9.2*   HEMATOCRIT % 28.8* 28.2*   PLATELETS 10*3/mm3 608* 535*     Results from last 7 days   Lab Units 11/19/23  0207   INR  0.98   APTT seconds 26.9*                 Invalid input(s): \"LDLCALC\"  Results from last 7 days   Lab Units 11/19/23  0207   PROBNP pg/mL 9,877.0*           Recent Radiology:  Imaging Results (Most Recent)       Procedure Component Value Units Date/Time    XR Chest PA & Lateral [[id number]] Collected: 11/20/23 0817     Updated: 11/20/23 0831    Narrative:      XR CHEST PA AND LATERAL    Date of Exam: 11/20/2023 8:14 AM EST    Indication: Pneumonia    Comparison: 11/19/2023  Findings:  Previously noted lung infiltrates have largely cleared with minimal infiltrates remaining. There are minimal pleural effusions. There is borderline cardiomegaly.      Impression:      Impression:  Significant interval improvement with minimal infiltrates remaining.      Electronically Signed: Marni Cohen MD    11/20/2023 8:17 AM EST    Workstation ID: [id number]    CT Abdomen Pelvis Stone Protocol [[id number]] Collected: 11/19/23 0819     Updated: 11/19/23 0831    Narrative:      CT ABDOMEN PELVIS STONE PROTOCOL    Date of Exam: 11/19/2023 8:04 AM EST    Indication: Left hydronephrosis.    Comparison: CT chest from earlier today    Technique: Axial CT images were obtained of the abdomen and pelvis without the administration of contrast. Sagittal and " coronal reconstructions were performed.  Automated exposure control and iterative reconstruction methods were used.      Findings:  Within the lung visualized lung bases is no significant change from recent prior CT chest.    The liver, gallbladder, adrenal glands, spleen, and pancreas are unremarkable. There is severe right hydroureteronephrosis with suggestion of atypical attachment of the right ureter to the urinary bladder. There is severe left hydronephrosis with abrupt   transition to normal in caliber left ureter, with the connection of the left ureter to the urinary bladder not well identified. No obstructing ureteral stone is identified on either side.    The stomach appears normal. The small bowel appears normal in caliber and configuration. The colon appears normal. The appendix appears normal. There is no ascites or loculated collection. No abnormally enlarged lymph nodes are identified.    The rectum and urinary bladder are unremarkable. The uterus is surgically absent. There is a 5 cm cystic lesion within the left pelvis posterior to the urinary bladder. There is pelvic floor laxity.    No aggressive osseous lesions are identified.      Impression:      Impression:  1.Severe right hydroureteronephrosis with suggestion of atypical attachment of right ureter to urinary bladder. There is also severe left hydronephrosis with abrupt transition to normal caliber left ureter, suggesting left UPJ stenosis. The connection of   the left ureter to the urinary bladder is not well identified.  2.5 cm cystic lesion within left pelvis posterior to urinary bladder, of unclear etiology but could represent a ureterocele.            Electronically Signed: Naresh Dorantes MD    11/19/2023 8:29 AM EST    Workstation ID: [id number]    CT Angiogram Chest Pulmonary Embolism [[id number]] Collected: 11/19/23 0322     Updated: 11/19/23 0328    Narrative:      CT ANGIOGRAM CHEST PULMONARY EMBOLISM    Date of Exam: 11/19/2023 3:08  AM EST    Indication: Pulmonary embolism (PE) suspected, high prob.    Comparison: Chest radiograph performed earlier today.    Technique: Axial CT images were obtained of the chest after the uneventful intravenous administration of iodinated contrast utilizing pulmonary embolism protocol.  Sagittal and coronal reconstructions were performed.  Automated exposure control and   iterative reconstruction methods were used.      Findings:  There is interlobular septal thickening noted throughout the lungs. There is associated peribronchial thickening and mosaic attenuation in the lungs. There are small bilateral pleural effusions. There are a few focal areas of airspace disease in the   lungs including in the right upper lobe on image 64, where there is a nodular density with central aeration or cavitation measuring 18 mm diameter.    The left thyroid lobe appears enlarged likely due to a nodule measuring 28 mm. The aorta exhibits mild atherosclerotic plaque. There are a few shelflike plaques in the aorta most pronounced on image 142. The heart size is normal. No pericardial effusion.   There is a fat-containing hiatal hernia. There are multiple calcified mediastinal granulomas. There is no evidence of pulmonary embolism. There are mildly enlarged bilateral hilar and subcarinal lymph nodes.    There are no acute findings in the superficial soft tissues. There is severe left-sided hydronephrosis and diminished enhancement of the left renal parenchyma, which is only partially seen on this study, but concerning for obstructive uropathy of   indeterminate age. There are no acute osseous abnormalities or destructive bone lesions. There are mild diffuse thoracic degenerative changes.      Impression:      Impression:  1.No evidence of pulmonary embolism.  2.There is interlobular septal thickening, peribronchial thickening and mosaic attenuation in the lungs with small bilateral pleural effusions. This is favored to reflect  pulmonary edema.  3.There are a few focal areas of airspace disease in the lungs that could be related to edema or pneumonia.  4.There is severe left-sided hydronephrosis and diminished enhancement of the left renal parenchyma, which is only partially seen on this study, but concerning for obstructive uropathy of indeterminate age.  5.28 mm left thyroid lobe nodule. This could be evaluated with ultrasound.  6.Fat-containing hiatal hernia.  7.Evidence of prior granulomatous infection.        Electronically Signed: Salvador Love MD    11/19/2023 3:26 AM EST    Workstation ID: [id number]    XR Chest 1 View [[id number]] Collected: 11/19/23 0234     Updated: 11/19/23 0237    Narrative:      XR CHEST 1 VW    Date of Exam: 11/19/2023 2:06 AM EST    Indication: dyspnea    Comparison: 11/13/2023.    Findings:  There is increased interstitial disease with curly B lines. Cardiac silhouette is unchanged. Pulmonary vasculature is partially indistinct. No pneumothorax or pleural effusion. No lobar consolidation. No acute osseous abnormality.      Impression:      Impression:  Increased interstitial disease that could reflect interstitial pulmonary edema.        Electronically Signed: Salvador Love MD    11/19/2023 2:35 AM EST    Workstation ID: [id number]            ECHOCARDIOGRAM:    Results for orders placed during the hospital encounter of 11/19/23    Adult Transthoracic Echo Complete W/ Cont if Necessary Per Protocol    Interpretation Summary    Left ventricular ejection fraction appears to be 41 - 45%.    The left ventricular cavity is borderline dilated.    Left ventricular wall thickness is consistent with mild concentric hypertrophy.    Left ventricular diastolic function is consistent with (grade Ia w/high LAP) impaired relaxation and age.    The left atrial cavity is mild to moderately dilated.    Estimated right ventricular systolic pressure from tricuspid regurgitation is normal (<35 mmHg).    There is a small (<1cm)  pericardial effusion.    Transthoracic echocardiography reveals mild LVH with mildly dilated LV with overall EF mildly reduced measured around 45%  Diastolic dysfunction criteria noted with mild to moderate left atrial enlargement with mild MR.  Small pericardial effusion noted.      Electronically signed by Basil Hwang MD, 11/20/23, 4:09 PM EST.        I reviewed the patient's new clinical results.    EKG:      Assessment:       Acute exacerbation of CHF (congestive heart failure)    Chronic HFrEF (heart failure with reduced ejection fraction)    1) Acute on chronic systolic heart failure  - BNP 9877  - CXR shows infiltrates vs edema  - CTA chest shows no PE  - IV lasix --> PO lasix  - 2D echo showed EF = 45% with grade 1 diastolic dysfunction and mild MR.  - appears compensated today    2) tachycardia / hx PSVT  - sinus tachycardia, likely hemodynamically appropriate in setting PNA  - s/p dose IV beta blocker  - started on PO toprol XL    3) Hydronephrosis s/p bilateral stenting 11/19  - urology following    4) LENO  - Cr 1.15 --> 1.39    5) PNA  - per primary team    6) tobacco dependence    7) pelvic prolapse  - planned for OP GYN eval      Plan:   2D echo shows mild LV dysfunction.  Continue on toprol XL but will defer starting ACEi/ARB at this time given recent hydronephrosis and mild renal insufficiency.  Patient appears compensated on PO lasix.  BMP in a week.  She is planned for outpatient stress test.  F/U with Dr. Hwang in 2 weeks.    Electronically signed by ZAN Lynn, 11/21/23, 11:46 AM EST.

## 2023-11-21 NOTE — ANESTHESIA PROCEDURE NOTES
Airway  Urgency: elective    Date/Time: 11/21/2023 3:50 PM  End Time:11/21/2023 3:51 PM  Airway not difficult    General Information and Staff    Patient location during procedure: OR  CRNA/CAA: Brionna Rodríguez CRNA    Indications and Patient Condition  Indications for airway management: airway protection    Preoxygenated: yes  MILS maintained throughout  Mask difficulty assessment: 1 - vent by mask    Final Airway Details  Final airway type: endotracheal airway      Successful airway: ETT  Cuffed: yes   Successful intubation technique: video laryngoscopy  Facilitating devices/methods: intubating stylet  Endotracheal tube insertion site: oral  Blade: Alvarez  Blade size: 3  ETT size (mm): 7.0  Cormack-Lehane Classification: grade I - full view of glottis  Placement verified by: chest auscultation and capnometry   Measured from: lips  ETT/EBT  to lips (cm): 22  Number of attempts at approach: 1  Assessment: lips, teeth, and gum same as pre-op and atraumatic intubation            
Arterial Line      Patient location during procedure: OR  Start time: 11/21/2023 3:58 PM  Stop Time:11/21/2023 4:03 PM       Line placed for hemodynamic monitoring, ABGs/Labs/ISTAT and MD/Surgeon request.  Performed By   Anesthesiologist: Hector Nguyen MD   Preanesthetic Checklist  Completed: patient identified, IV checked, site marked, risks and benefits discussed, surgical consent, monitors and equipment checked, pre-op evaluation and timeout performed  Arterial Line Prep    Sterile Tech: cap, gloves, gown and mask  Prep: ChloraPrep  Patient monitoring: blood pressure monitoring, continuous pulse oximetry and EKG  Arterial Line Procedure   Laterality:right  Location:  radial artery  Catheter size: 20 G   Guidance: ultrasound guided  PROCEDURE NOTE/ULTRASOUND INTERPRETATION.  Using ultrasound guidance the potential vascular sites for insertion of the catheter were visualized to determine the patency of the vessel to be used for vascular access.  After selecting the appropriate site for insertion, the needle was visualized under ultrasound being inserted into the radial artery, followed by ultrasound confirmation of wire and catheter placement. There were no abnormalities seen on ultrasound; an image was taken; and the patient tolerated the procedure with no complications.   Number of attempts: 2  Successful placement: yes Images: still images not obtained  Post Assessment   Dressing Type: occlusive dressing applied.   Complications no  Circ/Move/Sens Assessment: normal and unchanged.   Patient Tolerance: patient tolerated the procedure well with no apparent complications            
Arterial Line    Pre-sedation assessment completed: 11/21/2023 3:54 PM    Patient reassessed immediately prior to procedure    Patient location during procedure: OR  Start time: 11/21/2023 3:54 PM  Stop Time:11/21/2023 4:03 PM       Line placed for hemodynamic monitoring and respiratory failure.  Performed By   Anesthesiologist: Hector Nguyen MD   Preanesthetic Checklist  Completed: patient identified, IV checked, site marked, risks and benefits discussed, surgical consent, monitors and equipment checked, pre-op evaluation and timeout performed  Arterial Line Prep    Sterile Tech: cap, gloves and mask  Prep: ChloraPrep  Patient monitoring: blood pressure monitoring, continuous pulse oximetry and EKG  Arterial Line Procedure   Laterality:right  Location:  radial artery  Catheter size: 20 G   Guidance: ultrasound guided  PROCEDURE NOTE/ULTRASOUND INTERPRETATION.  Using ultrasound guidance the potential vascular sites for insertion of the catheter were visualized to determine the patency of the vessel to be used for vascular access.  After selecting the appropriate site for insertion, the needle was visualized under ultrasound being inserted into the radial artery, followed by ultrasound confirmation of wire and catheter placement. There were no abnormalities seen on ultrasound; an image was taken; and the patient tolerated the procedure with no complications.   Number of attempts: 1  Successful placement: yes Images: still images not obtained  Post Assessment   Dressing Type: occlusive dressing applied, wrist guard applied and secured with tape.   Complications no  Circ/Move/Sens Assessment: normal.   Patient Tolerance: patient tolerated the procedure well with no apparent complications            
Leukocytosis, unspecified type

## 2023-11-21 NOTE — ANESTHESIA PREPROCEDURE EVALUATION
Anesthesia Evaluation     NPO Solid Status: Waived due to emergency  NPO Liquid Status: Waived due to emergency           Airway   Mallampati: II  TM distance: >3 FB  Neck ROM: full  No difficulty expected  Dental - normal exam     Pulmonary - normal exam   Cardiovascular - normal exam    (+) CHF Systolic <55%    ROS comment: ·  Left ventricular ejection fraction appears to be 41 - 45%.  ·  The left ventricular cavity is borderline dilated.  ·  Left ventricular wall thickness is consistent with mild concentric hypertrophy.  ·  Left ventricular diastolic function is consistent with (grade Ia w/high LAP) impaired relaxation and age.  ·  The left atrial cavity is mild to moderately dilated.  ·  Estimated right ventricular systolic pressure from tricuspid regurgitation is normal (<35 mmHg).  ·  There is a small (<1cm) pericardial effusion.     Transthoracic echocardiography reveals mild LVH with mildly dilated LV with overall EF mildly reduced measured around 45%  Diastolic dysfunction criteria noted with mild to moderate left atrial enlargement with mild MR.  Small pericardial effusion noted.         Neuro/Psych  GI/Hepatic/Renal/Endo      Musculoskeletal     Abdominal  - normal exam    Bowel sounds: normal.   Substance History      OB/GYN          Other                    Anesthesia Plan    ASA 3     general and [place]     intravenous induction     Anesthetic plan, risks, benefits, and alternatives have been provided, discussed and informed consent has been obtained with: patient.  Pre-procedure education provided  Plan discussed with CRNA.    CODE STATUS:    Level Of Support Discussed With: Patient  Code Status (Patient has no pulse and is not breathing): CPR (Attempt to Resuscitate)  Medical Interventions (Patient has pulse or is breathing): Full Support

## 2023-11-22 ENCOUNTER — READMISSION MANAGEMENT (OUTPATIENT)
Dept: CALL CENTER | Facility: HOSPITAL | Age: 63
End: 2023-11-22
Payer: COMMERCIAL

## 2023-11-22 ENCOUNTER — APPOINTMENT (OUTPATIENT)
Dept: RESPIRATORY THERAPY | Facility: HOSPITAL | Age: 63
End: 2023-11-22
Payer: COMMERCIAL

## 2023-11-22 VITALS
HEIGHT: 69 IN | BODY MASS INDEX: 32.07 KG/M2 | WEIGHT: 216.49 LBS | SYSTOLIC BLOOD PRESSURE: 143 MMHG | HEART RATE: 95 BPM | OXYGEN SATURATION: 97 % | RESPIRATION RATE: 17 BRPM | DIASTOLIC BLOOD PRESSURE: 85 MMHG | TEMPERATURE: 98.1 F

## 2023-11-22 LAB
ANION GAP SERPL CALCULATED.3IONS-SCNC: 11 MMOL/L (ref 5–15)
APTT PPP: 26.5 SECONDS (ref 61–76.5)
APTT PPP: 27.6 SECONDS (ref 61–76.5)
BASOPHILS # BLD AUTO: 0.1 10*3/MM3 (ref 0–0.2)
BASOPHILS NFR BLD AUTO: 1 % (ref 0–1.5)
BUN SERPL-MCNC: 20 MG/DL (ref 8–23)
BUN/CREAT SERPL: 15.2 (ref 7–25)
CALCIUM SPEC-SCNC: 9 MG/DL (ref 8.6–10.5)
CHLORIDE SERPL-SCNC: 101 MMOL/L (ref 98–107)
CO2 SERPL-SCNC: 27 MMOL/L (ref 22–29)
CREAT SERPL-MCNC: 1.32 MG/DL (ref 0.57–1)
DEPRECATED RDW RBC AUTO: 49.9 FL (ref 37–54)
EGFRCR SERPLBLD CKD-EPI 2021: 45.7 ML/MIN/1.73
EOSINOPHIL # BLD AUTO: 0 10*3/MM3 (ref 0–0.4)
EOSINOPHIL NFR BLD AUTO: 0.1 % (ref 0.3–6.2)
ERYTHROCYTE [DISTWIDTH] IN BLOOD BY AUTOMATED COUNT: 14.9 % (ref 12.3–15.4)
FERRITIN SERPL-MCNC: 248.9 NG/ML (ref 13–150)
GLUCOSE SERPL-MCNC: 115 MG/DL (ref 65–99)
HCT VFR BLD AUTO: 29.5 % (ref 34–46.6)
HGB BLD-MCNC: 10 G/DL (ref 12–15.9)
INR PPP: 0.95 (ref 0.93–1.1)
IRON 24H UR-MRATE: 36 MCG/DL (ref 37–145)
IRON SATN MFR SERPL: 11 % (ref 20–50)
LYMPHOCYTES # BLD AUTO: 1.7 10*3/MM3 (ref 0.7–3.1)
LYMPHOCYTES NFR BLD AUTO: 17.1 % (ref 19.6–45.3)
MCH RBC QN AUTO: 30.4 PG (ref 26.6–33)
MCHC RBC AUTO-ENTMCNC: 33.9 G/DL (ref 31.5–35.7)
MCV RBC AUTO: 89.6 FL (ref 79–97)
MONOCYTES # BLD AUTO: 0.7 10*3/MM3 (ref 0.1–0.9)
MONOCYTES NFR BLD AUTO: 6.5 % (ref 5–12)
NEUTROPHILS NFR BLD AUTO: 7.6 10*3/MM3 (ref 1.7–7)
NEUTROPHILS NFR BLD AUTO: 75.3 % (ref 42.7–76)
NRBC BLD AUTO-RTO: 0.1 /100 WBC (ref 0–0.2)
PLATELET # BLD AUTO: 716 10*3/MM3 (ref 140–450)
PMV BLD AUTO: 6.8 FL (ref 6–12)
POTASSIUM SERPL-SCNC: 4.3 MMOL/L (ref 3.5–5.2)
PROTHROMBIN TIME: 10.4 SECONDS (ref 9.6–11.7)
RBC # BLD AUTO: 3.29 10*6/MM3 (ref 3.77–5.28)
SODIUM SERPL-SCNC: 139 MMOL/L (ref 136–145)
TIBC SERPL-MCNC: 320 MCG/DL (ref 298–536)
TRANSFERRIN SERPL-MCNC: 215 MG/DL (ref 200–360)
VIT B12 BLD-MCNC: 728 PG/ML (ref 211–946)
WBC NRBC COR # BLD AUTO: 10.1 10*3/MM3 (ref 3.4–10.8)

## 2023-11-22 PROCEDURE — 85025 COMPLETE CBC W/AUTO DIFF WBC: CPT | Performed by: STUDENT IN AN ORGANIZED HEALTH CARE EDUCATION/TRAINING PROGRAM

## 2023-11-22 PROCEDURE — 99222 1ST HOSP IP/OBS MODERATE 55: CPT | Performed by: INTERNAL MEDICINE

## 2023-11-22 PROCEDURE — 80048 BASIC METABOLIC PNL TOTAL CA: CPT | Performed by: STUDENT IN AN ORGANIZED HEALTH CARE EDUCATION/TRAINING PROGRAM

## 2023-11-22 PROCEDURE — 82728 ASSAY OF FERRITIN: CPT | Performed by: STUDENT IN AN ORGANIZED HEALTH CARE EDUCATION/TRAINING PROGRAM

## 2023-11-22 PROCEDURE — 99232 SBSQ HOSP IP/OBS MODERATE 35: CPT | Performed by: INTERNAL MEDICINE

## 2023-11-22 PROCEDURE — 36415 COLL VENOUS BLD VENIPUNCTURE: CPT | Performed by: STUDENT IN AN ORGANIZED HEALTH CARE EDUCATION/TRAINING PROGRAM

## 2023-11-22 PROCEDURE — 83540 ASSAY OF IRON: CPT | Performed by: STUDENT IN AN ORGANIZED HEALTH CARE EDUCATION/TRAINING PROGRAM

## 2023-11-22 PROCEDURE — 82607 VITAMIN B-12: CPT | Performed by: STUDENT IN AN ORGANIZED HEALTH CARE EDUCATION/TRAINING PROGRAM

## 2023-11-22 PROCEDURE — 84466 ASSAY OF TRANSFERRIN: CPT | Performed by: STUDENT IN AN ORGANIZED HEALTH CARE EDUCATION/TRAINING PROGRAM

## 2023-11-22 PROCEDURE — 25010000002 CEFTRIAXONE PER 250 MG: Performed by: STUDENT IN AN ORGANIZED HEALTH CARE EDUCATION/TRAINING PROGRAM

## 2023-11-22 PROCEDURE — 85730 THROMBOPLASTIN TIME PARTIAL: CPT | Performed by: INTERNAL MEDICINE

## 2023-11-22 PROCEDURE — 85730 THROMBOPLASTIN TIME PARTIAL: CPT | Performed by: STUDENT IN AN ORGANIZED HEALTH CARE EDUCATION/TRAINING PROGRAM

## 2023-11-22 PROCEDURE — 85610 PROTHROMBIN TIME: CPT | Performed by: STUDENT IN AN ORGANIZED HEALTH CARE EDUCATION/TRAINING PROGRAM

## 2023-11-22 RX ORDER — ATORVASTATIN CALCIUM 20 MG/1
20 TABLET, FILM COATED ORAL NIGHTLY
Status: DISCONTINUED | OUTPATIENT
Start: 2023-11-22 | End: 2023-11-22 | Stop reason: HOSPADM

## 2023-11-22 RX ADMIN — SENNOSIDES AND DOCUSATE SODIUM 2 TABLET: 50; 8.6 TABLET ORAL at 08:46

## 2023-11-22 RX ADMIN — CEFTRIAXONE 2000 MG: 2 INJECTION, POWDER, FOR SOLUTION INTRAMUSCULAR; INTRAVENOUS at 08:47

## 2023-11-22 RX ADMIN — FUROSEMIDE 40 MG: 40 TABLET ORAL at 08:47

## 2023-11-22 RX ADMIN — APIXABAN 5 MG: 5 TABLET, FILM COATED ORAL at 08:47

## 2023-11-22 RX ADMIN — Medication 10 ML: at 09:52

## 2023-11-22 RX ADMIN — Medication 1 PATCH: at 08:47

## 2023-11-22 RX ADMIN — METOPROLOL SUCCINATE 25 MG: 25 TABLET, EXTENDED RELEASE ORAL at 08:47

## 2023-11-22 NOTE — PAYOR COMM NOTE
"Neva Wise (63 y.o. Female)  1960  REF #[id number]   DC order Placed 23    AUTHORIZATION PENDING - .  PLEASE FORWARD DETERMINATION TO FOLLOWING CONTACT:    SHAGUFTA SONG LPN UR  Utilization Review Nurse  [facility]  Direct & confidential phone # [phone]  Fax # [phone]        Date of Birth   1960    Social Security Number       Address   [address] [place] [address]    Home Phone   [phone]    MRN   [id number]       Mosque   Sabianist    Marital Status   Single                            Admission Date   23    Admission Type   Emergency    Admitting Provider   Leona Mckinney MD    Attending Provider   Jai Null MD    Department, Room/Bed   [facility] SURGICAL INPATIENT,        Discharge Date       Discharge Disposition   Home or Self Care    Discharge Destination                                 Attending Provider: Jai Null MD    Allergies: Other, Tetracyclines & Related    Isolation: None   Infection: None   Code Status: CPR    Ht: 175.3 cm (69\")   Wt: 98.2 kg (216 lb 7.9 oz)    Admission Cmt: None   Principal Problem: Acute exacerbation of CHF (congestive heart failure) [I50.9]                   Active Insurance as of 2023       Primary Coverage       Payor Plan Insurance Group Employer/Plan Group    ANTH [employer] [facility] [employer] BLUE [facility] PPO [id number]       Payor Plan Address Payor Plan Phone Number Payor Plan Fax Number Effective Dates    [id number] [phone]  2023 - None Entered    [place] [address]         Subscriber Name Subscriber Birth Date Member ID       NEVA WISE 1960 [id number]                     Emergency Contacts        (Rel.) Home Phone Work Phone Mobile Phone    Nicole Everett (Daughter) -- -- [phone]    NUNO CASTELLANO (Daughter) -- -- [phone]                 Operative/Procedure Notes (last 48 hours)        Rishi, " Canelo MCMANUS II, MD at 11/21/23 1617          Date of Admission:  11/19/2023  Today's Date:  11/21/23  Canelo Blackwell II, MD  [facility]    Preoperative Diagnosis:   Acute limb ischemia left arm    Postoperative Diagnosis:   Same    Procedure Performed:   Left arm thromboembolectomy via brachial artery cutdown   Left arm arteriogram    Surgeon:   Canelo Blackwell II, MD    Assistant:    Edda EUBANKS, Provided critical assistance in exposure, retraction, and suction that overall decrease blood loss and operative time.    Anesthesia:   General    Estimated Blood Loss:   150mL    Findings:    Cutdown on left brachial artery just proximal to bifurcation.  Ping mechanical thrombectomy performed with mostly acute thrombus removed.  6 Liberian sheath placed and angiogram performed of the brachial and subclavian and axillary arteries proximally.  There is some residual thrombus in the subclavian artery in the area of the thyrocervical trunk which was seen on the preoperative CTA.  Over-the-wire Ping mechanical thrombectomy was performed of this and some scant chronic appearing thrombus was removed.  Completion angiogram after that showed no residual defects in the subclavian or axillary arteries and a widely patent left subclavian artery origin.    Implants:    Implant Name Type Inv. Item Serial No.  Lot No. LRB No. Used Action   CLIPAPPLR M/ ENDO LIGACLIP 9 3/8IN SM - [id number] Implant CLIPAPPLR M/ ENDO LIGACLIP 9 3/8IN SM  [employer]  DIV OF J AND J [id number] Left 1 Implanted   CLIPAPPLR M/ ENDO LIGACLIP 20CLP 11IN MD - [id number] Implant CLIPAPPLR M/ ENDO LIGACLIP 20CLP 11IN MD  [employer]  DIV OF J AND J [id number] Left 1 Implanted       Staff:   Circulator: Jen Roque RN; Tiffany Medellin RN  Radiology Technologist: Janae Villalta  Scrub Person: Alysa Dorantes  Assistant: Edda Gallegos CSA    Specimen:   none    Complications:   none    Dispo:   to  PACU    Indication for procedure:   63 y.o. female admitted with a heart failure exacerbation and history of paroxysmal SVT.  She was planning to discharge home today but had acute onset left arm pain.  There is noted to have pulse discrepancy and duplex and CTA confirmed occlusion of the left brachial artery.  Plans were made for left brachial artery cutdown and thrombectomy.  Details of the procedure including risk benefits and alternatives were discussed with the patient and her family who all verbalized understanding and agreed to proceed.    Description of procedure:   Patient brought to the OR and placed supine on the OR table.  General endotracheal anesthesia was begun by the anesthesia service.  Her left arm was prepped with chlorhexidine and draped in sterile fashion circumferentially.  A timeout was performed.  We began procedure by making a transverse incision on the left arm just beyond the antecubital fossa.  Electrocautery was used dissect down through the subcutaneous tissue.  There is some superficial veins that we were able to retract laterally and then identified the aponeurosis overlying the brachial artery.  We able to dissect through this and down onto the brachial artery and circumferential control of the brachial artery was obtained.  We then carried our dissection proximally and distally for an appropriate distance to be able to place clamps.  We did dissect down to the bifurcation distally.  The patient was given 10,000 units of heparin and this was allowed to circulate.  A transverse arteriotomy was made with an 11 blade scalpel and there was no for bleeding or backbleeding.  The patient did require another dose of heparin before becoming therapeutic.  Ultimately her ACT was therapeutic.  We advanced a #3 Ping proximally and perform Ping mechanical thrombectomy multiple times returning mostly acute thrombus.  The proximal cap was identified.  We repeated our Ping thrombectomy until  no further thrombus was returned.  We then clamped the proximal brachial artery and then performed thrombectomy distally down each branch.  Again we returned some acute appearing thrombus.  We repeated thrombectomy until there is no more thrombus returned and the patient had really good backbleeding.  We then clamped the distal brachial artery and advanced a 6 Mauritanian sheath into the proximal brachial artery over a wire.  An angiogram was performed which showed widely patent but somewhat small appearing brachial artery, widely patent axillary artery, mostly patent subclavian artery but around the area of the thyrocervical trunk there was filling defect consistent with the thrombus that had been seen on the preoperative CTA.  We attempted to perform thrombectomy under fluoroscopy here but are initial thrombectomy catheters would not reach this area.  A 0.018 Glidewire advantage was obtained and advanced into the proximal subclavian artery.  A longer over-the-wire Ping catheter was obtained and we performed Ping thrombectomy of the subclavian artery in this area.  There is only a small amount of count of chronic appearing material that returned.  We then repeated an angiogram and there is no further filling defect in the subclavian artery.  An angled glide cath was advanced over the wire and an angiogram of the proximal subclavian artery was performed through this and it showed widely patent proximal subclavian artery as well.  We then removed our wire and sheath and confirmed good pulsatile inflow and good backbleeding distally as well.  We then closed the arteriotomy with interrupted 6-0 Prolene sutures.  It was flushed prior to completing our closure.  Once we completed our arteriotomy closure the patient had palpable radial pulse.  A Doppler was used to insonate the brachial artery proximal to and distal to our closure and it sounded appropriate.  The patient had radial signal ulnar signal and deep and  superficial palmar arch signals.  The patient was given 40 mg of protamine and the wound was irrigated.  Meticulous hemostasis was obtained with electrocautery.  We then closed the incision in layers with 3-0 Vicryl suture through a deep layer and 4-0 Vicryl suture in a running subcuticular layer through the skin.  It was dressed with skin glue.  I did confirm the patient had a strong palpable radial and ulnar pulse prior to leaving the OR.  She was then aroused from anesthesia and transported to PACU in stable condition.  I discussed the outcome of the procedure with the patient's family.  The patient tolerated procedure well there are no Intra-Op complications and all wires catheters sheaths and other devices were removed and found to be whole intact prior to the conclusion of the procedure and at case completion all instrument count, needle count, and sponge counts were correct x2.    Canelo Blackwell II, MD  23     Active Hospital Problems    Diagnosis  POA    **Acute exacerbation of CHF (congestive heart failure) [I50.9]  Yes    Chronic HFrEF (heart failure with reduced ejection fraction) [I50.22]  Yes      Resolved Hospital Problems   No resolved problems to display.               Electronically signed by Canelo Blackwell II, MD at 23 3794          Physician Progress Notes (last 24 hours)        Canelo Blackwell II, MD at 23 1407            Name: Neva Kurtz ADMIT: 2023   : 1960  PCP: Provider, No Known    MRN: [id number] LOS: 3 days   AGE/SEX: 63 y.o. female  ROOM:  [place] [address]     CC: POD#1 s/p left arm thrombectomy  Interval History: No acute complaints this morning.  Incision healing well.  Palpable radial and ulnar pulses with normal cap refill in left hand.  Pain improved, able to use hand.    Subjective   Subjective     Review of Systems  Objective   Objective     Vitals:   Temp:  [97.5 °F (36.4 °C)-98.9 °F (37.2 °C)] 98.1 °F (36.7 °C)  Heart Rate:  [] 95  Resp:   "[13-25] 18  BP: (117-155)/(59-90) 118/78    No intake/output data recorded.    Scheduled Meds:     cefTRIAXone, 2,000 mg, Intravenous, Q24H  furosemide, 40 mg, Oral, BID  metoprolol succinate XL, 25 mg, Oral, Q24H  nicotine, 1 patch, Transdermal, Q24H  senna-docusate sodium, 2 tablet, Oral, BID  sodium chloride, 10 mL, Intravenous, Q12H      IV Meds:   heparin, 18 Units/kg/hr, Last Rate: 22 Units/kg/hr (11/22/23 0535)  lactated ringers, 20 mL/hr        Physical Exam  NAD  NCAT  RRR  Respirations unlabored   L arm incision healing well with dermabond in place  Palpable L radial and ulnar pulses      Data Reviewed:  CBC    Results from last 7 days   Lab Units 11/22/23 0427 11/21/23  1347 11/20/23 2303 11/19/23 2318 11/19/23  0207   WBC 10*3/mm3 10.10 7.30 8.00 11.50* 16.30*   HEMOGLOBIN g/dL 10.0* 11.0* 9.8* 9.2* 11.5*   PLATELETS 10*3/mm3 716* 695* 608* 535* 618*     BMP   Results from last 7 days   Lab Units 11/22/23 0427 11/21/23  1020 11/20/23 2303 11/19/23 2318 11/19/23  0207   SODIUM mmol/L 139  --  142 140 139   POTASSIUM mmol/L 4.3 4.3 3.6 4.0 4.0   CHLORIDE mmol/L 101  --  103 105 102   CO2 mmol/L 27.0  --  26.0 25.0 23.0   BUN mg/dL 20  --  22 18 13   CREATININE mg/dL 1.32*  --  1.39* 1.15* 1.10*   GLUCOSE mg/dL 115*  --  103* 122* 152*     Cr Clearance Estimated Creatinine Clearance: 55.1 mL/min (A) (by C-G formula based on SCr of 1.32 mg/dL (H)).  Coag   Results from last 7 days   Lab Units 11/22/23 0427 11/21/23 1959 11/21/23  1347 11/19/23  0207   INR  0.95  --  0.98 0.98   APTT seconds 26.5* 26.1 26.2* 26.9*     HbA1C No results found for: \"HGBA1C\"  Blood Glucose No results found for: \"POCGLU\"  Infection   Results from last 7 days   Lab Units 11/20/23  1321 11/20/23  1320 11/19/23  0403 11/19/23  0353   BLOODCX  No growth at 24 hours No growth at 24 hours  --  No growth at 3 days  No growth at 3 days   URINECX   --   --  No growth  --    PROCALCITONIN ng/mL  --   --   --  1.41*     CMP " "  Results from last 7 days   Lab Units 11/22/23 0427 11/21/23  1020 11/20/23  2303 11/19/23  2318 11/19/23  0207   SODIUM mmol/L 139  --  142 140 139   POTASSIUM mmol/L 4.3 4.3 3.6 4.0 4.0   CHLORIDE mmol/L 101  --  103 105 102   CO2 mmol/L 27.0  --  26.0 25.0 23.0   BUN mg/dL 20  --  22 18 13   CREATININE mg/dL 1.32*  --  1.39* 1.15* 1.10*   GLUCOSE mg/dL 115*  --  103* 122* 152*   ALBUMIN g/dL  --   --   --   --  3.5   BILIRUBIN mg/dL  --   --   --   --  0.3   ALK PHOS U/L  --   --   --   --  92   AST (SGOT) U/L  --   --   --   --  13   ALT (SGPT) U/L  --   --   --   --  10     ABG      UA    Results from last 7 days   Lab Units 11/19/23  0403   NITRITE UA  Positive*   WBC UA /HPF 0-2   BACTERIA UA /HPF None Seen   SQUAM EPITHEL UA /HPF 0-2   URINECX  No growth     DURGA  No results found for: \"POCMETH\", \"POCAMPHET\", \"POCBARBITUR\", \"POCBENZO\", \"POCCOCAINE\", \"POCOPIATES\", \"POCOXYCODO\", \"POCPHENCYC\", \"POCPROPOXY\", \"POCTHC\", \"POCTRICYC\"  Lysis Labs   Results from last 7 days   Lab Units 11/22/23  0427 11/21/23  1959 11/21/23  1347 11/20/23  2303 11/19/23  2318 11/19/23  0207   INR  0.95  --  0.98  --   --  0.98   APTT seconds 26.5* 26.1 26.2*  --   --  26.9*   HEMOGLOBIN g/dL 10.0*  --  11.0* 9.8* 9.2* 11.5*   PLATELETS 10*3/mm3 716*  --  695* 608* 535* 618*   CREATININE mg/dL 1.32*  --   --  1.39* 1.15* 1.10*     Radiology(recent) CT Angiogram Upper Extremity Left    Result Date: 11/21/2023  Impression: Findings consistent with complete to near complete occlusion of the left axillary artery with decreased contrast opacification throughout the remainder of the visualized arteries distally. This may be due to traumatic injury or significant noncalcified plaques Electronically Signed: [place] DO  11/21/2023 3:27 PM EST  Workstation ID: [id number]    XR Chest PA & Lateral    Result Date: 11/20/2023  Impression: Significant interval improvement with minimal infiltrates remaining. Electronically Signed: Marni" MD Vicki  2023 8:17 AM EST  Workstation ID: [id number]       Active Hospital Problems:   Active Hospital Problems    Diagnosis  POA    **Acute exacerbation of CHF (congestive heart failure) [I50.9]  Yes    Chronic HFrEF (heart failure with reduced ejection fraction) [I50.22]  Yes      Resolved Hospital Problems   No resolved problems to display.      Assessment & Plan   [place], Subsequent Hospital Care  Assessment / Plan     63-year-old lady doing well status post left arm thrombectomy for embolism causing acute limb ischemia.  Vascular exam normal this morning and incision healing well.  I think we can go ahead and transition her to Eliquis 5 mg twice daily today  Messaged Dr. Hwang and his nurse practitioner via epic message regarding potential need for cardioembolic work-up.  Will defer this to cardiology.  She already has an echo performed on this admission and has been in sinus rhythm and will be on anticoagulation anyway so may not need much more in the way of work-up here.  Could potentially discharge home today if cleared from other providers.  We will follow.    Canelo Blackwell II, MD  23  07:47 EST  Office Number [phone]                Electronically signed by Canelo Blackwell II, MD at 23 2002       Canelo Blackwell II, MD at 23 3738            Name: Neva Kurtz ADMIT: 2023   : 1960  PCP: Provider, No Known    MRN: [id number] LOS: 2 days   AGE/SEX: 63 y.o. female  ROOM:  [place] [address]/       Vascular Surgery note    CTA reviewed.  Patient has some thrombus in the subclavian artery at the origin of the thyrocervical trunk that is nonocclusive and none occlusive lesion distal to this.  Appears consistent with thrombus.  Plan for left arm thrombectomy via brachial artery cutdown with angiogram.      Canelo Blackwell II, MD  23  15:17 EST  Office Number [phone]        Electronically signed by Canelo Blackwell II, MD at 23 3618        Yissel Ennis APRN at 23 1139       Attestation signed by Basil Hwang MD at 23 1259    I have reviewed this documentation and agree.                  [facility] CARDIOLOGY  [facility]        LOS:  LOS: 2 days   Patient Name: Neva Kurtz  Age/Sex: 63 y.o. female  : 1960  MRN: [id number]    Day of Service: 23   Length of Stay: 2  Encounter Provider: ZAN Lynn  Place of Service: [facility] CARDIOLOGY  Patient Care Team:  Provider, No Known as PCP - General    Subjective:     Chief Complaint:  F/U HF    Subjective:   Patient denies SOA.  Only c/o discomfort in left arm/hand where a peripheral IV had been discontinued.      Current Medications:   Scheduled Meds:cefTRIAXone, 2,000 mg, Intravenous, Q24H  enoxaparin, 40 mg, Subcutaneous, Daily  furosemide, 40 mg, Oral, BID  metoprolol succinate XL, 25 mg, Oral, Q24H  nicotine, 1 patch, Transdermal, Q24H  senna-docusate sodium, 2 tablet, Oral, BID  sodium chloride, 10 mL, Intravenous, Q12H      Continuous Infusions:     Allergies:  Allergies   Allergen Reactions    Other Other (See Comments)     tetramycin    Tetracyclines & Related Swelling       ROS    Objective:     Temp:  [97.9 °F (36.6 °C)-98.4 °F (36.9 °C)] 98 °F (36.7 °C)  Heart Rate:  [] 89  Resp:  [13-19] 18  BP: (122-143)/(63-85) 143/85     Intake/Output Summary (Last 24 hours) at 2023 1139  Last data filed at 2023 1600  Gross per 24 hour   Intake 960 ml   Output --   Net 960 ml     Body mass index is 32.78 kg/m².      23  0500 23  0934 23  0351   Weight: 96.4 kg (212 lb 8.4 oz) 96.2 kg (212 lb) 97.8 kg (215 lb 9.8 oz)         Physical Exam:  Neuro:  CV:  Resp:  GI:  Ext:  Tele: AAOx3, no gross deficits  S1S2 RRR, no murmur  Nonlabored, CTA / dim bases  BS+, abd soft  Pedal pulses palp, trace non-pitting  bilateral ankle edema  Off monitor              "                                      Lab Review:   Results from last 7 days   Lab Units 11/21/23  1020 11/20/23 2303 11/19/23  2318 11/19/23  0207   SODIUM mmol/L  --  142 140 139   POTASSIUM mmol/L 4.3 3.6 4.0 4.0   CHLORIDE mmol/L  --  103 105 102   CO2 mmol/L  --  26.0 25.0 23.0   BUN mg/dL  --  22 18 13   CREATININE mg/dL  --  1.39* 1.15* 1.10*   GLUCOSE mg/dL  --  103* 122* 152*   CALCIUM mg/dL  --  8.9 8.4* 9.4   AST (SGOT) U/L  --   --   --  13   ALT (SGPT) U/L  --   --   --  10     Results from last 7 days   Lab Units 11/19/23  2018 11/19/23  1003 11/19/23  0353 11/19/23  0207   HSTROP T ng/L 34* 53* 32* 27*  27*     Results from last 7 days   Lab Units 11/20/23 2303 11/19/23 2318   WBC 10*3/mm3 8.00 11.50*   HEMOGLOBIN g/dL 9.8* 9.2*   HEMATOCRIT % 28.8* 28.2*   PLATELETS 10*3/mm3 608* 535*     Results from last 7 days   Lab Units 11/19/23  0207   INR  0.98   APTT seconds 26.9*                 Invalid input(s): \"LDLCALC\"  Results from last 7 days   Lab Units 11/19/23  0207   PROBNP pg/mL 9,877.0*           Recent Radiology:  Imaging Results (Most Recent)       Procedure Component Value Units Date/Time    XR Chest PA & Lateral [[id number]] Collected: 11/20/23 0817     Updated: 11/20/23 0831    Narrative:      XR CHEST PA AND LATERAL    Date of Exam: 11/20/2023 8:14 AM EST    Indication: Pneumonia    Comparison: 11/19/2023  Findings:  Previously noted lung infiltrates have largely cleared with minimal infiltrates remaining. There are minimal pleural effusions. There is borderline cardiomegaly.      Impression:      Impression:  Significant interval improvement with minimal infiltrates remaining.      Electronically Signed: Marni Cohen MD    11/20/2023 8:17 AM EST    Workstation ID: [id number]    CT Abdomen Pelvis Stone Protocol [[id number]] Collected: 11/19/23 0819     Updated: 11/19/23 0831    Narrative:      CT ABDOMEN PELVIS STONE PROTOCOL    Date of Exam: 11/19/2023 8:04 AM EST    Indication: Left " hydronephrosis.    Comparison: CT chest from earlier today    Technique: Axial CT images were obtained of the abdomen and pelvis without the administration of contrast. Sagittal and coronal reconstructions were performed.  Automated exposure control and iterative reconstruction methods were used.      Findings:  Within the lung visualized lung bases is no significant change from recent prior CT chest.    The liver, gallbladder, adrenal glands, spleen, and pancreas are unremarkable. There is severe right hydroureteronephrosis with suggestion of atypical attachment of the right ureter to the urinary bladder. There is severe left hydronephrosis with abrupt   transition to normal in caliber left ureter, with the connection of the left ureter to the urinary bladder not well identified. No obstructing ureteral stone is identified on either side.    The stomach appears normal. The small bowel appears normal in caliber and configuration. The colon appears normal. The appendix appears normal. There is no ascites or loculated collection. No abnormally enlarged lymph nodes are identified.    The rectum and urinary bladder are unremarkable. The uterus is surgically absent. There is a 5 cm cystic lesion within the left pelvis posterior to the urinary bladder. There is pelvic floor laxity.    No aggressive osseous lesions are identified.      Impression:      Impression:  1.Severe right hydroureteronephrosis with suggestion of atypical attachment of right ureter to urinary bladder. There is also severe left hydronephrosis with abrupt transition to normal caliber left ureter, suggesting left UPJ stenosis. The connection of   the left ureter to the urinary bladder is not well identified.  2.5 cm cystic lesion within left pelvis posterior to urinary bladder, of unclear etiology but could represent a ureterocele.            Electronically Signed: Naresh Dorantes MD    11/19/2023 8:29 AM EST    Workstation ID: [id number]    CT  Angiogram Chest Pulmonary Embolism [[id number]] Collected: 11/19/23 0322     Updated: 11/19/23 0328    Narrative:      CT ANGIOGRAM CHEST PULMONARY EMBOLISM    Date of Exam: 11/19/2023 3:08 AM EST    Indication: Pulmonary embolism (PE) suspected, high prob.    Comparison: Chest radiograph performed earlier today.    Technique: Axial CT images were obtained of the chest after the uneventful intravenous administration of iodinated contrast utilizing pulmonary embolism protocol.  Sagittal and coronal reconstructions were performed.  Automated exposure control and   iterative reconstruction methods were used.      Findings:  There is interlobular septal thickening noted throughout the lungs. There is associated peribronchial thickening and mosaic attenuation in the lungs. There are small bilateral pleural effusions. There are a few focal areas of airspace disease in the   lungs including in the right upper lobe on image 64, where there is a nodular density with central aeration or cavitation measuring 18 mm diameter.    The left thyroid lobe appears enlarged likely due to a nodule measuring 28 mm. The aorta exhibits mild atherosclerotic plaque. There are a few shelflike plaques in the aorta most pronounced on image 142. The heart size is normal. No pericardial effusion.   There is a fat-containing hiatal hernia. There are multiple calcified mediastinal granulomas. There is no evidence of pulmonary embolism. There are mildly enlarged bilateral hilar and subcarinal lymph nodes.    There are no acute findings in the superficial soft tissues. There is severe left-sided hydronephrosis and diminished enhancement of the left renal parenchyma, which is only partially seen on this study, but concerning for obstructive uropathy of   indeterminate age. There are no acute osseous abnormalities or destructive bone lesions. There are mild diffuse thoracic degenerative changes.      Impression:      Impression:  1.No evidence of  pulmonary embolism.  2.There is interlobular septal thickening, peribronchial thickening and mosaic attenuation in the lungs with small bilateral pleural effusions. This is favored to reflect pulmonary edema.  3.There are a few focal areas of airspace disease in the lungs that could be related to edema or pneumonia.  4.There is severe left-sided hydronephrosis and diminished enhancement of the left renal parenchyma, which is only partially seen on this study, but concerning for obstructive uropathy of indeterminate age.  5.28 mm left thyroid lobe nodule. This could be evaluated with ultrasound.  6.Fat-containing hiatal hernia.  7.Evidence of prior granulomatous infection.        Electronically Signed: Salvador Love MD    11/19/2023 3:26 AM EST    Workstation ID: [id number]    XR Chest 1 View [[id number]] Collected: 11/19/23 0234     Updated: 11/19/23 0237    Narrative:      XR CHEST 1 VW    Date of Exam: 11/19/2023 2:06 AM EST    Indication: dyspnea    Comparison: 11/13/2023.    Findings:  There is increased interstitial disease with curly B lines. Cardiac silhouette is unchanged. Pulmonary vasculature is partially indistinct. No pneumothorax or pleural effusion. No lobar consolidation. No acute osseous abnormality.      Impression:      Impression:  Increased interstitial disease that could reflect interstitial pulmonary edema.        Electronically Signed: Salvador Love MD    11/19/2023 2:35 AM EST    Workstation ID: [id number]            ECHOCARDIOGRAM:    Results for orders placed during the hospital encounter of 11/19/23    Adult Transthoracic Echo Complete W/ Cont if Necessary Per Protocol    Interpretation Summary    Left ventricular ejection fraction appears to be 41 - 45%.    The left ventricular cavity is borderline dilated.    Left ventricular wall thickness is consistent with mild concentric hypertrophy.    Left ventricular diastolic function is consistent with (grade Ia w/high LAP) impaired relaxation and  age.    The left atrial cavity is mild to moderately dilated.    Estimated right ventricular systolic pressure from tricuspid regurgitation is normal (<35 mmHg).    There is a small (<1cm) pericardial effusion.    Transthoracic echocardiography reveals mild LVH with mildly dilated LV with overall EF mildly reduced measured around 45%  Diastolic dysfunction criteria noted with mild to moderate left atrial enlargement with mild MR.  Small pericardial effusion noted.      Electronically signed by Basil Hwang MD, 11/20/23, 4:09 PM EST.        I reviewed the patient's new clinical results.    EKG:      Assessment:       Acute exacerbation of CHF (congestive heart failure)    Chronic HFrEF (heart failure with reduced ejection fraction)    1) Acute on chronic systolic heart failure  - BNP 9877  - CXR shows infiltrates vs edema  - CTA chest shows no PE  - IV lasix --> PO lasix  - 2D echo showed EF = 45% with grade 1 diastolic dysfunction and mild MR.  - appears compensated today    2) tachycardia / hx PSVT  - sinus tachycardia, likely hemodynamically appropriate in setting PNA  - s/p dose IV beta blocker  - started on PO toprol XL    3) Hydronephrosis s/p bilateral stenting 11/19  - urology following    4) LENO  - Cr 1.15 --> 1.39    5) PNA  - per primary team    6) tobacco dependence    7) pelvic prolapse  - planned for OP GYN eval      Plan:   2D echo shows mild LV dysfunction.  Continue on toprol XL but will defer starting ACEi/ARB at this time given recent hydronephrosis and mild renal insufficiency.  Patient appears compensated on PO lasix.  BMP in a week.  She is planned for outpatient stress test.  F/U with Dr. Hwang in 2 weeks.    Electronically signed by ZAN Lynn, 11/21/23, 11:46 AM EST.       Electronically signed by Basil Hwang MD at 11/21/23 5523

## 2023-11-22 NOTE — PROGRESS NOTES
[facility] CARDIOLOGY  [facility]        LOS:  LOS: 3 days   Patient Name: Neva Kurtz  Age/Sex: 63 y.o. female  : 1960  MRN: [id number]    Day of Service: 23   Length of Stay: 3  Encounter Provider: ZAN Lynn  Place of Service: [facility] CARDIOLOGY  Patient Care Team:  Provider, No Known as PCP - General    Subjective:     Chief Complaint:  F/u HF, brachial thrombus    Subjective:   Patient reports feeling well today and wants to go home.      Current Medications:   Scheduled Meds:apixaban, 5 mg, Oral, Q12H  cefTRIAXone, 2,000 mg, Intravenous, Q24H  furosemide, 40 mg, Oral, BID  metoprolol succinate XL, 25 mg, Oral, Q24H  nicotine, 1 patch, Transdermal, Q24H  senna-docusate sodium, 2 tablet, Oral, BID  sodium chloride, 10 mL, Intravenous, Q12H      Continuous Infusions:lactated ringers, 20 mL/hr        Allergies:  Allergies   Allergen Reactions    Other Other (See Comments)     tetramycin    Tetracyclines & Related Swelling       ROS    Objective:     Temp:  [97.5 °F (36.4 °C)-98.9 °F (37.2 °C)] 98.1 °F (36.7 °C)  Heart Rate:  [] 95  Resp:  [13-25] 18  BP: (117-155)/(59-90) 118/78     Intake/Output Summary (Last 24 hours) at 2023 1048  Last data filed at 2023 1725  Gross per 24 hour   Intake 1350 ml   Output --   Net 1350 ml     Body mass index is 31.97 kg/m².      23  0351 23  2100 23  0500   Weight: 97.8 kg (215 lb 9.8 oz) 96 kg (211 lb 10.3 oz) 98.2 kg (216 lb 7.9 oz)         Physical Exam:  Neuro:  CV:  Resp:  GI:  Ext:  Tele: AAOx3, no gross deficits  S1S2 RRR, no murmur  Nonlabored, CTA  BS+, abd soft  Pedal pulses palp, no edema  SR                                                   Lab Review:   Results from last 7 days   Lab Units 23  0427 23  1020 23  2303 23  2318 23  0207   SODIUM mmol/L 139  --  142   < > 139   POTASSIUM mmol/L 4.3 4.3 3.6   " < > 4.0   CHLORIDE mmol/L 101  --  103   < > 102   CO2 mmol/L 27.0  --  26.0   < > 23.0   BUN mg/dL 20  --  22   < > 13   CREATININE mg/dL 1.32*  --  1.39*   < > 1.10*   GLUCOSE mg/dL 115*  --  103*   < > 152*   CALCIUM mg/dL 9.0  --  8.9   < > 9.4   AST (SGOT) U/L  --   --   --   --  13   ALT (SGPT) U/L  --   --   --   --  10    < > = values in this interval not displayed.     Results from last 7 days   Lab Units 11/19/23 2018 11/19/23  1003 11/19/23  0353 11/19/23  0207   HSTROP T ng/L 34* 53* 32* 27*  27*     Results from last 7 days   Lab Units 11/22/23  0427 11/21/23  1347   WBC 10*3/mm3 10.10 7.30   HEMOGLOBIN g/dL 10.0* 11.0*   HEMATOCRIT % 29.5* 32.7*   PLATELETS 10*3/mm3 716* 695*     Results from last 7 days   Lab Units 11/22/23  0427 11/21/23 1959 11/21/23  1347   INR  0.95  --  0.98   APTT seconds 26.5* 26.1 26.2*               Invalid input(s): \"LDLCALC\"  Results from last 7 days   Lab Units 11/19/23  0207   PROBNP pg/mL 9,877.0*           Recent Radiology:  Imaging Results (Most Recent)       Procedure Component Value Units Date/Time    FL < 1 Hour [[id number]] Resulted: 11/21/23 1744     Updated: 11/21/23 1744    Narrative:      This procedure was auto-finalized with no dictation required.    CT Angiogram Upper Extremity Left [[id number]] Collected: 11/21/23 1514     Updated: 11/21/23 1529    Narrative:      CT ANGIOGRAM UPPER EXTREMITY LEFT    Date of Exam: 11/21/2023 3:06 PM EST    Indication: acute limb ischemia left arm.    Comparison: CTA chest 11/19/2023    Technique: CTA of the left upper extremity was performed before and after the uneventful intravenous administration of iodinated contrast. Reconstructed coronal and sagittal images were also obtained. In addition, a 3-D volume rendered image was created   for interpretation. Automated exposure control and iterative reconstruction methods were used.      Findings:  The visualized aorta is unremarkable. The visualized left common carotid and " internal carotid arteries are patent.  Mild stenosis of the proximal left subclavian artery (image 34 series 5), due to focal noncalcified plaque.    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 There is mild thickening and   hazy fat stranding of the vessel in this region.    The left brachial artery appears to be patent with mild contrast opacification, most likely from collaterals versus mild flow through the above-mentioned near occlusion.  There is nonopacification of the radial and ulnar arteries.    The visualized heart and pulmonary arteries are unremarkable.  The visualized left iliac arteries,, common femoral artery and left femoral arteries are unremarkable.    Mild patchy opacities in the left lung base most likely represents atelectasis.  Partially imaged left-sided hydronephrosis status post stenting.  Presumed left lateral bladder diverticulum is identified.  Otherwise the the visualized intra-abdominal and intrapelvic structures are grossly unremarkable.  Mildly heterogeneous thyroid gland.    The visualized soft tissues otherwise unremarkable.  The visualized osseous structures are intact.      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 This may be due to traumatic injury or significant noncalcified   plaques        Electronically Signed: [place] DO    11/21/2023 3:27 PM EST    Workstation ID: [id number]    XR Chest PA & Lateral [[id number]] Collected: 11/20/23 0817     Updated: 11/20/23 0831    Narrative:      XR CHEST PA AND LATERAL    Date of Exam: 11/20/2023 8:14 AM EST    Indication: Pneumonia    Comparison:  11/19/2023  Findings:  Previously noted lung infiltrates have largely cleared with minimal infiltrates remaining. There are minimal pleural effusions. There is borderline cardiomegaly.      Impression:      Impression:  Significant interval improvement with minimal infiltrates remaining.      Electronically Signed: Marni Cohen MD    11/20/2023 8:17 AM EST    Workstation ID: [id number]    CT Abdomen Pelvis Stone Protocol [[id number]] Collected: 11/19/23 0819     Updated: 11/19/23 0831    Narrative:      CT ABDOMEN PELVIS STONE PROTOCOL    Date of Exam: 11/19/2023 8:04 AM EST    Indication: Left hydronephrosis.    Comparison: CT chest from earlier today    Technique: Axial CT images were obtained of the abdomen and pelvis without the administration of contrast. Sagittal and coronal reconstructions were performed.  Automated exposure control and iterative reconstruction methods were used.      Findings:  Within the lung visualized lung bases is no significant change from recent prior CT chest.    The liver, gallbladder, adrenal glands, spleen, and pancreas are unremarkable. There is severe right hydroureteronephrosis with suggestion of atypical attachment of the right ureter to the urinary bladder. There is severe left hydronephrosis with abrupt   transition to normal in caliber left ureter, with the connection of the left ureter to the urinary bladder not well identified. No obstructing ureteral stone is identified on either side.    The stomach appears normal. The small bowel appears normal in caliber and configuration. The colon appears normal. The appendix appears normal. There is no ascites or loculated collection. No abnormally enlarged lymph nodes are identified.    The rectum and urinary bladder are unremarkable. The uterus is surgically absent. There is a 5 cm cystic lesion within the left pelvis posterior to the urinary bladder. There is pelvic floor laxity.    No aggressive osseous lesions are identified.       Impression:      Impression:  1.Severe right hydroureteronephrosis with suggestion of atypical attachment of right ureter to urinary bladder. There is also severe left hydronephrosis with abrupt transition to normal caliber left ureter, suggesting left UPJ stenosis. The connection of   the left ureter to the urinary bladder is not well identified.  2.5 cm cystic lesion within left pelvis posterior to urinary bladder, of unclear etiology but could represent a ureterocele.            Electronically Signed: Naresh Dorantes MD    11/19/2023 8:29 AM EST    Workstation ID: [id number]    CT Angiogram Chest Pulmonary Embolism [[id number]] Collected: 11/19/23 0322     Updated: 11/19/23 0328    Narrative:      CT ANGIOGRAM CHEST PULMONARY EMBOLISM    Date of Exam: 11/19/2023 3:08 AM EST    Indication: Pulmonary embolism (PE) suspected, high prob.    Comparison: Chest radiograph performed earlier today.    Technique: Axial CT images were obtained of the chest after the uneventful intravenous administration of iodinated contrast utilizing pulmonary embolism protocol.  Sagittal and coronal reconstructions were performed.  Automated exposure control and   iterative reconstruction methods were used.      Findings:  There is interlobular septal thickening noted throughout the lungs. There is associated peribronchial thickening and mosaic attenuation in the lungs. There are small bilateral pleural effusions. There are a few focal areas of airspace disease in the   lungs including in the right upper lobe on image 64, where there is a nodular density with central aeration or cavitation measuring 18 mm diameter.    The left thyroid lobe appears enlarged likely due to a nodule measuring 28 mm. The aorta exhibits mild atherosclerotic plaque. There are a few shelflike plaques in the aorta most pronounced on image 142. The heart size is normal. No pericardial effusion.   There is a fat-containing hiatal hernia. There are multiple  calcified mediastinal granulomas. There is no evidence of pulmonary embolism. There are mildly enlarged bilateral hilar and subcarinal lymph nodes.    There are no acute findings in the superficial soft tissues. There is severe left-sided hydronephrosis and diminished enhancement of the left renal parenchyma, which is only partially seen on this study, but concerning for obstructive uropathy of   indeterminate age. There are no acute osseous abnormalities or destructive bone lesions. There are mild diffuse thoracic degenerative changes.      Impression:      Impression:  1.No evidence of pulmonary embolism.  2.There is interlobular septal thickening, peribronchial thickening and mosaic attenuation in the lungs with small bilateral pleural effusions. This is favored to reflect pulmonary edema.  3.There are a few focal areas of airspace disease in the lungs that could be related to edema or pneumonia.  4.There is severe left-sided hydronephrosis and diminished enhancement of the left renal parenchyma, which is only partially seen on this study, but concerning for obstructive uropathy of indeterminate age.  5.28 mm left thyroid lobe nodule. This could be evaluated with ultrasound.  6.Fat-containing hiatal hernia.  7.Evidence of prior granulomatous infection.        Electronically Signed: Salvador Love MD    11/19/2023 3:26 AM EST    Workstation ID: [id number]    XR Chest 1 View [[id number]] Collected: 11/19/23 0234     Updated: 11/19/23 0237    Narrative:      XR CHEST 1 VW    Date of Exam: 11/19/2023 2:06 AM EST    Indication: dyspnea    Comparison: 11/13/2023.    Findings:  There is increased interstitial disease with curly B lines. Cardiac silhouette is unchanged. Pulmonary vasculature is partially indistinct. No pneumothorax or pleural effusion. No lobar consolidation. No acute osseous abnormality.      Impression:      Impression:  Increased interstitial disease that could reflect interstitial pulmonary  edema.        Electronically Signed: Salvador Love MD    11/19/2023 2:35 AM EST    Workstation ID: [id number]            ECHOCARDIOGRAM:    Results for orders placed during the hospital encounter of 11/19/23    Adult Transthoracic Echo Complete W/ Cont if Necessary Per Protocol    Interpretation Summary    Left ventricular ejection fraction appears to be 41 - 45%.    The left ventricular cavity is borderline dilated.    Left ventricular wall thickness is consistent with mild concentric hypertrophy.    Left ventricular diastolic function is consistent with (grade Ia w/high LAP) impaired relaxation and age.    The left atrial cavity is mild to moderately dilated.    Estimated right ventricular systolic pressure from tricuspid regurgitation is normal (<35 mmHg).    There is a small (<1cm) pericardial effusion.    Transthoracic echocardiography reveals mild LVH with mildly dilated LV with overall EF mildly reduced measured around 45%  Diastolic dysfunction criteria noted with mild to moderate left atrial enlargement with mild MR.  Small pericardial effusion noted.      Electronically signed by Basil Hwang MD, 11/20/23, 4:09 PM EST.        I reviewed the patient's new clinical results.    EKG:      Assessment:       Acute exacerbation of CHF (congestive heart failure)    Chronic HFrEF (heart failure with reduced ejection fraction)    1) Acute on chronic systolic heart failure  - BNP 9877  - CXR shows infiltrates vs edema  - CTA chest shows no PE  - IV lasix --> PO lasix  - 2D echo showed EF = 45% with grade 1 diastolic dysfunction and mild MR.  - GDMT: started on toprol XL; not on ACEi/ARB given renal insufficiency (can be reconsidered as outpatient after renal recovery)  - planned for outpatient stress test  - appears compensated today     2) tachycardia / hx PSVT  - sinus tachycardia, likely hemodynamically appropriate in setting PNA  - s/p dose IV beta blocker  - started on PO toprol XL     3) Hydronephrosis  s/p bilateral stenting 11/19  - urology following     4) LENO  - Cr 1.15 --> 1.39 --> 1.32     5) PNA  - per primary team     6) tobacco dependence     7) pelvic prolapse  - planned for OP GYN eval    8) Acute LUE ischemia s/p brachial thromboembolectomy 11/21/23  - vascular following   - hemoncology consulted for hypercoagulable work-up    Plan:   Patient is POD#1 brachial thromboembolectomy.  Hemoncology consulted for hypercoagulable work-up.  Eliquis has been started.  As d/w Dr. Hwang, no plan for MANISHA at this juncture.  Will send patient home with an ambulatory cardiac monitor for AF surveillance. Ok for discharge home from CV standpoint.  F/U with Dr. Hwang in 2 weeks.        Electronically signed by ZAN Lynn, 11/22/23, 11:04 AM EST.       Patient is seen and examined and data reviewed.  Unprovoked arterial thrombosis with recent hydronephrosis  Prior history of tobacco abuse      Physical Exam    General:      well developed, well nourished, in no acute distress.    Head:      normocephalic and atraumatic.    Eyes:      PERRL/EOM intact, conjunctivae and sclerae clear without nystagmus.    Neck:      no  thyromegaly, trachea central with normal respiratory effort  Lungs:      clear bilaterally to auscultation.    Heart:       regular rate and rhythm, S1, S2 without murmurs, rubs, or gallops  Skin:      intact without lesions or rashes.    Psych:      alert and cooperative; normal mood and affect; normal attention span and concentration.      Right carotid bruit with normal carotid upstroke      Start statins  Ischemia work-up can be done as an outpatient  Hematology work-up pending to evaluate for hypercoagulable state  Patient also has iron deficiency anemia which should be addressed by hematology team    Patient to be discharged on anticoagulation and statins  Outpatient follow-up        Electronically signed by Basil Hwang MD, 11/22/23, 4:31 PM EST.

## 2023-11-22 NOTE — CONSULTS
Hematology/Oncology Inpatient Consultation    Patient name: Neva Kurtz  : 1960  MRN: [id number]  Referring Provider: Dr. Null  Reason for Consultation: Thrombosis    Chief complaint: Left upper extremity pain    History of present illness:    Neva Kurtz is a 63 y.o. female who presented to [facility] on 2023 with complaints of shortness of breath.  Past medical history of SVT.  Patient also had a recent history of discharge from the hospital on 2023 after treatment for UTI.  Work-up revealed CHF and also severe right hydroureteronephrosis.  Cardiology was consulted for the acute exacerbation of CHF and neurology saw the patient for her bilateral hydronephrosis and did a bilateral stent placement.  It was also recommended that she follow-up with a gynecologist for hysterectomy and suspension of the vaginal vault due to right hydronephrosis secondary to ureteral kinking due to severe uterine prolapse.  It was in the process of being discharged from the hospital on 2023 when she complained of severe acute left arm pain after her IV site was removed.  Evaluation revealed acute limb ischemia due to an occluded ulnar artery.  She was initiated on heparin and underwent a left arm thrombectomy on 2023.  Since that time she has been transitioned from heparin to Eliquis for discharge home.  Cardiology is still followed for possible cardioembolic work-up and plans to discharge home on an ambulatory cardiac monitor for atrial fibrillation surveillance.    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    He/She  has a past medical history of SVT (supraventricular tachycardia).    PCP: Provider, Shannan  Known    History:  Past Medical History:   Diagnosis Date    SVT (supraventricular tachycardia)    ,   Past Surgical History:   Procedure Laterality Date    BREAST BIOPSY      CYSTOSCOPY, URETEROSCOPY, RETROGRADE PYELOGRAM, STENT INSERTION Bilateral 2023    Procedure: CYSTOSCOPY URETEROSCOPY RETROGRADE PYELOGRAM STENT INSERTION;  Surgeon: Alan Rodriguez MD;  Location: [facility] MAIN OR;  Service: Urology;  Laterality: Bilateral;   , History reviewed. No pertinent family history.,   Social History     Tobacco Use    Smoking status: Every Day     Packs/day: 1     Types: Cigarettes     Passive exposure: Never    Smokeless tobacco: Never   Vaping Use    Vaping Use: Never used   Substance Use Topics    Alcohol use: Never    Drug use: Never   ,   Medications Prior to Admission   Medication Sig Dispense Refill Last Dose    [] cefdinir (OMNICEF) 300 MG capsule Take 1 capsule by mouth 2 (Two) Times a Day for 7 days. 14 capsule 0     cetirizine (zyrTEC) 10 MG tablet Take 1 tablet by mouth Daily.       ibuprofen (ADVIL,MOTRIN) 400 MG tablet Take 1 tablet by mouth Every 6 (Six) Hours As Needed for Mild Pain.       multivitamin with minerals tablet tablet Take 1 tablet by mouth Daily.       phenazopyridine (Pyridium) 100 MG tablet Take 1 tablet by mouth 3 (Three) Times a Day. 6 tablet 0    , Scheduled Meds:  apixaban, 5 mg, Oral, Q12H  cefTRIAXone, 2,000 mg, Intravenous, Q24H  furosemide, 40 mg, Oral, BID  metoprolol succinate XL, 25 mg, Oral, Q24H  nicotine, 1 patch, Transdermal, Q24H  senna-docusate sodium, 2 tablet, Oral, BID  sodium chloride, 10 mL, Intravenous, Q12H    , Continuous Infusions:  lactated ringers, 20 mL/hr    , PRN Meds:    acetaminophen **OR** acetaminophen **OR** acetaminophen    aluminum-magnesium hydroxide-simethicone    senna-docusate sodium **AND** polyethylene glycol **AND** bisacodyl **AND** bisacodyl    Calcium Replacement - Follow Nurse / BPA Driven Protocol     "HYDROcodone-acetaminophen    ipratropium-albuterol    Magnesium Standard Dose Replacement - Follow Nurse / BPA Driven Protocol    melatonin    nitroglycerin    ondansetron **OR** ondansetron    Phosphorus Replacement - Follow Nurse / BPA Driven Protocol    Potassium Replacement - Follow Nurse / BPA Driven Protocol    [COMPLETED] Insert Peripheral IV **AND** sodium chloride    sodium chloride   Allergies:  Other and Tetracyclines & related    Subjective     ROS:  Review of Systems   Constitutional:  Positive for fatigue. Negative for fever.   HENT:  Negative for congestion and nosebleeds.    Eyes:  Negative for pain.   Respiratory:  Negative for cough and shortness of breath.    Cardiovascular:  Negative for chest pain.   Gastrointestinal:  Negative for abdominal pain, blood in stool, diarrhea, nausea and vomiting.   Endocrine: Negative for cold intolerance and heat intolerance.   Genitourinary:  Negative for difficulty urinating.   Musculoskeletal:  Negative for arthralgias.   Skin:  Negative for rash.   Neurological:  Negative for dizziness and headaches.   Hematological:  Does not bruise/bleed easily.   Psychiatric/Behavioral:  Negative for behavioral problems.         Objective   Vital Signs:   /85 (BP Location: Right arm, Patient Position: Sitting)   Pulse 95   Temp 98.1 °F (36.7 °C) (Oral)   Resp 17   Ht 175.3 cm (69\")   Wt 98.2 kg (216 lb 7.9 oz)   SpO2 97%   BMI 31.97 kg/m²     Physical Exam: (performed by MD)  Physical Exam  Constitutional:       Appearance: Normal appearance.   HENT:      Head: Normocephalic and atraumatic.   Eyes:      Pupils: Pupils are equal, round, and reactive to light.   Cardiovascular:      Rate and Rhythm: Normal rate and regular rhythm.      Pulses: Normal pulses.      Heart sounds: No murmur heard.  Pulmonary:      Effort: Pulmonary effort is normal.      Breath sounds: Normal breath sounds.   Abdominal:      General: There is no distension.      Palpations: Abdomen " is soft. There is no mass.      Tenderness: There is no abdominal tenderness.   Musculoskeletal:         General: Normal range of motion.      Cervical back: Normal range of motion and neck supple.   Skin:     General: Skin is warm.   Neurological:      General: No focal deficit present.      Mental Status: She is alert.   Psychiatric:         Mood and Affect: Mood normal.         Results Review:  Lab Results (last 48 hours)       Procedure Component Value Units Date/Time    Tissue Pathology Exam [[id number]] Collected: 11/21/23 1634    Specimen: Tissue from Arm, Left Updated: 11/22/23 0750    Ferritin [[id number]]  (Abnormal) Collected: 11/22/23 0427    Specimen: Blood Updated: 11/22/23 0502     Ferritin 248.90 ng/mL     Narrative:      Results may be falsely decreased if patient taking Biotin.      Basic Metabolic Panel [[id number]]  (Abnormal) Collected: 11/22/23 0427    Specimen: Blood Updated: 11/22/23 0459     Glucose 115 mg/dL      BUN 20 mg/dL      Creatinine 1.32 mg/dL      Sodium 139 mmol/L      Potassium 4.3 mmol/L      Chloride 101 mmol/L      CO2 27.0 mmol/L      Calcium 9.0 mg/dL      BUN/Creatinine Ratio 15.2     Anion Gap 11.0 mmol/L      eGFR 45.7 mL/min/1.73     Narrative:      GFR Normal >60  Chronic Kidney Disease <60  Kidney Failure <15      Iron Profile [[id number]]  (Abnormal) Collected: 11/22/23 0427    Specimen: Blood Updated: 11/22/23 0459     Iron 36 mcg/dL      Iron Saturation (TSAT) 11 %      Transferrin 215 mg/dL      TIBC 320 mcg/dL     aPTT [[id number]]  (Abnormal) Collected: 11/22/23 0427    Specimen: Blood Updated: 11/22/23 0446     PTT 26.5 seconds     Protime-INR [[id number]]  (Normal) Collected: 11/22/23 0427    Specimen: Blood Updated: 11/22/23 0446     Protime 10.4 Seconds      INR 0.95    CBC & Differential [[id number]]  (Abnormal) Collected: 11/22/23 0427    Specimen: Blood Updated: 11/22/23 0433    Narrative:      The following orders were created for panel order CBC &  Differential.  Procedure                               Abnormality         Status                     ---------                               -----------         ------                     CBC Auto Differential[[id number]]        Abnormal            Final result                 Please view results for these tests on the individual orders.    CBC Auto Differential [[id number]]  (Abnormal) Collected: 11/22/23 0427    Specimen: Blood Updated: 11/22/23 0433     WBC 10.10 10*3/mm3      RBC 3.29 10*6/mm3      Hemoglobin 10.0 g/dL      Hematocrit 29.5 %      MCV 89.6 fL      MCH 30.4 pg      MCHC 33.9 g/dL      RDW 14.9 %      RDW-SD 49.9 fl      MPV 6.8 fL      Platelets 716 10*3/mm3      Neutrophil % 75.3 %      Lymphocyte % 17.1 %      Monocyte % 6.5 %      Eosinophil % 0.1 %      Basophil % 1.0 %      Neutrophils, Absolute 7.60 10*3/mm3      Lymphocytes, Absolute 1.70 10*3/mm3      Monocytes, Absolute 0.70 10*3/mm3      Eosinophils, Absolute 0.00 10*3/mm3      Basophils, Absolute 0.10 10*3/mm3      nRBC 0.1 /100 WBC     Vitamin B12 [[id number]] Collected: 11/22/23 0427    Specimen: Blood Updated: 11/22/23 0430    Blood Culture - Blood, Arm, Left [[id number]]  (Normal) Collected: 11/19/23 0353    Specimen: Blood from Arm, Left Updated: 11/22/23 0416     Blood Culture No growth at 3 days    Blood Culture - Blood, Arm, Right [[id number]]  (Normal) Collected: 11/19/23 0353    Specimen: Blood from Arm, Right Updated: 11/22/23 0416     Blood Culture No growth at 3 days    Narrative:      Less than seven (7) mL's of blood was collected.  Insufficient quantity may yield false negative results.    aPTT [[id number]]  (Normal) Collected: 11/21/23 1959    Specimen: Blood Updated: 11/21/23 2022     PTT 26.1 seconds     POC Activated Clotting Time [[id number]]  (Abnormal) Collected: 11/21/23 1649    Specimen: Arterial Blood Updated: 11/21/23 1804     Activated Clotting Time  266 Seconds      Comment: Serial Number: [id number]:  [id number]        POC Activated Clotting Time [[id number]]  (Abnormal) Collected: 11/21/23 1633    Specimen: Arterial Blood Updated: 11/21/23 1804     Activated Clotting Time  239 Seconds      Comment: Serial Number: [id number]:  [id number]       POC Activated Clotting Time [[id number]]  (Abnormal) Collected: 11/21/23 1617    Specimen: Arterial Blood Updated: 11/21/23 1804     Activated Clotting Time  174 Seconds      Comment: Serial Number: [id number]:  [id number]       Protime-INR [[id number]]  (Normal) Collected: 11/21/23 1347    Specimen: Blood Updated: 11/21/23 1457     Protime 10.7 Seconds      INR 0.98    aPTT [[id number]]  (Abnormal) Collected: 11/21/23 1347    Specimen: Blood Updated: 11/21/23 1457     PTT 26.2 seconds     CBC & Differential [[id number]]  (Abnormal) Collected: 11/21/23 1347    Specimen: Blood Updated: 11/21/23 1444    Narrative:      The following orders were created for panel order CBC & Differential.  Procedure                               Abnormality         Status                     ---------                               -----------         ------                     CBC Auto Differential[[id number]]        Abnormal            Final result                 Please view results for these tests on the individual orders.    CBC Auto Differential [[id number]]  (Abnormal) Collected: 11/21/23 1347    Specimen: Blood Updated: 11/21/23 1444     WBC 7.30 10*3/mm3      RBC 3.62 10*6/mm3      Hemoglobin 11.0 g/dL      Hematocrit 32.7 %      MCV 90.3 fL      MCH 30.4 pg      MCHC 33.7 g/dL      RDW 15.2 %      RDW-SD 50.8 fl      MPV 7.4 fL      Platelets 695 10*3/mm3      Neutrophil % 67.0 %      Lymphocyte % 17.5 %      Monocyte % 13.6 %      Eosinophil % 0.9 %      Basophil % 1.0 %      Neutrophils, Absolute 4.90 10*3/mm3      Lymphocytes, Absolute 1.30 10*3/mm3      Monocytes, Absolute 1.00 10*3/mm3      Eosinophils, Absolute 0.10 10*3/mm3      Basophils, Absolute 0.10 10*3/mm3      nRBC 0.0 /100 WBC     Blood  Culture - Blood, Arm, Right [[id number]]  (Normal) Collected: 11/20/23 1320    Specimen: Blood from Arm, Right Updated: 11/21/23 1345     Blood Culture No growth at 24 hours    Narrative:      Less than seven (7) mL's of blood was collected.  Insufficient quantity may yield false negative results.    Blood Culture - Blood, Hand, Right [[id number]]  (Normal) Collected: 11/20/23 1321    Specimen: Blood from Hand, Right Updated: 11/21/23 1345     Blood Culture No growth at 24 hours    Narrative:      Less than seven (7) mL's of blood was collected.  Insufficient quantity may yield false negative results.    Potassium [[id number]]  (Normal) Collected: 11/21/23 1020    Specimen: Blood Updated: 11/21/23 1138     Potassium 4.3 mmol/L     Basic Metabolic Panel [[id number]]  (Abnormal) Collected: 11/20/23 2303    Specimen: Blood Updated: 11/21/23 0048     Glucose 103 mg/dL      BUN 22 mg/dL      Creatinine 1.39 mg/dL      Sodium 142 mmol/L      Potassium 3.6 mmol/L      Chloride 103 mmol/L      CO2 26.0 mmol/L      Calcium 8.9 mg/dL      BUN/Creatinine Ratio 15.8     Anion Gap 13.0 mmol/L      eGFR 43.0 mL/min/1.73     Narrative:      GFR Normal >60  Chronic Kidney Disease <60  Kidney Failure <15      CBC Auto Differential [[id number]]  (Abnormal) Collected: 11/20/23 2303    Specimen: Blood Updated: 11/21/23 0020     WBC 8.00 10*3/mm3      RBC 3.16 10*6/mm3      Hemoglobin 9.8 g/dL      Hematocrit 28.8 %      MCV 91.3 fL      MCH 31.2 pg      MCHC 34.2 g/dL      RDW 14.7 %      RDW-SD 46.8 fl      MPV 7.3 fL      Platelets 608 10*3/mm3      Neutrophil % 57.2 %      Lymphocyte % 27.0 %      Monocyte % 13.2 %      Eosinophil % 1.4 %      Basophil % 1.2 %      Neutrophils, Absolute 4.60 10*3/mm3      Lymphocytes, Absolute 2.20 10*3/mm3      Monocytes, Absolute 1.10 10*3/mm3      Eosinophils, Absolute 0.10 10*3/mm3      Basophils, Absolute 0.10 10*3/mm3      nRBC 0.1 /100 WBC              Pending Results:     Imaging Reviewed:   CT  Angiogram Upper Extremity Left    Result Date: 11/21/2023  Impression: Findings consistent with complete to near complete occlusion of the left axillary artery with decreased contrast opacification throughout the remainder of the visualized arteries distally. This may be due to traumatic injury or significant noncalcified plaques Electronically Signed: [place] DO  11/21/2023 3:27 PM EST  Workstation ID: [id number]    XR Chest PA & Lateral    Result Date: 11/20/2023  Impression: Significant interval improvement with minimal infiltrates remaining. Electronically Signed: Marni Cohen MD  11/20/2023 8:17 AM EST  Workstation ID: [id number]    CT Abdomen Pelvis Stone Protocol    Result Date: 11/19/2023  Impression: 1.Severe right hydroureteronephrosis with suggestion of atypical attachment of right ureter to urinary bladder. There is also severe left hydronephrosis with abrupt transition to normal caliber left ureter, suggesting left UPJ stenosis. The connection of  the left ureter to the urinary bladder is not well identified. 2.5 cm cystic lesion within left pelvis posterior to urinary bladder, of unclear etiology but could represent a ureterocele. Electronically Signed: Naresh Dorantes MD  11/19/2023 8:29 AM EST  Workstation ID: [id number]    CT Angiogram Chest Pulmonary Embolism    Result Date: 11/19/2023  Impression: 1.No evidence of pulmonary embolism. 2.There is interlobular septal thickening, peribronchial thickening and mosaic attenuation in the lungs with small bilateral pleural effusions. This is favored to reflect pulmonary edema. 3.There are a few focal areas of airspace disease in the lungs that could be related to edema or pneumonia. 4.There is severe left-sided hydronephrosis and diminished enhancement of the left renal parenchyma, which is only partially seen on this study, but concerning for obstructive uropathy of indeterminate age. 5.28 mm left thyroid lobe nodule. This could be evaluated with  ultrasound. 6.Fat-containing hiatal hernia. 7.Evidence of prior granulomatous infection. Electronically Signed: Salvador Love MD  11/19/2023 3:26 AM EST  Workstation ID: [id number]    XR Chest 1 View    Result Date: 11/19/2023  Impression: Increased interstitial disease that could reflect interstitial pulmonary edema. Electronically Signed: Salvador Love MD  11/19/2023 2:35 AM EST  Workstation ID: [id number]          Assessment & Plan   ASSESSMENT  Patient is a 63-year-old female admitted for shortness of breath and diagnosed with acute exacerbation of CHF also with a history of recent UTI and found to have bilateral hydronephrosis status post stent placement with urology.  Discharged home developed acute left upper extremity pain and was found to have acute limb ischemia due to a brachial artery occlusion.  Now status post left upper extremity thrombectomy and on anticoagulation.  We are asked to see to evaluate for possible hypercoagulable disorders.    Brachial artery thrombosis  Status post left arm thrombectomy on 11/21/2023  Heparin transitioned to Eliquis for discharge  Cardiology is sending home on ambulatory event monitor to evaluate for atrial fibrillation  Family history of blood clots in her father  Smoker    Normocytic anemia  Hemoglobin 10.0 g/dL, MCV 89.6  Baseline hemoglobin from 11/13/2023 11.3 g/dL  Iron 36, iron sat 11%, TIBC 320, ferritin 248.90  B12 728    Thrombocytosis  Platelets 716,000  Baseline platelets from 11/13/2023 491,000    PLAN  Continue Eliquis   Hypercoagulable workup in 3 months  Oral iron    Electronically signed by ZAN Paul, 11/22/23, 10:09 AM EST.    Patient seen and examined agree with assessment plan    Patient is a 63-year-old female who was admitted with acute exacerbation of CHF found to have acute limb ischemia due to brachial artery occlusion status post left arm thrombectomy transition to Eliquis after she was on heparin drip hematology consulted for  thrombocytosis, concerns for hypercoagulable work-up  Thrombocytosis was likely reactive secondary to iron deficiency anemia  I will recheck her CBC and iron studies in 3 weeks and follow-up.  Can start oral iron if no significant improvement  Patient has possible hypercoagulable state given brachial artery thrombosis, family history of DVT she will be a candidate for hypercoagulable work-up which we will complete in 3 months  Patient is okay to be discharged follow-up outpatient.    Thank you for this consult. We will be happy to follow along with you.     Suraj Cervantes MD

## 2023-11-22 NOTE — PROGRESS NOTES
Name: Neva Kurtz ADMIT: 2023   : 1960  PCP: Provider, No Known    MRN: [id number] LOS: 3 days   AGE/SEX: 63 y.o. female  ROOM:  [place] [address]/     CC: POD#1 s/p left arm thrombectomy  Interval History: No acute complaints this morning.  Incision healing well.  Palpable radial and ulnar pulses with normal cap refill in left hand.  Pain improved, able to use hand.    Subjective   Subjective     Review of Systems  Objective   Objective     Vitals:   Temp:  [97.5 °F (36.4 °C)-98.9 °F (37.2 °C)] 98.1 °F (36.7 °C)  Heart Rate:  [] 95  Resp:  [13-25] 18  BP: (117-155)/(59-90) 118/78    No intake/output data recorded.    Scheduled Meds:     cefTRIAXone, 2,000 mg, Intravenous, Q24H  furosemide, 40 mg, Oral, BID  metoprolol succinate XL, 25 mg, Oral, Q24H  nicotine, 1 patch, Transdermal, Q24H  senna-docusate sodium, 2 tablet, Oral, BID  sodium chloride, 10 mL, Intravenous, Q12H      IV Meds:   heparin, 18 Units/kg/hr, Last Rate: 22 Units/kg/hr (23 0535)  lactated ringers, 20 mL/hr        Physical Exam  NAD  NCAT  RRR  Respirations unlabored   L arm incision healing well with dermabond in place  Palpable L radial and ulnar pulses      Data Reviewed:  CBC    Results from last 7 days   Lab Units 23  0427 23  1347 23  2303 238 23  0207   WBC 10*3/mm3 10.10 7.30 8.00 11.50* 16.30*   HEMOGLOBIN g/dL 10.0* 11.0* 9.8* 9.2* 11.5*   PLATELETS 10*3/mm3 716* 695* 608* 535* 618*     BMP   Results from last 7 days   Lab Units 23  0427 23  1020 23  2303 23  0207   SODIUM mmol/L 139  --  142 140 139   POTASSIUM mmol/L 4.3 4.3 3.6 4.0 4.0   CHLORIDE mmol/L 101  --  103 105 102   CO2 mmol/L 27.0  --  26.0 25.0 23.0   BUN mg/dL 20  --  22 18 13   CREATININE mg/dL 1.32*  --  1.39* 1.15* 1.10*   GLUCOSE mg/dL 115*  --  103* 122* 152*     Cr Clearance Estimated Creatinine Clearance: 55.1 mL/min (A) (by C-G formula based on SCr of 1.32 mg/dL  "(H)).  Coag   Results from last 7 days   Lab Units 11/22/23  0427 11/21/23  1959 11/21/23  1347 11/19/23  0207   INR  0.95  --  0.98 0.98   APTT seconds 26.5* 26.1 26.2* 26.9*     HbA1C No results found for: \"HGBA1C\"  Blood Glucose No results found for: \"POCGLU\"  Infection   Results from last 7 days   Lab Units 11/20/23  1321 11/20/23  1320 11/19/23  0403 11/19/23  0353   BLOODCX  No growth at 24 hours No growth at 24 hours  --  No growth at 3 days  No growth at 3 days   URINECX   --   --  No growth  --    PROCALCITONIN ng/mL  --   --   --  1.41*     CMP   Results from last 7 days   Lab Units 11/22/23  0427 11/21/23  1020 11/20/23  2303 11/19/23  2318 11/19/23  0207   SODIUM mmol/L 139  --  142 140 139   POTASSIUM mmol/L 4.3 4.3 3.6 4.0 4.0   CHLORIDE mmol/L 101  --  103 105 102   CO2 mmol/L 27.0  --  26.0 25.0 23.0   BUN mg/dL 20  --  22 18 13   CREATININE mg/dL 1.32*  --  1.39* 1.15* 1.10*   GLUCOSE mg/dL 115*  --  103* 122* 152*   ALBUMIN g/dL  --   --   --   --  3.5   BILIRUBIN mg/dL  --   --   --   --  0.3   ALK PHOS U/L  --   --   --   --  92   AST (SGOT) U/L  --   --   --   --  13   ALT (SGPT) U/L  --   --   --   --  10     ABG      UA    Results from last 7 days   Lab Units 11/19/23  0403   NITRITE UA  Positive*   WBC UA /HPF 0-2   BACTERIA UA /HPF None Seen   SQUAM EPITHEL UA /HPF 0-2   URINECX  No growth     DURGA  No results found for: \"POCMETH\", \"POCAMPHET\", \"POCBARBITUR\", \"POCBENZO\", \"POCCOCAINE\", \"POCOPIATES\", \"POCOXYCODO\", \"POCPHENCYC\", \"POCPROPOXY\", \"POCTHC\", \"POCTRICYC\"  Lysis Labs   Results from last 7 days   Lab Units 11/22/23  0427 11/21/23  1959 11/21/23  1347 11/20/23  2303 11/19/23  2318 11/19/23  0207   INR  0.95  --  0.98  --   --  0.98   APTT seconds 26.5* 26.1 26.2*  --   --  26.9*   HEMOGLOBIN g/dL 10.0*  --  11.0* 9.8* 9.2* 11.5*   PLATELETS 10*3/mm3 716*  --  695* 608* 535* 618*   CREATININE mg/dL 1.32*  --   --  1.39* 1.15* 1.10*     Radiology(recent) CT Angiogram Upper Extremity " Left    Result Date: 2023  Impression: Findings consistent with complete to near complete occlusion of the left axillary artery with decreased contrast opacification throughout the remainder of the visualized arteries distally. This may be due to traumatic injury or significant noncalcified plaques Electronically Signed: [place] DO  2023 3:27 PM EST  Workstation ID: [id number]    XR Chest PA & Lateral    Result Date: 2023  Impression: Significant interval improvement with minimal infiltrates remaining. Electronically Signed: Marni Cohen MD  2023 8:17 AM EST  Workstation ID: [id number]       Active Hospital Problems:   Active Hospital Problems    Diagnosis  POA    **Acute exacerbation of CHF (congestive heart failure) [I50.9]  Yes    Chronic HFrEF (heart failure with reduced ejection fraction) [I50.22]  Yes      Resolved Hospital Problems   No resolved problems to display.      Assessment & Plan   [place], Subsequent Hospital Care  Assessment / Plan     63-year-old lady doing well status post left arm thrombectomy for embolism causing acute limb ischemia.  Vascular exam normal this morning and incision healing well.  I think we can go ahead and transition her to Eliquis 5 mg twice daily today  Messaged Dr. Hwang and his nurse practitioner via epic message regarding potential need for cardioembolic work-up.  Will defer this to cardiology.  She already has an echo performed on this admission and has been in sinus rhythm and will be on anticoagulation anyway so may not need much more in the way of work-up here.  Could potentially discharge home today if cleared from other providers.  We will follow.    Canelo Blackwell II, MD  23  07:47 EST  Office Number [phone]

## 2023-11-22 NOTE — CASE MANAGEMENT/SOCIAL WORK
Continued Stay Note  KRISTINE Quinones     Patient Name: Neva Kurtz  MRN: [id number]  Today's Date: 11/22/2023    Admit Date: 11/19/2023    Plan: Home. Daughter can transport at discharge.   Discharge Plan       Row Name 11/22/23 1427       Plan    Plan Home. Daughter can transport at discharge.    Plan Comments Will discharge home.  Family can transport at discharge                 Expected Discharge Date and Time       Expected Discharge Date Expected Discharge Time    Nov 22, 2023           Shayy Barnard RN

## 2023-11-22 NOTE — DISCHARGE SUMMARY
[facility] Medicine Services  DISCHARGE SUMMARY    Patient Name: Neva Kurtz  : 1960  MRN: [id number]    Date of Admission: 2023  Discharge Diagnosis: CHF exacerbation  Date of Discharge:  23  Primary Care Physician: Provider, No Known    Presenting Problem:   Acute pulmonary edema [J81.0]  Hypoxia [R09.02]  Acute exacerbation of CHF (congestive heart failure) [I50.9]  Dyspnea, unspecified type [R06.00]  Chronic HFrEF (heart failure with reduced ejection fraction) [I50.22]    Active and Resolved Hospital Problems:  Active Hospital Problems    Diagnosis POA    **Acute exacerbation of CHF (congestive heart failure) [I50.9] Yes    Chronic HFrEF (heart failure with reduced ejection fraction) [I50.22] Yes      Resolved Hospital Problems   No resolved problems to display.     CHF exacerbation  Sinus tachycardia  - CT PE Protocol: no PE, interlobular septal thickening, peribronchial thickening and mosaic attenuation in the lungs with small bilateral pleural effusions. This is favored to reflect pulmonary edema. There are a few focal areas of airspace disease in the lungs that could be related to edema or pneumonia.   - proBNP 9877, HS troponin 27, repeat troponins   - type of CHF unknown due to no previous echo  - 40mg IV lasix given in ED and will continue q12 hours  - give 5mg IV metoprolol now, discussed with attending  - pt has a history of SVT   - 2D echo-EF 40  - strict Is & Os  -Consulted cardiology     Pneumonia-afebrile/WBC--11.5,  - CT as above showed there are a few focal areas of airspace disease in the lungs that could be related to edema or pneumonia.   - WBC 16.30  - subjective fever, no fever currently  - IV antibiotics as ordered.rocephin added. Dc vanc/cefep  - requiring 1L O2 via NC  - follow blood cultures, check procalcitonin, urine antigens   - continuous pulse oximetry  - bronchodilators follow-up chest x-ray   Severe left sided  hydronephrosis  Obstructive uropathy  - CT: severe left-sided hydronephrosis and diminished enhancement of the left renal parenchyma, which is only partially seen on this study, but concerning for obstructive uropathy of indeterminate age.   - recent recurrent UTI, pansensitive treated with cefdinir  - IV antibiotics >PO OMNICEFT  - bladder scan only showed 160  - consulted urology for further recommendations     Thrombocytosis- plts 618  - start VTE lovenox  -Follow-up CBC     History of SVT  - pt is on no home medications for SVT as this was over 20 years ago and she had some type of procedure done for the SVT but unsure what. She believes she was on metoprolol in the past    -Cardiology to see the patient     Tobacco abuse  - encourage cessation  - nicotine patch      Hospital Course     Hospital Course:  Neva Kurtz is a 63 y.o. female with PMH of SVT not currently on medications who presented to [facility] ED 11/19/2023 with complaints of shortness of breath around 1am this morning. She woke up and felt like she could not take in a deep breath. She felt pressure in her mid chest. She was just discharged from here on 11/14 after treatment for a UTI. She has been taking her cefdinir. Daughter who works in healthcare at the bedside stated her HR was high during that admission 120s-130s and it was thought to be secondary to her infection. She has had lower extremity swelling since her discharge. She stated she started to urinate better after being discharged but then today had difficulty urinating again.   She has no PCP and does not routinely see a physician.      In the ED labs showed proBNP 9877, high-sensitivity troponin 27, creatinine 1.10, glucose 152, WBC 16.3, Hgb 11.5, platelets 618.  CXR showed increased interstitial disease that could reflect interstitial pulmonary edema.  CT PE protocol showed no PE, interlobular septal thickening, peribronchial thickening and mosaic attenuation in the lungs with small  bilateral pleural effusions. This is favored to reflect pulmonary edema. There are a few focal areas of airspace disease in the lungs that could be related to edema or pneumonia. There is severe left-sided hydronephrosis and diminished enhancement of the left renal parenchyma, which is only partially seen on this study, but concerning for obstructive uropathy of indeterminate age. 5.28 mm left thyroid lobe nodule. This could be evaluated with ultrasound. Fat-containing hiatal hernia. Evidence of prior granulomatous infection.  EKG showed sinus tachycardia rate 139. She was afebrile but with chills, elevated respiratory rate, blood pressure 150/92, mildly hypoxic placed on 1L O2 via nasal cannula with improvement in oxygen saturation 95%. She was given IV cefepime, IV vancomycin, 40 mg IV Lasix.  She was admitted for further treatment of pneumonia with hypoxia and new onset CHF.     Family history: Father  in his 40s from blood clot      23-Patient Reportsmild shortness of breath.  Feeling better.  Shortness of breath is improved.  Mild leg swelling.  Leg swelling is improved.  No chest pain currently.  No nausea vomiting.  No dizziness no lightheadedness.'  23 seen in bed NAD, no new complaints, says feels better Sats 95% on room air, MARKO RN,   23 seen in bed NAD,doing better today, no new complaints, discussed with RN.  2023 patient doing better today, will discharge patient home, condition on dc stable.    DISCHARGE Follow Up Recommendations for labs and diagnostics: follow with pcp in one week.    Reasons For Change In Medications and Indications for New Medications:  START taking:  aspirin   atorvastatin (Lipitor)   Farxiga (dapagliflozin)   furosemide (LASIX)   losartan (Cozaar)   metoprolol succinate XL (TOPROL-XL)   nicotine (NICODERM CQ)   potassium chloride    STOP taking:  ibuprofen 400 MG tablet (ADVIL,MOTRIN)     Day of Discharge     Vital Signs:  Temp:  [98 °F (36.7 °C)-98.9  °F (37.2 °C)] 98.1 °F (36.7 °C)  Heart Rate:  [] 95  Resp:  [13-25] 18  BP: (117-155)/(59-90) 118/78  Flow (L/min):  [4] 4    Physical Exam    Appearance: Normal appearance.   HENT:      Head: Normocephalic and atraumatic.      Nose: Nose normal.      Mouth/Throat:      Mouth: Mucous membranes are moist.   Cardiovascular:      Rate and Rhythm: Normal rate.   Pulmonary:      Effort: Pulmonary effort is normal. No respiratory distress.      Breath sounds: Normal breath sounds. No stridor. No wheezing, rhonchi or rales.   Chest:      Chest wall: No tenderness.   Abdominal:      General: Abdomen is flat. There is no distension.      Palpations: Abdomen is soft. There is no mass.      Tenderness: There is no abdominal tenderness.   Musculoskeletal:      Right lower leg: Edema present.      Left lower leg: Edema present.      Comments: Trace edema present both lower legs.  No calf tenderness bilaterally.   Neurological:      General: No focal deficit present.      Mental Status: She is alert.       Pertinent  and/or Most Recent Results     LAB RESULTS:      Lab 11/22/23  1104 11/22/23  0427 11/21/23  1959 11/21/23  1347 11/20/23  2303 11/19/23  2318 11/19/23  1003 11/19/23  0355 11/19/23  0353 11/19/23  0207   WBC  --  10.10  --  7.30 8.00 11.50*  --   --   --  16.30*   HEMOGLOBIN  --  10.0*  --  11.0* 9.8* 9.2*  --   --   --  11.5*   HEMATOCRIT  --  29.5*  --  32.7* 28.8* 28.2*  --   --   --  34.5   PLATELETS  --  716*  --  695* 608* 535*  --   --   --  618*   NEUTROS ABS  --  7.60*  --  4.90 4.60 8.90*  --   --   --  14.51*   LYMPHS ABS  --  1.70  --  1.30 2.20 1.50  --   --   --   --    MONOS ABS  --  0.70  --  1.00* 1.10* 1.10*  --   --   --   --    EOS ABS  --  0.00  --  0.10 0.10 0.00  --   --   --  0.33   MCV  --  89.6  --  90.3 91.3 91.9  --   --   --  91.3   PROCALCITONIN  --   --   --   --   --   --   --   --  1.41*  --    LACTATE  --   --   --   --   --   --  0.8 0.9  --   --    PROTIME  --  10.4  --   10.7  --   --   --   --   --  10.7   APTT 27.6* 26.5* 26.1 26.2*  --   --   --   --   --  26.9*         Lab 11/22/23  0427 11/21/23  1020 11/20/23  2303 11/19/23  2318 11/19/23  0207   SODIUM 139  --  142 140 139   POTASSIUM 4.3 4.3 3.6 4.0 4.0   CHLORIDE 101  --  103 105 102   CO2 27.0  --  26.0 25.0 23.0   ANION GAP 11.0  --  13.0 10.0 14.0   BUN 20 --  22 18 13   CREATININE 1.32*  --  1.39* 1.15* 1.10*   EGFR 45.7*  --  43.0* 54.0* 56.9*   GLUCOSE 115*  --  103* 122* 152*   CALCIUM 9.0  --  8.9 8.4* 9.4         Lab 11/19/23  0207   TOTAL PROTEIN 7.2   ALBUMIN 3.5   GLOBULIN 3.7   ALT (SGPT) 10   AST (SGOT) 13   BILIRUBIN 0.3   ALK PHOS 92         Lab 11/22/23  0427 11/21/23  1347 11/19/23  2018 11/19/23  1003 11/19/23  0353 11/19/23  0207   PROBNP  --   --   --   --   --  9,877.0*   HSTROP T  --   --  34* 53* 32* 27*  27*   PROTIME 10.4 10.7  --   --   --  10.7   INR 0.95 0.98  --   --   --  0.98             Lab 11/22/23  0427   IRON 36*   IRON SATURATION (TSAT) 11*   TIBC 320   TRANSFERRIN 215   FERRITIN 248.90*   VITAMIN B 12 728         Brief Urine Lab Results  (Last result in the past 365 days)        Color   Clarity   Blood   Leuk Est   Nitrite   Protein   CREAT   Urine HCG        11/19/23 0403 Dark Yellow   Clear   Negative   Trace   Positive   Negative                 Microbiology Results (last 10 days)       Procedure Component Value - Date/Time    Blood Culture - Blood, Hand, Right [[id number]]  (Normal) Collected: 11/20/23 1321    Lab Status: Preliminary result Specimen: Blood from Hand, Right Updated: 11/21/23 1345     Blood Culture No growth at 24 hours    Narrative:      Less than seven (7) mL's of blood was collected.  Insufficient quantity may yield false negative results.    Blood Culture - Blood, Arm, Right [[id number]]  (Normal) Collected: 11/20/23 1320    Lab Status: Preliminary result Specimen: Blood from Arm, Right Updated: 11/21/23 1345     Blood Culture No growth at 24 hours    Narrative:       Less than seven (7) mL's of blood was collected.  Insufficient quantity may yield false negative results.    MRSA Screen, PCR (Inpatient) - Swab, Nares [[id number]]  (Normal) Collected: 11/19/23 0951    Lab Status: Final result Specimen: Swab from Nares Updated: 11/19/23 1122     MRSA PCR No MRSA Detected    Narrative:      The negative predictive value of this diagnostic test is high and should only be used to consider de-escalating anti-MRSA therapy. A positive result may indicate colonization with MRSA and must be correlated clinically.    Urine Culture - Urine, Urine, Clean Catch [[id number]]  (Normal) Collected: 11/19/23 0403    Lab Status: Final result Specimen: Urine, Clean Catch Updated: 11/20/23 0936     Urine Culture No growth    Legionella Antigen, Urine - Urine, Urine, Clean Catch [[id number]]  (Normal) Collected: 11/19/23 0403    Lab Status: Final result Specimen: Urine, Clean Catch Updated: 11/19/23 0627     LEGIONELLA ANTIGEN, URINE Negative    S. Pneumo Ag Urine or CSF - Urine, Urine, Clean Catch [[id number]]  (Normal) Collected: 11/19/23 0403    Lab Status: Final result Specimen: Urine, Clean Catch Updated: 11/19/23 0627     Strep Pneumo Ag Negative    Blood Culture - Blood, Arm, Left [[id number]]  (Normal) Collected: 11/19/23 0353    Lab Status: Preliminary result Specimen: Blood from Arm, Left Updated: 11/22/23 0416     Blood Culture No growth at 3 days    Blood Culture - Blood, Arm, Right [[id number]]  (Normal) Collected: 11/19/23 0353    Lab Status: Preliminary result Specimen: Blood from Arm, Right Updated: 11/22/23 0416     Blood Culture No growth at 3 days    Narrative:      Less than seven (7) mL's of blood was collected.  Insufficient quantity may yield false negative results.    Blood Culture - Blood, Arm, Left [[id number]]  (Normal) Collected: 11/13/23 1307    Lab Status: Final result Specimen: Blood from Arm, Left Updated: 11/18/23 1317     Blood Culture No growth at 5 days    Blood Culture  - Blood, Arm, Left [[id number]]  (Normal) Collected: 11/13/23 1231    Lab Status: Final result Specimen: Blood from Arm, Left Updated: 11/18/23 1246     Blood Culture No growth at 5 days    Narrative:      Less than seven (7) mL's of blood was collected.  Insufficient quantity may yield false negative results.    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 This may be due to traumatic injury or significant noncalcified plaques Electronically Signed: [place] DO  11/21/2023 3:27 PM EST  Workstation ID: [id number]    XR Chest PA & Lateral    Result Date: 11/20/2023  Impression: Impression: Significant interval improvement with minimal infiltrates remaining. Electronically Signed: Marni Cohen MD  11/20/2023 8:17 AM EST  Workstation ID: [id number]    CT Abdomen Pelvis Stone Protocol    Result Date: 11/19/2023  Impression: Impression: 1.Severe right hydroureteronephrosis with suggestion of atypical attachment of right ureter to urinary bladder. There is also severe left hydronephrosis with abrupt transition to normal caliber left ureter, suggesting left UPJ stenosis. The connection of  the left ureter to the urinary bladder is not well identified. 2.5 cm cystic lesion within left pelvis posterior to urinary bladder, of unclear etiology but could represent a ureterocele. Electronically Signed: Naresh Dorantes MD  11/19/2023 8:29 AM EST  Workstation ID: [id number]    CT Angiogram Chest Pulmonary Embolism    Result Date: 11/19/2023  Impression: Impression: 1.No evidence of pulmonary  embolism. 2.There is interlobular septal thickening, peribronchial thickening and mosaic attenuation in the lungs with small bilateral pleural effusions. This is favored to reflect pulmonary edema. 3.There are a few focal areas of airspace disease in the lungs that could be related to edema or pneumonia. 4.There is severe left-sided hydronephrosis and diminished enhancement of the left renal parenchyma, which is only partially seen on this study, but concerning for obstructive uropathy of indeterminate age. 5.28 mm left thyroid lobe nodule. This could be evaluated with ultrasound. 6.Fat-containing hiatal hernia. 7.Evidence of prior granulomatous infection. Electronically Signed: Salvador Love MD  11/19/2023 3:26 AM EST  Workstation ID: [id number]    XR Chest 1 View    Result Date: 11/19/2023  Impression: Impression: Increased interstitial disease that could reflect interstitial pulmonary edema. Electronically Signed: Salvador Love MD  11/19/2023 2:35 AM EST  Workstation ID: [id number]    XR Chest 1 View    Result Date: 11/13/2023  Impression: Impression: Cardiomegaly with increased interstitial opacities, which can be seen with both edema and atypical infection. Negative for lobar consolidation. Electronically Signed: Shane Cobos MD  11/13/2023 12:48 PM EST  Workstation ID: [id number]     Results for orders placed during the hospital encounter of 11/19/23    Duplex Venous Upper Extremity - Left CAR    Interpretation Summary    Acute left upper extremity superficial thrombophlebitis noted in the basilic (upper arm) and cephalic (upper arm).    All other left sided veins appear normal.    Incidental note made of patent arteries in left upper extremity, with multiphasic signal in the subclavian artery but reduced velocities and dampened waveforms distally.      Results for orders placed during the hospital encounter of 11/19/23    Duplex Venous Upper Extremity - Left CAR    Interpretation Summary    Acute left upper  extremity superficial thrombophlebitis noted in the basilic (upper arm) and cephalic (upper arm).    All other left sided veins appear normal.    Incidental note made of patent arteries in left upper extremity, with multiphasic signal in the subclavian artery but reduced velocities and dampened waveforms distally.      Results for orders placed during the hospital encounter of 11/19/23    Adult Transthoracic Echo Complete W/ Cont if Necessary Per Protocol    Interpretation Summary    Left ventricular ejection fraction appears to be 41 - 45%.    The left ventricular cavity is borderline dilated.    Left ventricular wall thickness is consistent with mild concentric hypertrophy.    Left ventricular diastolic function is consistent with (grade Ia w/high LAP) impaired relaxation and age.    The left atrial cavity is mild to moderately dilated.    Estimated right ventricular systolic pressure from tricuspid regurgitation is normal (<35 mmHg).    There is a small (<1cm) pericardial effusion.    Transthoracic echocardiography reveals mild LVH with mildly dilated LV with overall EF mildly reduced measured around 45%  Diastolic dysfunction criteria noted with mild to moderate left atrial enlargement with mild MR.  Small pericardial effusion noted.      Electronically signed by Basil Hwang MD, 11/20/23, 4:09 PM EST.      Labs Pending at Discharge:  Pending Labs       Order Current Status    Tissue Pathology Exam In process    Blood Culture - Blood, Arm, Left Preliminary result    Blood Culture - Blood, Arm, Right Preliminary result    Blood Culture - Blood, Arm, Right Preliminary result    Blood Culture - Blood, Hand, Right Preliminary result            Procedures Performed  Procedure(s):  CYSTOSCOPY URETEROSCOPY RETROGRADE PYELOGRAM STENT INSERTION         Consults:   Consults       Date and Time Order Name Status Description    11/22/2023  9:07 AM Hematology & Oncology Inpatient Consult      11/21/2023 12:56 PM  Inpatient Vascular Surgery Consult Completed     11/19/2023  4:26 AM Inpatient Urology Consult Completed     11/19/2023  4:26 AM Inpatient Cardiology Consult Completed     11/19/2023  3:42 AM Hospitalist (on-call MD unless specified)            Assessment:        Acute exacerbation of CHF (congestive heart failure)     1) BLE edema / SOA  - BNP 9877  - CXR shows infiltrates vs edema  - CTA chest shows no PE  - on IV lasix  - 2D echo pending     2) tachycardia / hx PSVT  - sinus tachycardia, likely hemodynamically appropriate in setting PNA  - s/p dose IV beta blocker     3) Hydronephrosis s/p bilateral stenting 11/19  - urology following     4) LENO  - Cr 1.15     5) PNA  - per primary team     6) tobacco dependence     7) pelvic prolapse  - planned for OP GYN eval        Plan:   Tele shows SR/ST.  Continue on low dose beta blocker given hx PSVT.  Patient appears compensated.  Cr trending up.  Will transition IV lasix to PO.  2D echo pending.           Electronically signed by ZAN Lynn, 11/20/23, 1:36 PM EST.      Addendum: 2D echo shows EF = 45-50%.  On BB.  NO ACEi/ARB given recent hydronephrosis.  As d/w Dr. wHang, will plan outpatient stress test.     Electronically signed by ZAN Lynn, 11/20/23, 4:11 PM EST.             Cosigned by: Basil Hwang MD at 11/20/23 1611       Discharge Details        Discharge Medications        New Medications        Instructions Start Date   apixaban 5 MG tablet tablet  Commonly known as: ELIQUIS   5 mg, Oral, Every 12 Hours Scheduled      aspirin 81 MG EC tablet   81 mg, Oral, Daily      atorvastatin 10 MG tablet  Commonly known as: Lipitor   10 mg, Oral, Daily      Farxiga 5 MG tablet tablet  Generic drug: dapagliflozin   5 mg, Oral, Daily      furosemide 20 MG tablet  Commonly known as: LASIX  Notes to patient: This medication will cause you to urinate more often and can deplete your potassium. Notify your doctor if you feel weak or  have muscle cramps.    20 mg, Oral, 2 Times Daily      losartan 25 MG tablet  Commonly known as: Cozaar   25 mg, Oral, Daily      metoprolol succinate XL 25 MG 24 hr tablet  Commonly known as: TOPROL-XL  Notes to patient: This medication helps your heart work more effectively. It is a beta blocker medication. I can cause your heart rate to be low. If you feel tired,dizzy check your pulse and blood pressure. If low notify your PCP   25 mg, Oral, Every 24 Hours Scheduled      nicotine 21 MG/24HR patch  Commonly known as: NICODERM CQ   1 patch, Transdermal, Every 24 Hours Scheduled      potassium chloride 10 MEQ CR tablet   10 mEq, Oral, 2 Times Daily             Continue These Medications        Instructions Start Date   cefdinir 300 MG capsule  Commonly known as: OMNICEF  Notes to patient: Take with food to prevent stomach upset. May cause diarrhea,inform PCP if you have continuous diarrhea. Take this medication until gone to prevent infection from returning.    300 mg, Oral, 2 Times Daily      cetirizine 10 MG tablet  Commonly known as: zyrTEC   10 mg, Oral, Daily      multivitamin with minerals tablet tablet   1 tablet, Oral, Daily      phenazopyridine 100 MG tablet  Commonly known as: Pyridium   100 mg, Oral, 3 Times Daily             Stop These Medications      ibuprofen 400 MG tablet  Commonly known as: ADVIL,MOTRIN              Allergies   Allergen Reactions    Other Other (See Comments)     tetramycin    Tetracyclines & Related Swelling         Discharge Disposition:   Home or Self Care    Diet:  Hospital:  Diet Order   Procedures    Diet: Regular/House Diet; Texture: Regular Texture (IDDSI 7); Fluid Consistency: Thin (IDDSI 0)         Discharge Activity:   Activity Instructions       Gradually Increase Activity Until at Pre-Hospitalization Level      Gradually Increase Activity Until at Pre-Hospitalization Level                CODE STATUS:  Code Status and Medical Interventions:   Ordered at: 11/19/23 0450      Level Of Support Discussed With:    Patient     Code Status (Patient has no pulse and is not breathing):    CPR (Attempt to Resuscitate)     Medical Interventions (Patient has pulse or is breathing):    Full Support     I have utilized all available, immediate resources to obtain, update, or review the patient's current medications including all prescriptions, over-the-counter products, herbals, cannabis/cannabidiol products, and vitamin.mineral/dietary (nutritional) supplements.      Level Of Support Discussed With: Patient  Code Status (Patient has no pulse and is not breathing): CPR (Attempt to Resuscitate)  Medical Interventions (Patient has pulse or is breathing): Full Support    Next of kin of Power of :       Admission Status:  I believe this patient meets admit status.    Hospital Medicine Quality Measures for Heart Failure:  The patient has a history of heart transplant or LVAD. no  The patient has current prior documentation of left ventricular ejection fraction less than 40%.no  The patient was prescribed or IS already taking an angiotensin-converting enzyme (ACE) Inhibitor, or angiotensin receptor blocker (ARB): YES  The patient was prescribed or is already taking a beta-blocker YES      Future Appointments   Date Time Provider Department Center   12/11/2023 10:30 AM Yissel Ennis APRN [facility]   12/14/2023  2:15 PM Hans Diaz MD [facility]       Additional Instructions for the Follow-ups that You Need to Schedule       Discharge Follow-up with PCP   As directed       Currently Documented PCP:    Provider, No Known    PCP Phone Number:    None     Follow Up Details: 1 week        Discharge Follow-up with PCP   As directed       Currently Documented PCP:    Provider, No Known    PCP Phone Number:    None     Follow Up Details: 1 week                Time spent on Discharge including face to face service:  34 minutes      Signature: Electronically signed by Jai RANDOLPH  MD Chaka, 11/21/23, 9:42 AM EST.

## 2023-11-22 NOTE — PLAN OF CARE
Goal Outcome Evaluation:  Plan of Care Reviewed With: patient        Progress: improving  Outcome Evaluation: Pt with no complaints of pain this shift. Radial pulse on left extremity 2+.  Able to ambulate independently. Daughter in law at the bedside. VSS and call light within reach. Plan ongoing.

## 2023-11-23 NOTE — OUTREACH NOTE
Prep Survey      Flowsheet Row Responses   [facility] facility [facility] discharged from? [facility]   Is LACE score < 7 ? No   Eligibility [facility]   [facility]   Date of Admission 11/19/23   Date of Discharge 11/22/23   Discharge Disposition Home or Self Care   Discharge diagnosis A/C CHF, Acute limb ischemia left arm, UPPER EXTREMITY THROMBECTOMY/EMBOLECTOMY   Does the patient have one of the following disease processes/diagnoses(primary or secondary)? CHF   Does the patient have Home health ordered? No   Is there a DME ordered? No   Comments regarding appointments new PCP appt   Prep survey completed? Yes            Santa TEJADA - Registered Nurse

## 2023-11-23 NOTE — PAYOR COMM NOTE
"This is discharge notification for Neva Wise   Reference/Auth # [id number]   Pt discharged on 11/22/23    PENDING ADDITIONAL DAYS APPROVAL    NO DC SUMMARY AVAILABLE AT THIS TIME    Jaz Matthew RN, BSN  Utilization Review Nurse  [facility]  Direct & confidential phone # [phone]  Fax # [phone]      Neva Wise (63 y.o. Female)       Date of Birth   1960    Social Security Number       Address   [address]  [place] [address]    Home Phone   [phone]    MRN   [id number]       L[employer] day   [employer]    Marital Status   Single                            Admission Date   11/19/23    Admission Type   Emergency    Admitting Provider   Leona Mckinney MD    Attending Provider       Department, Room/Bed   [facility] SUR[facility] INPAT[facility], 4111/1       Discharge Date   11/22/2023    Discharge Disposition   Home or Self Care    Discharge Destination                                 Attending Provider: (none)   Allergies: Other, Tetracyclines & Related    Isolation: None   Infection: None   Code Status: Prior    Ht: 175.3 cm (69\")   Wt: 98.2 kg (216 lb 7.9 oz)    Admission Cmt: None   Principal Problem: Acute exacerbation of CHF (congestive heart failure) [I50.9]                   Active Insurance as of 11/19/2023       Primary Coverage       Payor Plan Insurance Group Employer/Plan Group    [facility] [employer] [facility] [employer] [facility] PPO [id number]       Payor Plan Address Payor Plan Phone Number Payor Plan Fax Number Effective Dates    [address] [phone]  7/1/2023 - None Entered    [place] [address]         Subscriber Name Subscriber Birth Date Member ID       NEVA WISE 1960 [id number]                     Emergency Contacts        (Rel.) Home Phone Work Phone Mobile Phone    Nicole Everett (Daughter) -- -- [phone]    NUNO CASTELLANO (Daughter) -- -- [phone]              Discharge Summary    No notes of this type exist " for this encounter.

## 2023-11-24 ENCOUNTER — TRANSITIONAL CARE MANAGEMENT TELEPHONE ENCOUNTER (OUTPATIENT)
Dept: CALL CENTER | Facility: HOSPITAL | Age: 63
End: 2023-11-24
Payer: COMMERCIAL

## 2023-11-24 LAB
BACTERIA SPEC AEROBE CULT: NORMAL
BACTERIA SPEC AEROBE CULT: NORMAL
LAB AP CASE REPORT: NORMAL
PATH REPORT.FINAL DX SPEC: NORMAL
PATH REPORT.GROSS SPEC: NORMAL

## 2023-11-24 NOTE — ANESTHESIA POSTPROCEDURE EVALUATION
Patient: Neva Kurtz    Procedure Summary       Date: 11/21/23 Room / Location: [facility] OR 04 / [facility] MAIN OR    Anesthesia Start: 1543 Anesthesia Stop: 1745    Procedure: UPPER EXTREMITY THROMBECTOMY/EMBOLECTOMY (Left: Arm Upper) Diagnosis:     Surgeons: Canelo Blackwell II, MD Provider: Brionna Rodríguez CRNA    Anesthesia Type: general, Regina ASA Status: 3            Anesthesia Type: general, Regina    Vitals  Vitals Value Taken Time   /84 11/21/23 1939   Temp 98.2 °F (36.8 °C) 11/21/23 1937   Pulse 96 11/21/23 1939   Resp 20 11/21/23 1937   SpO2 97 % 11/21/23 1939   Vitals shown include unfiled device data.        Post Anesthesia Care and Evaluation    Patient location during evaluation: PACU  Patient participation: complete - patient participated  Level of consciousness: awake  Pain scale: See nurse's notes for pain score.  Pain management: adequate    Airway patency: patent  Anesthetic complications: No anesthetic complications  PONV Status: none  Cardiovascular status: acceptable  Respiratory status: acceptable and spontaneous ventilation  Hydration status: acceptable    Comments: Patient seen and examined postoperatively; vital signs stable; SpO2 greater than or equal to 90%; cardiopulmonary status stable; nausea/vomiting adequately controlled; pain adequately controlled; no apparent anesthesia complications; patient discharged from anesthesia care when discharge criteria were met

## 2023-11-24 NOTE — CASE MANAGEMENT/SOCIAL WORK
Case Management Discharge Note      Final Note: HOME         Selected Continued Care - Discharged on 11/22/2023 Admission date: 11/19/2023 - Discharge disposition: Home or Self Care            Transportation Services  Private: Car    Final Discharge Disposition Code: 01 - home or self-care

## 2023-11-24 NOTE — OUTREACH NOTE
Call Center TCM Note      Flowsheet Row Responses   [facility] patient discharged from? [facility]   Does the patient have one of the following disease processes/diagnoses(primary or secondary)? CHF   TCM attempt successful? Yes   Call start time 1029   Call end time 1040   Meds reviewed with patient/caregiver? Yes   Is the patient having any side effects they believe may be caused by any medication additions or changes? No   Does the patient have all medications ordered at discharge? Yes   Is the patient taking all medications as directed (includes completed medication regime)? Yes   Comments New PCP appt already scheduled for 12/14/23-will route to office for review. Cardiology appt 12/11/23. Urology appt 12/04/23.   Does the patient have an appointment with their PCP within 7-14 days of discharge? No   Nursing Interventions Confirmed date/time of appointment, Routed TCM call to PCP office   Has home health visited the patient within 72 hours of discharge? N/A   Pulse Ox monitoring None   Psychosocial issues? No   Psychosocial comments DaughterNicole, lives next door.   Did the patient receive a copy of their discharge instructions? Yes   Nursing interventions Reviewed instructions with patient   What is the patient's perception of their health status since discharge? Improving   Nursing interventions Nurse provided patient education   Is the patient able to teach back signs and symptoms of worsening condition? (i.e. weight gain, shortness of air, etc.) Yes   If the patient is a current smoker, are they able to teach back resources for cessation? Smoking cessation medications  [Using nicotine patches.]   Is the patient/caregiver able to teach back the hierarchy of who to call/visit for symptoms/problems? PCP, Specialist, Home health nurse, Urgent Care, ED, 911 Yes   Is the patient able to teach back Heart Failure Zones? No   CHF Zone this Call Green Zone   Green Zone Patient reports doing well, No new or  worsening shortness of breath, Physical activity level is normal for you, No new swelling -  feet, ankles and legs look normal for you, Weight check stable, No chest pain   Green Zone Interventions Daily weight check, Meds as directed, Low sodium diet, Follow up visits planned   TCM call completed? Yes   Wrap up additional comments Patient states is improving. States will start daily weights at her daughter's house. Daughter lives next door. Reviewed weight parameters. Denies any needs or concerns today. TCM complete.   Call end time 1040   Would this patient benefit from a Referral to [facility] Social Work? No   Is the patient interested in additional calls from an ambulatory ? No             Claribel Cardona RN    11/24/2023, 10:42 EST

## 2023-11-25 LAB
BACTERIA SPEC AEROBE CULT: NORMAL
BACTERIA SPEC AEROBE CULT: NORMAL

## 2023-12-01 ENCOUNTER — READMISSION MANAGEMENT (OUTPATIENT)
Dept: CALL CENTER | Facility: HOSPITAL | Age: 63
End: 2023-12-01
Payer: COMMERCIAL

## 2023-12-01 NOTE — OUTREACH NOTE
CHF Week 2 Survey      Flowsheet Row Responses   [facility] patient discharged from? [facility]   Does the patient have one of the following disease processes/diagnoses(primary or secondary)? CHF   Week 2 attempt successful? Yes   Call start time 1541   Call end time 1546   Discharge diagnosis A/C CHF, Acute limb ischemia left arm, UPPER EXTREMITY THROMBECTOMY/EMBOLECTOMY   Person spoke with today (if not patient) and relationship Patient   Meds reviewed with patient/caregiver? Yes   Is the patient having any side effects they believe may be caused by any medication additions or changes? No   Does the patient have all medications ordered at discharge? Yes   Is the patient taking all medications as directed (includes completed medication regime)? Yes   Comments regarding appointments Oncology f/u appt on 12/6/23.  Cardiology f/u appt on 12/11/23.   Does the patient have a primary care provider?  Yes   Comments regarding PCP New PCP appt on 12/14/23 with Dr. Hans Diaz.   Has the patient kept scheduled appointments due by today? N/A   Has home health visited the patient within 72 hours of discharge? N/A   Pulse Ox monitoring None   Psychosocial issues? No   Comments Pt states she is doing very good. No swelling or shortness of breath. Incision from surgery in left arm healing well. no s/s of infection.   Did the patient receive a copy of their discharge instructions? Yes   Nursing interventions Reviewed instructions with patient   What is the patient's perception of their health status since discharge? Improving   Nursing interventions Nurse provided patient education   Is the patient able to teach back signs and symptoms of worsening condition? (i.e. weight gain, shortness of air, etc.) Yes   If the patient is a current smoker, are they able to teach back resources for cessation? Smoking cessation medications  [pt is using Nicoderm CQ for smoking cessation.]   Is the patient/caregiver able to teach back the  hierarchy of who to call/visit for symptoms/problems? PCP, Specialist, Home health nurse, Urgent Care, ED, 911 Yes   CHF Zone this Call Green Zone   Green Zone Patient reports doing well, No new or worsening shortness of breath, Physical activity level is normal for you, No new swelling -  feet, ankles and legs look normal for you, Weight check stable, No chest pain   Green Zone Interventions Daily weight check, Meds as directed, Low sodium diet, Follow up visits planned   CHF Week 2 call completed? Yes   Revoked No further contact(revokes)-requires comment   Is the patient interested in additional calls from an ambulatory ? No   Would this patient benefit from a Referral to [facility] Social Work? No   Graduated/Revoked comments Pt doing very well. No needs or concerns. All f/u appts scheduled. No further calls needed.   Call end time 6722            Brionna HERRERA - Registered Nurse

## 2023-12-04 NOTE — PROGRESS NOTES
Cardiology Office Follow Up Visit      Primary Care Provider:  Alan Arreola MD    Reason for f/u:     Hospital Follow-Up HF, preop CV eval      Subjective       History of Present Illness       Neva Kurtz is a 63 y.o. female seen in clinic today for hospital follow-up.    Patient has no prior history of ischemic heart disease.  PMH includes tobacco dependence in which patient quit following hospitalization 11/2023.  She presented to the ER 11/2023 with complaints of shortness of breath and noted with sinus tachycardia in the 130s.  She was found with bilateral hydronephrosis s/p bilateral stenting 11/19/23 complicated by LENO.  She was also mildly hypervolemic on exam, and 2D echo showed an EF of 45% with grade 1 diastolic dysfunction and mild MR.  She received diuresis, and was started on Toprol XL but no ACEi/ARB due to renal dysfunction.  She was planned for outpatient nuclear stress testing.  Prior to discharge she developed acute LUE pain and discovered with brachial thrombus, in which she underwent brachial thrombectomy 11/21/2023.  She was started on anticoagulation and discharged home with an ambulatory cardiac monitor for afib surveillance.  This has been turned in and results are pending.    Patient reports feeling well.  She denies any chest pain shortness of breath or dizziness.  She has had no further palpitations.  She denies further lower extremity edema.  She is able to perform activities such as climbing a flight of stairs without unusual difficulty.  She is planned for hysterectomy with Dr. Helen Gleason at [facility] the first week of January.  She is planned for outpatient hemoncology f/u with labs (presumably hypercoagulable work-up) in March.  Thereafter she will be seen by urology regarding her recent ureteral stents.      ASSESSMENT/PLAN:      Diagnoses and all orders for this visit:    1. Chronic HFrEF (heart failure with reduced ejection fraction) (Primary)  -      Basic Metabolic Panel; Future  -     Stress Test With Myocardial Perfusion One Day; Future    2. Preop cardiovascular exam    3. Tachycardia    Other orders  -     metoprolol succinate XL (TOPROL-XL) 25 MG 24 hr tablet; Take 1 tablet by mouth Daily.  Dispense: 30 tablet; Refill: 11            MEDICAL DECISION MAKING:    Will proceed with exercise nuclear stress testing.  Will repeat BMP and tentatively plan to start losartan 25 mg PO daily for GDMT pending results.  Will follow-up on results of ambulatory cardiac monitor.      RTC in 3 months.    Past Medical History:   Diagnosis Date    CHF (congestive heart failure)     SVT (supraventricular tachycardia)        Past Surgical History:   Procedure Laterality Date    ARM THROMBECTOMY/EMBOLECTOMY Left 11/21/2023    Procedure: UPPER EXTREMITY THROMBECTOMY/EMBOLECTOMY;  Surgeon: Canelo Blackwell II, MD;  Location: [facility] MAIN OR;  Service: Vascular;  Laterality: Left;    BREAST BIOPSY      CYSTOSCOPY, URETEROSCOPY, RETROGRADE PYELOGRAM, STENT INSERTION Bilateral 11/19/2023    Procedure: CYSTOSCOPY URETEROSCOPY RETROGRADE PYELOGRAM STENT INSERTION;  Surgeon: Alan Rodriguez MD;  Location: [facility] MAIN OR;  Service: Urology;  Laterality: Bilateral;         Current Outpatient Medications:     apixaban (ELIQUIS) 5 MG tablet tablet, Take 1 tablet by mouth Every 12 (Twelve) Hours. Indications: Other - full anticoagulation, Disp: 60 tablet, Rfl: 4    aspirin 81 MG EC tablet, Take 1 tablet by mouth Daily., Disp: 30 tablet, Rfl: 0    atorvastatin (Lipitor) 10 MG tablet, Take 1 tablet by mouth Daily., Disp: 90 tablet, Rfl: 0    cetirizine (zyrTEC) 10 MG tablet, Take 1 tablet by mouth Daily., Disp: , Rfl:     dapagliflozin (Farxiga) 5 MG tablet tablet, Take 1 tablet by mouth Daily., Disp: 30 tablet, Rfl: 0    furosemide (LASIX) 20 MG tablet, Take 1 tablet by mouth 2 (Two) Times a Day., Disp: 60 tablet, Rfl: 0    metoprolol succinate XL (TOPROL-XL) 25 MG 24 hr tablet, Take 1 tablet  "by mouth Daily., Disp: 30 tablet, Rfl: 11    nicotine (NICODERM CQ) 21 MG/24HR patch, Place 1 patch on the skin as directed by provider Daily., Disp: 28 each, Rfl: 0    potassium chloride 10 MEQ CR tablet, Take 1 tablet by mouth 2 (Two) Times a Day., Disp: 60 tablet, Rfl: 0    Social History     Socioeconomic History    Marital status: Single   Tobacco Use    Smoking status: Former     Packs/day: 1     Types: Cigarettes     Passive exposure: Never    Smokeless tobacco: Never   Vaping Use    Vaping Use: Never used   Substance and Sexual Activity    Alcohol use: Never    Drug use: Never    Sexual activity: Defer       History reviewed. No pertinent family history.    The following portions of the patient's history were reviewed and updated as appropriate: allergies, current medications, past family history, past medical history, past social history, past surgical history and problem list.    ROS  /84   Pulse 105   Ht 175.3 cm (69\")   Wt 96.2 kg (212 lb)   SpO2 100%   BMI 31.31 kg/m² .  Objective     Physical Exam    Physical Exam:  Neuro:  CV:  Resp:  GI:  Ext:  Pysch: AAOx3, no gross deficits  S1S2 RRR, no murmur  Non-labored, CTA  BS+, abd soft  Pedal pulses palp, no edema  Calm and cooperative       Procedures           "

## 2023-12-05 NOTE — PROGRESS NOTES
HEMATOLOGY ONCOLOGY OUTPATIENT FOLLOW UP       Patient name: Neva Kurtz  : 1960  MRN: [id number]  Primary Care Physician: Provider, No Known  Referring Physician: Provider, No Known  Reason For Consult:         History of Present Illness:    Neva Kurtz is a 63 y.o. female who presented to [facility] on 2023 with complaints of shortness of breath.  Past medical history of SVT.  Patient also had a recent history of discharge from the hospital on 2023 after treatment for UTI.  Work-up revealed CHF and also severe right hydroureteronephrosis.  Cardiology was consulted for the acute exacerbation of CHF and neurology saw the patient for her bilateral hydronephrosis and did a bilateral stent placement.  It was also recommended that she follow-up with a gynecologist for hysterectomy and suspension of the vaginal vault due to right hydronephrosis secondary to ureteral kinking due to severe uterine prolapse.  It was in the process of being discharged from the hospital on 2023 when she complained of severe acute left arm pain after her IV site was removed.  Evaluation revealed acute limb ischemia due to an occluded ulnar artery.  She was initiated on heparin and underwent a left arm thrombectomy on 2023.  Since that time she has been transitioned from heparin to Eliquis for discharge home.  Cardiology is still followed for possible cardioembolic work-up and plans to discharge home on an ambulatory cardiac monitor for atrial fibrillation surveillance.     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    23 - Hb 12.7  plt 511    Subjective:  No new symptoms, no arm pain, no bleeding.     Past Medical History:    Diagnosis Date    SVT (supraventricular tachycardia)        Past Surgical History:   Procedure Laterality Date    ARM THROMBECTOMY/EMBOLECTOMY Left 11/21/2023    Procedure: UPPER EXTREMITY THROMBECTOMY/EMBOLECTOMY;  Surgeon: Canelo Blackwell II, MD;  Location: [facility] MAIN OR;  Service: Vascular;  Laterality: Left;    BREAST BIOPSY      CYSTOSCOPY, URETEROSCOPY, RETROGRADE PYELOGRAM, STENT INSERTION Bilateral 11/19/2023    Procedure: CYSTOSCOPY URETEROSCOPY RETROGRADE PYELOGRAM STENT INSERTION;  Surgeon: Alan Rodriguez MD;  Location: [facility] MAIN OR;  Service: Urology;  Laterality: Bilateral;         Current Outpatient Medications:     apixaban (ELIQUIS) 5 MG tablet tablet, Take 1 tablet by mouth Every 12 (Twelve) Hours. Indications: Other - full anticoagulation, Disp: 60 tablet, Rfl: 0    aspirin 81 MG EC tablet, Take 1 tablet by mouth Daily., Disp: 30 tablet, Rfl: 0    atorvastatin (Lipitor) 10 MG tablet, Take 1 tablet by mouth Daily., Disp: 90 tablet, Rfl: 0    cetirizine (zyrTEC) 10 MG tablet, Take 1 tablet by mouth Daily., Disp: , Rfl:     dapagliflozin (Farxiga) 5 MG tablet tablet, Take 1 tablet by mouth Daily., Disp: 30 tablet, Rfl: 0    furosemide (LASIX) 20 MG tablet, Take 1 tablet by mouth 2 (Two) Times a Day., Disp: 60 tablet, Rfl: 0    metoprolol succinate XL (TOPROL-XL) 25 MG 24 hr tablet, Take 1 tablet by mouth Daily for 30 days., Disp: 30 tablet, Rfl: 0    multivitamin with minerals tablet tablet, Take 1 tablet by mouth Daily., Disp: , Rfl:     nicotine (NICODERM CQ) 21 MG/24HR patch, Place 1 patch on the skin as directed by provider Daily., Disp: 28 each, Rfl: 0    potassium chloride 10 MEQ CR tablet, Take 1 tablet by mouth 2 (Two) Times a Day., Disp: 60 tablet, Rfl: 0    Allergies   Allergen Reactions    Other Other (See Comments)     tetramycin    Tetracyclines & Related Swelling       No family history on file.    Cancer-related family history is not on file.    Social History     Tobacco Use     "Smoking status: Every Day     Packs/day: 1     Types: Cigarettes     Passive exposure: Never    Smokeless tobacco: Never   Vaping Use    Vaping Use: Never used   Substance Use Topics    Alcohol use: Never    Drug use: Never     Social History     Social History Narrative    Not on file          ROS:   Review of Systems   Constitutional:  Negative for fatigue and fever.   HENT:  Negative for congestion and nosebleeds.    Eyes:  Negative for pain.   Respiratory:  Negative for cough and shortness of breath.    Cardiovascular:  Negative for chest pain.   Gastrointestinal:  Negative for abdominal pain, blood in stool, diarrhea, nausea and vomiting.   Endocrine: Negative for cold intolerance and heat intolerance.   Genitourinary:  Negative for difficulty urinating.   Musculoskeletal:  Negative for arthralgias.   Skin:  Negative for rash.   Neurological:  Negative for dizziness and headaches.   Hematological:  Does not bruise/bleed easily.   Psychiatric/Behavioral:  Negative for behavioral problems.        Objective:  Vital signs:  Vitals:    12/06/23 0921   BP: 157/80   Pulse: 78   Resp: 16   Temp: 97.8 °F (36.6 °C)   SpO2: 98%   Weight: 95.8 kg (211 lb 3.2 oz)   Height: 175.3 cm (69\")   PainSc: 0-No pain     Body mass index is 31.19 kg/m².  ECOG  (0) Fully active, able to carry on all predisease performance without restriction    Physical Exam:   Physical Exam  Constitutional:       Appearance: Normal appearance.   HENT:      Head: Normocephalic and atraumatic.   Eyes:      Extraocular Movements: Extraocular movements intact.      Pupils: Pupils are equal, round, and reactive to light.   Cardiovascular:      Rate and Rhythm: Normal rate and regular rhythm.      Pulses: Normal pulses.      Heart sounds: No murmur heard.  Pulmonary:      Effort: Pulmonary effort is normal.      Breath sounds: Normal breath sounds.   Abdominal:      General: There is no distension.      Palpations: Abdomen is soft. There is no mass.      " Tenderness: There is no abdominal tenderness.   Musculoskeletal:         General: Normal range of motion.      Cervical back: Normal range of motion and neck supple.   Skin:     General: Skin is warm.   Neurological:      General: No focal deficit present.      Mental Status: She is alert.   Psychiatric:         Mood and Affect: Mood normal.         Lab Results - Last 18 Months   Lab Units 12/06/23  0907 11/22/23  0427 11/21/23  1347   WBC 10*3/mm3 8.94 10.10 7.30   HEMOGLOBIN g/dL 12.7 10.0* 11.0*   HEMATOCRIT % 40.3 29.5* 32.7*   PLATELETS 10*3/mm3 511* 716* 695*   MCV fL 95.5 89.6 90.3     Lab Results - Last 18 Months   Lab Units 11/22/23  0427 11/21/23  1020 11/20/23  2303 11/19/23  2318 11/19/23  0207 11/14/23  1014 11/13/23  1307   SODIUM mmol/L 139  --  142 140 139   < > 135*   POTASSIUM mmol/L 4.3 4.3 3.6 4.0 4.0   < > 3.8   CHLORIDE mmol/L 101  --  103 105 102   < > 102   CO2 mmol/L 27.0  --  26.0 25.0 23.0   < > 19.0*   BUN mg/dL 20  --  22 18 13   < > 12   CREATININE mg/dL 1.32*  --  1.39* 1.15* 1.10*   < > 0.94   CALCIUM mg/dL 9.0  --  8.9 8.4* 9.4   < > 8.8   BILIRUBIN mg/dL  --   --   --   --  0.3  --  0.4   ALK PHOS U/L  --   --   --   --  92  --  96   ALT (SGPT) U/L  --   --   --   --  10  --  9   AST (SGOT) U/L  --   --   --   --  13  --  15   GLUCOSE mg/dL 115*  --  103* 122* 152*   < > 122*    < > = values in this interval not displayed.       Lab Results   Component Value Date    GLUCOSE 115 (H) 11/22/2023    BUN 20 11/22/2023    CREATININE 1.32 (H) 11/22/2023    BCR 15.2 11/22/2023    K 4.3 11/22/2023    CO2 27.0 11/22/2023    CALCIUM 9.0 11/22/2023    ALBUMIN 3.5 11/19/2023    AST 13 11/19/2023    ALT 10 11/19/2023       Lab Results - Last 18 Months   Lab Units 11/22/23  1104 11/22/23  0427 11/21/23  1959 11/21/23  1347 11/19/23  0207   INR   --  0.95  --  0.98 0.98   APTT seconds 27.6* 26.5* 26.1 26.2* 26.9*       Lab Results   Component Value Date    IRON 36 (L) 11/22/2023    TIBC 320  "11/22/2023    FERRITIN 248.90 (H) 11/22/2023       No results found for: \"FOLATE\"    No results found for: \"OCCULTBLD\"    No results found for: \"RETICCTPCT\"  Lab Results   Component Value Date    WNWCEZHO34 728 11/22/2023     No results found for: \"SPEP\", \"UPEP\"  No results found for: \"LDH\", \"URICACID\"  No results found for: \"ZACHARY\", \"RF\", \"SEDRATE\"  No results found for: \"FIBRINOGEN\", \"HAPTOGLOBIN\"  Lab Results   Component Value Date    PTT 27.6 (L) 11/22/2023    INR 0.95 11/22/2023     No results found for: \"\"  No results found for: \"CEA\"  No components found for: \"CA-19-9\"  No results found for: \"PSA\"  No results found for: \"SEDRATE\"    Assessment & Plan       Patient is a 63-year-old female admitted for shortness of breath and diagnosed with acute exacerbation of CHF also with a history of recent UTI and found to have bilateral hydronephrosis status post stent placement with urology.  Discharged home developed acute left upper extremity pain and was found to have acute limb ischemia due to a brachial artery occlusion.  Now status post left upper extremity thrombectomy and on anticoagulation.  We are asked to see to evaluate for possible hypercoagulable disorders.     Brachial artery thrombosis  Status post left arm thrombectomy on 11/21/2023  Heparin transitioned to Eliquis for discharge  Cardiology is sending home on ambulatory event monitor to evaluate for atrial fibrillation  Family history of blood clots in her father  Stopped smoking now.     Normocytic anemia  Hemoglobin 10.0 g/dL, MCV 89.6  Baseline hemoglobin from 11/13/2023 11.3 g/dL  Iron 36, iron sat 11%, TIBC 320, ferritin 248.90  B12 728  Now hb improved to normal.     Thrombocytosis  Now improved   Improved hb      PLAN  Continue Eliquis   Hypercoagulable workup in 3 months           Thank you very much for providing the opportunity to participate in this patient’s care. Please do not hesitate to call if there are any other questions.  "

## 2023-12-06 ENCOUNTER — APPOINTMENT (OUTPATIENT)
Dept: LAB | Facility: HOSPITAL | Age: 63
End: 2023-12-06
Payer: COMMERCIAL

## 2023-12-06 ENCOUNTER — OFFICE VISIT (OUTPATIENT)
Dept: ONCOLOGY | Facility: CLINIC | Age: 63
End: 2023-12-06
Payer: COMMERCIAL

## 2023-12-06 VITALS
WEIGHT: 211.2 LBS | DIASTOLIC BLOOD PRESSURE: 80 MMHG | OXYGEN SATURATION: 98 % | SYSTOLIC BLOOD PRESSURE: 157 MMHG | RESPIRATION RATE: 16 BRPM | HEIGHT: 69 IN | HEART RATE: 78 BPM | BODY MASS INDEX: 31.28 KG/M2 | TEMPERATURE: 97.8 F

## 2023-12-06 DIAGNOSIS — I74.2 BRACHIAL ARTERY THROMBOSIS: Primary | ICD-10-CM

## 2023-12-06 LAB
BASOPHILS # BLD AUTO: 0.13 10*3/MM3 (ref 0–0.2)
BASOPHILS NFR BLD AUTO: 1.5 % (ref 0–1.5)
DEPRECATED RDW RBC AUTO: 52.3 FL (ref 37–54)
EOSINOPHIL # BLD AUTO: 0.3 10*3/MM3 (ref 0–0.4)
EOSINOPHIL NFR BLD AUTO: 3.4 % (ref 0.3–6.2)
ERYTHROCYTE [DISTWIDTH] IN BLOOD BY AUTOMATED COUNT: 15.4 % (ref 12.3–15.4)
HCT VFR BLD AUTO: 40.3 % (ref 34–46.6)
HGB BLD-MCNC: 12.7 G/DL (ref 12–15.9)
LYMPHOCYTES # BLD AUTO: 3.41 10*3/MM3 (ref 0.7–3.1)
LYMPHOCYTES NFR BLD AUTO: 38.1 % (ref 19.6–45.3)
MCH RBC QN AUTO: 30.1 PG (ref 26.6–33)
MCHC RBC AUTO-ENTMCNC: 31.5 G/DL (ref 31.5–35.7)
MCV RBC AUTO: 95.5 FL (ref 79–97)
MONOCYTES # BLD AUTO: 1.01 10*3/MM3 (ref 0.1–0.9)
MONOCYTES NFR BLD AUTO: 11.3 % (ref 5–12)
NEUTROPHILS NFR BLD AUTO: 4.09 10*3/MM3 (ref 1.7–7)
NEUTROPHILS NFR BLD AUTO: 45.7 % (ref 42.7–76)
PLATELET # BLD AUTO: 511 10*3/MM3 (ref 140–450)
PMV BLD AUTO: 8.5 FL (ref 6–12)
RBC # BLD AUTO: 4.22 10*6/MM3 (ref 3.77–5.28)
WBC NRBC COR # BLD AUTO: 8.94 10*3/MM3 (ref 3.4–10.8)

## 2023-12-06 PROCEDURE — 85025 COMPLETE CBC W/AUTO DIFF WBC: CPT | Performed by: INTERNAL MEDICINE

## 2023-12-11 ENCOUNTER — OFFICE VISIT (OUTPATIENT)
Dept: CARDIOLOGY | Facility: CLINIC | Age: 63
End: 2023-12-11
Payer: COMMERCIAL

## 2023-12-11 VITALS
DIASTOLIC BLOOD PRESSURE: 84 MMHG | HEIGHT: 69 IN | WEIGHT: 212 LBS | BODY MASS INDEX: 31.4 KG/M2 | SYSTOLIC BLOOD PRESSURE: 142 MMHG | HEART RATE: 105 BPM | OXYGEN SATURATION: 100 %

## 2023-12-11 DIAGNOSIS — R00.0 TACHYCARDIA: ICD-10-CM

## 2023-12-11 DIAGNOSIS — Z01.810 PREOP CARDIOVASCULAR EXAM: ICD-10-CM

## 2023-12-11 DIAGNOSIS — I50.22 CHRONIC HFREF (HEART FAILURE WITH REDUCED EJECTION FRACTION): Primary | ICD-10-CM

## 2023-12-11 PROCEDURE — 99214 OFFICE O/P EST MOD 30 MIN: CPT | Performed by: NURSE PRACTITIONER

## 2023-12-11 RX ORDER — METOPROLOL SUCCINATE 25 MG/1
25 TABLET, EXTENDED RELEASE ORAL
Qty: 30 TABLET | Refills: 11 | Status: SHIPPED | OUTPATIENT
Start: 2023-12-11

## 2023-12-14 ENCOUNTER — OFFICE VISIT (OUTPATIENT)
Dept: FAMILY MEDICINE CLINIC | Facility: CLINIC | Age: 63
End: 2023-12-14
Payer: COMMERCIAL

## 2023-12-14 VITALS
HEART RATE: 99 BPM | HEIGHT: 69 IN | WEIGHT: 212.8 LBS | OXYGEN SATURATION: 94 % | SYSTOLIC BLOOD PRESSURE: 132 MMHG | DIASTOLIC BLOOD PRESSURE: 94 MMHG | BODY MASS INDEX: 31.52 KG/M2 | RESPIRATION RATE: 16 BRPM

## 2023-12-14 DIAGNOSIS — Z72.0 TOBACCO USE: ICD-10-CM

## 2023-12-14 DIAGNOSIS — I50.22 CHRONIC HFREF (HEART FAILURE WITH REDUCED EJECTION FRACTION): Primary | ICD-10-CM

## 2023-12-14 DIAGNOSIS — N81.10 VAGINAL PROLAPSE: ICD-10-CM

## 2023-12-14 DIAGNOSIS — I74.2 BRACHIAL ARTERY THROMBOSIS: ICD-10-CM

## 2023-12-14 DIAGNOSIS — N13.30 HYDRONEPHROSIS, UNSPECIFIED HYDRONEPHROSIS TYPE: ICD-10-CM

## 2023-12-14 PROBLEM — E78.5 HYPERLIPIDEMIA: Status: ACTIVE | Noted: 2023-12-14

## 2023-12-14 PROBLEM — Z01.810 PREOP CARDIOVASCULAR EXAM: Status: RESOLVED | Noted: 2023-12-11 | Resolved: 2023-12-14

## 2023-12-14 PROBLEM — G51.0 BELL'S PALSY: Status: ACTIVE | Noted: 2023-12-14

## 2023-12-14 PROBLEM — I10 HYPERTENSION: Status: ACTIVE | Noted: 2023-12-14

## 2023-12-14 PROBLEM — R00.0 TACHYCARDIA: Status: RESOLVED | Noted: 2023-12-11 | Resolved: 2023-12-14

## 2023-12-14 RX ORDER — NICOTINE 21 MG/24HR
1 PATCH, TRANSDERMAL 24 HOURS TRANSDERMAL
Qty: 28 EACH | Refills: 0 | Status: SHIPPED | OUTPATIENT
Start: 2023-12-14

## 2023-12-14 RX ORDER — DAPAGLIFLOZIN 5 MG/1
5 TABLET, FILM COATED ORAL DAILY
Qty: 30 TABLET | Refills: 2 | Status: SHIPPED | OUTPATIENT
Start: 2023-12-14

## 2023-12-14 NOTE — PROGRESS NOTES
Chief Complaint  Establish Care    HPI:    Neva Kurtz presents to [facility]    Patient presenting to establish care with new PCP.    Patient previously evaluated at [facility] for shortness of breath.  Diagnosed with acute CHF exacerbation by cardiology.  Echo with EF of 41 to 45% with borderline dilated LV and grade 1 diastolic dysfunction.  Patient was started on IV diuretics and eventually discharged home with metoprolol XL 25 mg daily, Lasix 20 mg daily, Farxiga 5 mg daily, and potassium 10 mill equivalents daily.  Patient recommended to follow-up with cardiology as an outpatient and undergo outpatient stress test on January 2nd.    During same admission, patient noted to have bilateral hydronephrosis.  Urology consulted and performed bilateral stent placement.  Urology also recommended following up with gynecologist for hysterectomy and suspension of vaginal vault due to ureteral kinking causing right hydronephrosis felt was secondary to severe uterine prolapse.   She is planned for initial evaluation for possible hysterectomy with Dr. Helen Gleason at [facility] January 9th.     Hematology/Oncology previously consulted for hypercoagulable evaluation in a patient with an acute left upper extremity limb ischemia secondary to near complete occlusion of the left axillary artery status post left thrombectomy and on anticoagulation with heparin and transitioned to Eliquis for discharge. Occlusion occurred after IV site removed.  Patient evaluated by hematology on 12/6/2023 and recommended continued Eliquis for now and hypercoagulable workup in 3 months. Due to follow up March 2024.     Tobacco use  Previous was about 1 PPD for decades. She has cut back significantly and has not had any the last couple of days on the patch.     Patient overall doing well after getting diruesis. Denies chest pain, shortness of breath, orthopnea, PND, and lower extremity  edema. Denies palpitations, tachycardia, dizziness, lightheadedness, and syncope.Shortness of breath with activity or rest. Weight has been stable. She has been watching salt, fluid intake. Compliant with medications. She has not started on losartan 25 mg daily until after stress test.     Patient with family history of blood clot with dad and brother both having blood clots. She is tolerating blood thinner well without bleeding or bruising.     Remote history of left sided [place] Palsy about 20 years ago. She had significant amount of facial droop otherwise no symptoms.     Review of Systems:  ROS negative unless otherwise noted in HPI above.    Past Medical History:   Diagnosis Date    CHF (congestive heart failure)     Prolapsed uterus     SVT (supraventricular tachycardia)          Current Outpatient Medications:     apixaban (ELIQUIS) 5 MG tablet tablet, Take 1 tablet by mouth Every 12 (Twelve) Hours. Indications: Other - full anticoagulation, Disp: 60 tablet, Rfl: 4    aspirin 81 MG EC tablet, Take 1 tablet by mouth Daily., Disp: 30 tablet, Rfl: 0    atorvastatin (Lipitor) 10 MG tablet, Take 1 tablet by mouth Daily., Disp: 90 tablet, Rfl: 0    cetirizine (zyrTEC) 10 MG tablet, Take 1 tablet by mouth Daily., Disp: , Rfl:     dapagliflozin (Farxiga) 5 MG tablet tablet, Take 1 tablet by mouth Daily., Disp: 30 tablet, Rfl: 2    furosemide (LASIX) 20 MG tablet, Take 1 tablet by mouth 2 (Two) Times a Day., Disp: 60 tablet, Rfl: 0    metoprolol succinate XL (TOPROL-XL) 25 MG 24 hr tablet, Take 1 tablet by mouth Daily., Disp: 30 tablet, Rfl: 11    nicotine (NICODERM CQ) 21 MG/24HR patch, Place 1 patch on the skin as directed by provider Daily., Disp: 28 each, Rfl: 0    potassium chloride 10 MEQ CR tablet, Take 1 tablet by mouth 2 (Two) Times a Day., Disp: 60 tablet, Rfl: 0    Social History     Socioeconomic History    Marital status: Single   Tobacco Use    Smoking status: Former     Packs/day: 1.00     Years: 40.00  "    Additional pack years: 0.00     Total pack years: 40.00     Types: Cigarettes     Quit date: 2023     Years since quittin.1     Passive exposure: Never    Smokeless tobacco: Never   Vaping Use    Vaping Use: Never used   Substance and Sexual Activity    Alcohol use: Never    Drug use: Never    Sexual activity: Defer        Objective   Vital Signs:  /94   Pulse 99   Resp 16   Ht 175.3 cm (69\")   Wt 96.5 kg (212 lb 12.8 oz)   SpO2 94%   BMI 31.43 kg/m²   Estimated body mass index is 31.43 kg/m² as calculated from the following:    Height as of this encounter: 175.3 cm (69\").    Weight as of this encounter: 96.5 kg (212 lb 12.8 oz).    Physical Exam:  General: Well-appearing patient, no apparent distress  HEENT: No posterior pharynx erythema, no tonsillar erythema or exudates  Cardiac: Regular rate and rhythm, normal S1/S2, no murmur, rubs or gallops, no lower extremity edema  Lungs: Clear to auscultation bilaterally, no crackles or wheezes  Abdomen: Soft, non-tender, no guarding or rebound tenderness, no hepatosplenomegaly  Skin: No significant rashes or lesions  MSK: Grossly normal tone and strength  Neuro: Alert and oriented x3, CN II-XII grossly intact  Psych: Appropriate mood and affect    Assessment and Plan:    (I50.22) Chronic HFrEF (heart failure with reduced ejection fraction)  Assessment: Patient with systolic heart failure. Most recent echocardiogram with EF of 45%. Weight recently overall stable. Currently appears euvolemic exam and is compliant with medication including metoprolol, Farxiga, Lasix, and potassium replacement.  Patient aware of importance of medication compliance and follow-up to prevent readmission.  Follow-up with cardiology and outpatient stress test already ordered.   Plan:  - Continue current medications as prescribed  - Limit salt and fluid intake  - Monitor weight  - Follow up with cardiology as scheduled  - Stress test per cardiology  - Anticipate cardiology " adding losartan based on recent labs and indication  - Follow-up if new or worsening symptoms    (N13.30) Hydronephrosis, unspecified hydronephrosis type  Assessment: Most likely secondary to ureteral kink from uterine prolapse.  Currently status post bilateral stent placement with urology.  Recent labs overall improving and has good urine output.  Plan:  - Follow-up with urology as scheduled    (N81.10) Vaginal prolapse  Assessment: Currently scheduled for surgery evaluation for possible hysterectomy.  Plan:  - Follow-up with surgeon as scheduled    (Z72.0) Tobacco use  Assessment: Approximately 40-pack-year history of tobacco use.  Patient has cut back significantly and intending complete tobacco cessation.  Discussed the importance of complete cessation to overall health.  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  Now currently status post left arm thrombectomy on Eliquis doing well.  Asymptomatic.  Hematology currently planning on 3 months of Eliquis with subsequent hypercoagulable workup given family history.  Plan:  - Continue Eliquis as prescribed  - Follow-up with hematology as scheduled  - Follow-up for hypercoagulable workup as ordered  - Encouraged tobacco cessation    Patient was given instructions and counseling regarding her condition or for health maintenance advice. Please see specific information pulled into the AVS if appropriate.       Dr Hans Diaz   Internal Medicine Physician  [facility]  [address], [address]  [place] [address]

## 2023-12-14 NOTE — PATIENT INSTRUCTIONS
- Up to date with labs    - Continue current medications as prescribed    - Follow up with cardiology as scheduled    - Stress test per cardiology    - Anticipate cardiology adding losartan based on recent labs and indication    - Limit salt and fluid intake    - Monitor daily weight    - Hysterectomy as scheduled    - Follow up with urology as scheduled    - Encourage healthy diet and exercise    - Encourage continue tobacco cessation    - Follow up in 3 months for recheck

## 2023-12-18 RX ORDER — FUROSEMIDE 20 MG/1
20 TABLET ORAL 2 TIMES DAILY
Qty: 60 TABLET | Refills: 0 | Status: SHIPPED | OUTPATIENT
Start: 2023-12-18

## 2023-12-18 RX ORDER — POTASSIUM CHLORIDE 750 MG/1
10 TABLET, FILM COATED, EXTENDED RELEASE ORAL 2 TIMES DAILY
Qty: 60 TABLET | Refills: 0 | Status: SHIPPED | OUTPATIENT
Start: 2023-12-18

## 2023-12-27 ENCOUNTER — LAB (OUTPATIENT)
Dept: LAB | Facility: HOSPITAL | Age: 63
End: 2023-12-27
Payer: COMMERCIAL

## 2023-12-27 DIAGNOSIS — I50.22 CHRONIC HFREF (HEART FAILURE WITH REDUCED EJECTION FRACTION): ICD-10-CM

## 2023-12-27 LAB
ANION GAP SERPL CALCULATED.3IONS-SCNC: 13.8 MMOL/L (ref 5–15)
BUN SERPL-MCNC: 25 MG/DL (ref 8–23)
BUN/CREAT SERPL: 26.6 (ref 7–25)
CALCIUM SPEC-SCNC: 9.4 MG/DL (ref 8.6–10.5)
CHLORIDE SERPL-SCNC: 103 MMOL/L (ref 98–107)
CO2 SERPL-SCNC: 24.2 MMOL/L (ref 22–29)
CREAT SERPL-MCNC: 0.94 MG/DL (ref 0.57–1)
EGFRCR SERPLBLD CKD-EPI 2021: 68.7 ML/MIN/1.73
GLUCOSE SERPL-MCNC: 81 MG/DL (ref 65–99)
POTASSIUM SERPL-SCNC: 4.2 MMOL/L (ref 3.5–5.2)
SODIUM SERPL-SCNC: 141 MMOL/L (ref 136–145)

## 2023-12-27 PROCEDURE — 36415 COLL VENOUS BLD VENIPUNCTURE: CPT

## 2023-12-27 PROCEDURE — 80048 BASIC METABOLIC PNL TOTAL CA: CPT

## 2023-12-29 DIAGNOSIS — I50.22 CHRONIC HFREF (HEART FAILURE WITH REDUCED EJECTION FRACTION): Primary | ICD-10-CM

## 2023-12-29 RX ORDER — LOSARTAN POTASSIUM 25 MG/1
25 TABLET ORAL DAILY
Qty: 30 TABLET | Refills: 11 | Status: SHIPPED | OUTPATIENT
Start: 2023-12-29

## 2024-01-16 ENCOUNTER — HOSPITAL ENCOUNTER (OUTPATIENT)
Dept: CARDIOLOGY | Facility: HOSPITAL | Age: 64
Discharge: HOME | End: 2024-01-16
Payer: COMMERCIAL

## 2024-01-16 ENCOUNTER — LAB (OUTPATIENT)
Dept: LAB | Facility: HOSPITAL | Age: 64
End: 2024-01-16
Payer: COMMERCIAL

## 2024-01-16 DIAGNOSIS — I50.22 CHRONIC HFREF (HEART FAILURE WITH REDUCED EJECTION FRACTION): ICD-10-CM

## 2024-01-16 LAB
ANION GAP SERPL CALCULATED.3IONS-SCNC: 10 MMOL/L (ref 5–15)
BH CV STRESS BP STAGE 1: NORMAL
BH CV STRESS DURATION MIN STAGE 1: 3
BH CV STRESS DURATION MIN STAGE 2: 3
BH CV STRESS DURATION SEC STAGE 1: 0
BH CV STRESS DURATION SEC STAGE 2: 0
BH CV STRESS GRADE STAGE 1: 10
BH CV STRESS GRADE STAGE 2: 12
BH CV STRESS HR STAGE 1: 134
BH CV STRESS HR STAGE 2: 142
BH CV STRESS METS STAGE 1: 5
BH CV STRESS METS STAGE 2: 7.5
BH CV STRESS PROTOCOL 1: NORMAL
BH CV STRESS RECOVERY BP: NORMAL MMHG
BH CV STRESS RECOVERY HR: 98 BPM
BH CV STRESS SPEED STAGE 1: 1.7
BH CV STRESS SPEED STAGE 2: 2.5
BH CV STRESS STAGE 1: 1
BH CV STRESS STAGE 2: 2
BUN SERPL-MCNC: 23 MG/DL (ref 8–23)
BUN/CREAT SERPL: 24.5 (ref 7–25)
CALCIUM SPEC-SCNC: 9.9 MG/DL (ref 8.6–10.5)
CHLORIDE SERPL-SCNC: 103 MMOL/L (ref 98–107)
CO2 SERPL-SCNC: 29 MMOL/L (ref 22–29)
CREAT SERPL-MCNC: 0.94 MG/DL (ref 0.57–1)
EGFRCR SERPLBLD CKD-EPI 2021: 68.7 ML/MIN/1.73
GLUCOSE SERPL-MCNC: 98 MG/DL (ref 65–99)
LV EF NUC BP: 54 %
MAXIMAL PREDICTED HEART RATE: 158 BPM
PERCENT MAX PREDICTED HR: 89.87 %
POTASSIUM SERPL-SCNC: 3.8 MMOL/L (ref 3.5–5.2)
SODIUM SERPL-SCNC: 142 MMOL/L (ref 136–145)
STRESS BASELINE BP: NORMAL MMHG
STRESS BASELINE HR: 100 BPM
STRESS PERCENT HR: 106 %
STRESS POST EXERCISE DUR MIN: 6 MIN
STRESS POST EXERCISE DUR SEC: 23 SEC
STRESS POST PEAK BP: NORMAL MMHG
STRESS POST PEAK HR: 142 BPM
STRESS TARGET HR: 134 BPM

## 2024-01-16 PROCEDURE — A9500 TC99M SESTAMIBI: HCPCS | Performed by: NURSE PRACTITIONER

## 2024-01-16 PROCEDURE — 0 TECHNETIUM SESTAMIBI: Performed by: NURSE PRACTITIONER

## 2024-01-16 PROCEDURE — 93017 CV STRESS TEST TRACING ONLY: CPT

## 2024-01-16 PROCEDURE — 80048 BASIC METABOLIC PNL TOTAL CA: CPT

## 2024-01-16 PROCEDURE — 78452 HT MUSCLE IMAGE SPECT MULT: CPT

## 2024-01-16 PROCEDURE — 36415 COLL VENOUS BLD VENIPUNCTURE: CPT

## 2024-01-16 RX ORDER — METOPROLOL SUCCINATE 25 MG/1
25 TABLET, EXTENDED RELEASE ORAL
Qty: 30 TABLET | Refills: 11 | Status: SHIPPED | OUTPATIENT
Start: 2024-01-16

## 2024-01-16 RX ORDER — FUROSEMIDE 20 MG/1
20 TABLET ORAL 2 TIMES DAILY
Qty: 60 TABLET | Refills: 0 | Status: SHIPPED | OUTPATIENT
Start: 2024-01-16

## 2024-01-16 RX ORDER — POTASSIUM CHLORIDE 750 MG/1
10 TABLET, FILM COATED, EXTENDED RELEASE ORAL 2 TIMES DAILY
Qty: 60 TABLET | Refills: 0 | Status: SHIPPED | OUTPATIENT
Start: 2024-01-16

## 2024-01-16 RX ADMIN — TECHNETIUM TC 99M SESTAMIBI 1 DOSE: 1 INJECTION INTRAVENOUS at 08:15

## 2024-01-16 RX ADMIN — TECHNETIUM TC 99M SESTAMIBI 1 DOSE: 1 INJECTION INTRAVENOUS at 09:35

## 2024-02-14 RX ORDER — POTASSIUM CHLORIDE 750 MG/1
10 TABLET, FILM COATED, EXTENDED RELEASE ORAL 2 TIMES DAILY
Qty: 60 TABLET | Refills: 0 | Status: SHIPPED | OUTPATIENT
Start: 2024-02-14

## 2024-02-14 RX ORDER — FUROSEMIDE 20 MG/1
20 TABLET ORAL 2 TIMES DAILY
Qty: 60 TABLET | Refills: 0 | Status: SHIPPED | OUTPATIENT
Start: 2024-02-14

## 2024-02-19 ENCOUNTER — PATIENT MESSAGE (OUTPATIENT)
Dept: FAMILY MEDICINE CLINIC | Facility: CLINIC | Age: 64
End: 2024-02-19
Payer: COMMERCIAL

## 2024-02-20 RX ORDER — ATORVASTATIN CALCIUM 10 MG/1
10 TABLET, FILM COATED ORAL DAILY
Qty: 90 TABLET | Refills: 2 | Status: SHIPPED | OUTPATIENT
Start: 2024-02-20

## 2024-02-20 NOTE — TELEPHONE ENCOUNTER
From: Neva Kurtz  To: Hans Diaz  Sent: 2/19/2024 7:47 PM EST  Subject: Atorvastatin    Could you please send a 90 day prescription in for Atorvastatin 10mg to [facility]? Thank you.

## 2024-02-28 ENCOUNTER — LAB (OUTPATIENT)
Dept: LAB | Facility: HOSPITAL | Age: 64
End: 2024-02-28
Payer: COMMERCIAL

## 2024-02-28 DIAGNOSIS — I74.2 BRACHIAL ARTERY THROMBOSIS: ICD-10-CM

## 2024-02-28 LAB
AT III PPP CHRO-ACNC: >128 % (ref 75–120)
BASOPHILS # BLD AUTO: 0.09 10*3/MM3 (ref 0–0.2)
BASOPHILS NFR BLD AUTO: 1.3 % (ref 0–1.5)
D DIMER PPP FEU-MCNC: 0.54 MG/L (FEU) (ref 0–0.62)
DEPRECATED RDW RBC AUTO: 46.6 FL (ref 37–54)
EOSINOPHIL # BLD AUTO: 0.24 10*3/MM3 (ref 0–0.4)
EOSINOPHIL NFR BLD AUTO: 3.4 % (ref 0.3–6.2)
ERYTHROCYTE [DISTWIDTH] IN BLOOD BY AUTOMATED COUNT: 14.3 % (ref 12.3–15.4)
FACT VIII ACT/NOR PPP: 206 % ACTIVITY (ref 70–160)
HCT VFR BLD AUTO: 45.4 % (ref 34–46.6)
HCYS SERPL-MCNC: 14.8 UMOL/L (ref 0–15)
HGB BLD-MCNC: 14.6 G/DL (ref 12–15.9)
LYMPHOCYTES # BLD AUTO: 3.21 10*3/MM3 (ref 0.7–3.1)
LYMPHOCYTES NFR BLD AUTO: 45.5 % (ref 19.6–45.3)
MCH RBC QN AUTO: 30 PG (ref 26.6–33)
MCHC RBC AUTO-ENTMCNC: 32.2 G/DL (ref 31.5–35.7)
MCV RBC AUTO: 93.4 FL (ref 79–97)
MONOCYTES # BLD AUTO: 0.82 10*3/MM3 (ref 0.1–0.9)
MONOCYTES NFR BLD AUTO: 11.6 % (ref 5–12)
NEUTROPHILS NFR BLD AUTO: 2.7 10*3/MM3 (ref 1.7–7)
NEUTROPHILS NFR BLD AUTO: 38.2 % (ref 42.7–76)
PLATELET # BLD AUTO: 386 10*3/MM3 (ref 140–450)
PMV BLD AUTO: 8.6 FL (ref 6–12)
RBC # BLD AUTO: 4.86 10*6/MM3 (ref 3.77–5.28)
WBC NRBC COR # BLD AUTO: 7.06 10*3/MM3 (ref 3.4–10.8)

## 2024-02-28 PROCEDURE — 85300 ANTITHROMBIN III ACTIVITY: CPT | Performed by: INTERNAL MEDICINE

## 2024-02-28 PROCEDURE — 81240 F2 GENE: CPT | Performed by: INTERNAL MEDICINE

## 2024-02-28 PROCEDURE — 83090 ASSAY OF HOMOCYSTEINE: CPT | Performed by: INTERNAL MEDICINE

## 2024-02-28 PROCEDURE — 85025 COMPLETE CBC W/AUTO DIFF WBC: CPT

## 2024-02-28 PROCEDURE — 85379 FIBRIN DEGRADATION QUANT: CPT | Performed by: INTERNAL MEDICINE

## 2024-02-28 PROCEDURE — 36415 COLL VENOUS BLD VENIPUNCTURE: CPT

## 2024-02-28 PROCEDURE — 85240 CLOT FACTOR VIII AHG 1 STAGE: CPT | Performed by: INTERNAL MEDICINE

## 2024-02-28 PROCEDURE — 81241 F5 GENE: CPT | Performed by: INTERNAL MEDICINE

## 2024-02-29 LAB
CARDIOLIPIN IGG SER IA-ACNC: <9 GPL U/ML (ref 0–14)
CARDIOLIPIN IGM SER IA-ACNC: 40 MPL U/ML (ref 0–12)
PROT S ACT/NOR PPP: 79 % (ref 63–140)

## 2024-03-01 LAB
APTT SCREEN TO CONFIRM RATIO: 1.11 RATIO (ref 0–1.34)
B2 GLYCOPROT1 IGA SER-ACNC: <9 GPI IGA UNITS (ref 0–25)
B2 GLYCOPROT1 IGG SER-ACNC: <9 GPI IGG UNITS (ref 0–20)
B2 GLYCOPROT1 IGM SER-ACNC: 81 GPI IGM UNITS (ref 0–32)
CONFIRM APTT/NORMAL: 42.4 SEC (ref 0–47.6)
DRVVT SCREEN TO CONFIRM RATIO: 1.2 RATIO (ref 0.8–1.2)
F5 GENE MUT ANL BLD/T: NORMAL
FACTOR II, DNA ANALYSIS: NORMAL
LA 2 SCREEN W REFLEX-IMP: ABNORMAL
MIXING DRVVT: 48.2 SEC (ref 0–40.4)
SCREEN APTT: 42.5 SEC (ref 0–43.5)
SCREEN DRVVT: 53.5 SEC (ref 0–47)
THROMBIN TIME: 17.1 SEC (ref 0–23)

## 2024-03-04 NOTE — PROGRESS NOTES
HEMATOLOGY ONCOLOGY OUTPATIENT FOLLOW UP       Patient name: Neva Kurtz  : 1960  MRN: [id number]  Primary Care Physician: Hans Diaz MD  Referring Physician: No ref. provider found  Reason For Consult:         History of Present Illness:    Neva Kurtz is a 63 y.o. female who presented to [facility] on 2023 with complaints of shortness of breath.  Past medical history of SVT.  Patient also had a recent history of discharge from the hospital on 2023 after treatment for UTI.  Work-up revealed CHF and also severe right hydroureteronephrosis.  Cardiology was consulted for the acute exacerbation of CHF and neurology saw the patient for her bilateral hydronephrosis and did a bilateral stent placement.  It was also recommended that she follow-up with a gynecologist for hysterectomy and suspension of the vaginal vault due to right hydronephrosis secondary to ureteral kinking due to severe uterine prolapse.  It was in the process of being discharged from the hospital on 2023 when she complained of severe acute left arm pain after her IV site was removed.  Evaluation revealed acute limb ischemia due to an occluded ulnar artery.  She was initiated on heparin and underwent a left arm thrombectomy on 2023.  Since that time she has been transitioned from heparin to Eliquis for discharge home.  Cardiology is still followed for possible cardioembolic work-up and plans to discharge home on an ambulatory cardiac monitor for atrial fibrillation surveillance.     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    23 - Hb 12.7  plt 511    24 - APLS testing with positive IgM for anticardiolipin and anti beta-2  glycoprotein, negative for lupus anticoagulant    Subjective:  No new symptoms or concerns    Past Medical History:   Diagnosis Date    CHF (congestive heart failure)     Prolapsed uterus     SVT (supraventricular tachycardia)        Past Surgical History:   Procedure Laterality Date    ARM THROMBECTOMY/EMBOLECTOMY Left 11/21/2023    Procedure: UPPER EXTREMITY THROMBECTOMY/EMBOLECTOMY;  Surgeon: Canelo Blackwell II, MD;  Location: [facility] MAIN OR;  Service: Vascular;  Laterality: Left;    BREAST BIOPSY      CYSTOSCOPY, URETEROSCOPY, RETROGRADE PYELOGRAM, STENT INSERTION Bilateral 11/19/2023    Procedure: CYSTOSCOPY URETEROSCOPY RETROGRADE PYELOGRAM STENT INSERTION;  Surgeon: Alan Rodriguez MD;  Location: [facility] MAIN OR;  Service: Urology;  Laterality: Bilateral;         Current Outpatient Medications:     apixaban (ELIQUIS) 5 MG tablet tablet, Take 1 tablet by mouth Every 12 (Twelve) Hours. Indications: Other - full anticoagulation, Disp: 60 tablet, Rfl: 4    aspirin 81 MG EC tablet, Take 1 tablet by mouth Daily., Disp: 30 tablet, Rfl: 0    atorvastatin (Lipitor) 10 MG tablet, Take 1 tablet by mouth Daily., Disp: 90 tablet, Rfl: 2    cetirizine (zyrTEC) 10 MG tablet, Take 1 tablet by mouth Daily., Disp: , Rfl:     dapagliflozin (Farxiga) 5 MG tablet tablet, Take 1 tablet by mouth Daily., Disp: 90 tablet, Rfl: 1    furosemide (LASIX) 20 MG tablet, Take 1 tablet by mouth 2 (Two) Times a Day., Disp: 60 tablet, Rfl: 0    losartan (Cozaar) 25 MG tablet, Take 1 tablet by mouth Daily., Disp: 30 tablet, Rfl: 11    metoprolol succinate XL (TOPROL-XL) 25 MG 24 hr tablet, Take 1 tablet by mouth Daily., Disp: 30 tablet, Rfl: 11    nicotine (NICODERM CQ) 21 MG/24HR patch, Place 1 patch on the skin as directed by provider Daily., Disp: 28 each, Rfl: 0    potassium chloride 10 MEQ CR tablet, Take 1 tablet by mouth 2 (Two) Times a Day., Disp: 60 tablet, Rfl: 0    Allergies   Allergen Reactions    Other Other (See Comments)      "tetramycin    Tetracyclines & Related Swelling       Family History   Problem Relation Age of Onset    Hyperlipidemia Mother     Diabetes Mother     Cancer Mother     Other Father        Cancer-related family history includes Cancer in her mother.    Social History     Tobacco Use    Smoking status: Former     Current packs/day: 0.00     Average packs/day: 1 pack/day for 40.0 years (40.0 ttl pk-yrs)     Types: Cigarettes     Start date: 1983     Quit date: 2023     Years since quittin.3     Passive exposure: Never    Smokeless tobacco: Never   Vaping Use    Vaping status: Never Used   Substance Use Topics    Alcohol use: Never    Drug use: Never     Social History     Social History Narrative    Works for [employer]. Retiring 2024.           ROS:   Review of Systems   Constitutional:  Negative for fatigue and fever.   HENT:  Negative for congestion and nosebleeds.    Eyes:  Negative for pain.   Respiratory:  Negative for cough and shortness of breath.    Cardiovascular:  Negative for chest pain.   Gastrointestinal:  Negative for abdominal pain, blood in stool, diarrhea, nausea and vomiting.   Endocrine: Negative for cold intolerance and heat intolerance.   Genitourinary:  Negative for difficulty urinating.   Musculoskeletal:  Negative for arthralgias.   Skin:  Negative for rash.   Neurological:  Negative for dizziness and headaches.   Hematological:  Does not bruise/bleed easily.   Psychiatric/Behavioral:  Negative for behavioral problems.        Objective:  Vital signs:  Vitals:    24 0945   BP: 127/83   Pulse: 67   Resp: 14   Temp: 98.4 °F (36.9 °C)   SpO2: 98%   Weight: 99.3 kg (219 lb)   Height: 175.3 cm (69\")   PainSc: 0-No pain       Body mass index is 32.34 kg/m².  ECOG  (0) Fully active, able to carry on all predisease performance without restriction    Physical Exam:   Physical Exam  Constitutional:       Appearance: Normal appearance.   HENT:      Head: Normocephalic and atraumatic. "   Eyes:      Extraocular Movements: Extraocular movements intact.      Pupils: Pupils are equal, round, and reactive to light.   Cardiovascular:      Rate and Rhythm: Normal rate and regular rhythm.      Pulses: Normal pulses.      Heart sounds: No murmur heard.  Pulmonary:      Effort: Pulmonary effort is normal.      Breath sounds: Normal breath sounds.   Abdominal:      General: There is no distension.      Palpations: Abdomen is soft. There is no mass.      Tenderness: There is no abdominal tenderness.   Musculoskeletal:         General: Normal range of motion.      Cervical back: Normal range of motion and neck supple.   Skin:     General: Skin is warm.   Neurological:      General: No focal deficit present.      Mental Status: She is alert.   Psychiatric:         Mood and Affect: Mood normal.         Lab Results - Last 18 Months   Lab Units 02/28/24  0915 12/06/23  0907 11/22/23  0427   WBC 10*3/mm3 7.06 8.94 10.10   HEMOGLOBIN g/dL 14.6 12.7 10.0*   HEMATOCRIT % 45.4 40.3 29.5*   PLATELETS 10*3/mm3 386 511* 716*   MCV fL 93.4 95.5 89.6     Lab Results - Last 18 Months   Lab Units 01/16/24  1131 12/27/23  1208 11/22/23  0427 11/19/23  2318 11/19/23  0207 11/14/23  1014 11/13/23  1307   SODIUM mmol/L 142 141 139   < > 139   < > 135*   POTASSIUM mmol/L 3.8 4.2 4.3   < > 4.0   < > 3.8   CHLORIDE mmol/L 103 103 101   < > 102   < > 102   CO2 mmol/L 29.0 24.2 27.0   < > 23.0   < > 19.0*   BUN mg/dL 23 25* 20   < > 13   < > 12   CREATININE mg/dL 0.94 0.94 1.32*   < > 1.10*   < > 0.94   CALCIUM mg/dL 9.9 9.4 9.0   < > 9.4   < > 8.8   BILIRUBIN mg/dL  --   --   --   --  0.3  --  0.4   ALK PHOS U/L  --   --   --   --  92  --  96   ALT (SGPT) U/L  --   --   --   --  10  --  9   AST (SGOT) U/L  --   --   --   --  13  --  15   GLUCOSE mg/dL 98 81 115*   < > 152*   < > 122*    < > = values in this interval not displayed.       Lab Results   Component Value Date    GLUCOSE 98 01/16/2024    BUN 23 01/16/2024    CREATININE  "0.94 01/16/2024    BCR 24.5 01/16/2024    K 3.8 01/16/2024    CO2 29.0 01/16/2024    CALCIUM 9.9 01/16/2024    ALBUMIN 3.5 11/19/2023    AST 13 11/19/2023    ALT 10 11/19/2023       Lab Results - Last 18 Months   Lab Units 11/22/23  1104 11/22/23  0427 11/21/23  1959 11/21/23  1347 11/19/23  0207   INR   --  0.95  --  0.98 0.98   APTT seconds 27.6* 26.5* 26.1 26.2* 26.9*       Lab Results   Component Value Date    IRON 36 (L) 11/22/2023    TIBC 320 11/22/2023    FERRITIN 248.90 (H) 11/22/2023       No results found for: \"FOLATE\"    No results found for: \"OCCULTBLD\"    No results found for: \"RETICCTPCT\"  Lab Results   Component Value Date    CBFGPJGQ28 728 11/22/2023     No results found for: \"SPEP\", \"UPEP\"  No results found for: \"LDH\", \"URICACID\"  No results found for: \"ZACHARY\", \"RF\", \"SEDRATE\"  No results found for: \"FIBRINOGEN\", \"HAPTOGLOBIN\"  Lab Results   Component Value Date    PTT 27.6 (L) 11/22/2023    INR 0.95 11/22/2023     No results found for: \"\"  No results found for: \"CEA\"  No components found for: \"CA-19-9\"  No results found for: \"PSA\"  No results found for: \"SEDRATE\"    Assessment & Plan       Patient is a 63-year-old female admitted for shortness of breath and diagnosed with acute exacerbation of CHF also with a history of recent UTI and found to have bilateral hydronephrosis status post stent placement with urology.  Discharged home developed acute left upper extremity pain and was found to have acute limb ischemia due to a brachial artery occlusion.  Now status post left upper extremity thrombectomy and on anticoagulation.  We are asked to see to evaluate for possible hypercoagulable disorders.     Brachial artery thrombosis/antiphospholipid syndrome  Status post left arm thrombectomy on 11/21/2023  Heparin transitioned to Eliquis for discharge  Cardiology is sending home on ambulatory event monitor to evaluate for atrial fibrillation  Family history of blood clots in her father  Patient has " stopped smoking.   2/3 criteria positive for APLS  Continue ELIQUIS, repeat APLS testing in 3 months  Plan for hysterectomy will need bridging with lovenox     Normocytic anemia  Hemoglobin 10.0 g/dL, MCV 89.6  Baseline hemoglobin from 11/13/2023 11.3 g/dL  Iron 36, iron sat 11%, TIBC 320, ferritin 248.90  B12 728  Now hb improved to normal.     Thrombocytosis  Now improved   Improved hb      PLAN  Continue Eliquis   APLS in 3 months and follow up           Thank you very much for providing the opportunity to participate in this patient’s care. Please do not hesitate to call if there are any other questions.

## 2024-03-06 ENCOUNTER — OFFICE VISIT (OUTPATIENT)
Dept: ONCOLOGY | Facility: CLINIC | Age: 64
End: 2024-03-06
Payer: COMMERCIAL

## 2024-03-06 VITALS
OXYGEN SATURATION: 98 % | SYSTOLIC BLOOD PRESSURE: 127 MMHG | HEIGHT: 69 IN | DIASTOLIC BLOOD PRESSURE: 83 MMHG | HEART RATE: 67 BPM | BODY MASS INDEX: 32.44 KG/M2 | RESPIRATION RATE: 14 BRPM | WEIGHT: 219 LBS | TEMPERATURE: 98.4 F

## 2024-03-06 DIAGNOSIS — I74.2 BRACHIAL ARTERY THROMBOSIS: Primary | ICD-10-CM

## 2024-03-06 PROCEDURE — 99214 OFFICE O/P EST MOD 30 MIN: CPT | Performed by: INTERNAL MEDICINE

## 2024-03-06 NOTE — PROGRESS NOTES
Cardiology Office Follow Up Visit      Primary Care Provider:  Hans Diaz MD    Reason for f/u:     Eval 3 Month HF, PSVT      Subjective       History of Present Illness       Neva Kurtz is a 63 y.o. female seen in clinic today for continued cardiac care of chronic systolic heart failure and PSVT.    Patient has no prior history of ischemic heart disease.  PMH includes tobacco dependence in which patient quit following hospitalization 11/2023.  She presented to the ER 11/2023 with complaints of shortness of breath and noted with sinus tachycardia in the 130s.  She was found with bilateral hydronephrosis s/p bilateral stenting 11/19/23 complicated by LENO.  She was also mildly hypervolemic on exam, and 2D echo showed an EF of 45% with grade 1 diastolic dysfunction and mild MR.  She received diuresis, and was started on Toprol XL but no ACEi/ARB due to renal dysfunction.  Prior to discharge she developed acute LUE pain and discovered with brachial thrombus, in which she underwent brachial thrombectomy 11/21/2023.  She was started on anticoagulation and discharged home with an ambulatory cardiac monitor for afib surveillance.  This showed an episode of PSVT but no afib.  Outpatient nuclear stress test 1/2024 showed no ischemia.  Repeat BMP 1/2024 showed Na 142, K 3.8, Cr 0.94.  She has been started on ARB for GDMT.       Patient states she is feeling better than she has felt in a long time.  She has now been more active performing light yard work without unusual difficulty.  She denies further palpitations.  She denies any edema, shortness of breath, PND/orthopnea.  She tells me she has seen the heme oncologist and her hypercoagulable workup is pending.  Her hysterectomy was postponed till next Tuesday.  Her urologist, Dr. Rodriguez, is planning on leaving the ureteral stents until post hysterectomy.      ASSESSMENT/PLAN:      Diagnoses and all orders for this visit:    1. Chronic HFrEF (heart failure with  reduced ejection fraction) (Primary)    2. PSVT (paroxysmal supraventricular tachycardia)    Other orders  -     dapagliflozin Propanediol (Farxiga) 10 MG tablet; Take 10 mg by mouth Daily.  Dispense: 90 tablet; Refill: 3            MEDICAL DECISION MAKING:    Patient appears compensated with respect to volume.  She is on appropriate GDMT: ARB, BB, and Farxiga in which I will increase her to the heart failure dose of 10 mg/day.  We discussed holding Farxiga preoperatively in the future.      RTC in 6 months.    Past Medical History:   Diagnosis Date    CHF (congestive heart failure)     Prolapsed uterus     SVT (supraventricular tachycardia)        Past Surgical History:   Procedure Laterality Date    ARM THROMBECTOMY/EMBOLECTOMY Left 11/21/2023    Procedure: UPPER EXTREMITY THROMBECTOMY/EMBOLECTOMY;  Surgeon: Canelo Blackwell II, MD;  Location: [facility] MAIN OR;  Service: Vascular;  Laterality: Left;    BREAST BIOPSY      CYSTOSCOPY, URETEROSCOPY, RETROGRADE PYELOGRAM, STENT INSERTION Bilateral 11/19/2023    Procedure: CYSTOSCOPY URETEROSCOPY RETROGRADE PYELOGRAM STENT INSERTION;  Surgeon: Alan Rodriguez MD;  Location: [facility] MAIN OR;  Service: Urology;  Laterality: Bilateral;         Current Outpatient Medications:     apixaban (ELIQUIS) 5 MG tablet tablet, Take 1 tablet by mouth Every 12 (Twelve) Hours. Indications: Other - full anticoagulation, Disp: 60 tablet, Rfl: 4    aspirin 81 MG EC tablet, Take 1 tablet by mouth Daily., Disp: 30 tablet, Rfl: 0    atorvastatin (Lipitor) 10 MG tablet, Take 1 tablet by mouth Daily., Disp: 90 tablet, Rfl: 2    cetirizine (zyrTEC) 10 MG tablet, Take 1 tablet by mouth Daily., Disp: , Rfl:     furosemide (LASIX) 20 MG tablet, Take 1 tablet by mouth 2 (Two) Times a Day., Disp: 60 tablet, Rfl: 0    losartan (Cozaar) 25 MG tablet, Take 1 tablet by mouth Daily., Disp: 30 tablet, Rfl: 11    metoprolol succinate XL (TOPROL-XL) 25 MG 24 hr tablet, Take 1 tablet by mouth Daily., Disp: 30  "tablet, Rfl: 11    potassium chloride 10 MEQ CR tablet, Take 1 tablet by mouth 2 (Two) Times a Day., Disp: 60 tablet, Rfl: 0    dapagliflozin Propanediol (Farxiga) 10 MG tablet, Take 10 mg by mouth Daily., Disp: 90 tablet, Rfl: 3    Social History     Socioeconomic History    Marital status: Single   Tobacco Use    Smoking status: Former     Current packs/day: 0.00     Average packs/day: 1 pack/day for 40.0 years (40.0 ttl pk-yrs)     Types: Cigarettes     Start date: 1983     Quit date: 2023     Years since quittin.3     Passive exposure: Never    Smokeless tobacco: Never   Vaping Use    Vaping status: Never Used   Substance and Sexual Activity    Alcohol use: Never    Drug use: Never    Sexual activity: Defer       Family History   Problem Relation Age of Onset    Hyperlipidemia Mother     Diabetes Mother     Cancer Mother     Other Father        The following portions of the patient's history were reviewed and updated as appropriate: allergies, current medications, past family history, past medical history, past social history, past surgical history and problem list.    ROS  /81   Pulse 75   Ht 175.3 cm (69\")   Wt 99.3 kg (219 lb)   SpO2 99%   BMI 32.34 kg/m² .  Objective     Physical Exam    Physical Exam:  Neuro:  CV:  Resp:  GI:  Ext:  Pysch: AAOx3, no gross deficits  S1S2 RRR, no murmur  Non-labored, CTA  BS+, abd soft  Pedal pulses palp, no edema  Calm and cooperative       Procedures           "

## 2024-03-11 RX ORDER — POTASSIUM CHLORIDE 750 MG/1
10 TABLET, FILM COATED, EXTENDED RELEASE ORAL 2 TIMES DAILY
Qty: 60 TABLET | Refills: 0 | Status: SHIPPED | OUTPATIENT
Start: 2024-03-11

## 2024-03-11 RX ORDER — FUROSEMIDE 20 MG/1
20 TABLET ORAL 2 TIMES DAILY
Qty: 60 TABLET | Refills: 0 | Status: SHIPPED | OUTPATIENT
Start: 2024-03-11

## 2024-03-15 ENCOUNTER — OFFICE VISIT (OUTPATIENT)
Dept: FAMILY MEDICINE CLINIC | Facility: CLINIC | Age: 64
End: 2024-03-15
Payer: COMMERCIAL

## 2024-03-15 VITALS
OXYGEN SATURATION: 97 % | HEIGHT: 69 IN | SYSTOLIC BLOOD PRESSURE: 118 MMHG | DIASTOLIC BLOOD PRESSURE: 74 MMHG | HEART RATE: 71 BPM | BODY MASS INDEX: 32.29 KG/M2 | RESPIRATION RATE: 16 BRPM | WEIGHT: 218 LBS

## 2024-03-15 DIAGNOSIS — N13.30 HYDRONEPHROSIS, UNSPECIFIED HYDRONEPHROSIS TYPE: ICD-10-CM

## 2024-03-15 DIAGNOSIS — I50.22 CHRONIC HFREF (HEART FAILURE WITH REDUCED EJECTION FRACTION): Primary | ICD-10-CM

## 2024-03-15 DIAGNOSIS — N81.10 VAGINAL PROLAPSE: ICD-10-CM

## 2024-03-15 DIAGNOSIS — Z72.0 TOBACCO USE: ICD-10-CM

## 2024-03-15 DIAGNOSIS — I74.2 BRACHIAL ARTERY THROMBOSIS: ICD-10-CM

## 2024-03-15 PROCEDURE — 99214 OFFICE O/P EST MOD 30 MIN: CPT | Performed by: INTERNAL MEDICINE

## 2024-03-15 NOTE — PROGRESS NOTES
Chief Complaint  Chronic HFrEF (heart failure with reduced ejection fraction    HPI:    Neva Kurtz presents to [facility]    Patient presenting for follow-up of multiple medical issues. Patient overall feeling good since the last time she was seen.    Patient previously seen in posthospital follow-up after recent heart failure diagnosis with reduced EF.  Patient overall doing well on current regimen including metoprolol XL 25 mg daily, Lasix 20 mg daily, Farxiga 5 mg daily, and potassium 10 meq daily.  Weight overall stable. Denies chest pain, shortness of breath, orthopnea, PND, lower extremity edema, palpitations, tachycardia previous stress test negative.  Follow-up with cardiology as scheduled on 3/18/2024.     Hematology/Oncology previously consulted for hypercoagulable evaluation in a patient with an acute left upper extremity limb ischemia secondary to near complete occlusion of the left axillary artery status post left thrombectomy and on anticoagulation with heparin and transitioned to Eliquis for discharge.  Patient's status post thrombectomy.  Most recently followed up with hematology on 3/6/2024 and positive for antiphospholipid antibodies.  Patient recommended to continue Eliquis and follow-up for APLS in 3 months.  Hematology stated that she will require bridging for upcoming hysterectomy.     She was previously seen by urogynocology previously and underwent urodynamic studies. She is due for repeat later this month and possibly will be scheduled for procedure in mid April.  Denies dysuria, urgency, frequency, hematuria, cloudy/foul smelling urine, or flank pain.      Review of Systems:    ROS negative unless otherwise noted in HPI above.    Past Medical History:   Diagnosis Date    CHF (congestive heart failure)     Prolapsed uterus     SVT (supraventricular tachycardia)          Current Outpatient Medications:     apixaban (ELIQUIS) 5 MG tablet tablet, Take 1  "tablet by mouth Every 12 (Twelve) Hours. Indications: Other - full anticoagulation, Disp: 60 tablet, Rfl: 4    aspirin 81 MG EC tablet, Take 1 tablet by mouth Daily., Disp: 30 tablet, Rfl: 0    atorvastatin (Lipitor) 10 MG tablet, Take 1 tablet by mouth Daily., Disp: 90 tablet, Rfl: 2    cetirizine (zyrTEC) 10 MG tablet, Take 1 tablet by mouth Daily., Disp: , Rfl:     dapagliflozin (Farxiga) 5 MG tablet tablet, Take 1 tablet by mouth Daily., Disp: 90 tablet, Rfl: 1    furosemide (LASIX) 20 MG tablet, Take 1 tablet by mouth 2 (Two) Times a Day., Disp: 60 tablet, Rfl: 0    losartan (Cozaar) 25 MG tablet, Take 1 tablet by mouth Daily., Disp: 30 tablet, Rfl: 11    metoprolol succinate XL (TOPROL-XL) 25 MG 24 hr tablet, Take 1 tablet by mouth Daily., Disp: 30 tablet, Rfl: 11    potassium chloride 10 MEQ CR tablet, Take 1 tablet by mouth 2 (Two) Times a Day., Disp: 60 tablet, Rfl: 0    nicotine (NICODERM CQ) 21 MG/24HR patch, Place 1 patch on the skin as directed by provider Daily. (Patient not taking: Reported on 3/15/2024), Disp: 28 each, Rfl: 0    Social History     Socioeconomic History    Marital status: Single   Tobacco Use    Smoking status: Former     Current packs/day: 0.00     Average packs/day: 1 pack/day for 40.0 years (40.0 ttl pk-yrs)     Types: Cigarettes     Start date: 1983     Quit date: 2023     Years since quittin.3     Passive exposure: Never    Smokeless tobacco: Never   Vaping Use    Vaping status: Never Used   Substance and Sexual Activity    Alcohol use: Never    Drug use: Never    Sexual activity: Defer        Objective   Vital Signs:  /74   Pulse 71   Resp 16   Ht 175.3 cm (69\")   Wt 98.9 kg (218 lb)   SpO2 97%   BMI 32.19 kg/m²   Estimated body mass index is 32.19 kg/m² as calculated from the following:    Height as of this encounter: 175.3 cm (69\").    Weight as of this encounter: 98.9 kg (218 lb).    Physical Exam:  General: Well-appearing patient, no apparent " distress  Cardiac: Regular rate and rhythm, normal S1/S2, no murmur, rubs or gallops, no lower extremity edema  Lungs: Clear to auscultation bilaterally, no crackles or wheezes  Abdomen: Soft, non-tender, no guarding or rebound tenderness, no hepatosplenomegaly  Skin: No significant rashes or lesions  MSK: Grossly normal tone and strength  Neuro: Alert and oriented x3, CN II-XII grossly intact  Psych: Appropriate mood and affect    Assessment and Plan:    (I50.22) Chronic HFrEF (heart failure with reduced ejection fraction) -   Assessment: Patient overall doing well with current medication regiment and currently asymptomatic. Weight overall stable and appears euvolemic on exam.  Patient compliant with medications.  Recent stress test negative for ischemia.  Patient planning on following up with cardiology as scheduled next week.    Plan:  -Continue current prescribed medications including metoprolol, losartan, Farxiga, Lasix, and potassium replacement  - Limit salt and fluid intake  - Monitor daily weight  - Notify clinic if gain more the 3 pounds in a day or 5 pounds in a week  - Follow-up with cardiology as scheduled    (I74.2) Brachial artery thrombosis  Assessment: Patient with previous thrombosis status post thrombectomy currently on Eliquis.  No evidence of recurrent clot.  Follows with hematology and recently tested positive for antiphospholipid antibody.  Suspected antiphospholipid antibody syndrome.  Patient to follow-up for confirmatory testing.  Anticipate patient will likely require lifelong anticoagulation.  Plan:  - Continue Eliquis  - Follow-up with hematology as scheduled  - Lovenox bridging with  possible upcoming procedure    (N81.10) Vaginal prolapse  (N13.30) Hydronephrosis, unspecified hydronephrosis type  Assessment: Patient with uterovaginal prolapse causing hydronephrosis for which she is recently followed with urogynecology.  Currently scheduled for urodynamic studies and possible subsequent  scheduling of surgical management.  Patient will need anticoagulation bridging at the time of procedure due to antiphospholipid antibody positivity.  Plan:  - Follow-up with urogynecology as scheduled  - Procedure scheduling per urogynecology  -Anticoagulation bridging at the time of potential procedure    (Z72.0) Tobacco use   Assessment:  Plan:  -Continue complete tobacco cessation    Patient was given instructions and counseling regarding her condition or for health maintenance advice. Please see specific information pulled into the AVS if appropriate.       Dr Hans Diaz   Internal Medicine Physician  [facility]--[address], [address]  [place] [phone]

## 2024-03-15 NOTE — PATIENT INSTRUCTIONS
Please stop at lab on second floor to have blood drawn    Medications:  Continue current medications as prescribed    Follow up with specialists as scheduled (cardiology, uro-gyn, hematology)    Encourage healthy diet and exercise    Continue smoking cessation    Follow up in 4 months for preventative care visit

## 2024-03-18 ENCOUNTER — OFFICE VISIT (OUTPATIENT)
Dept: CARDIOLOGY | Facility: CLINIC | Age: 64
End: 2024-03-18
Payer: COMMERCIAL

## 2024-03-18 VITALS
BODY MASS INDEX: 32.44 KG/M2 | OXYGEN SATURATION: 99 % | SYSTOLIC BLOOD PRESSURE: 139 MMHG | DIASTOLIC BLOOD PRESSURE: 81 MMHG | WEIGHT: 219 LBS | HEART RATE: 75 BPM | HEIGHT: 69 IN

## 2024-03-18 DIAGNOSIS — I47.10 PSVT (PAROXYSMAL SUPRAVENTRICULAR TACHYCARDIA): ICD-10-CM

## 2024-03-18 DIAGNOSIS — I50.22 CHRONIC HFREF (HEART FAILURE WITH REDUCED EJECTION FRACTION): Primary | ICD-10-CM

## 2024-03-18 PROCEDURE — 99214 OFFICE O/P EST MOD 30 MIN: CPT | Performed by: NURSE PRACTITIONER

## 2024-03-18 RX ORDER — DAPAGLIFLOZIN 10 MG/1
10 TABLET, FILM COATED ORAL DAILY
Qty: 90 TABLET | Refills: 3 | Status: SHIPPED | OUTPATIENT
Start: 2024-03-18

## 2024-03-19 ENCOUNTER — LAB (OUTPATIENT)
Dept: LAB | Facility: HOSPITAL | Age: 64
End: 2024-03-19
Payer: COMMERCIAL

## 2024-03-19 DIAGNOSIS — I50.22 CHRONIC HFREF (HEART FAILURE WITH REDUCED EJECTION FRACTION): ICD-10-CM

## 2024-03-19 LAB
ANION GAP SERPL CALCULATED.3IONS-SCNC: 13.9 MMOL/L (ref 5–15)
BUN SERPL-MCNC: 29 MG/DL (ref 8–23)
BUN/CREAT SERPL: 28.7 (ref 7–25)
CALCIUM SPEC-SCNC: 8.4 MG/DL (ref 8.6–10.5)
CHLORIDE SERPL-SCNC: 101 MMOL/L (ref 98–107)
CO2 SERPL-SCNC: 25.1 MMOL/L (ref 22–29)
CREAT SERPL-MCNC: 1.01 MG/DL (ref 0.57–1)
EGFRCR SERPLBLD CKD-EPI 2021: 63.1 ML/MIN/1.73
GLUCOSE SERPL-MCNC: 132 MG/DL (ref 65–99)
POTASSIUM SERPL-SCNC: 4.1 MMOL/L (ref 3.5–5.2)
SODIUM SERPL-SCNC: 140 MMOL/L (ref 136–145)

## 2024-03-19 PROCEDURE — 80048 BASIC METABOLIC PNL TOTAL CA: CPT

## 2024-03-19 PROCEDURE — 36415 COLL VENOUS BLD VENIPUNCTURE: CPT

## 2024-04-03 ENCOUNTER — PATIENT MESSAGE (OUTPATIENT)
Dept: CARDIOLOGY | Facility: CLINIC | Age: 64
End: 2024-04-03
Payer: COMMERCIAL

## 2024-04-05 ENCOUNTER — LAB (OUTPATIENT)
Dept: FAMILY MEDICINE CLINIC | Facility: CLINIC | Age: 64
End: 2024-04-05
Payer: COMMERCIAL

## 2024-04-05 ENCOUNTER — TELEPHONE (OUTPATIENT)
Dept: ONCOLOGY | Facility: CLINIC | Age: 64
End: 2024-04-05
Payer: COMMERCIAL

## 2024-04-05 ENCOUNTER — OFFICE VISIT (OUTPATIENT)
Dept: FAMILY MEDICINE CLINIC | Facility: CLINIC | Age: 64
End: 2024-04-05
Payer: COMMERCIAL

## 2024-04-05 VITALS
DIASTOLIC BLOOD PRESSURE: 82 MMHG | WEIGHT: 220.6 LBS | HEART RATE: 94 BPM | BODY MASS INDEX: 32.67 KG/M2 | RESPIRATION RATE: 16 BRPM | OXYGEN SATURATION: 97 % | HEIGHT: 69 IN | SYSTOLIC BLOOD PRESSURE: 126 MMHG

## 2024-04-05 DIAGNOSIS — Z01.818 PRE-OP EVALUATION: Primary | ICD-10-CM

## 2024-04-05 DIAGNOSIS — Z01.818 PRE-OP EVALUATION: ICD-10-CM

## 2024-04-05 DIAGNOSIS — I74.2 BRACHIAL ARTERY THROMBOSIS: ICD-10-CM

## 2024-04-05 DIAGNOSIS — I10 PRIMARY HYPERTENSION: ICD-10-CM

## 2024-04-05 DIAGNOSIS — I50.22 CHRONIC HFREF (HEART FAILURE WITH REDUCED EJECTION FRACTION): ICD-10-CM

## 2024-04-05 LAB
ANION GAP SERPL CALCULATED.3IONS-SCNC: 11 MMOL/L (ref 5–15)
BASOPHILS # BLD AUTO: 0.11 10*3/MM3 (ref 0–0.2)
BASOPHILS NFR BLD AUTO: 1.5 % (ref 0–1.5)
BUN SERPL-MCNC: 20 MG/DL (ref 8–23)
BUN/CREAT SERPL: 18.2 (ref 7–25)
CALCIUM SPEC-SCNC: 9.6 MG/DL (ref 8.6–10.5)
CHLORIDE SERPL-SCNC: 102 MMOL/L (ref 98–107)
CO2 SERPL-SCNC: 27 MMOL/L (ref 22–29)
CREAT SERPL-MCNC: 1.1 MG/DL (ref 0.57–1)
DEPRECATED RDW RBC AUTO: 43.3 FL (ref 37–54)
EGFRCR SERPLBLD CKD-EPI 2021: 56.9 ML/MIN/1.73
EOSINOPHIL # BLD AUTO: 0.43 10*3/MM3 (ref 0–0.4)
EOSINOPHIL NFR BLD AUTO: 5.7 % (ref 0.3–6.2)
ERYTHROCYTE [DISTWIDTH] IN BLOOD BY AUTOMATED COUNT: 13.2 % (ref 12.3–15.4)
GLUCOSE SERPL-MCNC: 157 MG/DL (ref 65–99)
HCT VFR BLD AUTO: 40.1 % (ref 34–46.6)
HGB BLD-MCNC: 13.4 G/DL (ref 12–15.9)
IMM GRANULOCYTES # BLD AUTO: 0.02 10*3/MM3 (ref 0–0.05)
IMM GRANULOCYTES NFR BLD AUTO: 0.3 % (ref 0–0.5)
LYMPHOCYTES # BLD AUTO: 2 10*3/MM3 (ref 0.7–3.1)
LYMPHOCYTES NFR BLD AUTO: 26.5 % (ref 19.6–45.3)
MCH RBC QN AUTO: 30 PG (ref 26.6–33)
MCHC RBC AUTO-ENTMCNC: 33.4 G/DL (ref 31.5–35.7)
MCV RBC AUTO: 89.7 FL (ref 79–97)
MONOCYTES # BLD AUTO: 0.79 10*3/MM3 (ref 0.1–0.9)
MONOCYTES NFR BLD AUTO: 10.5 % (ref 5–12)
NEUTROPHILS NFR BLD AUTO: 4.19 10*3/MM3 (ref 1.7–7)
NEUTROPHILS NFR BLD AUTO: 55.5 % (ref 42.7–76)
NRBC BLD AUTO-RTO: 0 /100 WBC (ref 0–0.2)
PLATELET # BLD AUTO: 440 10*3/MM3 (ref 140–450)
PMV BLD AUTO: 9.2 FL (ref 6–12)
POTASSIUM SERPL-SCNC: 4.1 MMOL/L (ref 3.5–5.2)
RBC # BLD AUTO: 4.47 10*6/MM3 (ref 3.77–5.28)
SODIUM SERPL-SCNC: 140 MMOL/L (ref 136–145)
WBC NRBC COR # BLD AUTO: 7.54 10*3/MM3 (ref 3.4–10.8)

## 2024-04-05 PROCEDURE — 36415 COLL VENOUS BLD VENIPUNCTURE: CPT

## 2024-04-05 PROCEDURE — 85025 COMPLETE CBC W/AUTO DIFF WBC: CPT | Performed by: INTERNAL MEDICINE

## 2024-04-05 PROCEDURE — 80048 BASIC METABOLIC PNL TOTAL CA: CPT | Performed by: INTERNAL MEDICINE

## 2024-04-05 RX ORDER — ENOXAPARIN SODIUM 100 MG/ML
1 INJECTION SUBCUTANEOUS EVERY 12 HOURS SCHEDULED
Qty: 6 ML | Refills: 0 | Status: SHIPPED | OUTPATIENT
Start: 2024-04-05 | End: 2024-04-05

## 2024-04-05 RX ORDER — ENOXAPARIN SODIUM 100 MG/ML
1 INJECTION SUBCUTANEOUS EVERY 12 HOURS SCHEDULED
Qty: 6 ML | Refills: 0 | Status: SHIPPED | OUTPATIENT
Start: 2024-04-05 | End: 2024-04-08

## 2024-04-05 NOTE — PROGRESS NOTES
Chief Complaint  Surgical Clearance (She is having a hysterectomy on 5/1/24 at [facility] which will be performed by Dr. Araya.)    HPI:    Neva Kurtz presents to [facility]    Patient is a 63-year-old female with a history of hypertension, hyperlipidemia, paroxysmal SVT, systolic CHF, Bell's palsy, tobacco use presenting for pre-op evaluation.    Patient currently scheduled to have hysterectomy on 5/1/2024 at [facility] performed by Dr. Araya.     Patient previously admitted and evaluated at [facility] on 11/19/2023 for shortness of breath.  Diagnosed with acute CHF exacerbation by cardiology.  Echo with EF of 41 to 45% with borderline dilated LV and grade 1 diastolic dysfunction.  Patient was started on IV diuretics and eventually discharged home with metoprolol XL 25 mg daily, Lasix 20 mg daily, Farxiga 5 mg daily, and potassium 10 mill equivalents daily.  Patient recommended to follow-up with cardiology as an outpatient and undergo outpatient stress test on January 2nd.  Stress test without evidence of ischemia.  Most recently seen by cardiology on 3/18/2024 and recommended continuation of medications without significant changes.    During same admission, patient noted to have bilateral hydronephrosis.  Urology consulted and performed bilateral stent placement.  Urology also recommended following up with gynecologist for hysterectomy and suspension of vaginal vault due to ureteral kinking causing right hydronephrosis felt was secondary to severe uterine prolapse.   She is planned for initial evaluation for possible hysterectomy with Dr. Helen Gleason at [facility] January 9th.      Hematology/Oncology previously consulted for hypercoagulable evaluation in a patient with an acute left upper extremity limb ischemia secondary to near complete occlusion of the left axillary artery status post left thrombectomy and on  anticoagulation with heparin and transitioned to Eliquis for discharge.  Patient's status post thrombectomy.  Most recently followed up with hematology on 3/6/2024 and positive for antiphospholipid antibodies.  Patient recommended to continue Eliquis and follow-up for APLS in 3 months.  Hematology stated that she will require bridging for upcoming hysterectomy.     Patient overall recently has been doing well. Patient recently had UTI that was recently treated with antibiotics by GYN with nitrofurantoin. UTI resolved with treatment. She was seen by urology on follow up and urine was clear. Stent replacement is gong to occur on 4/9/2024. Denies dysuria, urgency, frequency, hematuria, cloudy/foul smelling urine, or flank pain.     Patient denies chest pain, shortness of breath, orthopnea, PND, and lower extremity edema. Denies palpitations, tachycardia, dizziness, lightheadedness, and syncope.Shortness of breath with activity or rest. Weight stable. Personal or family history of cardiac conditions, diabetes, BONNY. No recurrence of blood clot. No recent smoking since November.     No prior complications with prior anesthesia or surgeries. Denies chronic steroids or chronic pain medications. Denies history of bleeding or disorders. Denies history of BONNY. No known prior blood transfusions. History of GERD or constipation. Denies ongoing UTI symptoms. Denies recent illness. Denies alcohol, tobacco, or recreational drug use.  Denies previous history of heart attack or stroke. Able to walk up 2 flights of stairs or four blocks.     Review of Systems:  ROS negative unless otherwise noted in HPI above.    Past Medical History:   Diagnosis Date    CHF (congestive heart failure)     Prolapsed uterus     SVT (supraventricular tachycardia)          Current Outpatient Medications:     apixaban (ELIQUIS) 5 MG tablet tablet, Take 1 tablet by mouth Every 12 (Twelve) Hours. Indications: Other - full anticoagulation, Disp: 60 tablet,  "Rfl: 4    aspirin 81 MG EC tablet, Take 1 tablet by mouth Daily., Disp: 30 tablet, Rfl: 0    atorvastatin (Lipitor) 10 MG tablet, Take 1 tablet by mouth Daily., Disp: 90 tablet, Rfl: 2    cetirizine (zyrTEC) 10 MG tablet, Take 1 tablet by mouth Daily., Disp: , Rfl:     dapagliflozin Propanediol (Farxiga) 10 MG tablet, Take 10 mg by mouth Daily., Disp: 90 tablet, Rfl: 3    furosemide (LASIX) 20 MG tablet, Take 1 tablet by mouth 2 (Two) Times a Day., Disp: 60 tablet, Rfl: 0    losartan (Cozaar) 25 MG tablet, Take 1 tablet by mouth Daily., Disp: 30 tablet, Rfl: 11    metoprolol succinate XL (TOPROL-XL) 25 MG 24 hr tablet, Take 1 tablet by mouth Daily., Disp: 30 tablet, Rfl: 11    potassium chloride 10 MEQ CR tablet, Take 1 tablet by mouth 2 (Two) Times a Day., Disp: 60 tablet, Rfl: 0    Social History     Socioeconomic History    Marital status: Single   Tobacco Use    Smoking status: Former     Current packs/day: 0.00     Average packs/day: 1 pack/day for 40.0 years (40.0 ttl pk-yrs)     Types: Cigarettes     Start date: 1983     Quit date: 2023     Years since quittin.4     Passive exposure: Never    Smokeless tobacco: Never   Vaping Use    Vaping status: Never Used   Substance and Sexual Activity    Alcohol use: Never    Drug use: Never    Sexual activity: Defer        Objective   Vital Signs:  /82   Pulse 94   Resp 16   Ht 175.3 cm (69\")   Wt 100 kg (220 lb 9.6 oz)   SpO2 97%   BMI 32.58 kg/m²   Estimated body mass index is 32.58 kg/m² as calculated from the following:    Height as of this encounter: 175.3 cm (69\").    Weight as of this encounter: 100 kg (220 lb 9.6 oz).    Physical Exam:  General: Well-appearing patient, no apparent distress  HEENT: No posterior pharynx erythema, no tonsillar erythema or exudates  Cardiac: Regular rate and rhythm, normal S1/S2, no murmur, rubs or gallops, no lower extremity edema  Lungs: Clear to auscultation bilaterally, no crackles or wheezes  Abdomen: " Soft, non-tender, no guarding or rebound tenderness, no hepatosplenomegaly  Skin: No significant rashes or lesions  MSK: Grossly normal tone and strength  Neuro: Alert and oriented x3, CN II-XII grossly intact  Psych: Appropriate mood and affect    Assessment and Plan:    (Z01.818) Pre-op evaluation -  Assessment: Patient is a 63 year-old female scheduled to undergo a low-risk surgery. The patient has not active cardiac or pulmonary symptoms, and he has good exertional capacity. No further cardiac evaluation indicated. The patient may be at increased risk of postoperative blood clots due to previous history of left upper extremity ischemia and possible antiphospholipid antibody syndrome. COVID testing per surgeon's office. Patient otherwise medically appropriate for surgery and can proceed as scheduled.  Plan:  - Labs: BMP and CBC  - ECG (11/19/2024): sinus tachycardia  - Echo (11/20/2023)      Left ventricular ejection fraction appears to be 41 - 45%.    The left ventricular cavity is borderline dilated.    Left ventricular wall thickness is consistent with mild concentric hypertrophy.    Left ventricular diastolic function is consistent with (grade Ia w/high LAP) impaired relaxation and age.    The left atrial cavity is mild to moderately dilated.    Estimated right ventricular systolic pressure from tricuspid regurgitation is normal (<35 mmHg).    There is a small (<1cm) pericardial effusion.  - COVID testing per surgeon's office  - Preoperative instructions:  - Follow-up with hematology and cardiology for clearance as needed  - Bridging per hematology  - Hold aspirin before procedure; typically 7 days but may vary based on cardiology recommendations  - Hold Farxiga 3 days prior to surgery  - Hold losartan and furosemide morning of procedure  - Continue metoprolol    (I10) Primary hypertension  Assessment: Blood pressure well controlled on current regimen including metoprolol, Lasix, and losartan.  Plan:  -  BMP  - Continue metoprolol perioperatively  - Hold losartan and Lasix morning of procedure  - Intermittently monitor home blood pressures and follow up if elevated  - Encourage healthy diet and exercise    (I50.22) Chronic HFrEF (heart failure with reduced ejection fraction)  Assessment: Patient with systolic heart failure. Most recent echocardiogram with EF of 41-45%. Weight overall stable. Currently appears euvolemic on exam. Complaint with medications including metoprolol, losartan, Farxiga, and Lasix.  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  No recurrent clot since being on Eliquis.  Patient currently meets 2 of 3 criteria for APLS and due for repeat testing in June 2024 with hematology.  Plan:  - Bridging anticoagulation per hematology  - Follow-up with hematology as scheduled      Patient was given instructions and counseling regarding her condition or for health maintenance advice. Please see specific information pulled into the AVS if appropriate.       Dr Hans Diaz   Internal Medicine Physician  [facility]  [address], [address]  [place] [address]

## 2024-04-05 NOTE — TELEPHONE ENCOUNTER
Spoke w/ pt and let her know that Dr. Cervantes said if she wants to wait the six months which would be mid May to have her surgery then she would not need the Lovenox injections.  She states that it is scheduled for 5/1/24.  Per Dr. Cervantes he would still like for her to do the Lovenox since it wouldn't quite be six months.  Pt instructed to hold Eliquis x 3 days prior to surgery and do the Lovenox injections twice daily.  She was instructed to hold injection the evening before surgery.  Pt can resume Eliquis after surgery as long as she is not having any bleeding.  Pt states that her daughter is a RN and will be able to give her the injections.  Script sent to pt's pharmacy.  Clearance letter sent to [facility].

## 2024-04-05 NOTE — PATIENT INSTRUCTIONS
Please stop at lab on second floor to have blood drawn    Follow up with urology as scheduled    Pre op instructions:  Follow-up with hematology and cardiology for clearance as needed    Bridging per hematology    Hold aspirin before procedure; typically 7 days but may vary based on cardiology recommendations    Hold Farxiga 3 days prior to surgery    Hold losartan and furosemide morning of procedure    Continue metoprolol

## 2024-04-11 DIAGNOSIS — I74.2 BRACHIAL ARTERY THROMBOSIS: ICD-10-CM

## 2024-04-12 RX ORDER — POTASSIUM CHLORIDE 750 MG/1
10 TABLET, FILM COATED, EXTENDED RELEASE ORAL 2 TIMES DAILY
Qty: 60 TABLET | Refills: 0 | Status: SHIPPED | OUTPATIENT
Start: 2024-04-12

## 2024-04-12 RX ORDER — FUROSEMIDE 20 MG/1
20 TABLET ORAL 2 TIMES DAILY
Qty: 60 TABLET | Refills: 0 | Status: SHIPPED | OUTPATIENT
Start: 2024-04-12

## 2024-04-17 DIAGNOSIS — I74.2 BRACHIAL ARTERY THROMBOSIS: ICD-10-CM

## 2024-05-23 RX ORDER — POTASSIUM CHLORIDE 750 MG/1
10 TABLET, FILM COATED, EXTENDED RELEASE ORAL 2 TIMES DAILY
Qty: 60 TABLET | Refills: 0 | Status: SHIPPED | OUTPATIENT
Start: 2024-05-23

## 2024-05-23 RX ORDER — FUROSEMIDE 20 MG/1
20 TABLET ORAL 2 TIMES DAILY
Qty: 60 TABLET | Refills: 0 | Status: SHIPPED | OUTPATIENT
Start: 2024-05-23

## 2024-05-29 ENCOUNTER — LAB (OUTPATIENT)
Dept: LAB | Facility: HOSPITAL | Age: 64
End: 2024-05-29
Payer: COMMERCIAL

## 2024-05-29 DIAGNOSIS — I74.2 BRACHIAL ARTERY THROMBOSIS: ICD-10-CM

## 2024-05-29 LAB
BASOPHILS # BLD AUTO: 0.2 10*3/MM3 (ref 0–0.2)
BASOPHILS NFR BLD AUTO: 2.2 % (ref 0–1.5)
D DIMER PPP FEU-MCNC: 0.84 MG/L (FEU) (ref 0–0.63)
DEPRECATED RDW RBC AUTO: 51 FL (ref 37–54)
EOSINOPHIL # BLD AUTO: 1.18 10*3/MM3 (ref 0–0.4)
EOSINOPHIL NFR BLD AUTO: 12.8 % (ref 0.3–6.2)
ERYTHROCYTE [DISTWIDTH] IN BLOOD BY AUTOMATED COUNT: 14.9 % (ref 12.3–15.4)
HCT VFR BLD AUTO: 35.8 % (ref 34–46.6)
HGB BLD-MCNC: 11.1 G/DL (ref 12–15.9)
LYMPHOCYTES # BLD AUTO: 2.42 10*3/MM3 (ref 0.7–3.1)
LYMPHOCYTES NFR BLD AUTO: 26.2 % (ref 19.6–45.3)
MCH RBC QN AUTO: 29.9 PG (ref 26.6–33)
MCHC RBC AUTO-ENTMCNC: 31 G/DL (ref 31.5–35.7)
MCV RBC AUTO: 96.5 FL (ref 79–97)
MONOCYTES # BLD AUTO: 0.99 10*3/MM3 (ref 0.1–0.9)
MONOCYTES NFR BLD AUTO: 10.7 % (ref 5–12)
NEUTROPHILS NFR BLD AUTO: 4.43 10*3/MM3 (ref 1.7–7)
NEUTROPHILS NFR BLD AUTO: 48.1 % (ref 42.7–76)
PLATELET # BLD AUTO: 704 10*3/MM3 (ref 140–450)
PMV BLD AUTO: 8.2 FL (ref 6–12)
RBC # BLD AUTO: 3.71 10*6/MM3 (ref 3.77–5.28)
WBC NRBC COR # BLD AUTO: 9.22 10*3/MM3 (ref 3.4–10.8)

## 2024-05-29 PROCEDURE — 85025 COMPLETE CBC W/AUTO DIFF WBC: CPT

## 2024-05-29 PROCEDURE — 36415 COLL VENOUS BLD VENIPUNCTURE: CPT

## 2024-05-29 PROCEDURE — 85379 FIBRIN DEGRADATION QUANT: CPT | Performed by: INTERNAL MEDICINE

## 2024-05-30 LAB
CARDIOLIPIN IGG SER IA-ACNC: <9 GPL U/ML (ref 0–14)
CARDIOLIPIN IGM SER IA-ACNC: 51 MPL U/ML (ref 0–12)

## 2024-05-31 LAB
APTT SCREEN TO CONFIRM RATIO: 0.94 RATIO (ref 0–1.34)
B2 GLYCOPROT1 IGA SER-ACNC: <9 GPI IGA UNITS (ref 0–25)
B2 GLYCOPROT1 IGG SER-ACNC: <9 GPI IGG UNITS (ref 0–20)
B2 GLYCOPROT1 IGM SER-ACNC: 46 GPI IGM UNITS (ref 0–32)
CONFIRM APTT/NORMAL: 30.9 SEC (ref 0–47.6)
LA 2 SCREEN W REFLEX-IMP: NORMAL
SCREEN APTT: 42.4 SEC (ref 0–43.5)
SCREEN DRVVT: 40.6 SEC (ref 0–47)
THROMBIN TIME: 17.2 SEC (ref 0–23)

## 2024-05-31 NOTE — PROGRESS NOTES
HEMATOLOGY ONCOLOGY OUTPATIENT FOLLOW UP       Patient name: Neva Kurtz  : 1960  MRN: [id number]  Primary Care Physician: Hans Diaz MD  Referring Physician: Hans Diaz MD  Reason For Consult:         History of Present Illness:    Neva Kurtz is a 64 y.o. female who presented to [facility] on 2023 with complaints of shortness of breath.  Past medical history of SVT.  Patient also had a recent history of discharge from the hospital on 2023 after treatment for UTI.  Work-up revealed CHF and also severe right hydroureteronephrosis.  Cardiology was consulted for the acute exacerbation of CHF and neurology saw the patient for her bilateral hydronephrosis and did a bilateral stent placement.  It was also recommended that she follow-up with a gynecologist for hysterectomy and suspension of the vaginal vault due to right hydronephrosis secondary to ureteral kinking due to severe uterine prolapse.  It was in the process of being discharged from the hospital on 2023 when she complained of severe acute left arm pain after her IV site was removed.  Evaluation revealed acute limb ischemia due to an occluded ulnar artery.  She was initiated on heparin and underwent a left arm thrombectomy on 2023.  Since that time she has been transitioned from heparin to Eliquis for discharge home.  Cardiology is still followed for possible cardioembolic work-up and plans to discharge home on an ambulatory cardiac monitor for atrial fibrillation surveillance.     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    23 - Hb 12.7  plt 511    24 - APLS testing with positive IgM for anticardiolipin and anti beta-2  glycoprotein, negative for lupus anticoagulant     5/1/24 - hysterectomy and prolapse repair. No post op complications.  5/29/24 - APLS persistent postitive IgM and beta-2 ddimer 0.84, no LA     Subjective:  No new symptoms or concerns.  No bleeding concerns.    Past Medical History:   Diagnosis Date    CHF (congestive heart failure)     Prolapsed uterus     SVT (supraventricular tachycardia)        Past Surgical History:   Procedure Laterality Date    ARM THROMBECTOMY/EMBOLECTOMY Left 11/21/2023    Procedure: UPPER EXTREMITY THROMBECTOMY/EMBOLECTOMY;  Surgeon: Canelo Blackwell II, MD;  Location: [facility] MAIN OR;  Service: Vascular;  Laterality: Left;    BREAST BIOPSY      CYSTOSCOPY, URETEROSCOPY, RETROGRADE PYELOGRAM, STENT INSERTION Bilateral 11/19/2023    Procedure: CYSTOSCOPY URETEROSCOPY RETROGRADE PYELOGRAM STENT INSERTION;  Surgeon: Alan Rodriguez MD;  Location: [facility];  Service: Urology;  Laterality: Bilateral;         Current Outpatient Medications:     apixaban (ELIQUIS) 5 MG tablet tablet, Take 1 tablet by mouth Every 12 (Twelve) Hours. Indications: Other - full anticoagulation, Disp: 180 tablet, Rfl: 4    aspirin 81 MG EC tablet, Take 1 tablet by mouth Daily., Disp: 30 tablet, Rfl: 0    atorvastatin (Lipitor) 10 MG tablet, Take 1 tablet by mouth Daily., Disp: 90 tablet, Rfl: 2    cetirizine (zyrTEC) 10 MG tablet, Take 1 tablet by mouth Daily., Disp: , Rfl:     dapagliflozin Propanediol (Farxiga) 10 MG tablet, Take 10 mg by mouth Daily., Disp: 90 tablet, Rfl: 3    furosemide (LASIX) 20 MG tablet, TAKE 1 TABLET BY MOUTH TWICE A DAY, Disp: 60 tablet, Rfl: 0    losartan (Cozaar) 25 MG tablet, Take 1 tablet by mouth Daily., Disp: 30 tablet, Rfl: 11    metoprolol succinate XL (TOPROL-XL) 25 MG 24 hr tablet, Take 1 tablet by mouth Daily., Disp: 30 tablet, Rfl: 11    potassium chloride 10 MEQ CR tablet, TAKE 1 TABLET BY MOUTH TWICE A DAY, Disp: 60 tablet, Rfl: 0    Allergies   Allergen Reactions     "Other Other (See Comments)     tetramycin    Tetracyclines & Related Swelling       Family History   Problem Relation Age of Onset    Hyperlipidemia Mother     Diabetes Mother     Cancer Mother     Other Father        Cancer-related family history includes Cancer in her mother.    Social History     Tobacco Use    Smoking status: Former     Current packs/day: 0.00     Average packs/day: 1 pack/day for 40.0 years (40.0 ttl pk-yrs)     Types: Cigarettes     Start date: 1983     Quit date: 2023     Years since quittin.5     Passive exposure: Never    Smokeless tobacco: Never   Vaping Use    Vaping status: Never Used   Substance Use Topics    Alcohol use: Never    Drug use: Never     Social History     Social History Narrative    Works for [employer]. Retiring 2024.           ROS:   Review of Systems   Constitutional:  Negative for fatigue and fever.   HENT:  Negative for congestion and nosebleeds.    Eyes:  Negative for pain and discharge.   Respiratory:  Negative for cough and shortness of breath.    Cardiovascular:  Negative for chest pain.   Gastrointestinal:  Negative for abdominal pain, blood in stool, diarrhea, nausea and vomiting.   Endocrine: Negative for cold intolerance and heat intolerance.   Genitourinary:  Negative for difficulty urinating.   Musculoskeletal:  Negative for arthralgias.   Skin:  Negative for rash.   Neurological:  Negative for dizziness and headaches.   Hematological:  Does not bruise/bleed easily.   Psychiatric/Behavioral:  Negative for behavioral problems.        Objective:  Vital signs:  Vitals:    24 1027   BP: 128/82   Pulse: 87   Resp: 14   Temp: 97.8 °F (36.6 °C)   SpO2: 98%   Weight: 97.3 kg (214 lb 9.6 oz)   Height: 175.3 cm (69\")   PainSc: 0-No pain         Body mass index is 31.69 kg/m².  ECOG  (0) Fully active, able to carry on all predisease performance without restriction    Physical Exam:   Physical Exam  Constitutional:       Appearance: Normal " appearance.   HENT:      Head: Normocephalic and atraumatic.   Eyes:      Extraocular Movements: Extraocular movements intact.      Pupils: Pupils are equal, round, and reactive to light.   Cardiovascular:      Rate and Rhythm: Normal rate and regular rhythm.      Pulses: Normal pulses.      Heart sounds: No murmur heard.  Pulmonary:      Effort: Pulmonary effort is normal.      Breath sounds: Normal breath sounds.   Abdominal:      General: There is no distension.      Palpations: Abdomen is soft. There is no mass.      Tenderness: There is no abdominal tenderness.   Musculoskeletal:         General: Normal range of motion.      Cervical back: Normal range of motion and neck supple.   Skin:     General: Skin is warm.   Neurological:      General: No focal deficit present.      Mental Status: She is alert.   Psychiatric:         Mood and Affect: Mood normal.         Lab Results - Last 18 Months   Lab Units 05/29/24  0848 05/02/24  0928 05/02/24  0449   WBC 10*3/mm3 9.22 9.73 10.67   HEMOGLOBIN g/dL 11.1* 9.4* 9.5*   HEMATOCRIT % 35.8 29.4* 29.3*   PLATELETS 10*3/mm3 704* 303 313   MCV fL 96.5 93.0 93.3     Lab Results - Last 18 Months   Lab Units 05/01/24  0930 04/05/24  1052 03/19/24  1520 01/16/24  1131 11/19/23  2318 11/19/23  0207 11/14/23  1014 11/13/23  1307   SODIUM mmol/L 141 140 140 142   < > 139   < > 135*   POTASSIUM mmol/L 4.0 4.1 4.1 3.8   < > 4.0   < > 3.8   CHLORIDE mmol/L  --  102 101 103   < > 102   < > 102   CO2 mmol/L  --  27.0 25.1 29.0   < > 23.0   < > 19.0*   BUN mg/dL  --  20 29* 23   < > 13   < > 12   CREATININE mg/dL  --  1.10* 1.01* 0.94   < > 1.10*   < > 0.94   CALCIUM mg/dL  --  9.6 8.4* 9.9   < > 9.4   < > 8.8   BILIRUBIN mg/dL  --   --   --   --   --  0.3  --  0.4   ALK PHOS U/L  --   --   --   --   --  92  --  96   ALT (SGPT) U/L  --   --   --   --   --  10  --  9   AST (SGOT) U/L  --   --   --   --   --  13  --  15   GLUCOSE mg/dL  --  157* 132* 98   < > 152*   < > 122*    < > =  "values in this interval not displayed.       Lab Results   Component Value Date    GLUCOSE 157 (H) 04/05/2024    BUN 20 04/05/2024    CREATININE 1.10 (H) 04/05/2024    BCR 18.2 04/05/2024    K 4.0 05/01/2024    CO2 27.0 04/05/2024    CALCIUM 9.6 04/05/2024    ALBUMIN 3.5 11/19/2023    AST 13 11/19/2023    ALT 10 11/19/2023       Lab Results - Last 18 Months   Lab Units 04/16/24  1034 11/22/23  1104 11/22/23  0427 11/21/23  1959 11/21/23  1347   INR INR 1.2  --  0.95  --  0.98   APTT s 39.1* 27.6* 26.5*   < > 26.2*    < > = values in this interval not displayed.       Lab Results   Component Value Date    IRON 36 (L) 11/22/2023    TIBC 320 11/22/2023    FERRITIN 248.90 (H) 11/22/2023       No results found for: \"FOLATE\"    No results found for: \"OCCULTBLD\"    No results found for: \"RETICCTPCT\"  Lab Results   Component Value Date    FPXINOFA23 728 11/22/2023     No results found for: \"SPEP\", \"UPEP\"  No results found for: \"LDH\", \"URICACID\"  No results found for: \"ZACHARY\", \"RF\", \"SEDRATE\"  No results found for: \"FIBRINOGEN\", \"HAPTOGLOBIN\"  Lab Results   Component Value Date    PTT 39.1 (H) 04/16/2024    INR 1.2 04/16/2024     No results found for: \"\"  No results found for: \"CEA\"  No components found for: \"CA-19-9\"  No results found for: \"PSA\"  No results found for: \"SEDRATE\"    Assessment & Plan       Patient is a 64-year-old female admitted for shortness of breath and diagnosed with acute exacerbation of CHF also with a history of recent UTI and found to have bilateral hydronephrosis status post stent placement with urology.  Discharged home developed acute left upper extremity pain and was found to have acute limb ischemia due to a brachial artery occlusion.  Now status post left upper extremity thrombectomy and on anticoagulation.  We are asked to see to evaluate for possible hypercoagulable disorders.     Brachial artery thrombosis/antiphospholipid syndrome  Status post left arm thrombectomy on 11/21/2023  Heparin " transitioned to Eliquis for discharge  Cardiology is sending home on ambulatory event monitor to evaluate for atrial fibrillation  Family history of blood clots in her father  Patient has stopped smoking.   2/3 criteria positive for APLS  Continue ELIQUIS, now repeat APLS positive hence confirmatory  High risk for future VTE ddimer persistently high, continue Eliquis 5mg BID, consider decreasing dose In future if APLS labs improve and ddimer improves     Normocytic anemia  Baseline hemoglobin from 11/13/2023 11.3 g/dL  Iron 36, iron sat 11%, TIBC 320, ferritin 248.90  B12 728  Start oral ferrous sulphate every other day.  Hb 11.3 now , will repeat in 6 months.  Drop Likely related to recent hysterectomy improved from what it was in the hospital at 9.     Thrombocytosis  Worsened with recent surgery, f/u in 6 months.     PLAN  Continue Eliquis   Start oral iron every other day   Cbc iron studies ddimer in 6 months and follow up.        Thank you very much for providing the opportunity to participate in this patient’s care. Please do not hesitate to call if there are any other questions.

## 2024-06-05 ENCOUNTER — OFFICE VISIT (OUTPATIENT)
Dept: ONCOLOGY | Facility: CLINIC | Age: 64
End: 2024-06-05
Payer: COMMERCIAL

## 2024-06-05 VITALS
OXYGEN SATURATION: 98 % | BODY MASS INDEX: 31.78 KG/M2 | DIASTOLIC BLOOD PRESSURE: 82 MMHG | WEIGHT: 214.6 LBS | RESPIRATION RATE: 14 BRPM | SYSTOLIC BLOOD PRESSURE: 128 MMHG | HEIGHT: 69 IN | TEMPERATURE: 97.8 F | HEART RATE: 87 BPM

## 2024-06-05 DIAGNOSIS — I74.2 BRACHIAL ARTERY THROMBOSIS: Primary | ICD-10-CM

## 2024-06-05 PROCEDURE — 99214 OFFICE O/P EST MOD 30 MIN: CPT | Performed by: INTERNAL MEDICINE

## 2024-06-18 ENCOUNTER — LAB (OUTPATIENT)
Dept: FAMILY MEDICINE CLINIC | Facility: CLINIC | Age: 64
End: 2024-06-18
Payer: COMMERCIAL

## 2024-06-18 ENCOUNTER — OFFICE VISIT (OUTPATIENT)
Dept: FAMILY MEDICINE CLINIC | Facility: CLINIC | Age: 64
End: 2024-06-18
Payer: COMMERCIAL

## 2024-06-18 VITALS
RESPIRATION RATE: 16 BRPM | SYSTOLIC BLOOD PRESSURE: 132 MMHG | HEIGHT: 69 IN | OXYGEN SATURATION: 97 % | DIASTOLIC BLOOD PRESSURE: 74 MMHG | BODY MASS INDEX: 31.7 KG/M2 | HEART RATE: 87 BPM | WEIGHT: 214 LBS

## 2024-06-18 DIAGNOSIS — N39.0 COMPLICATED UTI (URINARY TRACT INFECTION): ICD-10-CM

## 2024-06-18 DIAGNOSIS — R30.0 DYSURIA: ICD-10-CM

## 2024-06-18 DIAGNOSIS — R30.0 DYSURIA: Primary | ICD-10-CM

## 2024-06-18 LAB
BILIRUB BLD-MCNC: ABNORMAL MG/DL
CLARITY, POC: ABNORMAL
COLOR UR: YELLOW
GLUCOSE UR STRIP-MCNC: ABNORMAL MG/DL
KETONES UR QL: NEGATIVE
LEUKOCYTE EST, POC: ABNORMAL
NITRITE UR-MCNC: POSITIVE MG/ML
PH UR: 6 [PH] (ref 5–8)
PROT UR STRIP-MCNC: ABNORMAL MG/DL
RBC # UR STRIP: ABNORMAL /UL
SP GR UR: 1.01 (ref 1–1.03)
UROBILINOGEN UR QL: ABNORMAL

## 2024-06-18 PROCEDURE — 87086 URINE CULTURE/COLONY COUNT: CPT | Performed by: INTERNAL MEDICINE

## 2024-06-18 PROCEDURE — 81003 URINALYSIS AUTO W/O SCOPE: CPT | Performed by: INTERNAL MEDICINE

## 2024-06-18 PROCEDURE — 99213 OFFICE O/P EST LOW 20 MIN: CPT | Performed by: INTERNAL MEDICINE

## 2024-06-18 RX ORDER — LEVOFLOXACIN 750 MG/1
750 TABLET, FILM COATED ORAL DAILY
Qty: 7 TABLET | Refills: 0 | Status: SHIPPED | OUTPATIENT
Start: 2024-06-18

## 2024-06-18 RX ORDER — DAPAGLIFLOZIN 5 MG/1
5 TABLET, FILM COATED ORAL DAILY
COMMUNITY
Start: 2024-06-09

## 2024-06-18 NOTE — PATIENT INSTRUCTIONS
Complicated UTI  - Urinalysis and urine culture  - Start Levaquin 750 mg daily for 7 days  - Follow up urine cultures, adjust antibiotics based on sensitivities  - Stay well hydrated  - Follow up with urology regarding stent exchange, symptoms  - Notify clinic if not improving or new/worsening symptoms despite antibiotics

## 2024-06-18 NOTE — PROGRESS NOTES
Chief Complaint  Difficulty Urinating and Flank Pain    HPI:    Neva Kurtz presents to [facility]    Patient is a 64-year-old female with history of hypertension, hyperlipidemia, CHF, paroxysmal SVT, vaginal prolapse, UTIs, tobacco use, and Bell's palsy presenting for evaluation of urinary symptoms.    Patient previously underwent underwent Vaginal Hysterectomy, Sacrospinous Ligament Suspension, Anterior Repair,posterior Repair, Perineorrhaphy and Cystoscopy at [facility] on 5/1/2024.  Patient tolerated procedure well without postop complications.  Most recently seen in follow-up on 6/6/2024 and denied any symptoms, including urinary symptoms.  Patient was recommended to resume normal activity and follow-up as needed.    Patient presenting with approximately one day left sided flank discomfort and developed chills this morning. This morning she had some bloody urine with some dysuria. Denies urgency, frequency, fever, nausea, or vomiting. Denies abdominal pain, diarrhea, constipation, melena, hematochezia, or jaundice. Overall feeling about the same now as this morning. Patient currently on Farxiga for CHF.  Most recent UTI on 11/12/2023 with pansensitive E. coli. Follow up scheduled on July 9, 2024 urology as she has stents in place. Most recently replaced around mid-April. Denies recent antibiotics, hospital admissions, or multidrug resistant organisms. Patient staying well hydrated.         Review of Systems:  ROS negative unless otherwise noted in HPI above.    Past Medical History:   Diagnosis Date    CHF (congestive heart failure)     Prolapsed uterus     SVT (supraventricular tachycardia)          Current Outpatient Medications:     apixaban (ELIQUIS) 5 MG tablet tablet, Take 1 tablet by mouth Every 12 (Twelve) Hours. Indications: Other - full anticoagulation, Disp: 180 tablet, Rfl: 4    aspirin 81 MG EC tablet, Take 1 tablet by mouth Daily., Disp: 30  "tablet, Rfl: 0    atorvastatin (Lipitor) 10 MG tablet, Take 1 tablet by mouth Daily., Disp: 90 tablet, Rfl: 2    cetirizine (zyrTEC) 10 MG tablet, Take 1 tablet by mouth Daily., Disp: , Rfl:     Farxiga 5 MG tablet tablet, Take 1 tablet by mouth Daily., Disp: , Rfl:     furosemide (LASIX) 20 MG tablet, TAKE 1 TABLET BY MOUTH TWICE A DAY, Disp: 60 tablet, Rfl: 0    losartan (Cozaar) 25 MG tablet, Take 1 tablet by mouth Daily., Disp: 30 tablet, Rfl: 11    metoprolol succinate XL (TOPROL-XL) 25 MG 24 hr tablet, Take 1 tablet by mouth Daily., Disp: 30 tablet, Rfl: 11    potassium chloride 10 MEQ CR tablet, TAKE 1 TABLET BY MOUTH TWICE A DAY, Disp: 60 tablet, Rfl: 0    levoFLOXacin (Levaquin) 750 MG tablet, Take 1 tablet by mouth Daily., Disp: 7 tablet, Rfl: 0    Social History     Socioeconomic History    Marital status: Single   Tobacco Use    Smoking status: Former     Current packs/day: 0.00     Average packs/day: 1 pack/day for 40.0 years (40.0 ttl pk-yrs)     Types: Cigarettes     Start date: 1983     Quit date: 2023     Years since quittin.6     Passive exposure: Never    Smokeless tobacco: Never   Vaping Use    Vaping status: Never Used   Substance and Sexual Activity    Alcohol use: Never    Drug use: Never    Sexual activity: Defer        Objective   Vital Signs:  /74   Pulse 87   Resp 16   Ht 175.3 cm (69\")   Wt 97.1 kg (214 lb)   SpO2 97%   BMI 31.60 kg/m²   Estimated body mass index is 31.6 kg/m² as calculated from the following:    Height as of this encounter: 175.3 cm (69\").    Weight as of this encounter: 97.1 kg (214 lb).    Physical Exam:  General: Well-appearing patient, no apparent distress  Cardiac: Regular rate and rhythm, normal S1/S2, no murmur, rubs or gallops, no lower extremity edema  Lungs: Clear to auscultation bilaterally, no crackles or wheezes  Abdomen: Soft, non-tender, no guarding or rebound tenderness, no hepatosplenomegaly  Skin: No significant rashes or " lesions  MSK: Grossly normal tone and strength, no CVA tenderness  Neuro: Alert and oriented x3, CN II-XII grossly intact  Psych: Appropriate mood and affect    Assessment and Plan:    (N39.0) Complicated UTI (urinary tract infection)  Assessment: Patient with a history of bilateral hydronephrosis currently following with urology with bilateral stent placement presenting with 1 day of dysuria, chills, and flank pain.  Symptoms concerning for complicated UTI/pyelonephritis.  Urine suggestive of infection.  Proceeding with treatment with Levaquin after discussion of potential risks and benefits.  Patient planning on reaching out to urology due to ongoing symptoms and upcoming stent retrieval/replacement.  Patient aware of signs and symptoms which should prompt reevaluation.  Plan:  - Urinalysis and urine culture  - Start Levaquin 750 mg daily for 7 days  - Follow up urine cultures, adjust antibiotics based on sensitivities  - Stay well hydrated  - Follow up with urology regarding stent exchange, symptoms  - Notify clinic if not improving or new/worsening symptoms despite antibiotics         Patient was given instructions and counseling regarding her condition or for health maintenance advice. Please see specific information pulled into the AVS if appropriate.       Dr Hans Diaz   Internal Medicine Physician  [facility]  [address], [address]  [place] [address]

## 2024-06-19 LAB — BACTERIA SPEC AEROBE CULT: NORMAL

## 2024-06-19 RX ORDER — FUROSEMIDE 20 MG/1
20 TABLET ORAL 2 TIMES DAILY
Qty: 180 TABLET | Refills: 1 | Status: SHIPPED | OUTPATIENT
Start: 2024-06-19

## 2024-06-19 RX ORDER — POTASSIUM CHLORIDE 750 MG/1
10 TABLET, FILM COATED, EXTENDED RELEASE ORAL 2 TIMES DAILY
Qty: 180 TABLET | Refills: 1 | Status: SHIPPED | OUTPATIENT
Start: 2024-06-19

## 2024-07-16 ENCOUNTER — OFFICE VISIT (OUTPATIENT)
Dept: FAMILY MEDICINE CLINIC | Facility: CLINIC | Age: 64
End: 2024-07-16
Payer: COMMERCIAL

## 2024-07-16 VITALS
SYSTOLIC BLOOD PRESSURE: 118 MMHG | OXYGEN SATURATION: 98 % | HEIGHT: 69 IN | HEART RATE: 83 BPM | WEIGHT: 211.8 LBS | DIASTOLIC BLOOD PRESSURE: 76 MMHG | RESPIRATION RATE: 16 BRPM | BODY MASS INDEX: 31.37 KG/M2

## 2024-07-16 DIAGNOSIS — I74.2 BRACHIAL ARTERY THROMBOSIS: ICD-10-CM

## 2024-07-16 DIAGNOSIS — D64.9 NORMOCYTIC ANEMIA: ICD-10-CM

## 2024-07-16 DIAGNOSIS — I50.22 CHRONIC HFREF (HEART FAILURE WITH REDUCED EJECTION FRACTION): ICD-10-CM

## 2024-07-16 DIAGNOSIS — N13.30 HYDRONEPHROSIS, UNSPECIFIED HYDRONEPHROSIS TYPE: Primary | ICD-10-CM

## 2024-07-16 DIAGNOSIS — N39.0 URINARY TRACT INFECTION WITHOUT HEMATURIA, SITE UNSPECIFIED: ICD-10-CM

## 2024-07-16 DIAGNOSIS — R76.0 ANTIPHOSPHOLIPID ANTIBODY POSITIVE: ICD-10-CM

## 2024-07-16 PROCEDURE — 99214 OFFICE O/P EST MOD 30 MIN: CPT | Performed by: INTERNAL MEDICINE

## 2024-07-16 RX ORDER — ESTRADIOL 0.1 MG/G
CREAM VAGINAL
COMMUNITY
Start: 2024-06-26

## 2024-07-16 NOTE — PATIENT INSTRUCTIONS
No labs today; up to date    Medications:  Continue current medications as prescribed    Follow up for ultrasound and nephology as scheduled    Follow up with specialists as scheduled    Encourage healthy diet and exercise    Continue tobacco cessation    Follow up in 3 months for preventative care visit

## 2024-07-16 NOTE — PROGRESS NOTES
Chief Complaint  Follow-up    HPI:    Neva Kurtz presents to [facility]    Patient is a 63-year-old female with a history of hypertension, hyperlipidemia, paroxysmal SVT, systolic CHF, Bell's palsy, tobacco use presenting for follow-up.    Patient previously evaluated on 6/18/2024 with approximately 1 day history of dysuria, chills, and flank pain.  Symptoms concerning for possible complicated UTI/pyelonephritis, especially in the setting of history of bilateral hydronephrosis for which she followed with urology for bilateral stent placement.  Patient was started on Levaquin 750 mg daily for 7 days and recommended to follow-up with urology as scheduled.  Urine culture eventually returned with mixed taj.    Since last evaluation, patient overall doing well. Her previous urinary symptoms resolved after a couple days of antibiotics. Tolerated Levaquin without side effect. Patient currently on cranberry supplementation and probiotic. She is scheduled for ultrasound today and due to see urology later this week. Anticipating likely stent removal.     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  Patient started and continued on Eliquis.  Patient underwent initial APLS testing which was positive with second repeat APLS testing confirmatory.  Follows with hematology.  Patient recommended to continue lifelong anticoagulation given high risk for future VTE. No recent recurrent symptoms.     Normocytic anemia  Follows with hematology.  [place] secondary to blood loss that improved after recent hysterectomy.  Currently on iron sulfate every other day. No issues on anticoagulation other than occasional bruising with trauma.     CHF  Patient overall doing well without any reported cardiopulmonary issues. Denies chest pain, shortness of breath, orthopnea, PND, and lower extremity edema. Denies palpitations, tachycardia, dizziness,  lightheadedness, and syncope.Weight overall recently stable.  Patient compliant with medications.      Review of Systems:  ROS negative unless otherwise noted in HPI above.    Past Medical History:   Diagnosis Date    CHF (congestive heart failure)     Prolapsed uterus     SVT (supraventricular tachycardia)          Current Outpatient Medications:     apixaban (ELIQUIS) 5 MG tablet tablet, Take 1 tablet by mouth Every 12 (Twelve) Hours. Indications: Other - full anticoagulation, Disp: 180 tablet, Rfl: 4    aspirin 81 MG EC tablet, Take 1 tablet by mouth Daily., Disp: 30 tablet, Rfl: 0    atorvastatin (Lipitor) 10 MG tablet, Take 1 tablet by mouth Daily., Disp: 90 tablet, Rfl: 2    cetirizine (zyrTEC) 10 MG tablet, Take 1 tablet by mouth Daily., Disp: , Rfl:     D-MANNOSE PO, Take  by mouth., Disp: , Rfl:     estradiol (ESTRACE) 0.1 MG/GM vaginal cream, APPLY VAGINALLY 1GM PER VAGINA 3X PER WEEK, Disp: , Rfl:     Farxiga 5 MG tablet tablet, Take 1 tablet by mouth Daily., Disp: , Rfl:     furosemide (LASIX) 20 MG tablet, TAKE 1 TABLET BY MOUTH TWICE A DAY, Disp: 180 tablet, Rfl: 1    losartan (Cozaar) 25 MG tablet, Take 1 tablet by mouth Daily., Disp: 30 tablet, Rfl: 11    metoprolol succinate XL (TOPROL-XL) 25 MG 24 hr tablet, Take 1 tablet by mouth Daily., Disp: 30 tablet, Rfl: 11    potassium chloride 10 MEQ CR tablet, TAKE 1 TABLET BY MOUTH TWICE A DAY, Disp: 180 tablet, Rfl: 1    Probiotic Product (PROBIOTIC DAILY PO), Take  by mouth., Disp: , Rfl:     Social History     Socioeconomic History    Marital status: Single   Tobacco Use    Smoking status: Former     Current packs/day: 0.00     Average packs/day: 1 pack/day for 40.0 years (40.0 ttl pk-yrs)     Types: Cigarettes     Start date: 1983     Quit date: 2023     Years since quittin.7     Passive exposure: Never    Smokeless tobacco: Never   Vaping Use    Vaping status: Never Used   Substance and Sexual Activity    Alcohol use: Never    Drug use:  "Never    Sexual activity: Defer        Objective   Vital Signs:  /76   Pulse 83   Resp 16   Ht 175.3 cm (69\")   Wt 96.1 kg (211 lb 12.8 oz)   SpO2 98%   BMI 31.28 kg/m²   Estimated body mass index is 31.28 kg/m² as calculated from the following:    Height as of this encounter: 175.3 cm (69\").    Weight as of this encounter: 96.1 kg (211 lb 12.8 oz).    Physical Exam:  General: Well-appearing patient, no apparent distress  Cardiac: Regular rate and rhythm, normal S1/S2, no murmur, rubs or gallops, no lower extremity edema  Lungs: Clear to auscultation bilaterally, no crackles or wheezes  Abdomen: Soft, non-tender, no guarding or rebound tenderness, no hepatosplenomegaly  Skin: No significant rashes or lesions  MSK: Grossly normal tone and strength, no CVA tenderness  Neuro: Alert and oriented x3, CN II-XII grossly intact  Psych: Appropriate mood and affect    Assessment and Plan:    (N13.30) Hydronephrosis, unspecified hydronephrosis type  (N39.0) Urinary tract infection without hematuria, site unspecified  Assessment: Patient overall doing well after recent treatment of complicated UTI with Levaquin.  Patient already was scheduled follow-up with urology for consideration of stent removal.  No current indications for continued antibiotics.  Could consider adjustments in Farxiga if patient having recurrent UTIs.  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  Recent testing confirmatory of APLS.  Discussed the indication for lifelong anticoagulation, even without recurrence and VTE.  Plan:  - Follow up with hematology as scheduled  - Lifelong Eliquis    (D64.9) Normocytic anemia  Assessment: Overall improving after recent hysterectomy.  Follows with hematology.  No recent evidence " of bleeding or bruising.  Plan:  - Intermittently monitor CBC  - Follow-up with hematology as scheduled    (I50.22) Chronic HFrEF (heart failure with reduced ejection fraction)   Assessment: Patient with chronic systolic heart failure. Most recent echocardiogram with EF of 40 to 45% %. Weight overall stable and appears euvolemic on exam.  No recent evidence of exacerbation and patient compliant with medication.  Plan:   - Continue medications including Farxiga, furosemide, losartan, metoprolol as prescribed  - Limit salt and fluid intake  - Monitor daily weight  -Follow-up with cardiology as scheduled    Patient was given instructions and counseling regarding her condition or for health maintenance advice. Please see specific information pulled into the AVS if appropriate.       Dr Hans Diaz   Internal Medicine Physician  [facility]  [address], [address]  [place] [address]

## 2024-07-18 ENCOUNTER — LAB (OUTPATIENT)
Dept: LAB | Facility: HOSPITAL | Age: 64
End: 2024-07-18
Payer: COMMERCIAL

## 2024-07-18 ENCOUNTER — HOSPITAL ENCOUNTER (OUTPATIENT)
Dept: CARDIOLOGY | Facility: HOSPITAL | Age: 64
Discharge: HOME | End: 2024-07-18
Payer: COMMERCIAL

## 2024-07-18 ENCOUNTER — TRANSCRIBE ORDERS (OUTPATIENT)
Dept: ADMINISTRATIVE | Facility: HOSPITAL | Age: 64
End: 2024-07-18
Payer: COMMERCIAL

## 2024-07-18 DIAGNOSIS — N13.5 URETERAL STRICTURE: Primary | ICD-10-CM

## 2024-07-18 DIAGNOSIS — N13.5 URETERAL STRICTURE: ICD-10-CM

## 2024-07-18 LAB
ANION GAP SERPL CALCULATED.3IONS-SCNC: 9.7 MMOL/L (ref 5–15)
BASOPHILS # BLD AUTO: 0.1 10*3/MM3 (ref 0–0.2)
BASOPHILS NFR BLD AUTO: 1.5 % (ref 0–1.5)
BUN SERPL-MCNC: 21 MG/DL (ref 8–23)
BUN/CREAT SERPL: 17.2 (ref 7–25)
CALCIUM SPEC-SCNC: 9.7 MG/DL (ref 8.6–10.5)
CHLORIDE SERPL-SCNC: 104 MMOL/L (ref 98–107)
CO2 SERPL-SCNC: 25.3 MMOL/L (ref 22–29)
CREAT SERPL-MCNC: 1.22 MG/DL (ref 0.57–1)
DEPRECATED RDW RBC AUTO: 46.7 FL (ref 37–54)
EGFRCR SERPLBLD CKD-EPI 2021: 50 ML/MIN/1.73
EOSINOPHIL # BLD AUTO: 0.22 10*3/MM3 (ref 0–0.4)
EOSINOPHIL NFR BLD AUTO: 3.3 % (ref 0.3–6.2)
ERYTHROCYTE [DISTWIDTH] IN BLOOD BY AUTOMATED COUNT: 13.7 % (ref 12.3–15.4)
GLUCOSE SERPL-MCNC: 102 MG/DL (ref 65–99)
HCT VFR BLD AUTO: 39.7 % (ref 34–46.6)
HGB BLD-MCNC: 12.6 G/DL (ref 12–15.9)
IMM GRANULOCYTES # BLD AUTO: 0.02 10*3/MM3 (ref 0–0.05)
IMM GRANULOCYTES NFR BLD AUTO: 0.3 % (ref 0–0.5)
LYMPHOCYTES # BLD AUTO: 2.67 10*3/MM3 (ref 0.7–3.1)
LYMPHOCYTES NFR BLD AUTO: 40.6 % (ref 19.6–45.3)
MCH RBC QN AUTO: 29.1 PG (ref 26.6–33)
MCHC RBC AUTO-ENTMCNC: 31.7 G/DL (ref 31.5–35.7)
MCV RBC AUTO: 91.7 FL (ref 79–97)
MONOCYTES # BLD AUTO: 0.78 10*3/MM3 (ref 0.1–0.9)
MONOCYTES NFR BLD AUTO: 11.9 % (ref 5–12)
NEUTROPHILS NFR BLD AUTO: 2.79 10*3/MM3 (ref 1.7–7)
NEUTROPHILS NFR BLD AUTO: 42.4 % (ref 42.7–76)
NRBC BLD AUTO-RTO: 0 /100 WBC (ref 0–0.2)
PLATELET # BLD AUTO: 491 10*3/MM3 (ref 140–450)
PMV BLD AUTO: 8.8 FL (ref 6–12)
POTASSIUM SERPL-SCNC: 4.1 MMOL/L (ref 3.5–5.2)
QT INTERVAL: 366 MS
QTC INTERVAL: 423 MS
RBC # BLD AUTO: 4.33 10*6/MM3 (ref 3.77–5.28)
SODIUM SERPL-SCNC: 139 MMOL/L (ref 136–145)
WBC NRBC COR # BLD AUTO: 6.58 10*3/MM3 (ref 3.4–10.8)

## 2024-07-18 PROCEDURE — 80048 BASIC METABOLIC PNL TOTAL CA: CPT

## 2024-07-18 PROCEDURE — 93005 ELECTROCARDIOGRAM TRACING: CPT | Performed by: UROLOGY

## 2024-07-18 PROCEDURE — 36415 COLL VENOUS BLD VENIPUNCTURE: CPT

## 2024-07-18 PROCEDURE — 85025 COMPLETE CBC W/AUTO DIFF WBC: CPT

## 2024-08-01 ENCOUNTER — TRANSCRIBE ORDERS (OUTPATIENT)
Dept: ADMINISTRATIVE | Facility: HOSPITAL | Age: 64
End: 2024-08-01
Payer: COMMERCIAL

## 2024-08-01 DIAGNOSIS — N13.30 HYDRONEPHROSIS, UNSPECIFIED HYDRONEPHROSIS TYPE: Primary | ICD-10-CM

## 2024-08-06 ENCOUNTER — HOSPITAL ENCOUNTER (OUTPATIENT)
Dept: ULTRASOUND IMAGING | Facility: HOSPITAL | Age: 64
Discharge: HOME | End: 2024-08-06
Admitting: UROLOGY
Payer: COMMERCIAL

## 2024-08-06 DIAGNOSIS — N13.30 HYDRONEPHROSIS, UNSPECIFIED HYDRONEPHROSIS TYPE: ICD-10-CM

## 2024-08-06 PROCEDURE — 76775 US EXAM ABDO BACK WALL LIM: CPT

## 2024-09-12 RX ORDER — DAPAGLIFLOZIN 5 MG/1
5 TABLET, FILM COATED ORAL DAILY
Qty: 90 TABLET | Refills: 1 | Status: SHIPPED | OUTPATIENT
Start: 2024-09-12

## 2024-09-20 RX ORDER — FUROSEMIDE 20 MG
20 TABLET ORAL 2 TIMES DAILY
Qty: 180 TABLET | Refills: 0 | Status: SHIPPED | OUTPATIENT
Start: 2024-09-20

## 2024-09-20 RX ORDER — LOSARTAN POTASSIUM 25 MG/1
25 TABLET ORAL DAILY
Qty: 90 TABLET | Refills: 0 | Status: SHIPPED | OUTPATIENT
Start: 2024-09-20

## 2024-09-20 RX ORDER — ATORVASTATIN CALCIUM 10 MG/1
10 TABLET, FILM COATED ORAL DAILY
Qty: 90 TABLET | Refills: 2 | Status: SHIPPED | OUTPATIENT
Start: 2024-09-20

## 2024-09-23 NOTE — PROGRESS NOTES
Cardiology Office Follow Up Visit      Primary Care Provider:  Hans Diaz MD    Reason for f/u:     6 month f/u      Subjective       History of Present Illness       Neva Kurtz is a 64 y.o. female seen in clinic today for continued cardiac care of chronic systolic heart failure and PSVT.    Patient has a past cardiac history of chronic systolic heart failure and PSVT.  She quit smoking 11/2023.  2D echo 11/2023 showed an EF of 45% with grade 1 diastolic dysfunction and mild MR.  Nuclear stress test 1/2024 showed no ischemia.  EF = 54%.  PMH includes HTN, HLD, reformed tobacco dependence, bilateral hydronephrosis s/p uretal stenting, brachial thrombus s/p brachial thrombectomy 11/2023 (anticoagulated with Eliquis).  Ambulatory cardiac monitor 11/2023 showed an episode of PSVT but no afib.     Patient has now had her ureteral stents removed with urology follow-up appointment on Thursday.  Last chemistry 7/2024 as follows Na 139, K 4.1, Cr 1.22 (prior Cr 0.94).  She had hysterectomy without complication 5/2024.  She had a hypercoagulable workup with hemeoncology, Dr. Cervantes, and tells me her labs were positive (unable to provide further details) and that she will require lifelong anticoagulation.  Her follow-up is in November 2024.    Patient reports feeling better than she has in some time.  She denies shortness of breath or edema.  She denies any further palpitations.  She wants to restart a walking regimen in which she was previously doing 5 miles a day.         ASSESSMENT/PLAN:      Diagnoses and all orders for this visit:    1. Chronic HFrEF (heart failure with reduced ejection fraction) (Primary)    2. PSVT (paroxysmal supraventricular tachycardia)    Other orders  -     furosemide (LASIX) 20 MG tablet; Take 1 tablet by mouth Daily.  Dispense: 30 tablet; Refill: 11  -     potassium chloride 10 MEQ CR tablet; Take 1 tablet by mouth Daily.  Dispense: 30 tablet; Refill: 11  -     dapagliflozin  Propanediol (Farxiga) 10 MG tablet; Take 10 mg by mouth Daily.  Dispense: 30 tablet; Refill: 11            MEDICAL DECISION MAKING:    Patient appears compensated with respect to volume.  Last chemistry showed mild renal insufficiency, albeit in setting of urological dysfunction.  I think we can safely cut back her Lasix/potassium to daily dosage.  She is planned for repeat chemistry with her PCP on 10/16.  Will increase Farxiga to appropriate HF dose of 10 mg p.o. daily.      RTC in 6 months.    Past Medical History:   Diagnosis Date    CHF (congestive heart failure)     Prolapsed uterus     SVT (supraventricular tachycardia)        Past Surgical History:   Procedure Laterality Date    ARM THROMBECTOMY/EMBOLECTOMY Left 11/21/2023    Procedure: UPPER EXTREMITY THROMBECTOMY/EMBOLECTOMY;  Surgeon: Canelo Blackwell II, MD;  Location: [facility] MAIN OR;  Service: Vascular;  Laterality: Left;    BREAST BIOPSY      CYSTOSCOPY, URETEROSCOPY, RETROGRADE PYELOGRAM, STENT INSERTION Bilateral 11/19/2023    Procedure: CYSTOSCOPY URETEROSCOPY RETROGRADE PYELOGRAM STENT INSERTION;  Surgeon: Alan Rodriguez MD;  Location: [facility] MAIN OR;  Service: Urology;  Laterality: Bilateral;    HYSTERECTOMY  05/01/2024         Current Outpatient Medications:     apixaban (ELIQUIS) 5 MG tablet tablet, Take 1 tablet by mouth Every 12 (Twelve) Hours. Indications: Other - full anticoagulation, Disp: 180 tablet, Rfl: 4    aspirin 81 MG EC tablet, Take 1 tablet by mouth Daily., Disp: 30 tablet, Rfl: 0    atorvastatin (LIPITOR) 10 MG tablet, TAKE 1 TABLET BY MOUTH EVERY DAY, Disp: 90 tablet, Rfl: 2    cetirizine (zyrTEC) 10 MG tablet, Take 1 tablet by mouth Daily., Disp: , Rfl:     D-MANNOSE PO, Take  by mouth., Disp: , Rfl:     Enoxaparin Sodium (LOVENOX) 100 MG/ML solution prefilled syringe syringe, Inject 1 mL under the skin into the appropriate area as directed Every 12 (Twelve) Hours. Hold Eliquis two days prior to dental procedure and start  Lovenox 1 mg/kg twice daily.  Give last dose of Lovenox the night before the procedure.  May resume Eliquis 24 hours after procedure if okay w/ dental., Disp: 4 mL, Rfl: 0    estradiol (ESTRACE) 0.1 MG/GM vaginal cream, APPLY VAGINALLY 1GM PER VAGINA 3X PER WEEK, Disp: , Rfl:     furosemide (LASIX) 20 MG tablet, Take 1 tablet by mouth Daily., Disp: 30 tablet, Rfl: 11    losartan (COZAAR) 25 MG tablet, TAKE 1 TABLET BY MOUTH EVERY DAY, Disp: 90 tablet, Rfl: 0    metoprolol succinate XL (TOPROL-XL) 25 MG 24 hr tablet, Take 1 tablet by mouth Daily., Disp: 30 tablet, Rfl: 11    potassium chloride 10 MEQ CR tablet, Take 1 tablet by mouth Daily., Disp: 30 tablet, Rfl: 11    Probiotic Product (PROBIOTIC DAILY PO), Take  by mouth., Disp: , Rfl:     dapagliflozin Propanediol (Farxiga) 10 MG tablet, Take 10 mg by mouth Daily., Disp: 30 tablet, Rfl: 11    Social History     Socioeconomic History    Marital status: Single   Tobacco Use    Smoking status: Former     Current packs/day: 0.00     Average packs/day: 1 pack/day for 40.0 years (40.0 ttl pk-yrs)     Types: Cigarettes     Start date: 1983     Quit date: 2023     Years since quittin.9     Passive exposure: Never    Smokeless tobacco: Never   Vaping Use    Vaping status: Never Used   Substance and Sexual Activity    Alcohol use: Never    Drug use: Never    Sexual activity: Defer       Family History   Problem Relation Age of Onset    Hyperlipidemia Mother     Diabetes Mother     Cancer Mother     Other Father        The following portions of the patient's history were reviewed and updated as appropriate: allergies, current medications, past family history, past medical history, past social history, past surgical history and problem list.    ROS  /75 (BP Location: Left arm, Patient Position: Sitting, Cuff Size: Large Adult)   Pulse 81   Wt 98.4 kg (217 lb)   SpO2 99%   BMI 32.05 kg/m² .  Objective     Physical Exam    Physical  Exam:  Neuro:  CV:  Resp:  GI:  Ext:  Pysch: AAOx3, no gross deficits  S1S2 RRR, no murmur  Non-labored, CTA  BS+, abd soft  Pedal pulses palp, no edema  Calm and cooperative       Procedures

## 2024-09-24 RX ORDER — ENOXAPARIN SODIUM 100 MG/ML
1 INJECTION SUBCUTANEOUS EVERY 12 HOURS SCHEDULED
Qty: 4 ML | Refills: 0 | Status: SHIPPED | OUTPATIENT
Start: 2024-09-24

## 2024-09-30 ENCOUNTER — OFFICE VISIT (OUTPATIENT)
Dept: CARDIOLOGY | Facility: CLINIC | Age: 64
End: 2024-09-30
Payer: COMMERCIAL

## 2024-09-30 VITALS
BODY MASS INDEX: 32.05 KG/M2 | HEART RATE: 81 BPM | WEIGHT: 217 LBS | DIASTOLIC BLOOD PRESSURE: 75 MMHG | SYSTOLIC BLOOD PRESSURE: 112 MMHG | OXYGEN SATURATION: 99 %

## 2024-09-30 DIAGNOSIS — I47.10 PSVT (PAROXYSMAL SUPRAVENTRICULAR TACHYCARDIA): ICD-10-CM

## 2024-09-30 DIAGNOSIS — I50.22 CHRONIC HFREF (HEART FAILURE WITH REDUCED EJECTION FRACTION): Primary | ICD-10-CM

## 2024-09-30 PROCEDURE — 99214 OFFICE O/P EST MOD 30 MIN: CPT | Performed by: NURSE PRACTITIONER

## 2024-09-30 RX ORDER — POTASSIUM CHLORIDE 750 MG/1
10 TABLET, EXTENDED RELEASE ORAL DAILY
Qty: 30 TABLET | Refills: 11 | Status: SHIPPED | OUTPATIENT
Start: 2024-09-30

## 2024-09-30 RX ORDER — FUROSEMIDE 20 MG
20 TABLET ORAL DAILY
Qty: 30 TABLET | Refills: 11 | Status: SHIPPED | OUTPATIENT
Start: 2024-09-30

## 2024-09-30 RX ORDER — DAPAGLIFLOZIN 10 MG/1
10 TABLET, FILM COATED ORAL DAILY
Qty: 30 TABLET | Refills: 11 | Status: SHIPPED | OUTPATIENT
Start: 2024-09-30

## 2024-10-16 ENCOUNTER — LAB (OUTPATIENT)
Dept: FAMILY MEDICINE CLINIC | Facility: CLINIC | Age: 64
End: 2024-10-16
Payer: COMMERCIAL

## 2024-10-16 ENCOUNTER — OFFICE VISIT (OUTPATIENT)
Dept: FAMILY MEDICINE CLINIC | Facility: CLINIC | Age: 64
End: 2024-10-16
Payer: COMMERCIAL

## 2024-10-16 VITALS
HEIGHT: 69 IN | OXYGEN SATURATION: 98 % | RESPIRATION RATE: 18 BRPM | DIASTOLIC BLOOD PRESSURE: 80 MMHG | BODY MASS INDEX: 31.78 KG/M2 | WEIGHT: 214.6 LBS | HEART RATE: 70 BPM | SYSTOLIC BLOOD PRESSURE: 128 MMHG

## 2024-10-16 DIAGNOSIS — E78.41 ELEVATED LIPOPROTEIN(A): ICD-10-CM

## 2024-10-16 DIAGNOSIS — Z87.891 HISTORY OF TOBACCO USE: ICD-10-CM

## 2024-10-16 DIAGNOSIS — Z00.00 PREVENTATIVE HEALTH CARE: Primary | ICD-10-CM

## 2024-10-16 DIAGNOSIS — R76.0 ANTIPHOSPHOLIPID ANTIBODY POSITIVE: ICD-10-CM

## 2024-10-16 DIAGNOSIS — I10 PRIMARY HYPERTENSION: ICD-10-CM

## 2024-10-16 DIAGNOSIS — Z12.11 COLON CANCER SCREENING: ICD-10-CM

## 2024-10-16 DIAGNOSIS — Z12.31 ENCOUNTER FOR SCREENING MAMMOGRAM FOR MALIGNANT NEOPLASM OF BREAST: ICD-10-CM

## 2024-10-16 DIAGNOSIS — Z11.59 NEED FOR HEPATITIS C SCREENING TEST: ICD-10-CM

## 2024-10-16 DIAGNOSIS — I50.22 CHRONIC HFREF (HEART FAILURE WITH REDUCED EJECTION FRACTION): ICD-10-CM

## 2024-10-16 DIAGNOSIS — I74.2 BRACHIAL ARTERY THROMBOSIS: ICD-10-CM

## 2024-10-16 DIAGNOSIS — I47.10 PSVT (PAROXYSMAL SUPRAVENTRICULAR TACHYCARDIA): ICD-10-CM

## 2024-10-16 DIAGNOSIS — Z12.2 ENCOUNTER FOR SCREENING FOR LUNG CANCER: ICD-10-CM

## 2024-10-16 LAB
ANION GAP SERPL CALCULATED.3IONS-SCNC: 10.1 MMOL/L (ref 5–15)
BASOPHILS # BLD AUTO: 0.08 10*3/MM3 (ref 0–0.2)
BASOPHILS NFR BLD AUTO: 1.2 % (ref 0–1.5)
BUN SERPL-MCNC: 19 MG/DL (ref 8–23)
BUN/CREAT SERPL: 17.6 (ref 7–25)
CALCIUM SPEC-SCNC: 9.8 MG/DL (ref 8.6–10.5)
CHLORIDE SERPL-SCNC: 104 MMOL/L (ref 98–107)
CHOLEST SERPL-MCNC: 212 MG/DL (ref 0–200)
CO2 SERPL-SCNC: 26.9 MMOL/L (ref 22–29)
CREAT SERPL-MCNC: 1.08 MG/DL (ref 0.57–1)
DEPRECATED RDW RBC AUTO: 46.5 FL (ref 37–54)
EGFRCR SERPLBLD CKD-EPI 2021: 57.8 ML/MIN/1.73
EOSINOPHIL # BLD AUTO: 0.21 10*3/MM3 (ref 0–0.4)
EOSINOPHIL NFR BLD AUTO: 3.2 % (ref 0.3–6.2)
ERYTHROCYTE [DISTWIDTH] IN BLOOD BY AUTOMATED COUNT: 13.9 % (ref 12.3–15.4)
GLUCOSE SERPL-MCNC: 89 MG/DL (ref 65–99)
HCT VFR BLD AUTO: 42.6 % (ref 34–46.6)
HDLC SERPL-MCNC: 46 MG/DL (ref 40–60)
HGB BLD-MCNC: 14.5 G/DL (ref 12–15.9)
IMM GRANULOCYTES # BLD AUTO: 0.01 10*3/MM3 (ref 0–0.05)
IMM GRANULOCYTES NFR BLD AUTO: 0.2 % (ref 0–0.5)
LDLC SERPL CALC-MCNC: 142 MG/DL (ref 0–100)
LDLC/HDLC SERPL: 3.02 {RATIO}
LYMPHOCYTES # BLD AUTO: 2.34 10*3/MM3 (ref 0.7–3.1)
LYMPHOCYTES NFR BLD AUTO: 35.9 % (ref 19.6–45.3)
MCH RBC QN AUTO: 31.4 PG (ref 26.6–33)
MCHC RBC AUTO-ENTMCNC: 34 G/DL (ref 31.5–35.7)
MCV RBC AUTO: 92.2 FL (ref 79–97)
MONOCYTES # BLD AUTO: 0.64 10*3/MM3 (ref 0.1–0.9)
MONOCYTES NFR BLD AUTO: 9.8 % (ref 5–12)
NEUTROPHILS NFR BLD AUTO: 3.23 10*3/MM3 (ref 1.7–7)
NEUTROPHILS NFR BLD AUTO: 49.7 % (ref 42.7–76)
NRBC BLD AUTO-RTO: 0 /100 WBC (ref 0–0.2)
PLATELET # BLD AUTO: 422 10*3/MM3 (ref 140–450)
PMV BLD AUTO: 8.8 FL (ref 6–12)
POTASSIUM SERPL-SCNC: 4.9 MMOL/L (ref 3.5–5.2)
RBC # BLD AUTO: 4.62 10*6/MM3 (ref 3.77–5.28)
SODIUM SERPL-SCNC: 141 MMOL/L (ref 136–145)
TRIGL SERPL-MCNC: 135 MG/DL (ref 0–150)
VLDLC SERPL-MCNC: 24 MG/DL (ref 5–40)
WBC NRBC COR # BLD AUTO: 6.51 10*3/MM3 (ref 3.4–10.8)

## 2024-10-16 PROCEDURE — 36415 COLL VENOUS BLD VENIPUNCTURE: CPT

## 2024-10-16 PROCEDURE — 85025 COMPLETE CBC W/AUTO DIFF WBC: CPT | Performed by: INTERNAL MEDICINE

## 2024-10-16 PROCEDURE — 90656 IIV3 VACC NO PRSV 0.5 ML IM: CPT | Performed by: INTERNAL MEDICINE

## 2024-10-16 PROCEDURE — 99214 OFFICE O/P EST MOD 30 MIN: CPT | Performed by: INTERNAL MEDICINE

## 2024-10-16 PROCEDURE — 90471 IMMUNIZATION ADMIN: CPT | Performed by: INTERNAL MEDICINE

## 2024-10-16 PROCEDURE — 99396 PREV VISIT EST AGE 40-64: CPT | Performed by: INTERNAL MEDICINE

## 2024-10-16 PROCEDURE — 80048 BASIC METABOLIC PNL TOTAL CA: CPT | Performed by: INTERNAL MEDICINE

## 2024-10-16 PROCEDURE — 80061 LIPID PANEL: CPT | Performed by: INTERNAL MEDICINE

## 2024-10-16 RX ORDER — CHLORHEXIDINE GLUCONATE ORAL RINSE 1.2 MG/ML
SOLUTION DENTAL
COMMUNITY
Start: 2024-09-27

## 2024-10-16 NOTE — PROGRESS NOTES
Chief Complaint  Annual Exam    HPI:    Neva Kurtz presents to [facility]    Patient is a 64-year-old female presenting for annual preventative care visit.    Hypertension  Home blood pressures typically well controlled on current medication regimen including losartan 25 mg daily, metoprolol XL 25 mg daily, and lasix 20 mg daily. . Denies side effects on current anti-hypertensive medication regimen. Denies headaches, blurry vision, dizziness, chest pain, shortness of breath, or palpitations. Following a low salt diet. Tries to exercise several times weekly.     Hyperlipidemia  Compliant with statin. No side effects. Following a low-cholesterol diet.     Chronic systolic heart failure  Most recent echo 11/20/2023 with a EF of 45% and grade 1 diastolic dysfunction. Weight overall stable.  Recently evaluated by cardiology last week.  Denies chest pain, shortness of breath, orthopnea, PND, lower extremity edema, palpitations, tachycardia     Follows with urology. Stents most recently removed in mid July. She has been doing well since and has had updated ultrasound. Denies dysuria, urgency, frequency, hematuria, cloudy/foul smelling urine, or flank pain.     Paroxysmal SVT  Previously noted on prior ambulatory cardiac monitor.  Follows with cardiology.    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  Patient started and continued on Eliquis.  Patient underwent initial APLS testing which was positive with second repeat APLS testing confirmatory.  Follows with hematology.  Patient recommended to continue lifelong anticoagulation given high risk for future VTE. No recent recurrent symptoms.     Tobacco use  Previously quit smoking November 2023.    Preventative:    Social:     Diet and Exercise: Could be better    Alcohol, Tobacco, and Recreational Drug use: Occasional alcohol    Cancer  screenings:  Colonoscopy: No prior colonoscopy; no known family history of colon cancer  Mammogram: May have been done at [place] on 1/22/2024  Pap/HPV: Status post hysterectomy    DEXA: No no prior DEXA on file    Immunizations: Due for flu, COVID, Tdap, Shingles    [facility] Directive: Not on file      Review of Systems:  ROS negative unless otherwise noted in HPI above.    Past Medical History:   Diagnosis Date    CHF (congestive heart failure)     Prolapsed uterus     SVT (supraventricular tachycardia)          Current Outpatient Medications:     apixaban (ELIQUIS) 5 MG tablet tablet, Take 1 tablet by mouth Every 12 (Twelve) Hours. Indications: Other - full anticoagulation, Disp: 180 tablet, Rfl: 4    aspirin 81 MG EC tablet, Take 1 tablet by mouth Daily., Disp: 30 tablet, Rfl: 0    atorvastatin (LIPITOR) 10 MG tablet, TAKE 1 TABLET BY MOUTH EVERY DAY, Disp: 90 tablet, Rfl: 2    cetirizine (zyrTEC) 10 MG tablet, Take 1 tablet by mouth Daily., Disp: , Rfl:     chlorhexidine (PERIDEX) 0.12 % solution, SWISH 15 ML FOR 30 SECONDS THEN SPIT. USE TWICE DAILY (MORNING AND EVENING) AFTER BRUSHING TEETH, Disp: , Rfl:     D-MANNOSE PO, Take  by mouth., Disp: , Rfl:     dapagliflozin Propanediol (Farxiga) 10 MG tablet, Take 10 mg by mouth Daily., Disp: 30 tablet, Rfl: 11    furosemide (LASIX) 20 MG tablet, Take 1 tablet by mouth Daily., Disp: 30 tablet, Rfl: 11    losartan (COZAAR) 25 MG tablet, TAKE 1 TABLET BY MOUTH EVERY DAY, Disp: 90 tablet, Rfl: 0    metoprolol succinate XL (TOPROL-XL) 25 MG 24 hr tablet, Take 1 tablet by mouth Daily., Disp: 30 tablet, Rfl: 11    potassium chloride 10 MEQ CR tablet, Take 1 tablet by mouth Daily., Disp: 30 tablet, Rfl: 11    Probiotic Product (PROBIOTIC DAILY PO), Take  by mouth., Disp: , Rfl:     Social History     Socioeconomic History    Marital status: Single   Tobacco Use    Smoking status: Former     Current packs/day: 0.00     Average packs/day: 1 pack/day for 40.0  "years (40.0 ttl pk-yrs)     Types: Cigarettes     Start date: 1983     Quit date: 2023     Years since quittin.9     Passive exposure: Never    Smokeless tobacco: Never   Vaping Use    Vaping status: Never Used   Substance and Sexual Activity    Alcohol use: Never    Drug use: Never    Sexual activity: Defer        Objective   Vital Signs:  /80   Pulse 70   Resp 18   Ht 175.3 cm (69\")   Wt 97.3 kg (214 lb 9.6 oz)   SpO2 98%   BMI 31.69 kg/m²   Estimated body mass index is 31.69 kg/m² as calculated from the following:    Height as of this encounter: 175.3 cm (69\").    Weight as of this encounter: 97.3 kg (214 lb 9.6 oz).    Physical Exam:  General: Well-appearing patient, no apparent distress  HEENT: No posterior pharynx erythema, no tonsillar erythema or exudates, normal external auditory canals, TM normal without bulging or erythema  Neck: No cervical lymphadenopathy  Cardiac: Regular rate and rhythm, normal S1/S2, no murmur, rubs or gallops, no lower extremity edema  Lungs: Clear to auscultation bilaterally, no crackles or wheezes  Abdomen: Soft, non-tender, no guarding or rebound tenderness, no hepatosplenomegaly  Skin: No significant rashes or lesions  MSK: Grossly normal tone and strength  Neuro: Alert and oriented x3, CN II-XII grossly intact  Psych: Appropriate mood and affect    Assessment and Plan:      (Z00.00) Preventative health care  Patient is a 64 year old female who is overall doing well. Reviewed social and family history. Encouraged increased healthy diet and exercise and discussed importance to overall health.  Updating age and gender appropriate cancer screenings with colonoscopy, mammogram, and low-dose chest CT.  Discussed indicated vaccines based on age and comorbidities. No skin, mood concerns.  Plan:  - Encourage healthy diet and exercise  - Up date vaccines, if necessary; patient to get flu today and will follow-up with outside pharmacy for additionally recommended " shingles, Tdap, and pneumonia vaccines  - Screening labs as ordered  - Update cancer screening as below    (I10) Primary hypertension -   Assessment: Blood pressure well controlled on current regimen. Discussed importance of healthy diet and exercise.  Plan:  - BMP, CBC  - Continue current medications as prescribed  - Intermittently monitor home blood pressures and follow up if elevated  - Encourage healthy diet and exercise  - Continue tobacco cessation    (E78.41) Elevated lipoprotein(a) -  Assessment: Stable on statin without side effects. Discussed importance of healthy diet and exercise.  Plan:  - Fasting lipid panel  - Continue statin without changes  - Discussed healthy diet and lifestyle     (I50.22) Chronic HFrEF (heart failure with reduced ejection fraction) -  Assessment: Patient with systolic heart failure with most recent EF 45%.  Follows with cardiology.  Weight stable and euvolemic on exam.  No recent exacerbation.  Compliant with medications.  Plan:   - Continue metoprolol, losartan, Farxiga, and Lasix as prescribed  - Limit salt and fluid intake  - Monitor daily weight  - Follow-up with cardiology as scheduled      (I47.10) PSVT (paroxysmal supraventricular tachycardia)  Assessment: Follows with cardiology.  No recent episodes of SVT while on metoprolol.  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  Previously tested positive for antiphospholipid antibodies x 2.  Currently on lifelong anticoagulation with Eliquis and will require bridging with future procedures.  No recent clots.  Plan:  - Continue Eliquis as prescribed  - Follow up with hepatology as scheduled    (Z87.891) History of tobacco use  Assessment: Patient with previous history of tobacco use currently abstinent for several months.  Plan:  - Continue tobacco cessation  - Low-dose chest CT for  lung cancer rule out    (Z12.11) Colon cancer screening - Plan: Ambulatory Referral For Screening Colonoscopy    (Z12.31) Encounter for screening mammogram for malignant neoplasm of breast - Plan: Mammo Screening Digital Tomosynthesis Bilateral With CAD      BMI is >= 30 and <35. (Class 1 Obesity). The following options were offered after discussion;: exercise counseling/recommendations and nutrition counseling/recommendations     Patient was given instructions and counseling regarding her condition or for health maintenance advice. Please see specific information pulled into the AVS if appropriate.       Dr Hans Diaz   Internal Medicine Physician  [facility]  [address], [address]  [place] [address]

## 2024-10-16 NOTE — PATIENT INSTRUCTIONS
Stay in the room for flu shot    Please stop at lab on second floor to have blood drawn    Due for possible tetanus, shingles, and pneumonia vaccine; can get at local pharmacy    Medications:  Continue medications as prescribed    Encourage healthy diet and exercise    Follow up with specialists as scheduled    Follow up for mammogram and colonoscopy as scheduled    Encourage continued smoking cessation    Follow up every 6 months or sooner if something arises

## 2024-10-17 DIAGNOSIS — Z12.2 ENCOUNTER FOR SCREENING FOR LUNG CANCER: ICD-10-CM

## 2024-10-17 RX ORDER — ATORVASTATIN CALCIUM 20 MG/1
20 TABLET, FILM COATED ORAL DAILY
Qty: 90 TABLET | Refills: 1 | Status: SHIPPED | OUTPATIENT
Start: 2024-10-17

## 2024-11-06 DIAGNOSIS — R91.8 ABNORMAL CT LUNG SCREENING: Primary | ICD-10-CM

## 2024-11-24 DIAGNOSIS — R91.1 LUNG NODULE: Primary | ICD-10-CM

## 2024-11-25 ENCOUNTER — PATIENT OUTREACH (OUTPATIENT)
Dept: ONCOLOGY | Facility: CLINIC | Age: 64
End: 2024-11-25
Payer: COMMERCIAL

## 2024-11-25 NOTE — SIGNIFICANT NOTE
Patient returned call. Aware of all upcoming appts. She has my direct number for any questions or concerns.

## 2024-11-25 NOTE — SIGNIFICANT NOTE
Adding to navigation tracking per Dr. Stevenson. Orders for PET, CT ion and PFTs soon. Patient seeing Dr. Lewis 12/11.

## 2024-12-04 ENCOUNTER — LAB (OUTPATIENT)
Dept: LAB | Facility: HOSPITAL | Age: 64
End: 2024-12-04
Payer: COMMERCIAL

## 2024-12-04 DIAGNOSIS — I74.2 BRACHIAL ARTERY THROMBOSIS: Primary | ICD-10-CM

## 2024-12-04 DIAGNOSIS — I74.2 BRACHIAL ARTERY THROMBOSIS: ICD-10-CM

## 2024-12-04 LAB
BASOPHILS # BLD AUTO: 0.07 10*3/MM3 (ref 0–0.2)
BASOPHILS NFR BLD AUTO: 1.1 % (ref 0–1.5)
D DIMER PPP FEU-MCNC: <0.27 MCGFEU/ML (ref 0–0.63)
DEPRECATED RDW RBC AUTO: 49.9 FL (ref 37–54)
EOSINOPHIL # BLD AUTO: 0.22 10*3/MM3 (ref 0–0.4)
EOSINOPHIL NFR BLD AUTO: 3.6 % (ref 0.3–6.2)
ERYTHROCYTE [DISTWIDTH] IN BLOOD BY AUTOMATED COUNT: 14.7 % (ref 12.3–15.4)
FERRITIN SERPL-MCNC: 72.2 NG/ML (ref 13–150)
HCT VFR BLD AUTO: 45.6 % (ref 34–46.6)
HGB BLD-MCNC: 14.5 G/DL (ref 12–15.9)
IRON 24H UR-MRATE: 131 MCG/DL (ref 37–145)
IRON SATN MFR SERPL: 34 % (ref 20–50)
LYMPHOCYTES # BLD AUTO: 2.51 10*3/MM3 (ref 0.7–3.1)
LYMPHOCYTES NFR BLD AUTO: 40.9 % (ref 19.6–45.3)
MCH RBC QN AUTO: 30.5 PG (ref 26.6–33)
MCHC RBC AUTO-ENTMCNC: 31.8 G/DL (ref 31.5–35.7)
MCV RBC AUTO: 96 FL (ref 79–97)
MONOCYTES # BLD AUTO: 0.64 10*3/MM3 (ref 0.1–0.9)
MONOCYTES NFR BLD AUTO: 10.4 % (ref 5–12)
NEUTROPHILS NFR BLD AUTO: 2.69 10*3/MM3 (ref 1.7–7)
NEUTROPHILS NFR BLD AUTO: 44 % (ref 42.7–76)
PLATELET # BLD AUTO: 415 10*3/MM3 (ref 140–450)
PMV BLD AUTO: 8.3 FL (ref 6–12)
RBC # BLD AUTO: 4.75 10*6/MM3 (ref 3.77–5.28)
TIBC SERPL-MCNC: 384 MCG/DL (ref 298–536)
TRANSFERRIN SERPL-MCNC: 258 MG/DL (ref 200–360)
WBC NRBC COR # BLD AUTO: 6.13 10*3/MM3 (ref 3.4–10.8)

## 2024-12-04 PROCEDURE — 83540 ASSAY OF IRON: CPT | Performed by: STUDENT IN AN ORGANIZED HEALTH CARE EDUCATION/TRAINING PROGRAM

## 2024-12-04 PROCEDURE — 84466 ASSAY OF TRANSFERRIN: CPT | Performed by: STUDENT IN AN ORGANIZED HEALTH CARE EDUCATION/TRAINING PROGRAM

## 2024-12-04 PROCEDURE — 85379 FIBRIN DEGRADATION QUANT: CPT | Performed by: STUDENT IN AN ORGANIZED HEALTH CARE EDUCATION/TRAINING PROGRAM

## 2024-12-04 PROCEDURE — 82728 ASSAY OF FERRITIN: CPT | Performed by: STUDENT IN AN ORGANIZED HEALTH CARE EDUCATION/TRAINING PROGRAM

## 2024-12-04 PROCEDURE — 85025 COMPLETE CBC W/AUTO DIFF WBC: CPT

## 2024-12-04 PROCEDURE — 36415 COLL VENOUS BLD VENIPUNCTURE: CPT

## 2024-12-05 ENCOUNTER — HOSPITAL ENCOUNTER (OUTPATIENT)
Dept: RESPIRATORY THERAPY | Facility: HOSPITAL | Age: 64
Discharge: HOME | End: 2024-12-05
Admitting: SURGERY
Payer: COMMERCIAL

## 2024-12-05 DIAGNOSIS — R91.1 LUNG NODULE: ICD-10-CM

## 2024-12-05 PROCEDURE — 94726 PLETHYSMOGRAPHY LUNG VOLUMES: CPT

## 2024-12-05 PROCEDURE — 94010 BREATHING CAPACITY TEST: CPT

## 2024-12-05 PROCEDURE — 94729 DIFFUSING CAPACITY: CPT

## 2024-12-11 RX ORDER — POTASSIUM CHLORIDE 750 MG/1
10 TABLET, EXTENDED RELEASE ORAL 2 TIMES DAILY
Qty: 180 TABLET | Refills: 2 | Status: SHIPPED | OUTPATIENT
Start: 2024-12-11

## 2024-12-11 NOTE — PROGRESS NOTES
HEMATOLOGY ONCOLOGY OUTPATIENT FOLLOW UP       Patient name: Neva Kurtz  : 1960  MRN: [id number]  Primary Care Physician: Hans Diaz MD  Referring Physician: No ref. provider found  Reason For Consult:         History of Present Illness:    Neva Kurtz is a 64 y.o. female who presented to [facility] on 2023 with complaints of shortness of breath.  Past medical history of SVT.  Patient also had a recent history of discharge from the hospital on 2023 after treatment for UTI.  Work-up revealed CHF and also severe right hydroureteronephrosis.  Cardiology was consulted for the acute exacerbation of CHF and neurology saw the patient for her bilateral hydronephrosis and did a bilateral stent placement.  It was also recommended that she follow-up with a gynecologist for hysterectomy and suspension of the vaginal vault due to right hydronephrosis secondary to ureteral kinking due to severe uterine prolapse.  It was in the process of being discharged from the hospital on 2023 when she complained of severe acute left arm pain after her IV site was removed.  Evaluation revealed acute limb ischemia due to an occluded ulnar artery.  She was initiated on heparin and underwent a left arm thrombectomy on 2023.  Since that time she has been transitioned from heparin to Eliquis for discharge home.  Cardiology is still followed for possible cardioembolic work-up and plans to discharge home on an ambulatory cardiac monitor for atrial fibrillation surveillance.     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    23 - Hb 12.7  plt 511    24 - APLS testing with positive IgM for anticardiolipin and anti beta-2  glycoprotein, negative for lupus anticoagulant     5/1/24 - hysterectomy and prolapse repair. No post op complications.  5/29/24 - APLS persistent postitive IgM and beta-2 ddimer 0.84, no LA     Subjective:  12/13/24: Patient seen today for follow-up visit.  She is currently on long-term anticoagulation for hypercoagulable disorder and history of left upper extremity DVT.  Previously being seen by Dr. Cervantes in this office.  Patient clinically doing well at this time.  Denied any new complaints.  Good tolerance compliance to Eliquis.    Past Medical History:   Diagnosis Date    CHF (congestive heart failure)     Prolapsed uterus     SVT (supraventricular tachycardia)        Past Surgical History:   Procedure Laterality Date    ARM THROMBECTOMY/EMBOLECTOMY Left 11/21/2023    Procedure: UPPER EXTREMITY THROMBECTOMY/EMBOLECTOMY;  Surgeon: Canelo Blackwell II, MD;  Location: [facility];  Service: Vascular;  Laterality: Left;    BREAST BIOPSY      CYSTOSCOPY W/ URETERAL STENT REMOVAL  2024    CYSTOSCOPY, URETEROSCOPY, RETROGRADE PYELOGRAM, STENT INSERTION Bilateral 11/19/2023    Procedure: CYSTOSCOPY URETEROSCOPY RETROGRADE PYELOGRAM STENT INSERTION;  Surgeon: Alan Rodriguez MD;  Location: [facility];  Service: Urology;  Laterality: Bilateral;    HYSTERECTOMY  05/01/2024         Current Outpatient Medications:     apixaban (ELIQUIS) 5 MG tablet tablet, Take 1 tablet by mouth Every 12 (Twelve) Hours. Indications: Other - full anticoagulation, Disp: 180 tablet, Rfl: 4    aspirin 81 MG EC tablet, Take 1 tablet by mouth Daily., Disp: 30 tablet, Rfl: 0    atorvastatin (LIPITOR) 20 MG tablet, Take 1 tablet by mouth Daily., Disp: 90 tablet, Rfl: 1    cetirizine (zyrTEC) 10 MG tablet, Take 1 tablet by mouth Daily., Disp: , Rfl:     chlorhexidine (PERIDEX) 0.12 % solution, SWISH 15 ML FOR 30 SECONDS THEN SPIT. USE TWICE DAILY (MORNING AND EVENING) AFTER BRUSHING TEETH, Disp: , Rfl:     D-MANNOSE PO, Take  by mouth., Disp:  , Rfl:     dapagliflozin Propanediol (Farxiga) 10 MG tablet, Take 10 mg by mouth Daily., Disp: 30 tablet, Rfl: 11    furosemide (LASIX) 20 MG tablet, Take 1 tablet by mouth Daily., Disp: 30 tablet, Rfl: 11    losartan (COZAAR) 25 MG tablet, TAKE 1 TABLET BY MOUTH EVERY DAY, Disp: 90 tablet, Rfl: 0    metoprolol succinate XL (TOPROL-XL) 25 MG 24 hr tablet, Take 1 tablet by mouth Daily., Disp: 30 tablet, Rfl: 11    potassium chloride 10 MEQ CR tablet, TAKE 1 TABLET BY MOUTH TWICE A DAY, Disp: 180 tablet, Rfl: 2    Probiotic Product (PROBIOTIC DAILY PO), Take  by mouth., Disp: , Rfl:     Allergies   Allergen Reactions    Other Other (See Comments)     tetramycin    Tetracyclines & Related Swelling       Family History   Problem Relation Age of Onset    Hyperlipidemia Mother     Diabetes Mother     Cancer Mother     Other Father        Cancer-related family history includes Cancer in her mother.    Social History     Tobacco Use    Smoking status: Former     Current packs/day: 0.00     Average packs/day: 1 pack/day for 40.0 years (40.0 ttl pk-yrs)     Types: Cigarettes     Start date: 1983     Quit date: 2023     Years since quittin.1     Passive exposure: Never    Smokeless tobacco: Never   Vaping Use    Vaping status: Never Used   Substance Use Topics    Alcohol use: Never    Drug use: Never     Social History     Social History Narrative    Works for [employer]. Retiring 2024.           ROS:   Review of Systems   Constitutional:  Negative for fatigue and fever.   HENT:  Negative for congestion and nosebleeds.    Eyes:  Negative for pain and discharge.   Respiratory:  Negative for cough and shortness of breath.    Cardiovascular:  Negative for chest pain.   Gastrointestinal:  Negative for abdominal pain, blood in stool, diarrhea, nausea and vomiting.   Endocrine: Negative for cold intolerance and heat intolerance.   Genitourinary:  Negative for difficulty urinating.   Musculoskeletal:  Negative for  "arthralgias.   Skin:  Negative for rash.   Neurological:  Negative for dizziness and headaches.   Hematological:  Does not bruise/bleed easily.   Psychiatric/Behavioral:  Negative for behavioral problems.        Objective:  Vital signs:  Vitals:    12/13/24 1007   BP: 137/81   Pulse: 71   Temp: 98 °F (36.7 °C)   TempSrc: Oral   SpO2: 100%   Weight: 98 kg (216 lb)   Height: 175.3 cm (69.02\")   PainSc: 0-No pain           Body mass index is 31.88 kg/m².  ECOG  (0) Fully active, able to carry on all predisease performance without restriction    Physical Exam:   Physical Exam  Constitutional:       Appearance: Normal appearance.   HENT:      Head: Normocephalic and atraumatic.   Eyes:      Extraocular Movements: Extraocular movements intact.      Pupils: Pupils are equal, round, and reactive to light.   Cardiovascular:      Rate and Rhythm: Normal rate and regular rhythm.      Pulses: Normal pulses.      Heart sounds: No murmur heard.  Pulmonary:      Effort: Pulmonary effort is normal.      Breath sounds: Normal breath sounds.   Abdominal:      General: There is no distension.      Palpations: Abdomen is soft. There is no mass.      Tenderness: There is no abdominal tenderness.   Musculoskeletal:         General: Normal range of motion.      Cervical back: Normal range of motion and neck supple.   Skin:     General: Skin is warm.   Neurological:      General: No focal deficit present.      Mental Status: She is alert.   Psychiatric:         Mood and Affect: Mood normal.         Lab Results - Last 18 Months   Lab Units 12/04/24  0824 10/16/24  0929 07/18/24  1022   WBC 10*3/mm3 6.13 6.51 6.58   HEMOGLOBIN g/dL 14.5 14.5 12.6   HEMATOCRIT % 45.6 42.6 39.7   PLATELETS 10*3/mm3 415 422 491*   MCV fL 96.0 92.2 91.7     Lab Results - Last 18 Months   Lab Units 10/16/24  0929 07/18/24  1022 05/01/24  0930 04/05/24  1052 11/19/23  2318 11/19/23  0207 11/14/23  1014 11/13/23  1307   SODIUM mmol/L 141 139 141 140   < > 139   < > " "135*   POTASSIUM mmol/L 4.9 4.1 4.0 4.1   < > 4.0   < > 3.8   CHLORIDE mmol/L 104 104  --  102   < > 102   < > 102   CO2 mmol/L 26.9 25.3  --  27.0   < > 23.0   < > 19.0*   BUN mg/dL 19 21  --  20   < > 13   < > 12   CREATININE mg/dL 1.08* 1.22*  --  1.10*   < > 1.10*   < > 0.94   CALCIUM mg/dL 9.8 9.7  --  9.6   < > 9.4   < > 8.8   BILIRUBIN mg/dL  --   --   --   --   --  0.3  --  0.4   ALK PHOS U/L  --   --   --   --   --  92  --  96   ALT (SGPT) U/L  --   --   --   --   --  10  --  9   AST (SGOT) U/L  --   --   --   --   --  13  --  15   GLUCOSE mg/dL 89 102*  --  157*   < > 152*   < > 122*    < > = values in this interval not displayed.       Lab Results   Component Value Date    GLUCOSE 89 10/16/2024    BUN 19 10/16/2024    CREATININE 1.08 (H) 10/16/2024    BCR 17.6 10/16/2024    K 4.9 10/16/2024    CO2 26.9 10/16/2024    CALCIUM 9.8 10/16/2024    ALBUMIN 3.5 11/19/2023    AST 13 11/19/2023    ALT 10 11/19/2023       Lab Results - Last 18 Months   Lab Units 04/16/24  1034 11/22/23  1104 11/22/23  0427 11/21/23  1959 11/21/23  1347   INR INR 1.2  --  0.95  --  0.98   APTT s 39.1* 27.6* 26.5*   < > 26.2*    < > = values in this interval not displayed.       Lab Results   Component Value Date    IRON 131 12/04/2024    TIBC 384 12/04/2024    FERRITIN 72.20 12/04/2024       No results found for: \"FOLATE\"    No results found for: \"OCCULTBLD\"    No results found for: \"RETICCTPCT\"  Lab Results   Component Value Date    [id number] 728 11/22/2023     No results found for: \"SPEP\", \"UPEP\"  No results found for: \"LDH\", \"URICACID\"  No results found for: \"ZACHARY\", \"RF\", \"SEDRATE\"  No results found for: \"FIBRINOGEN\", \"HAPTOGLOBIN\"  Lab Results   Component Value Date    PTT 39.1 (H) 04/16/2024    INR 1.2 04/16/2024     No results found for: \"\"  No results found for: \"CEA\"  No components found for: \"CA-19-9\"  No results found for: \"PSA\"  No results found for: \"SEDRATE\"    Assessment & Plan       Patient is a 64-year-old female " admitted for shortness of breath and diagnosed with acute exacerbation of CHF also with a history of recent UTI and found to have bilateral hydronephrosis status post stent placement with urology.  Discharged home developed acute left upper extremity pain and was found to have acute limb ischemia due to a brachial artery occlusion.  Now status post left upper extremity thrombectomy and on anticoagulation.  We are asked to see to evaluate for possible hypercoagulable disorders.     Brachial artery thrombosis/antiphospholipid syndrome  Status post left arm thrombectomy on 11/21/2023  Heparin transitioned to Eliquis for discharge  Cardiology is sending home on ambulatory event monitor to evaluate for atrial fibrillation  Family history of blood clots in her father  Patient has stopped smoking.   2/3 criteria positive for APLS  Continue ELIQUIS, now repeat APLS positive hence confirmatory  High risk for future VTE ddimer persistently high, continue Eliquis 5mg BID. D dimer is normal today  -Will recheck APLS profile on follow up     Normocytic anemia  Baseline hemoglobin from 11/13/2023 11.3 g/dL  Iron 36, iron sat 11%, TIBC 320, ferritin 248.90  B12 728  Start oral ferrous sulphate every other day.  -Iron panel showed improvement. Continue oral iron. Hb is WNL today. 14.5  Drop Likely related to recent hysterectomy improved from what it was in the hospital at 9.     Thrombocytosis:  Resolved. Likely reactive due to surgery and PAVEL.     6 month follow up with repeat labs, sooner as needed.       Thank you very much for providing the opportunity to participate in this patient’s care. Please do not hesitate to call if there are any other questions.

## 2024-12-12 ENCOUNTER — HOSPITAL ENCOUNTER (OUTPATIENT)
Dept: PET IMAGING | Facility: HOSPITAL | Age: 64
Discharge: HOME | End: 2024-12-12
Payer: COMMERCIAL

## 2024-12-12 DIAGNOSIS — R91.1 LUNG NODULE: ICD-10-CM

## 2024-12-12 LAB — GLUCOSE BLDC GLUCOMTR-MCNC: 113 MG/DL (ref 70–105)

## 2024-12-12 PROCEDURE — 71250 CT THORAX DX C-: CPT

## 2024-12-12 PROCEDURE — 78815 PET IMAGE W/CT SKULL-THIGH: CPT

## 2024-12-12 PROCEDURE — 82948 REAGENT STRIP/BLOOD GLUCOSE: CPT

## 2024-12-12 PROCEDURE — A9552 F18 FDG: HCPCS | Performed by: SURGERY

## 2024-12-12 PROCEDURE — 34310000005 FLUDEOXYGLUCOSE F18 SOLUTION: Performed by: SURGERY

## 2024-12-12 RX ADMIN — FLUDEOXYGLUCOSE F 18 1 DOSE: 200 INJECTION, SOLUTION INTRAVENOUS at 08:51

## 2024-12-13 ENCOUNTER — OFFICE VISIT (OUTPATIENT)
Dept: ONCOLOGY | Facility: CLINIC | Age: 64
End: 2024-12-13
Payer: COMMERCIAL

## 2024-12-13 VITALS
HEART RATE: 71 BPM | WEIGHT: 216 LBS | OXYGEN SATURATION: 100 % | TEMPERATURE: 98 F | DIASTOLIC BLOOD PRESSURE: 81 MMHG | BODY MASS INDEX: 31.99 KG/M2 | HEIGHT: 69 IN | SYSTOLIC BLOOD PRESSURE: 137 MMHG

## 2024-12-13 DIAGNOSIS — D64.9 ANEMIA, UNSPECIFIED TYPE: ICD-10-CM

## 2024-12-13 DIAGNOSIS — I74.2 BRACHIAL ARTERY THROMBOSIS: Primary | ICD-10-CM

## 2024-12-24 ENCOUNTER — PREP FOR SURGERY (OUTPATIENT)
Dept: OTHER | Facility: HOSPITAL | Age: 64
End: 2024-12-24
Payer: COMMERCIAL

## 2024-12-24 ENCOUNTER — PATIENT OUTREACH (OUTPATIENT)
Dept: ONCOLOGY | Facility: CLINIC | Age: 64
End: 2024-12-24
Payer: COMMERCIAL

## 2024-12-24 ENCOUNTER — OFFICE VISIT (OUTPATIENT)
Dept: SURGERY | Facility: CLINIC | Age: 64
End: 2024-12-24
Payer: COMMERCIAL

## 2024-12-24 VITALS
HEIGHT: 70 IN | BODY MASS INDEX: 30.64 KG/M2 | DIASTOLIC BLOOD PRESSURE: 92 MMHG | SYSTOLIC BLOOD PRESSURE: 153 MMHG | WEIGHT: 214 LBS | OXYGEN SATURATION: 96 %

## 2024-12-24 DIAGNOSIS — R91.1 LUNG NODULE: Primary | ICD-10-CM

## 2024-12-24 PROCEDURE — 99205 OFFICE O/P NEW HI 60 MIN: CPT | Performed by: SURGERY

## 2024-12-24 NOTE — SIGNIFICANT NOTE
I accompanied patient and daughter to Dr. Stevenson's office visit.     Dr. Stevenson reviewed medical history, scan images and discussed ION bronchoscopy for biopsy. Patient on Eliquis and uses a bridge of Lovenox for procedures.  Dr. Stevenson planning biopsy for 1/7. Procedure discussed and risks and benefits. All questions answered. Patient has my direct number for any questions or concerns.

## 2024-12-27 ENCOUNTER — PATIENT ROUNDING (BHMG ONLY) (OUTPATIENT)
Dept: SURGERY | Facility: CLINIC | Age: 64
End: 2024-12-27
Payer: COMMERCIAL

## 2024-12-27 ENCOUNTER — HOSPITAL ENCOUNTER (OUTPATIENT)
Dept: CARDIOLOGY | Facility: HOSPITAL | Age: 64
Discharge: HOME | End: 2024-12-27
Admitting: SURGERY
Payer: COMMERCIAL

## 2024-12-27 DIAGNOSIS — R91.1 LUNG NODULE: Primary | ICD-10-CM

## 2024-12-27 PROCEDURE — 93005 ELECTROCARDIOGRAM TRACING: CPT | Performed by: SURGERY

## 2024-12-27 RX ORDER — ENOXAPARIN SODIUM 100 MG/ML
1 INJECTION SUBCUTANEOUS EVERY 12 HOURS
Status: SHIPPED | OUTPATIENT
Start: 2024-12-27 | End: 2024-12-30

## 2024-12-27 NOTE — PROGRESS NOTES
[employer] Message Sent    My name is: Kayley     I am with [facility]    I want to officially welcome you to the practice and ask about your recent visit.         If you could tell me about your visit with us. What things went well?          We're always looking for ways to make our patients' experiences even better. Do you have recommendations on ways we may improve?       Overall were you satisfied with your first visit to our practice?         I appreciate you taking the time to answer these few questions today. If there is anything else our office can do for you, please let us know.       Thank you and have a great day.

## 2024-12-28 LAB
QT INTERVAL: 363 MS
QTC INTERVAL: 414 MS

## 2024-12-31 RX ORDER — ENOXAPARIN SODIUM 100 MG/ML
1 INJECTION SUBCUTANEOUS EVERY 12 HOURS SCHEDULED
Qty: 12 ML | Refills: 0 | Status: SHIPPED | OUTPATIENT
Start: 2024-12-31 | End: 2025-01-06

## 2024-12-31 NOTE — PROGRESS NOTES
Left VM for patient that 6 day supply of lovenox has been sent to [facility]. Should begin tomorrow, 1/1/2025.

## 2025-01-07 ENCOUNTER — ANESTHESIA (OUTPATIENT)
Dept: GASTROENTEROLOGY | Facility: HOSPITAL | Age: 65
End: 2025-01-07
Payer: COMMERCIAL

## 2025-01-07 ENCOUNTER — ANESTHESIA EVENT (OUTPATIENT)
Dept: GASTROENTEROLOGY | Facility: HOSPITAL | Age: 65
End: 2025-01-07
Payer: COMMERCIAL

## 2025-01-07 ENCOUNTER — APPOINTMENT (OUTPATIENT)
Dept: GENERAL RADIOLOGY | Facility: HOSPITAL | Age: 65
End: 2025-01-07
Payer: COMMERCIAL

## 2025-01-07 ENCOUNTER — HOSPITAL ENCOUNTER (OUTPATIENT)
Facility: HOSPITAL | Age: 65
Setting detail: HOSPITAL OUTPATIENT SURGERY
Discharge: HOME | End: 2025-01-07
Attending: SURGERY | Admitting: SURGERY
Payer: COMMERCIAL

## 2025-01-07 VITALS
BODY MASS INDEX: 34.18 KG/M2 | HEART RATE: 74 BPM | TEMPERATURE: 98.1 F | OXYGEN SATURATION: 94 % | SYSTOLIC BLOOD PRESSURE: 131 MMHG | RESPIRATION RATE: 12 BRPM | WEIGHT: 217.8 LBS | DIASTOLIC BLOOD PRESSURE: 78 MMHG | HEIGHT: 67 IN

## 2025-01-07 DIAGNOSIS — R91.1 LUNG NODULE: ICD-10-CM

## 2025-01-07 LAB
ALBUMIN SERPL-MCNC: 4.1 G/DL (ref 3.5–5.2)
ALBUMIN/GLOB SERPL: 1.4 G/DL
ALP SERPL-CCNC: 99 U/L (ref 39–117)
ALT SERPL W P-5'-P-CCNC: 19 U/L (ref 1–33)
ANION GAP SERPL CALCULATED.3IONS-SCNC: 9.3 MMOL/L (ref 5–15)
AST SERPL-CCNC: 24 U/L (ref 1–32)
B PARAPERT DNA SPEC QL NAA+PROBE: NOT DETECTED
B PERT DNA SPEC QL NAA+PROBE: NOT DETECTED
BASOPHILS # BLD AUTO: 0.07 10*3/MM3 (ref 0–0.2)
BASOPHILS NFR BLD AUTO: 1.4 % (ref 0–1.5)
BILIRUB SERPL-MCNC: 0.3 MG/DL (ref 0–1.2)
BUN SERPL-MCNC: 18 MG/DL (ref 8–23)
BUN/CREAT SERPL: 21.2 (ref 7–25)
C PNEUM DNA NPH QL NAA+NON-PROBE: NOT DETECTED
CALCIUM SPEC-SCNC: 9.6 MG/DL (ref 8.6–10.5)
CHLORIDE SERPL-SCNC: 107 MMOL/L (ref 98–107)
CO2 SERPL-SCNC: 24.7 MMOL/L (ref 22–29)
CREAT SERPL-MCNC: 0.85 MG/DL (ref 0.57–1)
DEPRECATED RDW RBC AUTO: 48.1 FL (ref 37–54)
EGFRCR SERPLBLD CKD-EPI 2021: 77.1 ML/MIN/1.73
EOSINOPHIL # BLD AUTO: 0.12 10*3/MM3 (ref 0–0.4)
EOSINOPHIL NFR BLD AUTO: 2.4 % (ref 0.3–6.2)
ERYTHROCYTE [DISTWIDTH] IN BLOOD BY AUTOMATED COUNT: 13.9 % (ref 12.3–15.4)
FLUAV SUBTYP SPEC NAA+PROBE: NOT DETECTED
FLUBV RNA ISLT QL NAA+PROBE: NOT DETECTED
GLOBULIN UR ELPH-MCNC: 2.9 GM/DL
GLUCOSE SERPL-MCNC: 105 MG/DL (ref 65–99)
HADV DNA SPEC NAA+PROBE: NOT DETECTED
HCOV 229E RNA SPEC QL NAA+PROBE: NOT DETECTED
HCOV HKU1 RNA SPEC QL NAA+PROBE: NOT DETECTED
HCOV NL63 RNA SPEC QL NAA+PROBE: NOT DETECTED
HCOV OC43 RNA SPEC QL NAA+PROBE: NOT DETECTED
HCT VFR BLD AUTO: 43.7 % (ref 34–46.6)
HGB BLD-MCNC: 14 G/DL (ref 12–15.9)
HMPV RNA NPH QL NAA+NON-PROBE: NOT DETECTED
HPIV1 RNA ISLT QL NAA+PROBE: NOT DETECTED
HPIV2 RNA SPEC QL NAA+PROBE: NOT DETECTED
HPIV3 RNA NPH QL NAA+PROBE: NOT DETECTED
HPIV4 P GENE NPH QL NAA+PROBE: NOT DETECTED
IMM GRANULOCYTES # BLD AUTO: 0.02 10*3/MM3 (ref 0–0.05)
IMM GRANULOCYTES NFR BLD AUTO: 0.4 % (ref 0–0.5)
INR PPP: 0.98 (ref 0.9–1.1)
LYMPHOCYTES # BLD AUTO: 1.99 10*3/MM3 (ref 0.7–3.1)
LYMPHOCYTES NFR BLD AUTO: 40 % (ref 19.6–45.3)
M PNEUMO IGG SER IA-ACNC: NOT DETECTED
MCH RBC QN AUTO: 30.3 PG (ref 26.6–33)
MCHC RBC AUTO-ENTMCNC: 32 G/DL (ref 31.5–35.7)
MCV RBC AUTO: 94.6 FL (ref 79–97)
MONOCYTES # BLD AUTO: 0.47 10*3/MM3 (ref 0.1–0.9)
MONOCYTES NFR BLD AUTO: 9.4 % (ref 5–12)
NEUTROPHILS NFR BLD AUTO: 2.31 10*3/MM3 (ref 1.7–7)
NEUTROPHILS NFR BLD AUTO: 46.4 % (ref 42.7–76)
NRBC BLD AUTO-RTO: 0 /100 WBC (ref 0–0.2)
PLATELET # BLD AUTO: 440 10*3/MM3 (ref 140–450)
PMV BLD AUTO: 8.3 FL (ref 6–12)
POTASSIUM SERPL-SCNC: 4.1 MMOL/L (ref 3.5–5.2)
PROT SERPL-MCNC: 7 G/DL (ref 6–8.5)
PROTHROMBIN TIME: 13 SECONDS (ref 11.7–14.2)
RBC # BLD AUTO: 4.62 10*6/MM3 (ref 3.77–5.28)
RHINOVIRUS RNA SPEC NAA+PROBE: NOT DETECTED
RSV RNA NPH QL NAA+NON-PROBE: NOT DETECTED
SARS-COV-2 RNA RESP QL NAA+PROBE: NOT DETECTED
SODIUM SERPL-SCNC: 141 MMOL/L (ref 136–145)
WBC NRBC COR # BLD AUTO: 4.98 10*3/MM3 (ref 3.4–10.8)

## 2025-01-07 PROCEDURE — 87176 TISSUE HOMOGENIZATION CULTR: CPT | Performed by: SURGERY

## 2025-01-07 PROCEDURE — 87252 VIRUS INOCULATION TISSUE: CPT | Performed by: SURGERY

## 2025-01-07 PROCEDURE — 87071 CULTURE AEROBIC QUANT OTHER: CPT | Performed by: SURGERY

## 2025-01-07 PROCEDURE — 25010000002 ONDANSETRON PER 1 MG: Performed by: ANESTHESIOLOGY

## 2025-01-07 PROCEDURE — 0202U NFCT DS 22 TRGT SARS-COV-2: CPT | Performed by: SURGERY

## 2025-01-07 PROCEDURE — 31627 NAVIGATIONAL BRONCHOSCOPY: CPT | Performed by: SURGERY

## 2025-01-07 PROCEDURE — 25810000003 SODIUM CHLORIDE 0.9 % SOLUTION: Performed by: ANESTHESIOLOGY

## 2025-01-07 PROCEDURE — 71045 X-RAY EXAM CHEST 1 VIEW: CPT

## 2025-01-07 PROCEDURE — 25010000002 SUGAMMADEX 200 MG/2ML SOLUTION: Performed by: NURSE ANESTHETIST, CERTIFIED REGISTERED

## 2025-01-07 PROCEDURE — 31641 BRONCHOSCOPY TREAT BLOCKAGE: CPT | Performed by: SURGERY

## 2025-01-07 PROCEDURE — 88305 TISSUE EXAM BY PATHOLOGIST: CPT | Performed by: SURGERY

## 2025-01-07 PROCEDURE — 76000 FLUOROSCOPY <1 HR PHYS/QHP: CPT

## 2025-01-07 PROCEDURE — 80053 COMPREHEN METABOLIC PANEL: CPT | Performed by: SURGERY

## 2025-01-07 PROCEDURE — 25010000002 FENTANYL CITRATE (PF) 50 MCG/ML SOLUTION: Performed by: ANESTHESIOLOGY

## 2025-01-07 PROCEDURE — 85025 COMPLETE CBC W/AUTO DIFF WBC: CPT | Performed by: SURGERY

## 2025-01-07 PROCEDURE — 88108 CYTOPATH CONCENTRATE TECH: CPT | Performed by: SURGERY

## 2025-01-07 PROCEDURE — 88173 CYTOPATH EVAL FNA REPORT: CPT | Performed by: SURGERY

## 2025-01-07 PROCEDURE — 31624 DX BRONCHOSCOPE/LAVAGE: CPT | Performed by: SURGERY

## 2025-01-07 PROCEDURE — 87070 CULTURE OTHR SPECIMN AEROBIC: CPT | Performed by: SURGERY

## 2025-01-07 PROCEDURE — 25010000002 SUCCINYLCHOLINE PER 20 MG: Performed by: ANESTHESIOLOGY

## 2025-01-07 PROCEDURE — 85610 PROTHROMBIN TIME: CPT | Performed by: SURGERY

## 2025-01-07 PROCEDURE — 25010000002 PROPOFOL 10 MG/ML EMULSION: Performed by: ANESTHESIOLOGY

## 2025-01-07 PROCEDURE — 31629 BRONCHOSCOPY/NEEDLE BX EACH: CPT | Performed by: SURGERY

## 2025-01-07 PROCEDURE — 87206 SMEAR FLUORESCENT/ACID STAI: CPT | Performed by: SURGERY

## 2025-01-07 PROCEDURE — 25010000002 PROPOFOL 200 MG/20ML EMULSION: Performed by: ANESTHESIOLOGY

## 2025-01-07 PROCEDURE — 25010000002 DEXAMETHASONE PER 1 MG: Performed by: ANESTHESIOLOGY

## 2025-01-07 PROCEDURE — 25810000003 SODIUM CHLORIDE 0.9 % SOLUTION: Performed by: SURGERY

## 2025-01-07 PROCEDURE — 88172 CYTP DX EVAL FNA 1ST EA SITE: CPT | Performed by: SURGERY

## 2025-01-07 PROCEDURE — 87116 MYCOBACTERIA CULTURE: CPT | Performed by: SURGERY

## 2025-01-07 PROCEDURE — 87102 FUNGUS ISOLATION CULTURE: CPT | Performed by: SURGERY

## 2025-01-07 PROCEDURE — 87205 SMEAR GRAM STAIN: CPT | Performed by: SURGERY

## 2025-01-07 PROCEDURE — 88177 CYTP FNA EVAL EA ADDL: CPT | Performed by: SURGERY

## 2025-01-07 RX ORDER — IPRATROPIUM BROMIDE AND ALBUTEROL SULFATE 2.5; .5 MG/3ML; MG/3ML
3 SOLUTION RESPIRATORY (INHALATION) ONCE AS NEEDED
Status: DISCONTINUED | OUTPATIENT
Start: 2025-01-07 | End: 2025-01-07 | Stop reason: HOSPADM

## 2025-01-07 RX ORDER — OXYCODONE HYDROCHLORIDE 5 MG/1
5 TABLET ORAL ONCE AS NEEDED
Status: DISCONTINUED | OUTPATIENT
Start: 2025-01-07 | End: 2025-01-07 | Stop reason: HOSPADM

## 2025-01-07 RX ORDER — DIPHENHYDRAMINE HYDROCHLORIDE 50 MG/ML
12.5 INJECTION INTRAMUSCULAR; INTRAVENOUS
Status: DISCONTINUED | OUTPATIENT
Start: 2025-01-07 | End: 2025-01-07 | Stop reason: HOSPADM

## 2025-01-07 RX ORDER — SUCCINYLCHOLINE CHLORIDE 20 MG/ML
INJECTION INTRAMUSCULAR; INTRAVENOUS AS NEEDED
Status: DISCONTINUED | OUTPATIENT
Start: 2025-01-07 | End: 2025-01-07 | Stop reason: SURG

## 2025-01-07 RX ORDER — HYDRALAZINE HYDROCHLORIDE 20 MG/ML
5 INJECTION INTRAMUSCULAR; INTRAVENOUS
Status: DISCONTINUED | OUTPATIENT
Start: 2025-01-07 | End: 2025-01-07 | Stop reason: HOSPADM

## 2025-01-07 RX ORDER — SODIUM CHLORIDE 9 MG/ML
10 INJECTION, SOLUTION INTRAVENOUS ONCE
Status: COMPLETED | OUTPATIENT
Start: 2025-01-07 | End: 2025-01-07

## 2025-01-07 RX ORDER — ROCURONIUM BROMIDE 10 MG/ML
INJECTION, SOLUTION INTRAVENOUS AS NEEDED
Status: DISCONTINUED | OUTPATIENT
Start: 2025-01-07 | End: 2025-01-07 | Stop reason: SURG

## 2025-01-07 RX ORDER — SODIUM CHLORIDE 9 MG/ML
INJECTION, SOLUTION INTRAVENOUS CONTINUOUS PRN
Status: DISCONTINUED | OUTPATIENT
Start: 2025-01-07 | End: 2025-01-07 | Stop reason: SURG

## 2025-01-07 RX ORDER — LABETALOL HYDROCHLORIDE 5 MG/ML
5 INJECTION, SOLUTION INTRAVENOUS
Status: DISCONTINUED | OUTPATIENT
Start: 2025-01-07 | End: 2025-01-07 | Stop reason: HOSPADM

## 2025-01-07 RX ORDER — FENTANYL CITRATE 50 UG/ML
INJECTION, SOLUTION INTRAMUSCULAR; INTRAVENOUS AS NEEDED
Status: DISCONTINUED | OUTPATIENT
Start: 2025-01-07 | End: 2025-01-07 | Stop reason: SURG

## 2025-01-07 RX ORDER — MEPERIDINE HYDROCHLORIDE 25 MG/ML
12.5 INJECTION INTRAMUSCULAR; INTRAVENOUS; SUBCUTANEOUS
Status: DISCONTINUED | OUTPATIENT
Start: 2025-01-07 | End: 2025-01-07 | Stop reason: HOSPADM

## 2025-01-07 RX ORDER — PROPOFOL 10 MG/ML
INJECTION, EMULSION INTRAVENOUS AS NEEDED
Status: DISCONTINUED | OUTPATIENT
Start: 2025-01-07 | End: 2025-01-07 | Stop reason: SURG

## 2025-01-07 RX ORDER — NALOXONE HCL 0.4 MG/ML
0.4 VIAL (ML) INJECTION AS NEEDED
Status: DISCONTINUED | OUTPATIENT
Start: 2025-01-07 | End: 2025-01-07 | Stop reason: HOSPADM

## 2025-01-07 RX ORDER — DIPHENHYDRAMINE HYDROCHLORIDE 50 MG/ML
12.5 INJECTION INTRAMUSCULAR; INTRAVENOUS ONCE AS NEEDED
Status: DISCONTINUED | OUTPATIENT
Start: 2025-01-07 | End: 2025-01-07 | Stop reason: HOSPADM

## 2025-01-07 RX ORDER — OXYCODONE HYDROCHLORIDE 5 MG/1
10 TABLET ORAL EVERY 4 HOURS PRN
Status: DISCONTINUED | OUTPATIENT
Start: 2025-01-07 | End: 2025-01-07 | Stop reason: HOSPADM

## 2025-01-07 RX ORDER — ONDANSETRON 2 MG/ML
4 INJECTION INTRAMUSCULAR; INTRAVENOUS ONCE AS NEEDED
Status: DISCONTINUED | OUTPATIENT
Start: 2025-01-07 | End: 2025-01-07 | Stop reason: HOSPADM

## 2025-01-07 RX ORDER — EPHEDRINE SULFATE 5 MG/ML
5 INJECTION INTRAVENOUS ONCE AS NEEDED
Status: DISCONTINUED | OUTPATIENT
Start: 2025-01-07 | End: 2025-01-07 | Stop reason: HOSPADM

## 2025-01-07 RX ORDER — ONDANSETRON 2 MG/ML
INJECTION INTRAMUSCULAR; INTRAVENOUS AS NEEDED
Status: DISCONTINUED | OUTPATIENT
Start: 2025-01-07 | End: 2025-01-07 | Stop reason: SURG

## 2025-01-07 RX ORDER — DEXAMETHASONE SODIUM PHOSPHATE 4 MG/ML
INJECTION, SOLUTION INTRA-ARTICULAR; INTRALESIONAL; INTRAMUSCULAR; INTRAVENOUS; SOFT TISSUE AS NEEDED
Status: DISCONTINUED | OUTPATIENT
Start: 2025-01-07 | End: 2025-01-07 | Stop reason: SURG

## 2025-01-07 RX ORDER — PHENYLEPHRINE HCL IN 0.9% NACL 1 MG/10 ML
SYRINGE (ML) INTRAVENOUS AS NEEDED
Status: DISCONTINUED | OUTPATIENT
Start: 2025-01-07 | End: 2025-01-07 | Stop reason: SURG

## 2025-01-07 RX ADMIN — DEXAMETHASONE SODIUM PHOSPHATE 8 MG: 4 INJECTION, SOLUTION INTRAMUSCULAR; INTRAVENOUS at 13:50

## 2025-01-07 RX ADMIN — PROPOFOL 175 MCG/KG/MIN: 10 INJECTION, EMULSION INTRAVENOUS at 13:48

## 2025-01-07 RX ADMIN — SODIUM CHLORIDE: 9 INJECTION, SOLUTION INTRAVENOUS at 13:37

## 2025-01-07 RX ADMIN — SUCCINYLCHOLINE CHLORIDE 120 MG: 20 INJECTION, SOLUTION INTRAMUSCULAR; INTRAVENOUS at 13:45

## 2025-01-07 RX ADMIN — Medication 100 MCG: at 14:05

## 2025-01-07 RX ADMIN — SODIUM CHLORIDE 10 ML/HR: 9 INJECTION, SOLUTION INTRAVENOUS at 11:18

## 2025-01-07 RX ADMIN — ONDANSETRON 4 MG: 2 INJECTION, SOLUTION INTRAMUSCULAR; INTRAVENOUS at 14:22

## 2025-01-07 RX ADMIN — ONDANSETRON 4 MG: 2 INJECTION, SOLUTION INTRAMUSCULAR; INTRAVENOUS at 14:03

## 2025-01-07 RX ADMIN — Medication 100 MCG: at 14:09

## 2025-01-07 RX ADMIN — SUGAMMADEX 200 MG: 100 INJECTION, SOLUTION INTRAVENOUS at 14:22

## 2025-01-07 RX ADMIN — PROPOFOL 200 MG: 10 INJECTION, EMULSION INTRAVENOUS at 13:45

## 2025-01-07 RX ADMIN — ROCURONIUM BROMIDE 15 MG: 10 INJECTION, SOLUTION INTRAVENOUS at 13:45

## 2025-01-07 RX ADMIN — FENTANYL CITRATE 50 MCG: 50 INJECTION, SOLUTION INTRAMUSCULAR; INTRAVENOUS at 13:45

## 2025-01-07 RX ADMIN — ROCURONIUM BROMIDE 30 MG: 10 INJECTION, SOLUTION INTRAVENOUS at 13:50

## 2025-01-07 RX ADMIN — FENTANYL CITRATE 50 MCG: 50 INJECTION, SOLUTION INTRAMUSCULAR; INTRAVENOUS at 14:13

## 2025-01-07 RX ADMIN — Medication 50 MCG: at 14:14

## 2025-01-07 NOTE — OP NOTE
Operative Note     Date of procedure: 1/7/2025     Patient name: Neva Kurtz  MRN: [id number]    Pre OP diagnosis:  Right lower lobe nodule    Post OP diagnosis:  Right lower lobe nodule    Procedure performed:   Flexible bronchoscopy.  Navigational bronchoscopy with ION robot with C-arm.  Interpretation of fluoroscopy images.  Radial endobronchial ultrasound.  Fine-needle aspiration of the right lower lobe nodule.  Cryo biopsy of the the right lower lobe nodule using 1.1 mm ERBECRYO®  cryoprobe.  Right lower lobe segmental bronchoalveolar lavage.    ION Synoptic report:  Date of radiological diagnosis: 12/12/2024  Lesions biopsied: Single  Tumor size: 19 mm  Location: Right Lower Lobe  Peripheral 1/3: Yes  Appearance: Solid  PET avidity: No  Bronchial sign: Yes  Prior biopsy: No  Radial EBUS: Eccentric  Tools utilized: 23-Gauge Needle, cryobiopsy, BAL  SHILPA result: No evidence of malignancy  EBUS performed: No    Indications:   Neva Kurtz is a 64-year-old female who has history of tobacco abuse and quit smoking in 2023.  She had a 19 mm non-PET avid right lower lobe nodule.  Due to her history of smoking, the possibility of slow-growing malignancy could not be ruled out.  Therefore, I recommended robotic navigational bronchoscopy and biopsy.       Surgeon: Zurdo Stevenson MD     Assistants: No qualified assistant was available for this procedure.      Anesthesia: General endotracheal anesthesia    ASA Class: 3    Procedure Details   On 1/7/2025, the patient was brought to the operating room and placed in the supine position on the operating room table. Following an uneventful induction of general anesthesia, patient was intubated with a single endotracheal tube without incident.  Antibiotic for surgical prophylaxis was not indicated due to the nature of the procedure.  Prior to beginning the operation, a time-out was conducted with all members of surgical team present. The patient was identified as Neva TEJADA  Jerardo, the procedure and the correct site were verified.     I began by performing flexible bronchoscopy.  A flexible adult bronchoscope was advanced through the endotracheal tube.  A complete examination of the distal trachea and bilateral mainstem and lobar bronchi and all segmental bronchial orifices was performed.  The patient has normal endobronchial anatomy.  All the tracheobronchial tree had moderate mucus secretion.  There was no blood, endoluminal lesions or other abnormal findings.  The bronchoscope was removed.    Prior to the procedure, the patient CT scan was integrated into the PlanPoint interface that generated the patient's 3D airway tree.  The target nodule was identified and its anatomical borders were mapped.  A path to the target nodule was generated through the PlanPoint interface.  After the plan was finalized, the patient image and plan guide was transferred to the [employer] robotic platform.  Using the Ion's controller, the 3.5 mm robotic catheter was advanced with the Ion's vision probe and navigated to the target along the preplanned path.  The catheter was parked next to the target lesion.  The Ion vision probe was removed and radial EBUS was used to confirm the presence of the target lesion.  The radial EBUS was removed and under fluoroscopic guidance, a 23 gauge Ion’s Flexision needle was passed into the robotic catheter.  The needle was advanced into the target lesion and the location of the needle was confirmed with the C-arm. Multiple passes of the needle were made into the target lesion and the aspirate was sent for Rapid on Site Evaluation (SHILPA). The needle was removed. I then performed cryo biopsy using 1.1 mm ERBECRYO®  cryoprobe through the working channel in the same location. The bronchioloalveolar lavage was performed in the segmental bronchi.  The aspirate was sent for cytology and culture.  The robotic catheter was removed and an adult flexible bronchoscope was inserted.  There  was no pooling of blood into the bronchial tree.  All tracheobronchial tree were cleared from secretions.    The patient was awakened from anesthesia, was extubated without incident, and was transported to the Post Anesthesia Care Unit in stable condition.    Findings:  Using 23-gauge needle, fine-needle aspiration of the right lower lobe nodule was performed.  Cryo biopsy of the right lower lobe nodule was performed using 1.1 mm ERBECRYO®  cryoprobe.  Bronchoalveolar lavage of the right lower lobe segment was performed.  The pathologist reported no evidence of malignancy on the preliminary analysis.  No evidence of significant active bleeding at the end of the procedure.    Estimated Blood Loss:  Minimal           Drains: None                 Specimens:   ID Type Source Tests Collected by Time   1 : tissue culture Tissue Lung, Right Lower Lobe TISSUE CULTURE, QUANTITATIVE (Canceled), TISSUE / BONE CULTURE Zurdo Stevenson MD 1/7/2025 7388   A : slides and formulin Fine Needle Aspirate Lung, Right Lower Lobe FINE NEEDLE ASPIRATION Zurdo Stevneson MD 1/7/2025 9724   B : cryo biopsy Tissue Lung, Right Lower Lobe TISSUE PATHOLOGY EXAM Zurdo Stevenson MD 1/7/2025 1267   C :  Lavage Lung, Right Lower Lobe NON-GYNECOLOGIC CYTOLOGY, FUNGAL CULTURE, VIRUS CULTURE, AFB CULTURE, BAL CULTURE, QUANTITATIVE, RESPIRATORY PANEL PCR W/ COVID-19 (SARS-COV-2), NP SWAB IN UTM/VTP, 2 HR TAT Zurdo Stevenson MD 1/7/2025 9495              Implants: None           Complications: None           Disposition: PACU - hemodynamically stable.           Condition: Stable     Zurdo Stevenson MD   Thoracic Surgeon  [facility]

## 2025-01-07 NOTE — H&P (VIEW-ONLY)
THORACIC SURGERY CLINIC CONSULT NOTE    REASON FOR CONSULT: Right lower lobe nodule    REFERRING PROVIDER: Hans Diaz MD    Subjective   HISTORY OF PRESENTING ILLNESS:   Neva Kurtz is a 64 y.o. female who has significant medical problems as mentioned in the medical chart.     History of Present Illness  The patient presents for evaluation of a lung nodule.    She was referred for a follow-up following the discovery of an abnormality on her chest CT during a hospital admission for kidney-related sepsis last year. A repeat low-dose chest CT conducted a few weeks ago revealed changes in the previously identified abnormality. She has no history of cancer. She is capable of walking a block without experiencing shortness of breath and independently manages her daily activities, including driving and grocery shopping. She has no history of chest surgery, heart attack, or stroke. She has a scheduled iron CT scan on Friday. During her hospital stay last year, she experienced shortness of breath, prompting the initial chest CT. She also had pneumonia at the time, which required hospitalization.    She has a history of kidney infection, which led to sepsis. She had a uterine prolapse that was compressing both ureters, leading to hydronephrosis. This necessitated the placement of stents in both ureters, a hysterectomy, and bladder repair. She developed heart failure due to fluid overload from the sepsis treatment. She was hospitalized for 3 days initially, discharged, and then readmitted for another 3 days due to the discovery of a clotting disorder. Her heart function has since returned to normal, as confirmed by an echocardiogram and stress test.    She is currently on Eliquis twice daily for a blood clotting disorder, specifically antiphospholipid antibodies. She has previously discontinued this medication for procedures and was bridged with Lovenox. She is under the care of Dr. Lewis in hematology.    SOCIAL  HISTORY  The patient quit smoking last year after smoking about a pack a day for almost 39 to 40 years.    MEDICATIONS  Eliquis    Past Medical History:   Diagnosis Date    CHF (congestive heart failure)     DVT (deep venous thrombosis)     Left upper extremity    Elevated cholesterol     Hypertension     Prolapsed uterus     SVT (supraventricular tachycardia)        Past Surgical History:   Procedure Laterality Date    ARM THROMBECTOMY/EMBOLECTOMY Left 2023    Procedure: UPPER EXTREMITY THROMBECTOMY/EMBOLECTOMY;  Surgeon: Canelo Blackwell II, MD;  Location: [facility] MAIN OR;  Service: Vascular;  Laterality: Left;    BREAST BIOPSY      CYSTOSCOPY W/ URETERAL STENT REMOVAL      CYSTOSCOPY, URETEROSCOPY, RETROGRADE PYELOGRAM, STENT INSERTION Bilateral 2023    Procedure: CYSTOSCOPY URETEROSCOPY RETROGRADE PYELOGRAM STENT INSERTION;  Surgeon: Alan Rodriguez MD;  Location: [facility] MAIN OR;  Service: Urology;  Laterality: Bilateral;    HYSTERECTOMY  2024       Family History   Problem Relation Age of Onset    Hyperlipidemia Mother     Diabetes Mother     Cancer Mother     Other Father        Social History     Socioeconomic History    Marital status: Single   Tobacco Use    Smoking status: Former     Current packs/day: 0.00     Average packs/day: 1 pack/day for 40.0 years (40.0 ttl pk-yrs)     Types: Cigarettes     Start date: 1983     Quit date: 2023     Years since quittin.1     Passive exposure: Never    Smokeless tobacco: Never   Vaping Use    Vaping status: Never Used   Substance and Sexual Activity    Alcohol use: Never    Drug use: Never    Sexual activity: Defer         Current Outpatient Medications:     apixaban (ELIQUIS) 5 MG tablet tablet, Take 1 tablet by mouth Every 12 (Twelve) Hours. Indications: Other - full anticoagulation, Disp: 180 tablet, Rfl: 4    aspirin 81 MG EC tablet, Take 1 tablet by mouth Daily., Disp: 30 tablet, Rfl: 0    atorvastatin (LIPITOR) 20 MG tablet, Take  1 tablet by mouth Daily., Disp: 90 tablet, Rfl: 1    cetirizine (zyrTEC) 10 MG tablet, Take 1 tablet by mouth Daily., Disp: , Rfl:     chlorhexidine (PERIDEX) 0.12 % solution, , Disp: , Rfl:     D-MANNOSE PO, Take  by mouth., Disp: , Rfl:     dapagliflozin Propanediol (Farxiga) 10 MG tablet, Take 10 mg by mouth Daily., Disp: 30 tablet, Rfl: 11    furosemide (LASIX) 20 MG tablet, Take 1 tablet by mouth Daily., Disp: 30 tablet, Rfl: 11    losartan (COZAAR) 25 MG tablet, TAKE 1 TABLET BY MOUTH EVERY DAY, Disp: 90 tablet, Rfl: 0    metoprolol succinate XL (TOPROL-XL) 25 MG 24 hr tablet, Take 1 tablet by mouth Daily., Disp: 30 tablet, Rfl: 11    potassium chloride 10 MEQ CR tablet, TAKE 1 TABLET BY MOUTH TWICE A DAY, Disp: 180 tablet, Rfl: 2    Probiotic Product (PROBIOTIC DAILY PO), Take  by mouth., Disp: , Rfl:   No current facility-administered medications for this visit.    Facility-Administered Medications Ordered in Other Visits:     atropine sulfate injection 0.5 mg, 0.5 mg, Intravenous, Once PRN, Brionna Rodríguez CRNA    diphenhydrAMINE (BENADRYL) injection 12.5 mg, 12.5 mg, Intravenous, Once PRN, Brionna Rodríguez CRNA    diphenhydrAMINE (BENADRYL) injection 12.5 mg, 12.5 mg, Intravenous, Q15 Min PRN, Brionna Rodríguez CRNA    ePHEDrine Sulfate (Pressors) 5 MG/ML injection 5 mg, 5 mg, Intravenous, Once PRN, Brionna Rodríguez CRNA    hydrALAZINE (APRESOLINE) injection 5 mg, 5 mg, Intravenous, Q10 Min PRN, Brionna Rodríguez CRNA    HYDROmorphone (DILAUDID) injection 0.5 mg, 0.5 mg, Intravenous, Q15 Min PRN, Brionna Rodríguez CRNA    HYDROmorphone (DILAUDID) injection 1 mg, 1 mg, Intravenous, Q15 Min PRN, Brionna Rodríguez CRNA    ipratropium-albuterol (DUO-NEB) nebulizer solution 3 mL, 3 mL, Nebulization, Once PRN, Brionna Rodríguez CRNA    labetalol (NORMODYNE,TRANDATE) injection 5 mg, 5 mg, Intravenous, Q5 Min PRN, Brionna Rodríguez CRNA    lidocaine (cardiac) (XYLOCAINE) injection 100 mg, 100 mg,  "Intravenous, Q5 Min PRN, Brionna Rodríguez CRNA    meperidine (DEMEROL) injection 12.5 mg, 12.5 mg, Intravenous, Q5 Min PRN, Brionna Rodríguez CRNA    naloxone (NARCAN) injection 0.4 mg, 0.4 mg, Intravenous, PRN, Brionna Rodríguez CRNA    ondansetron (ZOFRAN) injection 4 mg, 4 mg, Intravenous, Once PRN, Brionna Rodríguez CRNA    oxyCODONE (ROXICODONE) immediate release tablet 10 mg, 10 mg, Oral, Q4H PRN, Brionna Rodríguez CRNA    oxyCODONE (ROXICODONE) immediate release tablet 5 mg, 5 mg, Oral, Once PRN, Brionna Rodríguez CRNA     Allergies   Allergen Reactions    Other Other (See Comments)     tetramycin    Tetracyclines & Related Swelling             Objective    OBJECTIVE:     VITAL SIGNS:  /92   Ht 177.8 cm (70\")   Wt 97.1 kg (214 lb)   SpO2 96%   BMI 30.71 kg/m²     PHYSICAL EXAM:  Normal appearance.   Head is normocephalic.   Nose appears normal.   No obvious deformity of the mouth and throat.  Conjunctivae normal.   Heart rate and rhythm is normal.  Pulmonary effort is normal.   Moving all 4 extremities.  Extremities warm.  No focal deficit present.   Alert and oriented to person, place, and time.     RESULTS REVIEW:  I have reviewed the patient's all relevant laboratory and imaging findings.     Assessment & Plan    ASSESSMENT & PLAN:  Neva Kurtz is a 64 y.o. female with significant medical conditions as mentioned above presented to my clinic.    Assessment & Plan  1.  Right lower lobe pulmonary nodule.  The pulmonary nodule appears stable on imaging, with no significant changes in size or shape. The differential diagnosis includes a benign scar from a previous infection, an active infection, or a slow-growing neoplasm. Given the patient's history of smoking and the potential for malignancy, a biopsy is recommended to obtain a definitive diagnosis. A biopsy will be scheduled.  She has been informed about the procedure, including the risks of bleeding and pneumothorax. She will " discontinue Eliquis 2 days prior to the biopsy and will be transitioned to Lovenox, which will be stopped 12 hours before the procedure. Post-biopsy, she may experience coughing and hemoptysis for a few days. The resumption of Eliquis will be determined based on her post-procedure condition.    2. Antiphospholipid antibody syndrome.  She is currently on Eliquis for a clotting disorder. For the upcoming biopsy, Eliquis will be stopped 2 days prior, and she will be bridged with Lovenox. Lovenox will be discontinued 12 hours before the biopsy. Post-procedure, the resumption of Eliquis will be determined based on her condition. She is advised to follow up with her hematologist, Dr. Lewis, for ongoing management of her anticoagulation therapy.    I discussed the patients findings and my recommendations with the patient/family/caregiver. The patient/family/caregiver was given adequate time to ask questions and all questions were answered to patient satisfaction. Thank you for this consult and allowing us to participate in the care of your patient.      Zurdo Stevenson MD  Thoracic Surgeon  [facility]        Dictated utilizing Dragon dictation    I spent 60 minutes caring for Neva on this date of service. This time includes time spent by me in the following activities:preparing for the visit, reviewing tests, obtaining and/or reviewing a separately obtained history, performing a medically appropriate examination and/or evaluation, counseling and educating the patient/family/caregiver, ordering medications, tests, or procedures, referring and communicating with other health care professionals , documenting information in the medical record, independently interpreting results and communicating that information with the patient/family/caregiver, and care coordination and more than half the time was spent in direct face to face evaluation and decision making.     Patient or patient representative  verbalized consent for the use of Ambient Listening during the visit with  Zurdo Stevenson MD for chart documentation. 1/7/2025  17:35 EST

## 2025-01-07 NOTE — DISCHARGE INSTRUCTIONS
Endoscopic ultrasound and Robotic bronchoscopy with fine needle aspiration    Samples (biopsies) of the lymph nodes are taken from inside the lungs and sent for diagnostic testing.    Final results of your biopsy take up to 5 business days to process; your endoscopic physician will contact you with your results.    EBUS and robotic bronchoscopy is recommended in the following instances:  To diagnose different types of lung disorders (such as sarcoidosis or tuberculosis)  To diagnose or 'stage' cancer   To investigate enlarged lymph  nodes in the chest    Due to effects of sedation, do not drive or operate heavy machinery for 24 hours.    Avoid heavy lifting (>10 lbs.) or strenuous activity for 48 hours.    Continue to avoid non-steroidal anti-inflammatory medications (NSAIDS) for 5-7 days after the procedure unless told otherwise by your endoscopic and primary care physicians.    Some patients have a temporary sore throat after the procedure; this is a normal finding and over-the-counter lozenges help soothe symptoms.    You may resume normal diet once you are discharged.     Avoid red-colored foods for 24 hours.     You may resume your blood thinner medications (if applicable) as directed by your endoscopic and primary care physicians.    It is normal to have a very small amount of blood-tinged sputum immediately after the procedure. This should resolve within 3-4 days.    Although complications are rare, there is a small risk of pain, collapsed lung, bleeding and infection. Contact your endoscopic physician if you have these signs or symptoms (Dr. Stevenson):  Temperature greater than 102°F  Worsening pain not relieved by medications  Go to your nearest emergency room is you experience:  Shaking, chills or a temperature over 102°F.    New, sudden difficulty breathing.    New pain when taking a deep breath.    New, sudden chest pain.  Worsening cough that produces large amounts of blood.        RESUME ELIQUIS ON  1/10/2025

## 2025-01-07 NOTE — H&P (VIEW-ONLY)
THORACIC SURGERY CLINIC CONSULT NOTE    REASON FOR CONSULT: Right lower lobe nodule    REFERRING PROVIDER: Hans Diaz MD    Subjective   HISTORY OF PRESENTING ILLNESS:   Neva Kurtz is a 64 y.o. female who has significant medical problems as mentioned in the medical chart.     History of Present Illness  The patient presents for evaluation of a lung nodule.    She was referred for a follow-up following the discovery of an abnormality on her chest CT during a hospital admission for kidney-related sepsis last year. A repeat low-dose chest CT conducted a few weeks ago revealed changes in the previously identified abnormality. She has no history of cancer. She is capable of walking a block without experiencing shortness of breath and independently manages her daily activities, including driving and grocery shopping. She has no history of chest surgery, heart attack, or stroke. She has a scheduled iron CT scan on Friday. During her hospital stay last year, she experienced shortness of breath, prompting the initial chest CT. She also had pneumonia at the time, which required hospitalization.    She has a history of kidney infection, which led to sepsis. She had a uterine prolapse that was compressing both ureters, leading to hydronephrosis. This necessitated the placement of stents in both ureters, a hysterectomy, and bladder repair. She developed heart failure due to fluid overload from the sepsis treatment. She was hospitalized for 3 days initially, discharged, and then readmitted for another 3 days due to the discovery of a clotting disorder. Her heart function has since returned to normal, as confirmed by an echocardiogram and stress test.    She is currently on Eliquis twice daily for a blood clotting disorder, specifically antiphospholipid antibodies. She has previously discontinued this medication for procedures and was bridged with Lovenox. She is under the care of Dr. Lewis in hematology.    SOCIAL  HISTORY  The patient quit smoking last year after smoking about a pack a day for almost 39 to 40 years.    MEDICATIONS  Eliquis    Past Medical History:   Diagnosis Date    CHF (congestive heart failure)     DVT (deep venous thrombosis)     Left upper extremity    Elevated cholesterol     Hypertension     Prolapsed uterus     SVT (supraventricular tachycardia)        Past Surgical History:   Procedure Laterality Date    ARM THROMBECTOMY/EMBOLECTOMY Left 2023    Procedure: UPPER EXTREMITY THROMBECTOMY/EMBOLECTOMY;  Surgeon: Canelo Blackwell II, MD;  Location: [facility] MAIN OR;  Service: Vascular;  Laterality: Left;    BREAST BIOPSY      CYSTOSCOPY W/ URETERAL STENT REMOVAL      CYSTOSCOPY, URETEROSCOPY, RETROGRADE PYELOGRAM, STENT INSERTION Bilateral 2023    Procedure: CYSTOSCOPY URETEROSCOPY RETROGRADE PYELOGRAM STENT INSERTION;  Surgeon: Alan Rodriguez MD;  Location: [facility] MAIN OR;  Service: Urology;  Laterality: Bilateral;    HYSTERECTOMY  2024       Family History   Problem Relation Age of Onset    Hyperlipidemia Mother     Diabetes Mother     Cancer Mother     Other Father        Social History     Socioeconomic History    Marital status: Single   Tobacco Use    Smoking status: Former     Current packs/day: 0.00     Average packs/day: 1 pack/day for 40.0 years (40.0 ttl pk-yrs)     Types: Cigarettes     Start date: 1983     Quit date: 2023     Years since quittin.1     Passive exposure: Never    Smokeless tobacco: Never   Vaping Use    Vaping status: Never Used   Substance and Sexual Activity    Alcohol use: Never    Drug use: Never    Sexual activity: Defer         Current Outpatient Medications:     apixaban (ELIQUIS) 5 MG tablet tablet, Take 1 tablet by mouth Every 12 (Twelve) Hours. Indications: Other - full anticoagulation, Disp: 180 tablet, Rfl: 4    aspirin 81 MG EC tablet, Take 1 tablet by mouth Daily., Disp: 30 tablet, Rfl: 0    atorvastatin (LIPITOR) 20 MG tablet, Take  1 tablet by mouth Daily., Disp: 90 tablet, Rfl: 1    cetirizine (zyrTEC) 10 MG tablet, Take 1 tablet by mouth Daily., Disp: , Rfl:     chlorhexidine (PERIDEX) 0.12 % solution, , Disp: , Rfl:     D-MANNOSE PO, Take  by mouth., Disp: , Rfl:     dapagliflozin Propanediol (Farxiga) 10 MG tablet, Take 10 mg by mouth Daily., Disp: 30 tablet, Rfl: 11    furosemide (LASIX) 20 MG tablet, Take 1 tablet by mouth Daily., Disp: 30 tablet, Rfl: 11    losartan (COZAAR) 25 MG tablet, TAKE 1 TABLET BY MOUTH EVERY DAY, Disp: 90 tablet, Rfl: 0    metoprolol succinate XL (TOPROL-XL) 25 MG 24 hr tablet, Take 1 tablet by mouth Daily., Disp: 30 tablet, Rfl: 11    potassium chloride 10 MEQ CR tablet, TAKE 1 TABLET BY MOUTH TWICE A DAY, Disp: 180 tablet, Rfl: 2    Probiotic Product (PROBIOTIC DAILY PO), Take  by mouth., Disp: , Rfl:   No current facility-administered medications for this visit.    Facility-Administered Medications Ordered in Other Visits:     atropine sulfate injection 0.5 mg, 0.5 mg, Intravenous, Once PRN, Brionna Rodríguez CRNA    diphenhydrAMINE (BENADRYL) injection 12.5 mg, 12.5 mg, Intravenous, Once PRN, Brionna Rodríguez CRNA    diphenhydrAMINE (BENADRYL) injection 12.5 mg, 12.5 mg, Intravenous, Q15 Min PRN, Brionna Rodríguez CRNA    ePHEDrine Sulfate (Pressors) 5 MG/ML injection 5 mg, 5 mg, Intravenous, Once PRN, Brionna Rodríguez CRNA    hydrALAZINE (APRESOLINE) injection 5 mg, 5 mg, Intravenous, Q10 Min PRN, Brionna Rodríguez CRNA    HYDROmorphone (DILAUDID) injection 0.5 mg, 0.5 mg, Intravenous, Q15 Min PRN, Brionna Rodríguez CRNA    HYDROmorphone (DILAUDID) injection 1 mg, 1 mg, Intravenous, Q15 Min PRN, Brionna Rodríguez CRNA    ipratropium-albuterol (DUO-NEB) nebulizer solution 3 mL, 3 mL, Nebulization, Once PRN, Brionna Rodríguez CRNA    labetalol (NORMODYNE,TRANDATE) injection 5 mg, 5 mg, Intravenous, Q5 Min PRN, Brionna Rodríguez CRNA    lidocaine (cardiac) (XYLOCAINE) injection 100 mg, 100 mg,  "Intravenous, Q5 Min PRN, Brionna Rodríguez CRNA    meperidine (DEMEROL) injection 12.5 mg, 12.5 mg, Intravenous, Q5 Min PRN, Brionna Rodríguez CRNA    naloxone (NARCAN) injection 0.4 mg, 0.4 mg, Intravenous, PRN, Brionna Rodríguez CRNA    ondansetron (ZOFRAN) injection 4 mg, 4 mg, Intravenous, Once PRN, Brionna Rodríguez CRNA    oxyCODONE (ROXICODONE) immediate release tablet 10 mg, 10 mg, Oral, Q4H PRN, Brionna Rodríguez CRNA    oxyCODONE (ROXICODONE) immediate release tablet 5 mg, 5 mg, Oral, Once PRN, Brionna Rodríguez CRNA     Allergies   Allergen Reactions    Other Other (See Comments)     tetramycin    Tetracyclines & Related Swelling             Objective    OBJECTIVE:     VITAL SIGNS:  /92   Ht 177.8 cm (70\")   Wt 97.1 kg (214 lb)   SpO2 96%   BMI 30.71 kg/m²     PHYSICAL EXAM:  Normal appearance.   Head is normocephalic.   Nose appears normal.   No obvious deformity of the mouth and throat.  Conjunctivae normal.   Heart rate and rhythm is normal.  Pulmonary effort is normal.   Moving all 4 extremities.  Extremities warm.  No focal deficit present.   Alert and oriented to person, place, and time.     RESULTS REVIEW:  I have reviewed the patient's all relevant laboratory and imaging findings.     Assessment & Plan    ASSESSMENT & PLAN:  Neva Kurtz is a 64 y.o. female with significant medical conditions as mentioned above presented to my clinic.    Assessment & Plan  1.  Right lower lobe pulmonary nodule.  The pulmonary nodule appears stable on imaging, with no significant changes in size or shape. The differential diagnosis includes a benign scar from a previous infection, an active infection, or a slow-growing neoplasm. Given the patient's history of smoking and the potential for malignancy, a biopsy is recommended to obtain a definitive diagnosis. A biopsy will be scheduled.  She has been informed about the procedure, including the risks of bleeding and pneumothorax. She will " discontinue Eliquis 2 days prior to the biopsy and will be transitioned to Lovenox, which will be stopped 12 hours before the procedure. Post-biopsy, she may experience coughing and hemoptysis for a few days. The resumption of Eliquis will be determined based on her post-procedure condition.    2. Antiphospholipid antibody syndrome.  She is currently on Eliquis for a clotting disorder. For the upcoming biopsy, Eliquis will be stopped 2 days prior, and she will be bridged with Lovenox. Lovenox will be discontinued 12 hours before the biopsy. Post-procedure, the resumption of Eliquis will be determined based on her condition. She is advised to follow up with her hematologist, Dr. Lewis, for ongoing management of her anticoagulation therapy.    I discussed the patients findings and my recommendations with the patient/family/caregiver. The patient/family/caregiver was given adequate time to ask questions and all questions were answered to patient satisfaction. Thank you for this consult and allowing us to participate in the care of your patient.      Zurdo Stevenson MD  Thoracic Surgeon  [facility]        Dictated utilizing Dragon dictation    I spent 60 minutes caring for Neva on this date of service. This time includes time spent by me in the following activities:preparing for the visit, reviewing tests, obtaining and/or reviewing a separately obtained history, performing a medically appropriate examination and/or evaluation, counseling and educating the patient/family/caregiver, ordering medications, tests, or procedures, referring and communicating with other health care professionals , documenting information in the medical record, independently interpreting results and communicating that information with the patient/family/caregiver, and care coordination and more than half the time was spent in direct face to face evaluation and decision making.     Patient or patient representative  verbalized consent for the use of Ambient Listening during the visit with  Zurdo Stevenson MD for chart documentation. 1/7/2025  17:35 EST

## 2025-01-07 NOTE — PROGRESS NOTES
THORACIC SURGERY CLINIC CONSULT NOTE    REASON FOR CONSULT: Right lower lobe nodule    REFERRING PROVIDER: Hans Diaz MD    Subjective   HISTORY OF PRESENTING ILLNESS:   Neva Kurtz is a 64 y.o. female who has significant medical problems as mentioned in the medical chart.     History of Present Illness  The patient presents for evaluation of a lung nodule.    She was referred for a follow-up following the discovery of an abnormality on her chest CT during a hospital admission for kidney-related sepsis last year. A repeat low-dose chest CT conducted a few weeks ago revealed changes in the previously identified abnormality. She has no history of cancer. She is capable of walking a block without experiencing shortness of breath and independently manages her daily activities, including driving and grocery shopping. She has no history of chest surgery, heart attack, or stroke. She has a scheduled iron CT scan on Friday. During her hospital stay last year, she experienced shortness of breath, prompting the initial chest CT. She also had pneumonia at the time, which required hospitalization.    She has a history of kidney infection, which led to sepsis. She had a uterine prolapse that was compressing both ureters, leading to hydronephrosis. This necessitated the placement of stents in both ureters, a hysterectomy, and bladder repair. She developed heart failure due to fluid overload from the sepsis treatment. She was hospitalized for 3 days initially, discharged, and then readmitted for another 3 days due to the discovery of a clotting disorder. Her heart function has since returned to normal, as confirmed by an echocardiogram and stress test.    She is currently on Eliquis twice daily for a blood clotting disorder, specifically antiphospholipid antibodies. She has previously discontinued this medication for procedures and was bridged with Lovenox. She is under the care of Dr. Lewis in hematology.    SOCIAL  HISTORY  The patient quit smoking last year after smoking about a pack a day for almost 39 to 40 years.    MEDICATIONS  Eliquis    Past Medical History:   Diagnosis Date    CHF (congestive heart failure)     DVT (deep venous thrombosis)     Left upper extremity    Elevated cholesterol     Hypertension     Prolapsed uterus     SVT (supraventricular tachycardia)        Past Surgical History:   Procedure Laterality Date    ARM THROMBECTOMY/EMBOLECTOMY Left 2023    Procedure: UPPER EXTREMITY THROMBECTOMY/EMBOLECTOMY;  Surgeon: Canelo Blackwell II, MD;  Location: [facility] MAIN OR;  Service: Vascular;  Laterality: Left;    BREAST BIOPSY      CYSTOSCOPY W/ URETERAL STENT REMOVAL      CYSTOSCOPY, URETEROSCOPY, RETROGRADE PYELOGRAM, STENT INSERTION Bilateral 2023    Procedure: CYSTOSCOPY URETEROSCOPY RETROGRADE PYELOGRAM STENT INSERTION;  Surgeon: Alan Rodriguez MD;  Location: [facility] MAIN OR;  Service: Urology;  Laterality: Bilateral;    HYSTERECTOMY  2024       Family History   Problem Relation Age of Onset    Hyperlipidemia Mother     Diabetes Mother     Cancer Mother     Other Father        Social History     Socioeconomic History    Marital status: Single   Tobacco Use    Smoking status: Former     Current packs/day: 0.00     Average packs/day: 1 pack/day for 40.0 years (40.0 ttl pk-yrs)     Types: Cigarettes     Start date: 1983     Quit date: 2023     Years since quittin.1     Passive exposure: Never    Smokeless tobacco: Never   Vaping Use    Vaping status: Never Used   Substance and Sexual Activity    Alcohol use: Never    Drug use: Never    Sexual activity: Defer         Current Outpatient Medications:     apixaban (ELIQUIS) 5 MG tablet tablet, Take 1 tablet by mouth Every 12 (Twelve) Hours. Indications: Other - full anticoagulation, Disp: 180 tablet, Rfl: 4    aspirin 81 MG EC tablet, Take 1 tablet by mouth Daily., Disp: 30 tablet, Rfl: 0    atorvastatin (LIPITOR) 20 MG tablet, Take  1 tablet by mouth Daily., Disp: 90 tablet, Rfl: 1    cetirizine (zyrTEC) 10 MG tablet, Take 1 tablet by mouth Daily., Disp: , Rfl:     chlorhexidine (PERIDEX) 0.12 % solution, , Disp: , Rfl:     D-MANNOSE PO, Take  by mouth., Disp: , Rfl:     dapagliflozin Propanediol (Farxiga) 10 MG tablet, Take 10 mg by mouth Daily., Disp: 30 tablet, Rfl: 11    furosemide (LASIX) 20 MG tablet, Take 1 tablet by mouth Daily., Disp: 30 tablet, Rfl: 11    losartan (COZAAR) 25 MG tablet, TAKE 1 TABLET BY MOUTH EVERY DAY, Disp: 90 tablet, Rfl: 0    metoprolol succinate XL (TOPROL-XL) 25 MG 24 hr tablet, Take 1 tablet by mouth Daily., Disp: 30 tablet, Rfl: 11    potassium chloride 10 MEQ CR tablet, TAKE 1 TABLET BY MOUTH TWICE A DAY, Disp: 180 tablet, Rfl: 2    Probiotic Product (PROBIOTIC DAILY PO), Take  by mouth., Disp: , Rfl:   No current facility-administered medications for this visit.    Facility-Administered Medications Ordered in Other Visits:     atropine sulfate injection 0.5 mg, 0.5 mg, Intravenous, Once PRN, Brionna Rodríguez CRNA    diphenhydrAMINE (BENADRYL) injection 12.5 mg, 12.5 mg, Intravenous, Once PRN, Brionna Rodríguez CRNA    diphenhydrAMINE (BENADRYL) injection 12.5 mg, 12.5 mg, Intravenous, Q15 Min PRN, Brionna Rodríguez CRNA    ePHEDrine Sulfate (Pressors) 5 MG/ML injection 5 mg, 5 mg, Intravenous, Once PRN, Brionna Rodríguez CRNA    hydrALAZINE (APRESOLINE) injection 5 mg, 5 mg, Intravenous, Q10 Min PRN, Brionna Rodríguez CRNA    HYDROmorphone (DILAUDID) injection 0.5 mg, 0.5 mg, Intravenous, Q15 Min PRN, Brionna Rodríguez CRNA    HYDROmorphone (DILAUDID) injection 1 mg, 1 mg, Intravenous, Q15 Min PRN, Brionna Rodríguez CRNA    ipratropium-albuterol (DUO-NEB) nebulizer solution 3 mL, 3 mL, Nebulization, Once PRN, Brionna Rodríguez CRNA    labetalol (NORMODYNE,TRANDATE) injection 5 mg, 5 mg, Intravenous, Q5 Min PRN, Brionna Rodríguez CRNA    lidocaine (cardiac) (XYLOCAINE) injection 100 mg, 100 mg,  "Intravenous, Q5 Min PRN, Brionna Rodríguez CRNA    meperidine (DEMEROL) injection 12.5 mg, 12.5 mg, Intravenous, Q5 Min PRN, Brionna Rodríguez CRNA    naloxone (NARCAN) injection 0.4 mg, 0.4 mg, Intravenous, PRN, Brionna Rodríguez CRNA    ondansetron (ZOFRAN) injection 4 mg, 4 mg, Intravenous, Once PRN, Brionna Rodríguez CRNA    oxyCODONE (ROXICODONE) immediate release tablet 10 mg, 10 mg, Oral, Q4H PRN, Brionna Rodríguez CRNA    oxyCODONE (ROXICODONE) immediate release tablet 5 mg, 5 mg, Oral, Once PRN, Brionna Rodríguez CRNA     Allergies   Allergen Reactions    Other Other (See Comments)     tetramycin    Tetracyclines & Related Swelling             Objective    OBJECTIVE:     VITAL SIGNS:  /92   Ht 177.8 cm (70\")   Wt 97.1 kg (214 lb)   SpO2 96%   BMI 30.71 kg/m²     PHYSICAL EXAM:  Normal appearance.   Head is normocephalic.   Nose appears normal.   No obvious deformity of the mouth and throat.  Conjunctivae normal.   Heart rate and rhythm is normal.  Pulmonary effort is normal.   Moving all 4 extremities.  Extremities warm.  No focal deficit present.   Alert and oriented to person, place, and time.     RESULTS REVIEW:  I have reviewed the patient's all relevant laboratory and imaging findings.     Assessment & Plan    ASSESSMENT & PLAN:  Neva Kurtz is a 64 y.o. female with significant medical conditions as mentioned above presented to my clinic.    Assessment & Plan  1.  Right lower lobe pulmonary nodule.  The pulmonary nodule appears stable on imaging, with no significant changes in size or shape. The differential diagnosis includes a benign scar from a previous infection, an active infection, or a slow-growing neoplasm. Given the patient's history of smoking and the potential for malignancy, a biopsy is recommended to obtain a definitive diagnosis. A biopsy will be scheduled.  She has been informed about the procedure, including the risks of bleeding and pneumothorax. She will " discontinue Eliquis 2 days prior to the biopsy and will be transitioned to Lovenox, which will be stopped 12 hours before the procedure. Post-biopsy, she may experience coughing and hemoptysis for a few days. The resumption of Eliquis will be determined based on her post-procedure condition.    2. Antiphospholipid antibody syndrome.  She is currently on Eliquis for a clotting disorder. For the upcoming biopsy, Eliquis will be stopped 2 days prior, and she will be bridged with Lovenox. Lovenox will be discontinued 12 hours before the biopsy. Post-procedure, the resumption of Eliquis will be determined based on her condition. She is advised to follow up with her hematologist, Dr. Lewis, for ongoing management of her anticoagulation therapy.    I discussed the patients findings and my recommendations with the patient/family/caregiver. The patient/family/caregiver was given adequate time to ask questions and all questions were answered to patient satisfaction. Thank you for this consult and allowing us to participate in the care of your patient.      Zurdo Stevenson MD  Thoracic Surgeon  [facility]        Dictated utilizing Dragon dictation    I spent 60 minutes caring for Neva on this date of service. This time includes time spent by me in the following activities:preparing for the visit, reviewing tests, obtaining and/or reviewing a separately obtained history, performing a medically appropriate examination and/or evaluation, counseling and educating the patient/family/caregiver, ordering medications, tests, or procedures, referring and communicating with other health care professionals , documenting information in the medical record, independently interpreting results and communicating that information with the patient/family/caregiver, and care coordination and more than half the time was spent in direct face to face evaluation and decision making.     Patient or patient representative  verbalized consent for the use of Ambient Listening during the visit with  Zurdo Stevenson MD for chart documentation. 1/7/2025  17:35 EST

## 2025-01-07 NOTE — ANESTHESIA POSTPROCEDURE EVALUATION
Patient: Neva Kurtz    Procedure Summary       Date: 01/07/25 Room / Location: [facility] ENDOSCOPY 3 / [facility] ENDOSCOPY    Anesthesia Start: 1331 Anesthesia Stop: 1434    Procedure: BRONCHOSCOPY WITH ION ROBOT WITH FINE NEEDLE ASPIRATION X1 AREA AND CRYO BIOPSY X1 AREA AND BRONCHIALVEOLAR LAVAGE OF RIGHT LOWER LOBE (Bronchus) Diagnosis:       Lung nodule      (Lung nodule [R91.1])    Surgeons: Zurdo Stevenson MD Provider: Zeinab Padgett MD    Anesthesia Type: general ASA Status: 3            Anesthesia Type: general    Vitals  Vitals Value Taken Time   /66 01/07/25 1532   Temp     Pulse 77 01/07/25 1546   Resp 12 01/07/25 1515   SpO2 96 % 01/07/25 1546   Vitals shown include unfiled device data.        Post Anesthesia Care and Evaluation    Patient location during evaluation: PACU  Patient participation: complete - patient participated  Level of consciousness: awake and alert  Pain management: satisfactory to patient    Airway patency: patent  Anesthetic complications: No anesthetic complications  PONV Status: none  Cardiovascular status: acceptable  Respiratory status: acceptable  Hydration status: acceptable

## 2025-01-07 NOTE — ANESTHESIA PREPROCEDURE EVALUATION
Anesthesia Evaluation     Patient summary reviewed   NPO Solid Status: Waived due to emergency  NPO Liquid Status: Waived due to emergency           Airway   Mallampati: II  TM distance: >3 FB  Neck ROM: full  No difficulty expected  Dental - normal exam     Pulmonary - normal exam   (+) a smoker Former,  Cardiovascular - normal exam    ECG reviewed    (+) hypertension, CHF Systolic <55%, PVD, DVT, hyperlipidemia    ROS comment: ·  Left ventricular ejection fraction appears to be 41 - 45%.  ·  The left ventricular cavity is borderline dilated.  ·  Left ventricular wall thickness is consistent with mild concentric hypertrophy.  ·  Left ventricular diastolic function is consistent with (grade Ia w/high LAP) impaired relaxation and age.  ·  The left atrial cavity is mild to moderately dilated.  ·  Estimated right ventricular systolic pressure from tricuspid regurgitation is normal (<35 mmHg).  ·  There is a small (<1cm) pericardial effusion.     Transthoracic echocardiography reveals mild LVH with mildly dilated LV with overall EF mildly reduced measured around 45%  Diastolic dysfunction criteria noted with mild to moderate left atrial enlargement with mild MR.  Small pericardial effusion noted.         Neuro/Psych  (-) CVA  GI/Hepatic/Renal/Endo      Musculoskeletal     Abdominal  - normal exam    Bowel sounds: normal.   Substance History      OB/GYN          Other                    Anesthesia Plan    ASA 3     general     intravenous induction     Anesthetic plan, risks, benefits, and alternatives have been provided, discussed and informed consent has been obtained with: patient.  Pre-procedure education provided  Plan discussed with CRNA.    CODE STATUS:

## 2025-01-09 LAB
BACTERIA SPEC AEROBE CULT: NO GROWTH
GRAM STN SPEC: NORMAL
LAB AP CASE REPORT: NORMAL
LAB AP CASE REPORT: NORMAL
PATH REPORT.FINAL DX SPEC: NORMAL
PATH REPORT.FINAL DX SPEC: NORMAL
PATH REPORT.GROSS SPEC: NORMAL
PATH REPORT.GROSS SPEC: NORMAL

## 2025-01-10 ENCOUNTER — PATIENT OUTREACH (OUTPATIENT)
Dept: ONCOLOGY | Facility: CLINIC | Age: 65
End: 2025-01-10
Payer: COMMERCIAL

## 2025-01-10 ENCOUNTER — PREP FOR SURGERY (OUTPATIENT)
Dept: OTHER | Facility: HOSPITAL | Age: 65
End: 2025-01-10
Payer: COMMERCIAL

## 2025-01-10 DIAGNOSIS — C34.90 NON-SMALL CELL LUNG CANCER, UNSPECIFIED LATERALITY: ICD-10-CM

## 2025-01-10 DIAGNOSIS — R91.1 LUNG NODULE: Primary | ICD-10-CM

## 2025-01-10 DIAGNOSIS — C34.90 NON-SMALL CELL LUNG CANCER, UNSPECIFIED LATERALITY: Primary | ICD-10-CM

## 2025-01-10 PROBLEM — C34.31 ADENOCARCINOMA OF LOWER LOBE OF RIGHT LUNG: Status: ACTIVE | Noted: 2025-01-10

## 2025-01-10 LAB
BACTERIA SPEC AEROBE CULT: NORMAL
BEAKER LAB AP INTRAOPERATIVE CONSULTATION: NORMAL
GRAM STN SPEC: NORMAL
LAB AP CASE REPORT: NORMAL
LAB AP DIAGNOSIS COMMENT: NORMAL
PATH REPORT.FINAL DX SPEC: NORMAL
PATH REPORT.GROSS SPEC: NORMAL

## 2025-01-10 RX ORDER — SODIUM CHLORIDE 0.9 % (FLUSH) 0.9 %
10 SYRINGE (ML) INJECTION AS NEEDED
OUTPATIENT
Start: 2025-01-10

## 2025-01-10 RX ORDER — SODIUM CHLORIDE 9 MG/ML
40 INJECTION, SOLUTION INTRAVENOUS AS NEEDED
OUTPATIENT
Start: 2025-01-10

## 2025-01-10 RX ORDER — ENOXAPARIN SODIUM 100 MG/ML
1 INJECTION SUBCUTANEOUS EVERY 12 HOURS SCHEDULED
Qty: 6 ML | Refills: 0 | Status: SHIPPED | OUTPATIENT
Start: 2025-01-10 | End: 2025-01-13

## 2025-01-10 RX ORDER — SODIUM CHLORIDE 0.9 % (FLUSH) 0.9 %
10 SYRINGE (ML) INJECTION EVERY 12 HOURS SCHEDULED
OUTPATIENT
Start: 2025-01-10

## 2025-01-10 NOTE — SIGNIFICANT NOTE
ECHO scheduled for 1/16 1 pm at [place]. Arrive 30 mins before. Left VM for patient.     Angie Arroyo  Lung Nurse Navigator   [facility]  [phone]  [email or web address]

## 2025-01-12 LAB
FUNGUS WND CULT: NORMAL
MYCOBACTERIUM SPEC CULT: NORMAL
NIGHT BLUE STAIN TISS: NORMAL

## 2025-01-14 ENCOUNTER — LAB (OUTPATIENT)
Dept: LAB | Facility: HOSPITAL | Age: 65
End: 2025-01-14
Payer: COMMERCIAL

## 2025-01-14 DIAGNOSIS — C34.90 NON-SMALL CELL LUNG CANCER, UNSPECIFIED LATERALITY: ICD-10-CM

## 2025-01-14 LAB
ABO GROUP BLD: NORMAL
BACTERIA UR QL AUTO: ABNORMAL /HPF
BILIRUB UR QL STRIP: NEGATIVE
BLD GP AB SCN SERPL QL: NEGATIVE
CLARITY UR: ABNORMAL
COLOR UR: YELLOW
FUNGUS WND CULT: NORMAL
GLUCOSE UR STRIP-MCNC: ABNORMAL MG/DL
HBA1C MFR BLD: 5.92 % (ref 4.8–5.6)
HGB UR QL STRIP.AUTO: NEGATIVE
HOLD SPECIMEN: NORMAL
HYALINE CASTS UR QL AUTO: ABNORMAL /LPF
INR PPP: 1.08 (ref 0.9–1.1)
KETONES UR QL STRIP: ABNORMAL
LEUKOCYTE ESTERASE UR QL STRIP.AUTO: ABNORMAL
MRSA DNA SPEC QL NAA+PROBE: ABNORMAL
MYCOBACTERIUM SPEC CULT: NORMAL
NIGHT BLUE STAIN TISS: NORMAL
NITRITE UR QL STRIP: POSITIVE
PH UR STRIP.AUTO: 6 [PH] (ref 5–8)
PROT UR QL STRIP: ABNORMAL
PROTHROMBIN TIME: 14.1 SECONDS (ref 11.7–14.2)
RBC # UR STRIP: ABNORMAL /HPF
REF LAB TEST METHOD: ABNORMAL
RH BLD: POSITIVE
SP GR UR STRIP: 1.03 (ref 1–1.03)
SQUAMOUS #/AREA URNS HPF: ABNORMAL /HPF
T&S EXPIRATION DATE: NORMAL
UROBILINOGEN UR QL STRIP: ABNORMAL
WBC # UR STRIP: ABNORMAL /HPF

## 2025-01-14 PROCEDURE — 86901 BLOOD TYPING SEROLOGIC RH(D): CPT

## 2025-01-14 PROCEDURE — 86900 BLOOD TYPING SEROLOGIC ABO: CPT

## 2025-01-14 PROCEDURE — 85610 PROTHROMBIN TIME: CPT | Performed by: SURGERY

## 2025-01-14 PROCEDURE — 87077 CULTURE AEROBIC IDENTIFY: CPT | Performed by: SURGERY

## 2025-01-14 PROCEDURE — 87186 SC STD MICRODIL/AGAR DIL: CPT | Performed by: SURGERY

## 2025-01-14 PROCEDURE — 87086 URINE CULTURE/COLONY COUNT: CPT | Performed by: SURGERY

## 2025-01-14 PROCEDURE — 36415 COLL VENOUS BLD VENIPUNCTURE: CPT | Performed by: SURGERY

## 2025-01-14 PROCEDURE — 81001 URINALYSIS AUTO W/SCOPE: CPT | Performed by: SURGERY

## 2025-01-14 PROCEDURE — 86850 RBC ANTIBODY SCREEN: CPT

## 2025-01-14 PROCEDURE — 87641 MR-STAPH DNA AMP PROBE: CPT

## 2025-01-14 PROCEDURE — 83036 HEMOGLOBIN GLYCOSYLATED A1C: CPT | Performed by: SURGERY

## 2025-01-14 PROCEDURE — 85018 HEMOGLOBIN: CPT | Performed by: SURGERY

## 2025-01-14 RX ORDER — DAPAGLIFLOZIN 10 MG/1
1 TABLET, FILM COATED ORAL DAILY
Qty: 90 TABLET | Refills: 3 | Status: ON HOLD | OUTPATIENT
Start: 2025-01-14

## 2025-01-16 ENCOUNTER — HOSPITAL ENCOUNTER (OUTPATIENT)
Dept: CARDIOLOGY | Facility: HOSPITAL | Age: 65
Discharge: HOME | End: 2025-01-16
Admitting: SURGERY
Payer: COMMERCIAL

## 2025-01-16 VITALS
HEIGHT: 67 IN | BODY MASS INDEX: 34.05 KG/M2 | SYSTOLIC BLOOD PRESSURE: 154 MMHG | DIASTOLIC BLOOD PRESSURE: 81 MMHG | WEIGHT: 216.93 LBS

## 2025-01-16 DIAGNOSIS — C34.90 NON-SMALL CELL LUNG CANCER, UNSPECIFIED LATERALITY: ICD-10-CM

## 2025-01-16 LAB
AV MEAN PRESS GRAD SYS DOP V1V2: 7.7 MMHG
AV VMAX SYS DOP: 196.2 CM/SEC
BACTERIA SPEC AEROBE CULT: ABNORMAL
BH CV ECHO MEAS - ACS: 1.95 CM
BH CV ECHO MEAS - AI P1/2T: 628.3 MSEC
BH CV ECHO MEAS - AO MAX PG: 15.4 MMHG
BH CV ECHO MEAS - AO ROOT DIAM: 3.1 CM
BH CV ECHO MEAS - AO V2 VTI: 39.8 CM
BH CV ECHO MEAS - AVA(I,D): 2.9 CM2
BH CV ECHO MEAS - EDV(CUBED): 170.7 ML
BH CV ECHO MEAS - EDV(MOD-SP4): 77.6 ML
BH CV ECHO MEAS - EF(MOD-SP4): 55.5 %
BH CV ECHO MEAS - ESV(CUBED): 48.1 ML
BH CV ECHO MEAS - ESV(MOD-SP4): 34.6 ML
BH CV ECHO MEAS - FS: 34.5 %
BH CV ECHO MEAS - IVS/LVPW: 0.98 CM
BH CV ECHO MEAS - IVSD: 0.98 CM
BH CV ECHO MEAS - LA DIMENSION: 3.8 CM
BH CV ECHO MEAS - LV DIASTOLIC VOL/BSA (35-75): 37.1 CM2
BH CV ECHO MEAS - LV MASS(C)D: 213.6 GRAMS
BH CV ECHO MEAS - LV MAX PG: 8.4 MMHG
BH CV ECHO MEAS - LV MEAN PG: 3.3 MMHG
BH CV ECHO MEAS - LV SYSTOLIC VOL/BSA (12-30): 16.5 CM2
BH CV ECHO MEAS - LV V1 MAX: 144.5 CM/SEC
BH CV ECHO MEAS - LV V1 VTI: 26.7 CM
BH CV ECHO MEAS - LVIDD: 5.5 CM
BH CV ECHO MEAS - LVIDS: 3.6 CM
BH CV ECHO MEAS - LVOT AREA: 4.3 CM2
BH CV ECHO MEAS - LVOT DIAM: 2.35 CM
BH CV ECHO MEAS - LVPWD: 1 CM
BH CV ECHO MEAS - MR MAX PG: 71.4 MMHG
BH CV ECHO MEAS - MR MAX VEL: 422.3 CM/SEC
BH CV ECHO MEAS - MV A MAX VEL: 103.2 CM/SEC
BH CV ECHO MEAS - MV DEC SLOPE: 255 CM/SEC2
BH CV ECHO MEAS - MV DEC TIME: 0.29 SEC
BH CV ECHO MEAS - MV E MAX VEL: 74.3 CM/SEC
BH CV ECHO MEAS - MV E/A: 0.72
BH CV ECHO MEAS - MV MAX PG: 4.6 MMHG
BH CV ECHO MEAS - MV MEAN PG: 2.01 MMHG
BH CV ECHO MEAS - MV V2 VTI: 25.1 CM
BH CV ECHO MEAS - MVA(VTI): 4.6 CM2
BH CV ECHO MEAS - PA ACC TIME: 0.07 SEC
BH CV ECHO MEAS - PA V2 MAX: 139.4 CM/SEC
BH CV ECHO MEAS - PI END-D VEL: 121.8 CM/SEC
BH CV ECHO MEAS - PULM A REVS DUR: 0.12 SEC
BH CV ECHO MEAS - PULM A REVS VEL: 39.1 CM/SEC
BH CV ECHO MEAS - PULM DIAS VEL: 42.2 CM/SEC
BH CV ECHO MEAS - PULM S/D: 1.3
BH CV ECHO MEAS - PULM SYS VEL: 54.7 CM/SEC
BH CV ECHO MEAS - RAP SYSTOLE: 3 MMHG
BH CV ECHO MEAS - RVSP: 30.9 MMHG
BH CV ECHO MEAS - SV(LVOT): 115.8 ML
BH CV ECHO MEAS - SV(MOD-SP4): 43 ML
BH CV ECHO MEAS - SVI(LVOT): 55.3 ML/M2
BH CV ECHO MEAS - SVI(MOD-SP4): 20.6 ML/M2
BH CV ECHO MEAS - TAPSE (>1.6): 2.5 CM
BH CV ECHO MEAS - TR MAX PG: 27.9 MMHG
BH CV ECHO MEAS - TR MAX VEL: 264 CM/SEC
BH CV XLRA - RV BASE: 3.4 CM
BH CV XLRA - RV MID: 1.9 CM
LV EF BIPLANE MOD: 60 %
VIRUS SPEC CULT: NORMAL

## 2025-01-16 PROCEDURE — 93306 TTE W/DOPPLER COMPLETE: CPT

## 2025-01-16 RX ORDER — SULFAMETHOXAZOLE AND TRIMETHOPRIM 800; 160 MG/1; MG/1
1 TABLET ORAL 2 TIMES DAILY
Qty: 10 TABLET | Refills: 0 | Status: ON HOLD | OUTPATIENT
Start: 2025-01-16 | End: 2025-01-21

## 2025-01-16 RX ORDER — LOSARTAN POTASSIUM 25 MG/1
25 TABLET ORAL DAILY
Qty: 90 TABLET | Refills: 0 | Status: ON HOLD | OUTPATIENT
Start: 2025-01-16

## 2025-01-16 NOTE — PAT
Message to Dr. Stevenson about abnormal urine culture.  Awaiting response.    He said ok to proceed with surgery on 1/20/2025.  He is sending in antibiotics.

## 2025-01-18 ENCOUNTER — ANESTHESIA EVENT (OUTPATIENT)
Dept: PERIOP | Facility: HOSPITAL | Age: 65
End: 2025-01-18
Payer: COMMERCIAL

## 2025-01-20 ENCOUNTER — ANESTHESIA (OUTPATIENT)
Dept: PERIOP | Facility: HOSPITAL | Age: 65
End: 2025-01-20
Payer: COMMERCIAL

## 2025-01-20 ENCOUNTER — APPOINTMENT (OUTPATIENT)
Dept: GENERAL RADIOLOGY | Facility: HOSPITAL | Age: 65
End: 2025-01-20
Payer: COMMERCIAL

## 2025-01-20 ENCOUNTER — HOSPITAL ENCOUNTER (INPATIENT)
Facility: HOSPITAL | Age: 65
LOS: 2 days | Discharge: HOME | End: 2025-01-22
Attending: SURGERY | Admitting: SURGERY
Payer: COMMERCIAL

## 2025-01-20 DIAGNOSIS — I74.2 BRACHIAL ARTERY THROMBOSIS: ICD-10-CM

## 2025-01-20 DIAGNOSIS — C34.90 NON-SMALL CELL LUNG CANCER, UNSPECIFIED LATERALITY: ICD-10-CM

## 2025-01-20 DIAGNOSIS — C34.31 ADENOCARCINOMA OF LOWER LOBE OF RIGHT LUNG: Primary | ICD-10-CM

## 2025-01-20 LAB
ANION GAP SERPL CALCULATED.3IONS-SCNC: 12.1 MMOL/L (ref 5–15)
BUN SERPL-MCNC: 19 MG/DL (ref 8–23)
BUN/CREAT SERPL: 17.1 (ref 7–25)
CALCIUM SPEC-SCNC: 9.2 MG/DL (ref 8.6–10.5)
CHLORIDE SERPL-SCNC: 106 MMOL/L (ref 98–107)
CO2 SERPL-SCNC: 20.9 MMOL/L (ref 22–29)
CREAT SERPL-MCNC: 1.11 MG/DL (ref 0.57–1)
DEPRECATED RDW RBC AUTO: 49.9 FL (ref 37–54)
EGFRCR SERPLBLD CKD-EPI 2021: 56 ML/MIN/1.73
ERYTHROCYTE [DISTWIDTH] IN BLOOD BY AUTOMATED COUNT: 13.8 % (ref 12.3–15.4)
GLUCOSE SERPL-MCNC: 106 MG/DL (ref 65–99)
HCT VFR BLD AUTO: 42.4 % (ref 34–46.6)
HGB BLD-MCNC: 13.5 G/DL (ref 12–15.9)
MAGNESIUM SERPL-MCNC: 2.7 MG/DL (ref 1.6–2.4)
MCH RBC QN AUTO: 30.9 PG (ref 26.6–33)
MCHC RBC AUTO-ENTMCNC: 31.8 G/DL (ref 31.5–35.7)
MCV RBC AUTO: 97 FL (ref 79–97)
PLATELET # BLD AUTO: 412 10*3/MM3 (ref 140–450)
PMV BLD AUTO: 8.6 FL (ref 6–12)
POTASSIUM SERPL-SCNC: 5.2 MMOL/L (ref 3.5–5.2)
RBC # BLD AUTO: 4.37 10*6/MM3 (ref 3.77–5.28)
SODIUM SERPL-SCNC: 139 MMOL/L (ref 136–145)
WBC NRBC COR # BLD AUTO: 9.51 10*3/MM3 (ref 3.4–10.8)

## 2025-01-20 PROCEDURE — 94640 AIRWAY INHALATION TREATMENT: CPT

## 2025-01-20 PROCEDURE — 32674 THORACOSCOPY LYMPH NODE EXC: CPT | Performed by: SURGERY

## 2025-01-20 PROCEDURE — 25010000002 SUGAMMADEX 200 MG/2ML SOLUTION: Performed by: NURSE ANESTHETIST, CERTIFIED REGISTERED

## 2025-01-20 PROCEDURE — 25010000002 LIDOCAINE PF 1% 1 % SOLUTION: Performed by: NURSE ANESTHETIST, CERTIFIED REGISTERED

## 2025-01-20 PROCEDURE — 32669 THORACOSCOPY REMOVE SEGMENT: CPT | Performed by: SURGERY

## 2025-01-20 PROCEDURE — 25010000002 ONDANSETRON PER 1 MG: Performed by: NURSE ANESTHETIST, CERTIFIED REGISTERED

## 2025-01-20 PROCEDURE — 88305 TISSUE EXAM BY PATHOLOGIST: CPT | Performed by: SURGERY

## 2025-01-20 PROCEDURE — 25010000002 BUPIVACAINE 0.5 % SOLUTION: Performed by: SURGERY

## 2025-01-20 PROCEDURE — 83735 ASSAY OF MAGNESIUM: CPT | Performed by: SURGERY

## 2025-01-20 PROCEDURE — 88311 DECALCIFY TISSUE: CPT | Performed by: SURGERY

## 2025-01-20 PROCEDURE — 07B74ZX EXCISION OF THORAX LYMPHATIC, PERCUTANEOUS ENDOSCOPIC APPROACH, DIAGNOSTIC: ICD-10-PCS | Performed by: SURGERY

## 2025-01-20 PROCEDURE — 0BJ08ZZ INSPECTION OF TRACHEOBRONCHIAL TREE, VIA NATURAL OR ARTIFICIAL OPENING ENDOSCOPIC: ICD-10-PCS | Performed by: SURGERY

## 2025-01-20 PROCEDURE — 8E0W4CZ ROBOTIC ASSISTED PROCEDURE OF TRUNK REGION, PERCUTANEOUS ENDOSCOPIC APPROACH: ICD-10-PCS | Performed by: SURGERY

## 2025-01-20 PROCEDURE — 94799 UNLISTED PULMONARY SVC/PX: CPT

## 2025-01-20 PROCEDURE — 71045 X-RAY EXAM CHEST 1 VIEW: CPT

## 2025-01-20 PROCEDURE — 80048 BASIC METABOLIC PNL TOTAL CA: CPT | Performed by: SURGERY

## 2025-01-20 PROCEDURE — 32674 THORACOSCOPY LYMPH NODE EXC: CPT | Performed by: SPECIALIST/TECHNOLOGIST, OTHER

## 2025-01-20 PROCEDURE — 32669 THORACOSCOPY REMOVE SEGMENT: CPT | Performed by: SPECIALIST/TECHNOLOGIST, OTHER

## 2025-01-20 PROCEDURE — 3E0T3BZ INTRODUCTION OF ANESTHETIC AGENT INTO PERIPHERAL NERVES AND PLEXI, PERCUTANEOUS APPROACH: ICD-10-PCS | Performed by: SURGERY

## 2025-01-20 PROCEDURE — 25810000003 SODIUM CHLORIDE 0.9 % SOLUTION 250 ML FLEX CONT: Performed by: NURSE ANESTHETIST, CERTIFIED REGISTERED

## 2025-01-20 PROCEDURE — 88309 TISSUE EXAM BY PATHOLOGIST: CPT | Performed by: SURGERY

## 2025-01-20 PROCEDURE — 94761 N-INVAS EAR/PLS OXIMETRY MLT: CPT

## 2025-01-20 PROCEDURE — 85027 COMPLETE CBC AUTOMATED: CPT | Performed by: SURGERY

## 2025-01-20 PROCEDURE — P9045 ALBUMIN (HUMAN), 5%, 250 ML: HCPCS | Performed by: NURSE ANESTHETIST, CERTIFIED REGISTERED

## 2025-01-20 PROCEDURE — 25010000002 FENTANYL CITRATE (PF) 100 MCG/2ML SOLUTION: Performed by: NURSE ANESTHETIST, CERTIFIED REGISTERED

## 2025-01-20 PROCEDURE — 25010000002 DEXAMETHASONE PER 1 MG: Performed by: NURSE ANESTHETIST, CERTIFIED REGISTERED

## 2025-01-20 PROCEDURE — 25010000002 CEFAZOLIN PER 500 MG: Performed by: SURGERY

## 2025-01-20 PROCEDURE — 25010000002 HYDROMORPHONE 1 MG/ML SOLUTION: Performed by: NURSE ANESTHETIST, CERTIFIED REGISTERED

## 2025-01-20 PROCEDURE — 25010000002 MAGNESIUM SULFATE PER 500 MG OF MAGNESIUM: Performed by: NURSE ANESTHETIST, CERTIFIED REGISTERED

## 2025-01-20 PROCEDURE — 25810000003 LACTATED RINGERS PER 1000 ML: Performed by: NURSE ANESTHETIST, CERTIFIED REGISTERED

## 2025-01-20 PROCEDURE — 25010000002 BUPIVACAINE LIPOSOME 1.3 % SUSPENSION: Performed by: SURGERY

## 2025-01-20 PROCEDURE — 0BBF4ZZ EXCISION OF RIGHT LOWER LUNG LOBE, PERCUTANEOUS ENDOSCOPIC APPROACH: ICD-10-PCS | Performed by: SURGERY

## 2025-01-20 PROCEDURE — 25010000002 PHENYLEPHRINE 10 MG/ML SOLUTION 5 ML VIAL: Performed by: NURSE ANESTHETIST, CERTIFIED REGISTERED

## 2025-01-20 PROCEDURE — 25010000002 MIDAZOLAM PER 1 MG: Performed by: NURSE ANESTHETIST, CERTIFIED REGISTERED

## 2025-01-20 PROCEDURE — 25010000002 ALBUMIN HUMAN 5% PER 50 ML: Performed by: NURSE ANESTHETIST, CERTIFIED REGISTERED

## 2025-01-20 PROCEDURE — 25010000002 PROPOFOL 10 MG/ML EMULSION: Performed by: NURSE ANESTHETIST, CERTIFIED REGISTERED

## 2025-01-20 DEVICE — SEAL HEMO SURG ARISTA/AH ABS/PWDR 3GM: Type: IMPLANTABLE DEVICE | Site: CHEST | Status: FUNCTIONAL

## 2025-01-20 DEVICE — ABSORBABLE HEMOSTAT (OXIDIZED REGENERATED CELLULOSE)
Type: IMPLANTABLE DEVICE | Site: CHEST | Status: FUNCTIONAL
Brand: SURGICEL

## 2025-01-20 DEVICE — SUREFORM 45 RELOAD BLUE
Type: IMPLANTABLE DEVICE | Site: LUNG | Status: FUNCTIONAL
Brand: SUREFORM

## 2025-01-20 DEVICE — SUREFORM 45 RELOAD WHITE
Type: IMPLANTABLE DEVICE | Site: LUNG | Status: FUNCTIONAL
Brand: SUREFORM

## 2025-01-20 RX ORDER — SODIUM CHLORIDE, SODIUM LACTATE, POTASSIUM CHLORIDE, CALCIUM CHLORIDE 600; 310; 30; 20 MG/100ML; MG/100ML; MG/100ML; MG/100ML
INJECTION, SOLUTION INTRAVENOUS CONTINUOUS PRN
Status: DISCONTINUED | OUTPATIENT
Start: 2025-01-20 | End: 2025-01-20 | Stop reason: SURG

## 2025-01-20 RX ORDER — HYDRALAZINE HYDROCHLORIDE 20 MG/ML
5 INJECTION INTRAMUSCULAR; INTRAVENOUS
Status: DISCONTINUED | OUTPATIENT
Start: 2025-01-20 | End: 2025-01-20 | Stop reason: HOSPADM

## 2025-01-20 RX ORDER — ACETAMINOPHEN 500 MG
1000 TABLET ORAL 3 TIMES DAILY
Status: DISCONTINUED | OUTPATIENT
Start: 2025-01-20 | End: 2025-01-22 | Stop reason: HOSPADM

## 2025-01-20 RX ORDER — NITROGLYCERIN 0.4 MG/1
0.4 TABLET SUBLINGUAL
Status: DISCONTINUED | OUTPATIENT
Start: 2025-01-20 | End: 2025-01-20 | Stop reason: SDUPTHER

## 2025-01-20 RX ORDER — ACETYLCYSTEINE 200 MG/ML
3 SOLUTION ORAL; RESPIRATORY (INHALATION)
Status: DISCONTINUED | OUTPATIENT
Start: 2025-01-20 | End: 2025-01-21

## 2025-01-20 RX ORDER — PROPOFOL 10 MG/ML
VIAL (ML) INTRAVENOUS AS NEEDED
Status: DISCONTINUED | OUTPATIENT
Start: 2025-01-20 | End: 2025-01-20 | Stop reason: SURG

## 2025-01-20 RX ORDER — ONDANSETRON 2 MG/ML
INJECTION INTRAMUSCULAR; INTRAVENOUS AS NEEDED
Status: DISCONTINUED | OUTPATIENT
Start: 2025-01-20 | End: 2025-01-20 | Stop reason: SURG

## 2025-01-20 RX ORDER — IPRATROPIUM BROMIDE AND ALBUTEROL SULFATE 2.5; .5 MG/3ML; MG/3ML
3 SOLUTION RESPIRATORY (INHALATION) ONCE AS NEEDED
Status: DISCONTINUED | OUTPATIENT
Start: 2025-01-20 | End: 2025-01-20 | Stop reason: HOSPADM

## 2025-01-20 RX ORDER — OXYCODONE HYDROCHLORIDE 5 MG/1
10 TABLET ORAL EVERY 4 HOURS PRN
Status: DISCONTINUED | OUTPATIENT
Start: 2025-01-20 | End: 2025-01-20 | Stop reason: HOSPADM

## 2025-01-20 RX ORDER — NITROGLYCERIN 0.4 MG/1
0.4 TABLET SUBLINGUAL
Status: DISCONTINUED | OUTPATIENT
Start: 2025-01-20 | End: 2025-01-22 | Stop reason: HOSPADM

## 2025-01-20 RX ORDER — ALBUMIN HUMAN 50 G/1000ML
SOLUTION INTRAVENOUS CONTINUOUS PRN
Status: DISCONTINUED | OUTPATIENT
Start: 2025-01-20 | End: 2025-01-20 | Stop reason: SURG

## 2025-01-20 RX ORDER — DIPHENHYDRAMINE HYDROCHLORIDE 50 MG/ML
12.5 INJECTION INTRAMUSCULAR; INTRAVENOUS
Status: DISCONTINUED | OUTPATIENT
Start: 2025-01-20 | End: 2025-01-20 | Stop reason: HOSPADM

## 2025-01-20 RX ORDER — EPHEDRINE SULFATE 5 MG/ML
5 INJECTION INTRAVENOUS ONCE AS NEEDED
Status: DISCONTINUED | OUTPATIENT
Start: 2025-01-20 | End: 2025-01-20 | Stop reason: HOSPADM

## 2025-01-20 RX ORDER — ONDANSETRON 4 MG/1
4 TABLET, ORALLY DISINTEGRATING ORAL EVERY 6 HOURS PRN
Status: DISCONTINUED | OUTPATIENT
Start: 2025-01-20 | End: 2025-01-22 | Stop reason: HOSPADM

## 2025-01-20 RX ORDER — ROCURONIUM BROMIDE 10 MG/ML
INJECTION, SOLUTION INTRAVENOUS AS NEEDED
Status: DISCONTINUED | OUTPATIENT
Start: 2025-01-20 | End: 2025-01-20 | Stop reason: SURG

## 2025-01-20 RX ORDER — ONDANSETRON 2 MG/ML
4 INJECTION INTRAMUSCULAR; INTRAVENOUS EVERY 6 HOURS PRN
Status: DISCONTINUED | OUTPATIENT
Start: 2025-01-20 | End: 2025-01-22 | Stop reason: HOSPADM

## 2025-01-20 RX ORDER — BISACODYL 10 MG
10 SUPPOSITORY, RECTAL RECTAL DAILY PRN
Status: DISCONTINUED | OUTPATIENT
Start: 2025-01-20 | End: 2025-01-22 | Stop reason: HOSPADM

## 2025-01-20 RX ORDER — LABETALOL HYDROCHLORIDE 5 MG/ML
5 INJECTION, SOLUTION INTRAVENOUS
Status: DISCONTINUED | OUTPATIENT
Start: 2025-01-20 | End: 2025-01-20 | Stop reason: HOSPADM

## 2025-01-20 RX ORDER — DIAZEPAM 2 MG/1
2 TABLET ORAL EVERY 6 HOURS PRN
Status: DISCONTINUED | OUTPATIENT
Start: 2025-01-20 | End: 2025-01-22 | Stop reason: HOSPADM

## 2025-01-20 RX ORDER — ALBUTEROL SULFATE 0.83 MG/ML
2.5 SOLUTION RESPIRATORY (INHALATION)
Status: DISCONTINUED | OUTPATIENT
Start: 2025-01-20 | End: 2025-01-21

## 2025-01-20 RX ORDER — CETIRIZINE HYDROCHLORIDE 10 MG/1
10 TABLET ORAL NIGHTLY
Status: DISCONTINUED | OUTPATIENT
Start: 2025-01-20 | End: 2025-01-22 | Stop reason: HOSPADM

## 2025-01-20 RX ORDER — MIDAZOLAM HYDROCHLORIDE 1 MG/ML
INJECTION, SOLUTION INTRAMUSCULAR; INTRAVENOUS AS NEEDED
Status: DISCONTINUED | OUTPATIENT
Start: 2025-01-20 | End: 2025-01-20 | Stop reason: SURG

## 2025-01-20 RX ORDER — ENOXAPARIN SODIUM 100 MG/ML
40 INJECTION SUBCUTANEOUS EVERY 24 HOURS
Status: DISCONTINUED | OUTPATIENT
Start: 2025-01-21 | End: 2025-01-22 | Stop reason: HOSPADM

## 2025-01-20 RX ORDER — LIDOCAINE HYDROCHLORIDE 10 MG/ML
INJECTION, SOLUTION EPIDURAL; INFILTRATION; INTRACAUDAL; PERINEURAL AS NEEDED
Status: DISCONTINUED | OUTPATIENT
Start: 2025-01-20 | End: 2025-01-20 | Stop reason: SURG

## 2025-01-20 RX ORDER — SODIUM CHLORIDE 0.9 % (FLUSH) 0.9 %
10 SYRINGE (ML) INJECTION EVERY 12 HOURS SCHEDULED
Status: DISCONTINUED | OUTPATIENT
Start: 2025-01-20 | End: 2025-01-20 | Stop reason: HOSPADM

## 2025-01-20 RX ORDER — LIDOCAINE HYDROCHLORIDE 40 MG/ML
SOLUTION TOPICAL AS NEEDED
Status: DISCONTINUED | OUTPATIENT
Start: 2025-01-20 | End: 2025-01-20 | Stop reason: SURG

## 2025-01-20 RX ORDER — GABAPENTIN 300 MG/1
300 CAPSULE ORAL 3 TIMES DAILY
Status: DISCONTINUED | OUTPATIENT
Start: 2025-01-20 | End: 2025-01-22 | Stop reason: HOSPADM

## 2025-01-20 RX ORDER — SODIUM CHLORIDE 9 MG/ML
40 INJECTION, SOLUTION INTRAVENOUS AS NEEDED
Status: DISCONTINUED | OUTPATIENT
Start: 2025-01-20 | End: 2025-01-20 | Stop reason: HOSPADM

## 2025-01-20 RX ORDER — BUPIVACAINE HYDROCHLORIDE 5 MG/ML
INJECTION, SOLUTION PERINEURAL AS NEEDED
Status: DISCONTINUED | OUTPATIENT
Start: 2025-01-20 | End: 2025-01-20 | Stop reason: HOSPADM

## 2025-01-20 RX ORDER — NALOXONE HCL 0.4 MG/ML
0.4 VIAL (ML) INJECTION AS NEEDED
Status: DISCONTINUED | OUTPATIENT
Start: 2025-01-20 | End: 2025-01-20 | Stop reason: HOSPADM

## 2025-01-20 RX ORDER — DOCUSATE SODIUM 100 MG/1
100 CAPSULE, LIQUID FILLED ORAL 2 TIMES DAILY
Status: DISCONTINUED | OUTPATIENT
Start: 2025-01-20 | End: 2025-01-22 | Stop reason: HOSPADM

## 2025-01-20 RX ORDER — ONDANSETRON 2 MG/ML
4 INJECTION INTRAMUSCULAR; INTRAVENOUS ONCE AS NEEDED
Status: DISCONTINUED | OUTPATIENT
Start: 2025-01-20 | End: 2025-01-20 | Stop reason: HOSPADM

## 2025-01-20 RX ORDER — OXYCODONE HYDROCHLORIDE 5 MG/1
5 TABLET ORAL ONCE AS NEEDED
Status: DISCONTINUED | OUTPATIENT
Start: 2025-01-20 | End: 2025-01-20 | Stop reason: HOSPADM

## 2025-01-20 RX ORDER — DEXAMETHASONE SODIUM PHOSPHATE 4 MG/ML
INJECTION, SOLUTION INTRA-ARTICULAR; INTRALESIONAL; INTRAMUSCULAR; INTRAVENOUS; SOFT TISSUE AS NEEDED
Status: DISCONTINUED | OUTPATIENT
Start: 2025-01-20 | End: 2025-01-20 | Stop reason: SURG

## 2025-01-20 RX ORDER — FLUMAZENIL 0.1 MG/ML
0.1 INJECTION INTRAVENOUS AS NEEDED
Status: DISCONTINUED | OUTPATIENT
Start: 2025-01-20 | End: 2025-01-20 | Stop reason: HOSPADM

## 2025-01-20 RX ORDER — SODIUM CHLORIDE 0.9 % (FLUSH) 0.9 %
10 SYRINGE (ML) INJECTION AS NEEDED
Status: DISCONTINUED | OUTPATIENT
Start: 2025-01-20 | End: 2025-01-20 | Stop reason: HOSPADM

## 2025-01-20 RX ORDER — KETOROLAC TROMETHAMINE 30 MG/ML
15 INJECTION, SOLUTION INTRAMUSCULAR; INTRAVENOUS EVERY 8 HOURS
Status: DISCONTINUED | OUTPATIENT
Start: 2025-01-20 | End: 2025-01-20

## 2025-01-20 RX ORDER — MAGNESIUM SULFATE HEPTAHYDRATE 500 MG/ML
INJECTION, SOLUTION INTRAMUSCULAR; INTRAVENOUS AS NEEDED
Status: DISCONTINUED | OUTPATIENT
Start: 2025-01-20 | End: 2025-01-20 | Stop reason: SURG

## 2025-01-20 RX ORDER — SODIUM CHLORIDE, SODIUM LACTATE, POTASSIUM CHLORIDE, CALCIUM CHLORIDE 600; 310; 30; 20 MG/100ML; MG/100ML; MG/100ML; MG/100ML
40 INJECTION, SOLUTION INTRAVENOUS CONTINUOUS
Status: DISCONTINUED | OUTPATIENT
Start: 2025-01-20 | End: 2025-01-21

## 2025-01-20 RX ORDER — FENTANYL CITRATE 50 UG/ML
INJECTION, SOLUTION INTRAMUSCULAR; INTRAVENOUS AS NEEDED
Status: DISCONTINUED | OUTPATIENT
Start: 2025-01-20 | End: 2025-01-20 | Stop reason: SURG

## 2025-01-20 RX ORDER — OXYCODONE HYDROCHLORIDE 5 MG/1
5 TABLET ORAL EVERY 4 HOURS PRN
Status: DISCONTINUED | OUTPATIENT
Start: 2025-01-20 | End: 2025-01-22 | Stop reason: HOSPADM

## 2025-01-20 RX ORDER — DIPHENHYDRAMINE HYDROCHLORIDE 50 MG/ML
12.5 INJECTION INTRAMUSCULAR; INTRAVENOUS ONCE AS NEEDED
Status: DISCONTINUED | OUTPATIENT
Start: 2025-01-20 | End: 2025-01-20 | Stop reason: HOSPADM

## 2025-01-20 RX ORDER — LIDOCAINE HYDROCHLORIDE 20 MG/ML
JELLY TOPICAL AS NEEDED
Status: DISCONTINUED | OUTPATIENT
Start: 2025-01-20 | End: 2025-01-20 | Stop reason: SURG

## 2025-01-20 RX ORDER — PHENYLEPHRINE HCL IN 0.9% NACL 1 MG/10 ML
SYRINGE (ML) INTRAVENOUS AS NEEDED
Status: DISCONTINUED | OUTPATIENT
Start: 2025-01-20 | End: 2025-01-20 | Stop reason: SURG

## 2025-01-20 RX ORDER — POLYETHYLENE GLYCOL 3350 17 G/17G
17 POWDER, FOR SOLUTION ORAL DAILY
Status: DISCONTINUED | OUTPATIENT
Start: 2025-01-21 | End: 2025-01-22 | Stop reason: HOSPADM

## 2025-01-20 RX ORDER — LIDOCAINE 4 G/G
1 PATCH TOPICAL NIGHTLY
Status: DISCONTINUED | OUTPATIENT
Start: 2025-01-20 | End: 2025-01-22 | Stop reason: HOSPADM

## 2025-01-20 RX ADMIN — ROCURONIUM BROMIDE 10 MG: 10 INJECTION, SOLUTION INTRAVENOUS at 17:30

## 2025-01-20 RX ADMIN — PROPOFOL 150 MCG/KG/MIN: 10 INJECTION, EMULSION INTRAVENOUS at 16:57

## 2025-01-20 RX ADMIN — ALBUMIN (HUMAN): 2.5 SOLUTION INTRAVENOUS at 16:25

## 2025-01-20 RX ADMIN — LIDOCAINE HYDROCHLORIDE 1 EACH: 40 SOLUTION TOPICAL at 15:46

## 2025-01-20 RX ADMIN — DEXAMETHASONE SODIUM PHOSPHATE 12 MG: 4 INJECTION, SOLUTION INTRAMUSCULAR; INTRAVENOUS at 15:50

## 2025-01-20 RX ADMIN — CEFAZOLIN 2000 MG: 2 INJECTION, POWDER, FOR SOLUTION INTRAMUSCULAR; INTRAVENOUS at 15:25

## 2025-01-20 RX ADMIN — PROPOFOL 200 MG: 10 INJECTION, EMULSION INTRAVENOUS at 15:44

## 2025-01-20 RX ADMIN — Medication 100 MCG: at 15:45

## 2025-01-20 RX ADMIN — ACETAMINOPHEN 1000 MG: 500 TABLET, FILM COATED ORAL at 21:30

## 2025-01-20 RX ADMIN — LIDOCAINE HYDROCHLORIDE 50 MG: 10 INJECTION, SOLUTION EPIDURAL; INFILTRATION; INTRACAUDAL; PERINEURAL at 15:44

## 2025-01-20 RX ADMIN — FENTANYL CITRATE 25 MCG: 50 INJECTION, SOLUTION INTRAMUSCULAR; INTRAVENOUS at 15:44

## 2025-01-20 RX ADMIN — LIDOCAINE HYDROCHLORIDE 3 ML: 20 JELLY TOPICAL at 15:46

## 2025-01-20 RX ADMIN — CETIRIZINE HYDROCHLORIDE 10 MG: 10 TABLET, FILM COATED ORAL at 21:29

## 2025-01-20 RX ADMIN — FENTANYL CITRATE 25 MCG: 50 INJECTION, SOLUTION INTRAMUSCULAR; INTRAVENOUS at 16:47

## 2025-01-20 RX ADMIN — PROPOFOL 150 MCG/KG/MIN: 10 INJECTION, EMULSION INTRAVENOUS at 16:00

## 2025-01-20 RX ADMIN — ALBUTEROL SULFATE 2.5 MG: 2.5 SOLUTION RESPIRATORY (INHALATION) at 23:55

## 2025-01-20 RX ADMIN — ROCURONIUM BROMIDE 50 MG: 10 INJECTION, SOLUTION INTRAVENOUS at 15:44

## 2025-01-20 RX ADMIN — Medication 10 ML: at 15:26

## 2025-01-20 RX ADMIN — MAGNESIUM SULFATE HEPTAHYDRATE 1 G: 500 INJECTION, SOLUTION INTRAMUSCULAR; INTRAVENOUS at 16:30

## 2025-01-20 RX ADMIN — SUGAMMADEX 300 MG: 100 INJECTION, SOLUTION INTRAVENOUS at 18:14

## 2025-01-20 RX ADMIN — HYDROMORPHONE HYDROCHLORIDE 0.5 MG: 1 INJECTION, SOLUTION INTRAMUSCULAR; INTRAVENOUS; SUBCUTANEOUS at 19:21

## 2025-01-20 RX ADMIN — MIDAZOLAM 2 MG: 1 INJECTION INTRAMUSCULAR; INTRAVENOUS at 15:35

## 2025-01-20 RX ADMIN — HYDROMORPHONE HYDROCHLORIDE 0.5 MG: 1 INJECTION, SOLUTION INTRAMUSCULAR; INTRAVENOUS; SUBCUTANEOUS at 18:57

## 2025-01-20 RX ADMIN — LIDOCAINE PAIN RELIEF 1 PATCH: 560 PATCH TOPICAL at 21:28

## 2025-01-20 RX ADMIN — FENTANYL CITRATE 25 MCG: 50 INJECTION, SOLUTION INTRAMUSCULAR; INTRAVENOUS at 16:20

## 2025-01-20 RX ADMIN — PHENYLEPHRINE HYDROCHLORIDE 0.5 MCG/KG/MIN: 10 INJECTION INTRAVENOUS at 15:55

## 2025-01-20 RX ADMIN — HYDROMORPHONE HYDROCHLORIDE 1 MG: 1 INJECTION, SOLUTION INTRAMUSCULAR; INTRAVENOUS; SUBCUTANEOUS at 16:50

## 2025-01-20 RX ADMIN — SODIUM CHLORIDE, SODIUM LACTATE, POTASSIUM CHLORIDE, AND CALCIUM CHLORIDE: .6; .31; .03; .02 INJECTION, SOLUTION INTRAVENOUS at 15:40

## 2025-01-20 RX ADMIN — ACETYLCYSTEINE 3 ML: 200 SOLUTION ORAL; RESPIRATORY (INHALATION) at 23:55

## 2025-01-20 RX ADMIN — FENTANYL CITRATE 25 MCG: 50 INJECTION, SOLUTION INTRAMUSCULAR; INTRAVENOUS at 15:40

## 2025-01-20 RX ADMIN — GABAPENTIN 300 MG: 300 CAPSULE ORAL at 21:29

## 2025-01-20 RX ADMIN — ROCURONIUM BROMIDE 50 MG: 10 INJECTION, SOLUTION INTRAVENOUS at 16:20

## 2025-01-20 RX ADMIN — ROCURONIUM BROMIDE 20 MG: 10 INJECTION, SOLUTION INTRAVENOUS at 17:00

## 2025-01-20 RX ADMIN — DOCUSATE SODIUM 100 MG: 100 CAPSULE, LIQUID FILLED ORAL at 21:29

## 2025-01-20 RX ADMIN — ONDANSETRON 4 MG: 2 INJECTION INTRAMUSCULAR; INTRAVENOUS at 18:15

## 2025-01-20 NOTE — ANESTHESIA PREPROCEDURE EVALUATION
Anesthesia Evaluation     Patient summary reviewed and Nursing notes reviewed   NPO Solid Status: > 8 hours             Airway   Mallampati: II  TM distance: >3 FB  Neck ROM: full  No difficulty expected  Dental - normal exam     Pulmonary - negative pulmonary ROS and normal exam   (+) lung cancer,  Cardiovascular - negative cardio ROS and normal exam    ECG reviewed    (+) hypertension, CHF , hyperlipidemia    ROS comment:  Left ventricular systolic function is normal. Calculated left ventricular EF = 60% Left ventricular ejection fraction appears to be 56 - 60%.    Neuro/Psych- negative ROS  GI/Hepatic/Renal/Endo - negative ROS     Musculoskeletal (-) negative ROS    Abdominal  - normal exam    Bowel sounds: normal.   Substance History - negative use     OB/GYN negative ob/gyn ROS         Other      history of cancer active                Anesthesia Plan    ASA 3     general with block and [place]       Anesthetic plan, risks, benefits, and alternatives have been provided, discussed and informed consent has been obtained with: patient.    CODE STATUS:

## 2025-01-20 NOTE — ANESTHESIA PROCEDURE NOTES
Airway  Urgency: elective    Date/Time: 1/20/2025 3:46 PM  Airway not difficult    General Information and Staff    Patient location during procedure: OR    Indications and Patient Condition  Indications for airway management: airway protection    Preoxygenated: yes  MILS maintained throughout  Mask difficulty assessment: 1 - vent by mask    Final Airway Details  Final airway type: endotracheal airway      Successful airway: EBT - double lumen left  Cuffed: yes   Successful intubation technique: video laryngoscopy  Facilitating devices/methods: intubating stylet and cricoid pressure  Endotracheal tube insertion site: oral  Blade: Alvarez  Blade size: 3  EBT DL size (fr): 39  Cormack-Lehane Classification: grade IIa - partial view of glottis  Placement verified by: chest auscultation, bronchoscopy and capnometry   Tracheal cuff pressure (cm H2O): 8  Bronchial cuff pressure (cm H2O): 3  Number of attempts at approach: 1  Assessment: lips, teeth, and gum same as pre-op and atraumatic intubation    Additional Comments  Polyp on rt aspect of epiglottis

## 2025-01-21 ENCOUNTER — APPOINTMENT (OUTPATIENT)
Dept: GENERAL RADIOLOGY | Facility: HOSPITAL | Age: 65
End: 2025-01-21
Payer: COMMERCIAL

## 2025-01-21 LAB
ANION GAP SERPL CALCULATED.3IONS-SCNC: 10.1 MMOL/L (ref 5–15)
BASOPHILS # BLD AUTO: 0.04 10*3/MM3 (ref 0–0.2)
BASOPHILS NFR BLD AUTO: 0.3 % (ref 0–1.5)
BUN SERPL-MCNC: 21 MG/DL (ref 8–23)
BUN/CREAT SERPL: 20.8 (ref 7–25)
CALCIUM SPEC-SCNC: 8.8 MG/DL (ref 8.6–10.5)
CHLORIDE SERPL-SCNC: 104 MMOL/L (ref 98–107)
CO2 SERPL-SCNC: 21.9 MMOL/L (ref 22–29)
CREAT SERPL-MCNC: 1.01 MG/DL (ref 0.57–1)
DEPRECATED RDW RBC AUTO: 47.9 FL (ref 37–54)
EGFRCR SERPLBLD CKD-EPI 2021: 62.7 ML/MIN/1.73
EOSINOPHIL # BLD AUTO: 0 10*3/MM3 (ref 0–0.4)
EOSINOPHIL NFR BLD AUTO: 0 % (ref 0.3–6.2)
ERYTHROCYTE [DISTWIDTH] IN BLOOD BY AUTOMATED COUNT: 13.7 % (ref 12.3–15.4)
FUNGUS WND CULT: NORMAL
GLUCOSE SERPL-MCNC: 269 MG/DL (ref 65–99)
HCT VFR BLD AUTO: 39.9 % (ref 34–46.6)
HGB BLD-MCNC: 12.9 G/DL (ref 12–15.9)
IMM GRANULOCYTES # BLD AUTO: 0.06 10*3/MM3 (ref 0–0.05)
IMM GRANULOCYTES NFR BLD AUTO: 0.5 % (ref 0–0.5)
LYMPHOCYTES # BLD AUTO: 0.97 10*3/MM3 (ref 0.7–3.1)
LYMPHOCYTES NFR BLD AUTO: 7.7 % (ref 19.6–45.3)
MCH RBC QN AUTO: 30.6 PG (ref 26.6–33)
MCHC RBC AUTO-ENTMCNC: 32.3 G/DL (ref 31.5–35.7)
MCV RBC AUTO: 94.8 FL (ref 79–97)
MONOCYTES # BLD AUTO: 0.98 10*3/MM3 (ref 0.1–0.9)
MONOCYTES NFR BLD AUTO: 7.8 % (ref 5–12)
MYCOBACTERIUM SPEC CULT: NORMAL
NEUTROPHILS NFR BLD AUTO: 10.55 10*3/MM3 (ref 1.7–7)
NEUTROPHILS NFR BLD AUTO: 83.7 % (ref 42.7–76)
NIGHT BLUE STAIN TISS: NORMAL
NRBC BLD AUTO-RTO: 0 /100 WBC (ref 0–0.2)
PLATELET # BLD AUTO: 372 10*3/MM3 (ref 140–450)
PMV BLD AUTO: 8.4 FL (ref 6–12)
POTASSIUM SERPL-SCNC: 4.3 MMOL/L (ref 3.5–5.2)
RBC # BLD AUTO: 4.21 10*6/MM3 (ref 3.77–5.28)
SODIUM SERPL-SCNC: 136 MMOL/L (ref 136–145)
WBC NRBC COR # BLD AUTO: 12.6 10*3/MM3 (ref 3.4–10.8)

## 2025-01-21 PROCEDURE — 80048 BASIC METABOLIC PNL TOTAL CA: CPT | Performed by: SURGERY

## 2025-01-21 PROCEDURE — 71045 X-RAY EXAM CHEST 1 VIEW: CPT

## 2025-01-21 PROCEDURE — 97165 OT EVAL LOW COMPLEX 30 MIN: CPT

## 2025-01-21 PROCEDURE — 85025 COMPLETE CBC W/AUTO DIFF WBC: CPT | Performed by: SURGERY

## 2025-01-21 PROCEDURE — 99024 POSTOP FOLLOW-UP VISIT: CPT | Performed by: NURSE PRACTITIONER

## 2025-01-21 PROCEDURE — 97161 PT EVAL LOW COMPLEX 20 MIN: CPT

## 2025-01-21 PROCEDURE — 25010000002 ENOXAPARIN PER 10 MG: Performed by: SURGERY

## 2025-01-21 PROCEDURE — 94799 UNLISTED PULMONARY SVC/PX: CPT

## 2025-01-21 RX ORDER — SULFAMETHOXAZOLE AND TRIMETHOPRIM 800; 160 MG/1; MG/1
1 TABLET ORAL 2 TIMES DAILY
Status: DISCONTINUED | OUTPATIENT
Start: 2025-01-21 | End: 2025-01-22 | Stop reason: HOSPADM

## 2025-01-21 RX ORDER — ALBUTEROL SULFATE 0.83 MG/ML
2.5 SOLUTION RESPIRATORY (INHALATION) 2 TIMES DAILY
Status: DISCONTINUED | OUTPATIENT
Start: 2025-01-21 | End: 2025-01-22 | Stop reason: HOSPADM

## 2025-01-21 RX ORDER — ACETYLCYSTEINE 200 MG/ML
3 SOLUTION ORAL; RESPIRATORY (INHALATION)
Status: DISCONTINUED | OUTPATIENT
Start: 2025-01-21 | End: 2025-01-22 | Stop reason: HOSPADM

## 2025-01-21 RX ADMIN — GABAPENTIN 300 MG: 300 CAPSULE ORAL at 15:22

## 2025-01-21 RX ADMIN — GABAPENTIN 300 MG: 300 CAPSULE ORAL at 10:07

## 2025-01-21 RX ADMIN — GABAPENTIN 300 MG: 300 CAPSULE ORAL at 21:54

## 2025-01-21 RX ADMIN — CETIRIZINE HYDROCHLORIDE 10 MG: 10 TABLET, FILM COATED ORAL at 21:54

## 2025-01-21 RX ADMIN — DOCUSATE SODIUM 100 MG: 100 CAPSULE, LIQUID FILLED ORAL at 10:07

## 2025-01-21 RX ADMIN — Medication 12.5 MG: at 21:54

## 2025-01-21 RX ADMIN — SULFAMETHOXAZOLE AND TRIMETHOPRIM 1 TABLET: 800; 160 TABLET ORAL at 21:54

## 2025-01-21 RX ADMIN — ACETAMINOPHEN 1000 MG: 500 TABLET, FILM COATED ORAL at 15:21

## 2025-01-21 RX ADMIN — Medication 12.5 MG: at 10:07

## 2025-01-21 RX ADMIN — OXYCODONE HYDROCHLORIDE 5 MG: 5 TABLET ORAL at 15:22

## 2025-01-21 RX ADMIN — ACETAMINOPHEN 1000 MG: 500 TABLET, FILM COATED ORAL at 10:07

## 2025-01-21 RX ADMIN — DOCUSATE SODIUM 100 MG: 100 CAPSULE, LIQUID FILLED ORAL at 21:54

## 2025-01-21 RX ADMIN — ACETYLCYSTEINE 3 ML: 200 SOLUTION ORAL; RESPIRATORY (INHALATION) at 18:35

## 2025-01-21 RX ADMIN — POLYETHYLENE GLYCOL 3350 17 G: 17 POWDER, FOR SOLUTION ORAL at 10:07

## 2025-01-21 RX ADMIN — OXYCODONE HYDROCHLORIDE 5 MG: 5 TABLET ORAL at 05:35

## 2025-01-21 RX ADMIN — SULFAMETHOXAZOLE AND TRIMETHOPRIM 1 TABLET: 800; 160 TABLET ORAL at 12:38

## 2025-01-21 RX ADMIN — LIDOCAINE PAIN RELIEF 1 PATCH: 560 PATCH TOPICAL at 21:54

## 2025-01-21 RX ADMIN — ACETAMINOPHEN 1000 MG: 500 TABLET, FILM COATED ORAL at 21:54

## 2025-01-21 RX ADMIN — ENOXAPARIN SODIUM 40 MG: 100 INJECTION SUBCUTANEOUS at 15:21

## 2025-01-21 RX ADMIN — ALBUTEROL SULFATE 2.5 MG: 2.5 SOLUTION RESPIRATORY (INHALATION) at 18:31

## 2025-01-21 NOTE — BRIEF OP NOTE
THORACOSCOPY WITH LOBECTOMY WITH DAVINCI ROBOT  Progress Note    Neva Kurtz  1/20/2025    Pre-op Diagnosis:   Non-small cell lung cancer, unspecified laterality [C34.90]       Post-Op Diagnosis Codes:     * Non-small cell lung cancer, unspecified laterality [C34.90]    Procedure/CPT® Codes:        Procedure(s):  RIGHT THORACOSCOPY WITH RIGHT LOWER LOBE SEGMENTECTOMY WITH DAVINCI ROBOT, FLEXIBLE BROCHOSCOPY AND LYMPH NODE DISSECTION      Surgeon(s):  Zurdo Stevenson MD    Anesthesia: General    Staff:   Circulator: Kalina Unger RN; Tiffany Medellin RN  Scrub Person: Zeinab Diaz Allison  Assistant: Elfego Norton CSA  Assistant: Elfego Norton CSA      Estimated Blood Loss:  25 cc    Urine Voided: 200 mL    Specimens:                          Drains:   Chest Tube 1 Right (Active)   Function -20 cm H2O 01/20/25 2016   Air Leak/Fluctuation air leak not present 01/20/25 2016   Drainage Description Bright red 01/20/25 2016   Dressing Type Border Dressing 01/20/25 2016   Dressing Status Clean;Dry;Intact 01/20/25 2016   Site Assessment Not assessed 01/20/25 2016   Surrounding Skin Unable to view 01/20/25 2016   Securement tubing anchored to body distal to insertion site with tape 01/20/25 1834   Left Subcutaneous Emphysema none present 01/20/25 2016   Right Subcutaneous Emphysema none present 01/20/25 2016   Output (mL) 60 mL 01/20/25 1947       Urethral Catheter Non-latex 16 Fr. (Active)   Daily Indications Required activity restriction from trauma, surgery, (e.g. unstable spine, fracture, hemodynamics) 01/20/25 2016   Site Assessment Clean;Skin intact 01/20/25 1947   Collection Container Standard drainage bag 01/20/25 2016   Securement Method Securing device 01/20/25 2016   Catheter care complete Yes 01/20/25 1834       Assistant: Elfego Norton CSA  was responsible for performing the following activities: Retraction, Suction, Irrigation, Suturing, Closing, Placing Dressing, and  Held/Positioned Camera and their skilled assistance was necessary for the success of this case.    Zurdo Stevenson MD     Date: 1/20/2025  Time: 21:43 EST

## 2025-01-21 NOTE — DISCHARGE INSTRUCTIONS
Post-op VAT / Thoracotomy Discharge Instructions  !!! PLEASE GO TO THE [facility] FOR A CHEST X-RAY PRIOR TO YOUR APPOINTMENT!!!!    1. Activity:  · Climb stairs as tolerated  · May drive car after 2 weeks or as directed by surgeon  · Walk at least 3-4 times a day  · Limit lifting for first 6 weeks. No lifting, pushing, or pulling greater than 20 pounds for at least 2 weeks  · Continue to use your incentive spirometer 10x/hour    2. Nutrition:  · Resume previous diet  · Eat well balanced meals  · Avoid constipation by eating fresh fruits, vegetables, whole grain foods and increasing fluid intake.    3. Incision (wound) Care:  · Remove dressing after 48 hours, then leave open to air  · If continued drainage, change dressing every 24 hours and as needed with dry gauze  · May shower after discharge.  · Wash around incision area with soap and water daily. May also cleanse with hydrogen peroxide and/or betadine'  · No lotions or creams on incision area. It is normal to have some drainage from your chest tube site. Please keep the area clean and dry      4. When to call for Medical Advice: [phone]  · Fever greater than 101 degrees  · Unusual or severe pain  · Difficulty breathing  · Incision changes (redness, swelling, or increased purulent drainage)  · Any questions or problems    5. Medication Instructions:  · Take Pain medications as directed to stay comfortable     -Typically Tylenol, Gabapentin, Celebrex (anti-inflamatory), oxycodone as needed. See discharge teaching sheet  · No driving or drinking alcohol when taking prescribed pain meds  · Laxative of choice if needed for constipation.    6. Follow- up appointment:  · We will arrange a follow up appointment in about 2 weeks   Please go to the [facility] for a chest x-ray prior to your appointment      ** Some bulging to the side/abdomen is normal for up to 3-6 months after surgery as the nerves regenerate and muscles contract

## 2025-01-21 NOTE — PLAN OF CARE
Goal Outcome Evaluation:      Patient able to make needs known. Medications given per MAR. Plan of care ongoing.

## 2025-01-21 NOTE — PLAN OF CARE
Problem: Adult Inpatient Plan of Care  Goal: Plan of Care Review  Outcome: Progressing  Flowsheets (Taken 1/20/2025 2015)  Progress: no change  Outcome Evaluation: Pt is here from surgery,chest tube to suction, , [place] Cath, Pain treated, Call light in reach, Plan on going  Plan of Care Reviewed With: patient   Goal Outcome Evaluation:

## 2025-01-21 NOTE — ANESTHESIA POSTPROCEDURE EVALUATION
Patient: Neva Kurtz    Procedure Summary       Date: 01/20/25 Room / Location: [facility] OR 08 / [facility] MAIN OR    Anesthesia Start: 1540 Anesthesia Stop: 1831    Procedure: RIGHT THORACOSCOPY WITH RIGHT LOWER LOBE SEGMENTECTOMY WITH DAVINCI ROBOT, FLEXIBLE BROCHOSCOPY AND LYMPH NODE DISSECTION (Right: Chest) Diagnosis:       Non-small cell lung cancer, unspecified laterality      (Non-small cell lung cancer, unspecified laterality [C34.90])    Surgeons: Zurdo Stevenson MD Provider: Vignesh Warren MD    Anesthesia Type: general with block, [place] ASA Status: 3            Anesthesia Type: general with block, [place]    Vitals  Vitals Value Taken Time   /62 01/20/25 1947   Temp 98.4 °F (36.9 °C) 01/20/25 1947   Pulse 81 01/20/25 1947   Resp 14 01/20/25 1947   SpO2 93 % 01/20/25 1947           Post Anesthesia Care and Evaluation    Patient location during evaluation: PACU  Patient participation: complete - patient participated  Level of consciousness: awake  Pain scale: See nurse's notes for pain score.  Pain management: adequate    Airway patency: patent  Anesthetic complications: No anesthetic complications  PONV Status: none  Cardiovascular status: acceptable  Respiratory status: acceptable and spontaneous ventilation  Hydration status: acceptable    Comments: Patient seen and examined postoperatively; vital signs stable; SpO2 greater than or equal to 90%; cardiopulmonary status stable; nausea/vomiting adequately controlled; pain adequately controlled; no apparent anesthesia complications; patient discharged from anesthesia care when discharge criteria were met

## 2025-01-21 NOTE — THERAPY EVALUATION
Patient Name: Neva Kurtz  : 1960    MRN: [id number]                              Today's Date: 2025       Admit Date: 2025    Visit Dx:     ICD-10-CM ICD-9-CM   1. Non-small cell lung cancer, unspecified laterality  C34.90 162.9     Patient Active Problem List   Diagnosis    Urinary tract infection    UTI (urinary tract infection)    Acute exacerbation of CHF (congestive heart failure)    Chronic HFrEF (heart failure with reduced ejection fraction)    Bell's palsy    Hyperlipidemia    Hypertension    Hydronephrosis    Vaginal prolapse    Tobacco use    Brachial artery thrombosis    PSVT (paroxysmal supraventricular tachycardia)    Lung nodule    Non-small cell lung cancer    Adenocarcinoma of lower lobe of right lung     Past Medical History:   Diagnosis Date    CHF (congestive heart failure)     DVT (deep venous thrombosis)     Left upper extremity    Elevated cholesterol     Hypertension     Prolapsed uterus     SVT (supraventricular tachycardia)      Past Surgical History:   Procedure Laterality Date    ARM THROMBECTOMY/EMBOLECTOMY Left 2023    Procedure: UPPER EXTREMITY THROMBECTOMY/EMBOLECTOMY;  Surgeon: Canelo Blackwell II, MD;  Location: [facility] MAIN OR;  Service: Vascular;  Laterality: Left;    BREAST BIOPSY      BRONCHOSCOPY WITH ION ROBOTIC ASSIST N/A 2025    Procedure: BRONCHOSCOPY WITH ION ROBOT WITH FINE NEEDLE ASPIRATION X1 AREA AND CRYO BIOPSY X1 AREA AND BRONCHIALVEOLAR LAVAGE OF RIGHT LOWER LOBE;  Surgeon: Zurdo Stevenson MD;  Location: [facility] ENDOSCOPY;  Service: Robotics - Pulmonary;  Laterality: N/A;  POST: LUNG NODULE    CYSTOSCOPY W/ URETERAL STENT REMOVAL      CYSTOSCOPY, URETEROSCOPY, RETROGRADE PYELOGRAM, STENT INSERTION Bilateral 2023    Procedure: CYSTOSCOPY URETEROSCOPY RETROGRADE PYELOGRAM STENT INSERTION;  Surgeon: Alan Rodriguez MD;  Location: [facility] MAIN OR;  Service: Urology;  Laterality: Bilateral;    HYSTERECTOMY  2024      General  Information       [facility] Name 01/21/25 1251          Physical Therapy Time and Intention    Document Type evaluation  -     Mode of Treatment physical therapy  -[facility] Name 01/21/25 1251          General Information    Patient Profile Reviewed yes  -     Prior Level of Function independent:;all household mobility;community mobility;gait;ADL's  -     Existing Precautions/Restrictions fall  -     Barriers to Rehab medically complex  -[facility] Name 01/21/25 1251          Living Environment    People in Home alone  -[facility] Name 01/21/25 1251          Home Main Entrance    Number of Stairs, Main Entrance none  ramped entry  -[facility] Name 01/21/25 1251          Stairs Within Home, Primary    Number of Stairs, Within Home, Primary none  -[facility] Name 01/21/25 1251          Cognition    Orientation Status (Cognition) oriented x 4  -[facility] Name 01/21/25 1251          Safety Issues/Impairments Affecting Functional Mobility    Comment, Safety Issues/Impairments (Mobility) no safety issues noted.  -               User Key  (r) = Recorded By, (t) = Taken By, (c) = Cosigned By      Initials Name Provider Type     Elida Day PT Physical Therapist                   Mobility       [facility] Name 01/21/25 1252          Sit-Stand Transfer    Sit-Stand [place] (Transfers) independent  -[facility] Name 01/21/25 1252          Gait/Stairs (Locomotion)    [place] Level (Gait) independent  -     Distance in Feet (Gait) 300  -     Deviations/Abnormal Patterns (Gait) gait speed decreased  -               User Key  (r) = Recorded By, (t) = Taken By, (c) = Cosigned By      Initials Name Provider Type     Elida Day PT Physical Therapist                   Obj/Interventions       [facility] Name 01/21/25 1253          Range of Motion Comprehensive    General Range of Motion no range of motion deficits identified  -AH       Row Name 01/21/25 1253          Strength Comprehensive (MMT)     General Manual Muscle Testing (MMT) Assessment no strength deficits identified  -       Row Name 01/21/25 1253          Motor Skills    Motor Skills functional endurance  -     Functional Endurance good despite post op status.  -[facility] Name 01/21/25 1253          Balance    Balance Assessment sitting static balance;sitting dynamic balance;standing static balance;standing dynamic balance  -     Static Sitting Balance independent  -     Dynamic Sitting Balance independent  -     Position, Sitting Balance unsupported  -     Static Standing Balance independent  -     Dynamic Standing Balance independent  -     Position/Device Used, Standing Balance unsupported  -       Row Name 01/21/25 1253          Sensory Assessment (Somatosensory)    Sensory Assessment (Somatosensory) sensation intact  -               User Key  (r) = Recorded By, (t) = Taken By, (c) = Cosigned By      Initials Name Provider Type    Elida Real PT Physical Therapist                   Goals/Plan    No documentation.                  Clinical Impression       [facility] Name 01/21/25 1253          Pain    Pretreatment Pain Rating 0/10 - no pain  -     Posttreatment Pain Rating 0/10 - no pain  -[facility] Name 01/21/25 1253          Plan of Care Review    Plan of Care Reviewed With patient  -     Outcome Evaluation Pt is a 65 y/o female with NSCLC, now s/p R thoracoscopy w/ RLL segmentectomy w/ DaVinci, brochoscopy, and lymph node dissection by Dr. Stevenson.  CMHx including: Antiphospholipid antibody syndrome. Pt lives alone and is independent at baseline. No DME use but she has access to a walker, cane, WC, and bathroom equip if needed. Pt has a supportive dtr that lives next door. Pt presents for PT evaluation with no mobility deficits. She is independent and able to ambulate 300' without AD. Vitals stable after gait trial. Dtr is able to walk with her and confidently manages chest tube canister. Pt is safe to walk with  dtr's assist. Pt reports R shoulder deficit, attributed to prolonged flexion positioning in OR. Instructed in AROM and AAROM RUE mvmts.  PT reviewed safe post op lifting (wt limit and technique to decrease strain). No additional skilled therapy services needed. PT will sign off and complete order.  -[facility] Name 01/21/25 1257          Therapy Assessment/Plan (PT)    Criteria for Skilled Interventions Met (PT) no;does not meet criteria for skilled intervention  -     Therapy Frequency (PT) evaluation only  -       Row Name 01/21/25 1253          Positioning and Restraints    Post Treatment Position chair  -     In Chair notified nsg;call light within reach;sitting;encouraged to call for assist  -               User Key  (r) = Recorded By, (t) = Taken By, (c) = Cosigned By      Initials Name Provider Type    Elida Real PT Physical Therapist                   Outcome Measures       Row Name 01/21/25 9907          How much help from another person do you currently need...    Turning from your back to your side while in flat bed without using bedrails? 4  -AH     Moving from lying on back to sitting on the side of a flat bed without bedrails? 4  -AH     Moving to and from a bed to a chair (including a wheelchair)? 4  -AH     Standing up from a chair using your arms (e.g., wheelchair, bedside chair)? 4  -AH     Climbing 3-5 steps with a railing? 3  -AH     To walk in hospital room? 4  -     AM-PAC 6 Clicks Score (PT) 23  -     Highest Level of Mobility Goal 7 --> Walk 25 feet or more  -[facility] Name 01/21/25 1303          Functional Assessment    Outcome Measure Options AM-PAC 6 Clicks Basic Mobility (PT)  -               User Key  (r) = Recorded By, (t) = Taken By, (c) = Cosigned By      Initials Name Provider Type    Elida Real PT Physical Therapist                                 Physical Therapy Education       Title: PT OT SLP Therapies (Done)       Topic: Physical Therapy  (Done)       Point: Home exercise program (Done)       Learning Progress Summary            Patient Acceptance, E, VU by  at 1/21/2025 1305                      Point: Precautions (Done)       Learning Progress Summary            Patient Acceptance, E, VU by  at 1/21/2025 1305                                      User Key       Initials Effective Dates Name Provider Type [facility] 12/04/23 -  Elida Day, ALISSA Physical Therapist PT                  PT Recommendation and Plan     Outcome Evaluation: Pt is a 65 y/o female with NSCLC, now s/p R thoracoscopy w/ RLL segmentectomy w/ DaVinci, brochoscopy, and lymph node dissection by Dr. Stevenson.  CMHx including: Antiphospholipid antibody syndrome. Pt lives alone and is independent at baseline. No DME use but she has access to a walker, cane, WC, and bathroom equip if needed. Pt has a supportive dtr that lives next door. Pt presents for PT evaluation with no mobility deficits. She is independent and able to ambulate 300' without AD. Vitals stable after gait trial. Dtr is able to walk with her and confidently manages chest tube canister. Pt is safe to walk with dtr's assist. Pt reports R shoulder deficit, attributed to prolonged flexion positioning in OR. Instructed in AROM and AAROM RUE mvmts.  PT reviewed safe post op lifting (wt limit and technique to decrease strain). No additional skilled therapy services needed. PT will sign off and complete order.     Time Calculation:         PT Charges       Row Name 01/21/25 1306             Time Calculation    Start Time 1017  -      Stop Time 1026  -      Time Calculation (min) 9 min  -      PT Non-Billable Time (min) 0 min  -      PT Received On 01/21/25  -         Time Calculation- PT    Total Timed Code Minutes- PT 0 minute(s)  -                User Key  (r) = Recorded By, (t) = Taken By, (c) = Cosigned By      Initials Name Provider Type     Elida Day, ALISSA Physical Therapist                   Therapy Charges for Today       Code Description Service Date Service Provider Modifiers Qty    14088172306 HC PT EVAL LOW COMPLEXITY 2 1/21/2025 Elida Day, PT GP 1            PT G-Codes  Outcome Measure Options: AM-PAC 6 Clicks Basic Mobility (PT)  AM-PAC 6 Clicks Score (PT): 23  PT Discharge Summary  Anticipated Discharge Disposition (PT): home with assist    Elida Day, PT  1/21/2025

## 2025-01-21 NOTE — PLAN OF CARE
Goal Outcome Evaluation:  Plan of Care Reviewed With: patient           Outcome Evaluation: Pt is a 63 y/o female with NSCLC, now s/p R thoracoscopy w/ RLL segmentectomy w/ DaVinci, brochoscopy, and lymph node dissection by Dr. Stevenson.  CMHx including: Antiphospholipid antibody syndrome. Pt lives alone and is independent at baseline. No DME use but she has access to a walker, cane, WC, and shower chair if needed. Pt has a supportive dtr that lives next door. This date pt demos independence with mobility and ADLs.  Initially spoke with patient and gained subjective while she was walking the halls with dtr.  Returned later to go over ADLs and assess independence, and pt was having no difficulty.  Recommend home with assist from dtrs.  No further OT needs.

## 2025-01-21 NOTE — CASE MANAGEMENT/SOCIAL WORK
Discharge Planning Assessment   Joni     Patient Name: Neva Kurtz  MRN: [id number]  Today's Date: 1/21/2025    Admit Date: 1/20/2025    Plan: From home alone, plans to go home at discharge. Family will transport   Discharge Needs Assessment       Row Name 01/21/25 1500       Living Environment    People in Home alone    Current Living Arrangements home    Potentially Unsafe Housing Conditions none    In the past 12 months has the electric, gas, oil, or water company threatened to shut off services in your home? No    Primary Care Provided by self    Provides Primary Care For no one    Family Caregiver if Needed child(abbey), adult    Family Caregiver Names Nicole ( daughter)    Quality of Family Relationships helpful;involved;supportive    Able to Return to Prior Arrangements yes       Resource/Environmental Concerns    Resource/Environmental Concerns none    Transportation Concerns none       Transportation Needs    In the past 12 months, has lack of transportation kept you from medical appointments or from getting medications? no    In the past 12 months, has lack of transportation kept you from meetings, work, or from getting things needed for daily living? No       Food Insecurity    Within the past 12 months, you worried that your food would run out before you got the money to buy more. Never true    Within the past 12 months, the food you bought just didn't last and you didn't have money to get more. Never true       Transition Planning    Patient/Family Anticipates Transition to home    Patient/Family Anticipated Services at Transition none    Transportation Anticipated car, drives self;family or friend will provide       Discharge Needs Assessment    Readmission Within the Last 30 Days no previous admission in last 30 days    Equipment Currently Used at Home none    Concerns to be Addressed care coordination/care conferences;discharge planning    Anticipated Changes Related to Illness none     Equipment Needed After Discharge none    Provided Post Acute Provider List? N/A    N/A Provider List Comment denies dc needs                   Discharge Plan       Row Name 01/21/25 1501       Plan    Plan From home alone, plans to go home at discharge. Family will transport    Patient/Family in Agreement with Plan yes    Plan Comments Patient lives at home alone. Patient normally drives. Family  will transport at discharge. Patient performs ADL. PCP and pharmacy confirmed. Would like to use [facility]. Denies financial assistance needs for medication and/or food. Denies HH and/or rehab needs.   DC barriers: pod #1 01/20/25 RIGHT THORACOSCOPY WITH RIGHT LOWER LOBE SEGMENTECTOMY WITH DAVINCI ROBOT, FLEXIBLE BROCHOSCOPY AND LYMPH NODE DISSECTION , Chest tube,                  Continued Care and Services - Admitted Since 1/20/2025    No active coordination exists for this encounter.       Expected Discharge Date and Time       Expected Discharge Date Expected Discharge Time    Jan 23, 2025            Demographic Summary       Row Name 01/21/25 3269       General Information    Admission Type inpatient    Arrived From home;PACU/recovery room    Referral Source admission list    Reason for Consult discharge planning    Preferred Language English       Contact Information    Permission Granted to Share Info With                    Functional Status       Row Name 01/21/25 1500       Functional Status    Usual Activity Tolerance good    Current Activity Tolerance good       Functional Status, IADL    Medications independent    Meal Preparation independent    Housekeeping independent    Laundry independent    Shopping independent    If for any reason you need help with day-to-day activities such as bathing, preparing meals, shopping, managing finances, etc., do you get the help you need? I don't need any help       Mental Status    General Appearance WDL WDL       Mental Status Summary    Recent Changes in Mental  Status/Cognitive Functioning no changes             Shandra Barnard RN    SIPS 1  [email or web address]  Office [phone]  Cell [phone]

## 2025-01-21 NOTE — THERAPY EVALUATION
Patient Name: Neva Kurtz  : 1960    MRN: [id number]                              Today's Date: 2025       Admit Date: 2025    Visit Dx:     ICD-10-CM ICD-9-CM   1. Non-small cell lung cancer, unspecified laterality  C34.90 162.9     Patient Active Problem List   Diagnosis    Urinary tract infection    UTI (urinary tract infection)    Acute exacerbation of CHF (congestive heart failure)    Chronic HFrEF (heart failure with reduced ejection fraction)    Bell's palsy    Hyperlipidemia    Hypertension    Hydronephrosis    Vaginal prolapse    Tobacco use    Brachial artery thrombosis    PSVT (paroxysmal supraventricular tachycardia)    Lung nodule    Non-small cell lung cancer    Adenocarcinoma of lower lobe of right lung     Past Medical History:   Diagnosis Date    CHF (congestive heart failure)     DVT (deep venous thrombosis)     Left upper extremity    Elevated cholesterol     Hypertension     Prolapsed uterus     SVT (supraventricular tachycardia)      Past Surgical History:   Procedure Laterality Date    ARM THROMBECTOMY/EMBOLECTOMY Left 2023    Procedure: UPPER EXTREMITY THROMBECTOMY/EMBOLECTOMY;  Surgeon: Canelo Blackwell II, MD;  Location: [facility] MAIN OR;  Service: Vascular;  Laterality: Left;    BREAST BIOPSY      BRONCHOSCOPY WITH ION ROBOTIC ASSIST N/A 2025    Procedure: BRONCHOSCOPY WITH ION ROBOT WITH FINE NEEDLE ASPIRATION X1 AREA AND CRYO BIOPSY X1 AREA AND BRONCHIALVEOLAR LAVAGE OF RIGHT LOWER LOBE;  Surgeon: Zurdo Stevenson MD;  Location: [facility] ENDOSCOPY;  Service: Robotics - Pulmonary;  Laterality: N/A;  POST: LUNG NODULE    CYSTOSCOPY W/ URETERAL STENT REMOVAL      CYSTOSCOPY, URETEROSCOPY, RETROGRADE PYELOGRAM, STENT INSERTION Bilateral 2023    Procedure: CYSTOSCOPY URETEROSCOPY RETROGRADE PYELOGRAM STENT INSERTION;  Surgeon: Alan Rodriguez MD;  Location: [facility] MAIN OR;  Service: Urology;  Laterality: Bilateral;    HYSTERECTOMY  2024    LOBECTOMY  Right 1/20/2025    Procedure: RIGHT THORACOSCOPY WITH RIGHT LOWER LOBE SEGMENTECTOMY WITH DAVINCI ROBOT, FLEXIBLE BROCHOSCOPY AND LYMPH NODE DISSECTION;  Surgeon: Zurdo Stevenson MD;  Location: [facility] MAIN OR;  Service: Robotics - DaVinci;  Laterality: Right;      General Information       Row Name 01/21/25 1529          OT Time and Intention    Document Type evaluation  -SR     Mode of Treatment occupational therapy  -SR       Row Name 01/21/25 1529          Occupational Profile    Reason for Services/Referral (Occupational Profile) Pt is a 65 y/o female with NSCLC, now s/p R thoracoscopy w/ RLL segmentectomy w/ DaVinci, brochoscopy, and lymph node dissection by Dr. Stevenson.  CMHx including: Antiphospholipid antibody syndrome. Pt lives alone and is independent at baseline. No DME use but she has access to a walker, cane, WC, and shower chair if needed. Pt has a supportive dtr that lives next door.  -SR       Row Name 01/21/25 1529          Living Environment    People in Home alone  -SR       Row Name 01/21/25 1529          Cognition    Orientation Status (Cognition) oriented x 4  -SR               User Key  (r) = Recorded By, (t) = Taken By, (c) = Cosigned By      Initials Name Provider Type    SR Harini Zhu OT Occupational Therapist                     Mobility/ADL's       Row Name 01/21/25 1541          Sit-Stand Transfer    Sit-Stand [place] (Transfers) independent  -SR       Row Name 01/21/25 1541          Activities of Daily Living    BADL Assessment/Intervention lower body dressing  -SR       Row Name 01/21/25 1541          Lower Body Dressing Assessment/Training    Comment, (Lower Body Dressing) Pt simulates donning socks and is able to reach without difficulty using figure 4 techniqiue.  -SR               User Key  (r) = Recorded By, (t) = Taken By, (c) = Cosigned By      Initials Name Provider Type    SR Harini Zhu OT Occupational Therapist                   Obj/Interventions        Row Name 01/21/25 1541          Range of Motion Comprehensive    General Range of Motion no range of motion deficits identified  -SR       Row Name 01/21/25 1541          Strength Comprehensive (MMT)    General Manual Muscle Testing (MMT) Assessment no strength deficits identified  -SR       Row Name 01/21/25 1541          Balance    Dynamic Sitting Balance independent  -SR     Static Standing Balance independent  -SR     Dynamic Standing Balance independent  -SR     Balance Interventions sitting;standing;sit to stand;supported;dynamic;minimal challenge;static  -SR               User Key  (r) = Recorded By, (t) = Taken By, (c) = Cosigned By      Initials Name Provider Type    SR Harini Zhu, OT Occupational Therapist                   Goals/Plan    No documentation.                  Clinical Impression       Row Name 01/21/25 1543          Pain Assessment    Pretreatment Pain Rating 0/10 - no pain  -SR     Posttreatment Pain Rating 0/10 - no pain  -SR       Row Name 01/21/25 1543          Plan of Care Review    Plan of Care Reviewed With patient  -SR     Outcome Evaluation Pt is a 65 y/o female with NSCLC, now s/p R thoracoscopy w/ RLL segmentectomy w/ DaVinci, brochoscopy, and lymph node dissection by Dr. Stevenson.  CMHx including: Antiphospholipid antibody syndrome. Pt lives alone and is independent at baseline. No DME use but she has access to a walker, cane, WC, and shower chair if needed. Pt has a supportive dtr that lives next door. This date pt demos independence with mobility and ADLs.  Initially spoke with patient and gained subjective while she was walking the halls with dtr.  Returned later to go over ADLs and assess independence, and pt was having no difficulty.  Recommend home with assist from dtrs.  No further OT needs.  -SR       Row Name 01/21/25 1543          Therapy Assessment/Plan (OT)    Therapy Frequency (OT) evaluation only  -SR       Row Name 01/21/25 1543          Positioning and  Restraints    Pre-Treatment Position in bed  -SR     Post Treatment Position chair  -SR     In Chair call light within reach;encouraged to call for assist  -SR               User Key  (r) = Recorded By, (t) = Taken By, (c) = Cosigned By      Initials Name Provider Type    SR Harini Zhu, OT Occupational Therapist                   Outcome Measures       Row Name 01/21/25 1305          How much help from another person do you currently need...    Turning from your back to your side while in flat bed without using bedrails? 4  -AH     Moving from lying on back to sitting on the side of a flat bed without bedrails? 4  -AH     Moving to and from a bed to a chair (including a wheelchair)? 4  -AH     Standing up from a chair using your arms (e.g., wheelchair, bedside chair)? 4  -AH     Climbing 3-5 steps with a railing? 3  -AH     To walk in hospital room? 4  -AH     AM-PAC 6 Clicks Score (PT) 23  -     Highest Level of Mobility Goal 7 --> Walk 25 feet or more  -       Row Name 01/21/25 1305          Functional Assessment    Outcome Measure Options AM-PAC 6 Clicks Basic Mobility (PT)  -               User Key  (r) = Recorded By, (t) = Taken By, (c) = Cosigned By      Initials Name Provider Type     Elida Day, PT Physical Therapist                    Occupational Therapy Education       Title: PT OT SLP Therapies (In Progress)       Topic: Occupational Therapy (In Progress)       Point: ADL training (In Progress)       Description:   Instruct learner(s) on proper safety adaptation and remediation techniques during self care or transfers.   Instruct in proper use of assistive devices.                  Learning Progress Summary            Patient Acceptance, E,TB, NR by SR at 1/21/2025 1941                      Point: Home exercise program (Not Started)       Description:   Instruct learner(s) on appropriate technique for monitoring, assisting and/or progressing therapeutic exercises/activities.                   Learner Progress:  Not documented in this visit.              Point: Precautions (Not Started)       Description:   Instruct learner(s) on prescribed precautions during self-care and functional transfers.                  Learner Progress:  Not documented in this visit.              Point: Body mechanics (In Progress)       Description:   Instruct learner(s) on proper positioning and spine alignment during self-care, functional mobility activities and/or exercises.                  Learning Progress Summary            Patient Acceptance, E,TB, NR by SR at 1/21/2025 1546                                      User Key       Initials Effective Dates Name Provider Type Discipline     06/16/21 -  Harini Zhu, OT Occupational Therapist OT                  OT Recommendation and Plan  Therapy Frequency (OT): evaluation only  Plan of Care Review  Plan of Care Reviewed With: patient  Outcome Evaluation: Pt is a 63 y/o female with NSCLC, now s/p R thoracoscopy w/ RLL segmentectomy w/ DaVinci, brochoscopy, and lymph node dissection by Dr. Stevenson.  CMHx including: Antiphospholipid antibody syndrome. Pt lives alone and is independent at baseline. No DME use but she has access to a walker, cane, WC, and shower chair if needed. Pt has a supportive dtr that lives next door. This date pt demos independence with mobility and ADLs.  Initially spoke with patient and gained subjective while she was walking the halls with dtr.  Returned later to go over ADLs and assess independence, and pt was having no difficulty.  Recommend home with assist from dtrs.  No further OT needs.     Time Calculation:         Time Calculation- OT       Row Name 01/21/25 1546             Time Calculation- OT    OT Start Time 1035  -SR      OT Stop Time 1044  -SR      OT Time Calculation (min) 9 min  -SR      Total Timed Code Minutes- OT 0 minute(s)  -SR      OT Received On 01/21/25  -SR                User Key  (r) = Recorded By, (t) =  Taken By, (c) = Cosigned By      Initials Name Provider Type    SR Harini Zhu, OT Occupational Therapist                  Therapy Charges for Today       Code Description Service Date Service Provider Modifiers Qty    [id number]  OT EVAL LOW COMPLEXITY 3 1/21/2025 Harini Zhu OT GO 1                 Harini Zhu OT  1/21/2025

## 2025-01-21 NOTE — PROGRESS NOTES
"  POST-OPERATIVE NOTE     Chief Complaint: NSCLC, right  S/P: RIGHT THORACOSCOPY WITH RIGHT LOWER LOBE SEGMENTECTOMY WITH DAVINCI ROBOT, FLEXIBLE BROCHOSCOPY AND LYMPH NODE DISSECTION   POD # 1    Subjective  Up to chair.  Feeling well overall.  Pain well-controlled.      Objective:  General Appearance:  Comfortable and well-appearing.    Vital signs: (most recent): Blood pressure 131/75, pulse 76, temperature 97.5 °F (36.4 °C), temperature source Oral, resp. rate 16, height 172.7 cm (68\"), weight 94.6 kg (208 lb 9.6 oz), SpO2 96%.    HEENT: Normal HEENT exam.    Lungs:  Normal effort.    Chest: Chest wall tenderness present.    Extremities: Normal range of motion.    Pupils:  Pupils are equal, round, and reactive to light.    Skin:  Warm and dry.                Chest tube:   Site: Right, Clean, Dry, Intact, and Securement device intact  Suction: -20 cm  Air Leak: negative  24 Hour Total: 150ml    Results Review:     I reviewed the patient's new clinical results.  I reviewed the patient's new imaging results and agree with the interpretation.  Discussed with patient, RN and Dr. Stevenson    Assessment & Plan     POD 1 status post right lower lobe segmentectomy.  Recovering as expected.  Chest tube was to -20cm overnight, chest x-ray with expected postop appearance.  No airleak to chest tube.  Restart remaining 2 days of Bactrim for UTI.  Continue pulmonary toilet and encourage mobilization.  Anticipate DC tube tomorrow and home pending stable course.    Gillian Jurado DNP, APRN  [facility]  01/21/25  12:38 EST    Patient was seen and assessed while wearing personal protective equipment including facemask, protective eyewear and gloves.  Hand hygiene performed prior to entering the room and upon exiting with doffing of gloves.       "

## 2025-01-21 NOTE — PLAN OF CARE
Goal Outcome Evaluation:  Plan of Care Reviewed With: patient           Outcome Evaluation: Pt is a 65 y/o female with NSCLC, now s/p R thoracoscopy w/ RLL segmentectomy w/ DaVinci, brochoscopy, and lymph node dissection by Dr. Stevenson.  CMHx including: Antiphospholipid antibody syndrome. Pt lives alone and is independent at baseline. No DME use but she has access to a walker, cane, WC, and bathroom equip if needed. Pt has a supportive dtr that lives next door. Pt presents for PT evaluation with no mobility deficits. She is independent and able to ambulate 300' without AD. Vitals stable after gait trial. Dtr is able to walk with her and confidently manages chest tube canister. Pt is safe to walk with dtr's assist. Pt reports R shoulder deficit, attributed to prolonged flexion positioning in OR. Instructed in AROM and AAROCHOA GOODRICHE mvmts.  PT reviewed safe post op lifting (wt limit and technique to decrease strain). No additional skilled therapy services needed. PT will sign off and complete order.    Anticipated Discharge Disposition (PT): home with assist                         no radiation

## 2025-01-22 ENCOUNTER — APPOINTMENT (OUTPATIENT)
Dept: GENERAL RADIOLOGY | Facility: HOSPITAL | Age: 65
End: 2025-01-22
Payer: COMMERCIAL

## 2025-01-22 ENCOUNTER — READMISSION MANAGEMENT (OUTPATIENT)
Dept: CALL CENTER | Facility: HOSPITAL | Age: 65
End: 2025-01-22
Payer: COMMERCIAL

## 2025-01-22 VITALS
SYSTOLIC BLOOD PRESSURE: 105 MMHG | HEIGHT: 68 IN | OXYGEN SATURATION: 97 % | DIASTOLIC BLOOD PRESSURE: 69 MMHG | RESPIRATION RATE: 16 BRPM | WEIGHT: 208.6 LBS | HEART RATE: 79 BPM | TEMPERATURE: 98 F | BODY MASS INDEX: 31.61 KG/M2

## 2025-01-22 LAB
LAB AP CASE REPORT: NORMAL
LAB AP DIAGNOSIS COMMENT: NORMAL
LAB AP SYNOPTIC CHECKLIST: NORMAL
PATH REPORT.FINAL DX SPEC: NORMAL
PATH REPORT.GROSS SPEC: NORMAL

## 2025-01-22 PROCEDURE — 94664 DEMO&/EVAL PT USE INHALER: CPT

## 2025-01-22 PROCEDURE — 94799 UNLISTED PULMONARY SVC/PX: CPT

## 2025-01-22 PROCEDURE — 99024 POSTOP FOLLOW-UP VISIT: CPT | Performed by: NURSE PRACTITIONER

## 2025-01-22 PROCEDURE — 71045 X-RAY EXAM CHEST 1 VIEW: CPT

## 2025-01-22 PROCEDURE — 94761 N-INVAS EAR/PLS OXIMETRY MLT: CPT

## 2025-01-22 RX ORDER — GABAPENTIN 300 MG/1
300 CAPSULE ORAL 3 TIMES DAILY
Qty: 90 CAPSULE | Refills: 0 | Status: SHIPPED | OUTPATIENT
Start: 2025-01-22 | End: 2025-02-21

## 2025-01-22 RX ORDER — OXYCODONE HYDROCHLORIDE 5 MG/1
5 TABLET ORAL EVERY 4 HOURS PRN
Qty: 18 TABLET | Refills: 0 | Status: SHIPPED | OUTPATIENT
Start: 2025-01-22 | End: 2025-01-25

## 2025-01-22 RX ORDER — ACETAMINOPHEN 500 MG
1000 TABLET ORAL 3 TIMES DAILY
Qty: 84 TABLET | Refills: 0 | Status: SHIPPED | OUTPATIENT
Start: 2025-01-22 | End: 2025-02-05

## 2025-01-22 RX ADMIN — POLYETHYLENE GLYCOL 3350 17 G: 17 POWDER, FOR SOLUTION ORAL at 09:03

## 2025-01-22 RX ADMIN — Medication 12.5 MG: at 09:04

## 2025-01-22 RX ADMIN — OXYCODONE HYDROCHLORIDE 5 MG: 5 TABLET ORAL at 07:24

## 2025-01-22 RX ADMIN — ACETAMINOPHEN 1000 MG: 500 TABLET, FILM COATED ORAL at 09:04

## 2025-01-22 RX ADMIN — ACETYLCYSTEINE 3 ML: 200 SOLUTION ORAL; RESPIRATORY (INHALATION) at 06:49

## 2025-01-22 RX ADMIN — DOCUSATE SODIUM 100 MG: 100 CAPSULE, LIQUID FILLED ORAL at 09:03

## 2025-01-22 RX ADMIN — GABAPENTIN 300 MG: 300 CAPSULE ORAL at 09:04

## 2025-01-22 RX ADMIN — SULFAMETHOXAZOLE AND TRIMETHOPRIM 1 TABLET: 800; 160 TABLET ORAL at 09:03

## 2025-01-22 RX ADMIN — ALBUTEROL SULFATE 2.5 MG: 2.5 SOLUTION RESPIRATORY (INHALATION) at 06:49

## 2025-01-22 NOTE — OUTREACH NOTE
Prep Survey      Flowsheet Row Responses   [facility] patient discharged from? [facility]   Is LACE score < 7 ? No   Eligibility [facility]   Date of Admission 01/20/25   Date of Discharge 01/22/25   Discharge Disposition Home or Self Care   Discharge diagnosis Non-small cell lung cancer, RIGHT THORACOSCOPY WITH RIGHT LOWER LOBE SEGMENTECTOMY   Does the patient have one of the following disease processes/diagnoses(primary or secondary)? Cardiothoracic surgery   Does the patient have Home health ordered? No   Is there a DME ordered? No   Prep survey completed? Yes            Santa TEJADA - Registered Nurse

## 2025-01-22 NOTE — DISCHARGE SUMMARY
Patient Care Team:  Hans Diaz MD as PCP - General (Internal Medicine)  Juan Pablo Lewis MD as Consulting Physician (Hematology and Oncology)  Zurdo Stevenson MD as Surgeon (Thoracic Surgery)  Angie Arroyo RN as Nurse Navigator    Date of Admission: 1/20/2025   Date of Discharge:  1/22/2025    Discharge Diagnosis: Non-small cell lung cancer, right lung    Presenting Problem  Non-small cell lung cancer, unspecified laterality [C34.90]  Adenocarcinoma of lower lobe of right lung [C34.31]     History of Present Illness  Neva Kurtz is a 64 y.o. female who presented with a lung nodule in the right lower lobe.  After preoperative evaluation, Dr. Stevenson recommended to proceed with resection.    Hospital Course  Patient was admitted status post right lower lobe segmentectomy.  For further details, please refer to the operative note.  Her chest tube was placed to waterseal on POD 1.  Removed without difficulty on POD 2.  No airleak prior to removal.  She will finish a 5-day course of Bactrim today for UTI.  She will resume Eliquis tomorrow.  Postoperative care instructions have been discussed with her medications have been sent to the pharmacy.  She will follow-up with Dr. Mack with a chest x-ray in 2 weeks.    Procedures Performed  Procedure(s):  RIGHT THORACOSCOPY WITH RIGHT LOWER LOBE SEGMENTECTOMY WITH DAVINCI ROBOT, FLEXIBLE BROCHOSCOPY AND LYMPH NODE DISSECTION       Consults:   Consults       No orders found from 12/22/2024 to 1/21/2025.            Pertinent Test Results:     Imaging Results (Last 24 Hours)       Procedure Component Value Units Date/Time    XR Chest 1 View [[id number]] Collected: 01/22/25 0733     Updated: 01/22/25 0739    Narrative:      XR CHEST 1 VW    Date of Exam: 1/22/2025 3:25 AM EST    Indication: Post Op Lung Surgery    Comparison: 1/21/2025 and prior    Findings:  Study is limited by overlying support and monitoring apparatus. Lung volumes are low with some vascular  crowding and dependent opacities noted. The heart size is stable. Mild increase hazy and interstitial opacities noted asymmetrically on the right   compared to the left with more focal opacity in the right infrahilar region. There is a small right apical pneumothorax. This is increased from the comparison and likely accentuated by low lung lines. Right-sided chest tube overlies the right base   medially. Osseous structures are stable      Impression:      Impression:    1.  Small right apical pneumothorax slightly more prominent in the comparison. Right chest tube projects over the right lung base medially.    2. No significant change in perihilar and dependent opacities.      Electronically Signed: Anthony Lucia MD    1/22/2025 7:37 AM EST    Workstation ID: [id number]            Lab Results (last 24 hours)       ** No results found for the last 24 hours. **              Condition on Discharge:  stable    Vital Signs  Temp:  [97.5 °F (36.4 °C)-98.4 °F (36.9 °C)] 98 °F (36.7 °C)  Heart Rate:  [73-99] 79  Resp:  [16-21] 16  BP: (105-141)/(69-75) 105/69    Physical Exam:    General Appearance:  Alert, cooperative, in no acute distress   Head:  Normocephalic, without obvious abnormality, atraumatic   Eyes:  Lids and lashes normal, conjunctivae and sclerae normal, no icterus, no pallor, corneas clear, PERRLA   Ears:  Ears appear intact with no abnormalities noted   Throat:  No oral lesions, no thrush, oral mucosa moist   Neck:  No adenopathy, supple, trachea midline, no thyromegaly, no carotid bruit, no JVD   Back:  No kyphosis present, no scoliosis present, no skin lesions, erythema or scars, no tenderness to percussion, or palpation, range of motion normal   Lungs:  Clear to auscultation,respirations regular, even and unlabored    Heart:  Regular rhythm and normal rate, normal S1 and S2, no murmur, no gallop, no rub, no click   Breast Exam:  Deferred   Abdomen:  Normal bowel sounds, no masses, no organomegaly, soft  non-tender, non-distended, no guarding, no rebound tenderness   Genitalia:  Deferred   Extremities:  Moves all extremities well, no edema, no cyanosis, no redness   Pulses:  Pulses palpable and equal bilaterally   Skin:  No bleeding, bruising or rash.  Postop incision approximated and intact   Lymph nodes:  No palpable adenopathy   Neurologic:  Cranial nerves 2 - 12 grossly intact, sensation intact, DTR present and equal bilaterally       Discharge Disposition  Home today    Discharge Medications     Discharge Medications        New Medications        Instructions Start Date   acetaminophen 500 MG tablet  Commonly known as: TYLENOL   1,000 mg, Oral, 3 times daily      gabapentin 300 MG capsule  Commonly known as: NEURONTIN   300 mg, Oral, 3 Times Daily      oxyCODONE 5 MG immediate release tablet  Commonly known as: ROXICODONE   5 mg, Oral, Every 4 Hours PRN             Changes to Medications        Instructions Start Date   metoprolol succinate XL 25 MG 24 hr tablet  Commonly known as: TOPROL-XL  What changed: when to take this   25 mg, Oral, Every 24 Hours Scheduled             Continue These Medications        Instructions Start Date   apixaban 5 MG tablet tablet  Commonly known as: ELIQUIS   5 mg, Oral, Every 12 Hours Scheduled   Start Date: January 23, 2025     Aspirin Low Dose 81 MG EC tablet  Generic drug: aspirin   81 mg, Oral, Daily      atorvastatin 20 MG tablet  Commonly known as: LIPITOR   20 mg, Oral, Daily      cetirizine 10 MG tablet  Commonly known as: zyrTEC   10 mg, Oral, Every Evening      chlorhexidine 0.12 % solution  Commonly known as: PERIDEX       Farxiga 10 MG tablet  Generic drug: dapagliflozin Propanediol   10 mg, Oral, Daily      furosemide 20 MG tablet  Commonly known as: LASIX   20 mg, Oral, Daily      losartan 25 MG tablet  Commonly known as: COZAAR   25 mg, Oral, Daily      potassium chloride 10 MEQ CR tablet   10 mEq, Oral, 2 Times Daily             Stop These Medications       sulfamethoxazole-trimethoprim 800-160 MG per tablet  Commonly known as: Bactrim DS              Discharge Instructions:  No heavy lifting, pushing, pulling greater than 10 pounds.  No driving up until 2 weeks after surgery and no longer taking narcotics.  Resume home diet as tolerated.  Continue incentive spirometer at least 4 times per day.  Remove dressing from post chest tube site after 48 hours, may shower and clean surgical sites with antibacterial soap or hydrogen peroxide, and apply gauze dressing or band-aid as needed for any drainage.  No dressing needed once no longer draining.          Follow-up Appointments  Future Appointments   Date Time Provider Department Center   1/27/2025  9:00 AM TIM [facility] 3 BH TIM MAMMO TIM   2/4/2025  9:45 AM Zurdo Stevenson MD [facility] NA TIM   3/14/2025  9:55 AM LAB MD  [facility] LAB NA  [facility] TIM   3/14/2025 10:00 AM Juan Pablo Lewis MD [facility] ONC NA TIM   3/31/2025  9:30 AM Yissel Ennis APRN [facility] TIM   4/16/2025  9:00 AM Hans Diaz MD [facility] PC FLK[facility] TIM     Additional Instructions for the Follow-ups that You Need to Schedule       XR Chest 2 View    Feb 04, 2025 (Approximate)      Exam reason: post-op   Release to patient: Routine Release                Test Results Pending at Discharge  Pending Labs       Order Current Status    Tissue Pathology Exam In process            For any questions regarding patient's stay, please refer to patient's chart.    Gillian Jurado DNP, APRN  [facility]  01/22/25  11:30 EST

## 2025-01-22 NOTE — PLAN OF CARE
Goal Outcome Evaluation:      Patient is alert and oriented. Patient is able to make needs known. Call light within reach. Care plan ongoing.                                        denies pain/discomfort

## 2025-01-23 ENCOUNTER — TRANSITIONAL CARE MANAGEMENT TELEPHONE ENCOUNTER (OUTPATIENT)
Dept: CALL CENTER | Facility: HOSPITAL | Age: 65
End: 2025-01-23
Payer: COMMERCIAL

## 2025-01-23 NOTE — CASE MANAGEMENT/SOCIAL WORK
Case Management Discharge Note      Final Note: HOME    Provided Post Acute Provider List?: N/A  N/A Provider List Comment: denies dc needs    Selected Continued Care - Discharged on 1/22/2025 Admission date: 1/20/2025 - Discharge disposition: Home or Self Care            Transportation Services  Private: Car    Final Discharge Disposition Code: 01 - home or self-care

## 2025-01-23 NOTE — PAYOR COMM NOTE
"Discharge notification for case#  [id number]     -----------------    THANK YOU,    DAVIE Darling, RN  Utilization Review  [facility]  Phone: [phone]  Fax: [phone]      NPI: [id number]  Tax ID: [id number]      Neva Wise (64 y.o. Female)       Date of Birth   1960    Social Security Number       Address   [address] [place] [address]    Home Phone   [phone]    MRN   [id number]       [employer]   [employer]    Marital Status   Single                            Admission Date   1/20/25    Admission Type   Elective    Admitting Provider   Zurdo Stevenson MD    Attending Provider       Department, Room/Bed   [facility]PATIENT, 4106/1       Discharge Date   1/22/2025    Discharge Disposition   Home or Self Care    Discharge Destination   Home                              Attending Provider: (none)   Allergies: Other, Tetracyclines & Related    Isolation: None   Infection: MRSA No Isolation this Admit (01/20/25)   Code Status: Prior    Ht: 172.7 cm (68\")   Wt: 94.6 kg (208 lb 9.6 oz)    Admission Cmt: None   Principal Problem: Non-small cell lung cancer [C34.90]                   Active Insurance as of 1/20/2025       Primary Coverage       Payor Plan Insurance Group Employer/Plan Group    [employer] [facility] [employer] [facility] PPO [id number]       Payor Plan Address Payor Plan Phone Number Payor Plan Fax Number Effective Dates    [address] [phone]  1/1/2023 - None Entered    [place] [address]         Subscriber Name Subscriber Birth Date Member ID       NEVA WISE 1960 [id number]                     Emergency Contacts        (Rel.) Home Phone Work Phone Mobile Phone    Nicole Everett (Daughter) -- -- [phone]    NUNO CASTELLANO (Daughter) -- -- [phone]                 Operative/Procedure Notes (all)        Zurdo Stevenson MD at 01/20/25 1622  Version 1 of 1         THORACOSCOPY WITH LOBECTOMY WITH DAVINCI " ROBOT  Progress Note    Neva Kurtz  1/20/2025    Pre-op Diagnosis:   Non-small cell lung cancer, unspecified laterality [C34.90]       Post-Op Diagnosis Codes:     * Non-small cell lung cancer, unspecified laterality [C34.90]    Procedure/CPT® Codes:        Procedure(s):  RIGHT THORACOSCOPY WITH RIGHT LOWER LOBE SEGMENTECTOMY WITH DAVINCI ROBOT, FLEXIBLE BROCHOSCOPY AND LYMPH NODE DISSECTION      Surgeon(s):  Zurdo Stevenson MD    Anesthesia: General    Staff:   Circulator: Kalina Unger RN; Tiffany Medellin RN  Scrub Person: Zeinab Diaz Allison  Assistant: Elfego Norton CSA  Assistant: Elfego Norton CSA      Estimated Blood Loss:  25 cc    Urine Voided: 200 mL    Specimens:                          Drains:   Chest Tube 1 Right (Active)   Function -20 cm H2O 01/20/25 2016   Air Leak/Fluctuation air leak not present 01/20/25 2016   Drainage Description Bright red 01/20/25 2016   Dressing Type Border Dressing 01/20/25 2016   Dressing Status Clean;Dry;Intact 01/20/25 2016   Site Assessment Not assessed 01/20/25 2016   Surrounding Skin Unable to view 01/20/25 2016   Securement tubing anchored to body distal to insertion site with tape 01/20/25 1834   Left Subcutaneous Emphysema none present 01/20/25 2016   Right Subcutaneous Emphysema none present 01/20/25 2016   Output (mL) 60 mL 01/20/25 1947       Urethral Catheter Non-latex 16 Fr. (Active)   Daily Indications Required activity restriction from trauma, surgery, (e.g. unstable spine, fracture, hemodynamics) 01/20/25 2016   Site Assessment Clean;Skin intact 01/20/25 1947   Collection Container Standard drainage bag 01/20/25 2016   Securement Method Securing device 01/20/25 2016   Catheter care complete Yes 01/20/25 1834       Assistant: Elfego Norton CSA  was responsible for performing the following activities: Retraction, Suction, Irrigation, Suturing, Closing, Placing Dressing, and Held/Positioned Camera and their skilled assistance  was necessary for the success of this case.    Zurdo Stevenson MD     Date: 1/20/2025  Time: 21:43 EST          Electronically signed by Zurdo Stevenson MD at 01/20/25 2144          Discharge Summary        Gillian Jurado DNP, APRN at 01/22/25 1130       Attestation signed by Zurdo Stevenson MD at 01/22/25 1405    I have reviewed this documentation and agree.                       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 female who presented with a lung nodule in the right lower lobe.  After preoperative evaluation, Dr. Stevenson recommended to proceed with resection.    Hospital Course  Patient was admitted status post right lower lobe segmentectomy.  For further details, please refer to the operative note.  Her chest tube was placed to waterseal on POD 1.  Removed without difficulty on POD 2.  No airleak prior to removal.  She will finish a 5-day course of Bactrim today for UTI.  She will resume Eliquis tomorrow.  Postoperative care instructions have been discussed with her medications have been sent to the pharmacy.  She will follow-up with Dr. Mack with a chest x-ray in 2 weeks.    Procedures Performed  Procedure(s):  RIGHT THORACOSCOPY WITH RIGHT LOWER LOBE SEGMENTECTOMY WITH DAVINCI ROBOT, FLEXIBLE BROCHOSCOPY AND LYMPH NODE DISSECTION       Consults:   Consults       No orders found from 12/22/2024 to 1/21/2025.            Pertinent Test Results:     Imaging Results (Last 24 Hours)       Procedure Component  Value Units Date/Time    XR Chest 1 View [[id number]] Collected: 01/22/25 0733     Updated: 01/22/25 0739    Narrative:      XR CHEST 1 VW    Date of Exam: 1/22/2025 3:25 AM EST    Indication: Post Op Lung Surgery    Comparison: 1/21/2025 and prior    Findings:  Study is limited by overlying support and monitoring apparatus. Lung volumes are low with some vascular crowding and dependent opacities noted. The heart size is stable. Mild increase hazy and interstitial opacities noted asymmetrically on the right   compared to the left with more focal opacity in the right infrahilar region. There is a small right apical pneumothorax. This is increased from the comparison and likely accentuated by low lung lines. Right-sided chest tube overlies the right base   medially. Osseous structures are stable      Impression:      Impression:    1.  Small right apical pneumothorax slightly more prominent in the comparison. Right chest tube projects over the right lung base medially.    2. No significant change in perihilar and dependent opacities.      Electronically Signed: Anthony Lucia MD    1/22/2025 7:37 AM EST    Workstation ID: [id number]            Lab Results (last 24 hours)       ** No results found for the last 24 hours. **              Condition on Discharge:  stable    Vital Signs  Temp:  [97.5 °F (36.4 °C)-98.4 °F (36.9 °C)] 98 °F (36.7 °C)  Heart Rate:  [73-99] 79  Resp:  [16-21] 16  BP: (105-141)/(69-75) 105/69    Physical Exam:    General Appearance:  Alert, cooperative, in no acute distress   Head:  Normocephalic, without obvious abnormality, atraumatic   Eyes:  Lids and lashes normal, conjunctivae and sclerae normal, no icterus, no pallor, corneas clear, PERRLA   Ears:  Ears appear intact with no abnormalities noted   Throat:  No oral lesions, no thrush, oral mucosa moist   Neck:  No adenopathy, supple, trachea midline, no thyromegaly, no carotid bruit, no JVD   Back:  No kyphosis present, no scoliosis present,  no skin lesions, erythema or scars, no tenderness to percussion, or palpation, range of motion normal   Lungs:  Clear to auscultation,respirations regular, even and unlabored    Heart:  Regular rhythm and normal rate, normal S1 and S2, no murmur, no gallop, no rub, no click   Breast Exam:  Deferred   Abdomen:  Normal bowel sounds, no masses, no organomegaly, soft non-tender, non-distended, no guarding, no rebound tenderness   Genitalia:  Deferred   Extremities:  Moves all extremities well, no edema, no cyanosis, no redness   Pulses:  Pulses palpable and equal bilaterally   Skin:  No bleeding, bruising or rash.  Postop incision approximated and intact   Lymph nodes:  No palpable adenopathy   Neurologic:  Cranial nerves 2 - 12 grossly intact, sensation intact, DTR present and equal bilaterally       Discharge Disposition  Home today    Discharge Medications     Discharge Medications        New Medications        Instructions Start Date   acetaminophen 500 MG tablet  Commonly known as: TYLENOL   1,000 mg, Oral, 3 times daily      gabapentin 300 MG capsule  Commonly known as: NEURONTIN   300 mg, Oral, 3 Times Daily      oxyCODONE 5 MG immediate release tablet  Commonly known as: ROXICODONE   5 mg, Oral, Every 4 Hours PRN             Changes to Medications        Instructions Start Date   metoprolol succinate XL 25 MG 24 hr tablet  Commonly known as: TOPROL-XL  What changed: when to take this   25 mg, Oral, Every 24 Hours Scheduled             Continue These Medications        Instructions Start Date   apixaban 5 MG tablet tablet  Commonly known as: ELIQUIS   5 mg, Oral, Every 12 Hours Scheduled   Start Date: January 23, 2025     Aspirin Low Dose 81 MG EC tablet  Generic drug: aspirin   81 mg, Oral, Daily      atorvastatin 20 MG tablet  Commonly known as: LIPITOR   20 mg, Oral, Daily      cetirizine 10 MG tablet  Commonly known as: zyrTEC   10 mg, Oral, Every Evening      chlorhexidine 0.12 % solution  Commonly known as:  PERIDEX       Farxiga 10 MG tablet  Generic drug: dapagliflozin Propanediol   10 mg, Oral, Daily      furosemide 20 MG tablet  Commonly known as: LASIX   20 mg, Oral, Daily      losartan 25 MG tablet  Commonly known as: COZAAR   25 mg, Oral, Daily      potassium chloride 10 MEQ CR tablet   10 mEq, Oral, 2 Times Daily             Stop These Medications      sulfamethoxazole-trimethoprim 800-160 MG per tablet  Commonly known as: Bactrim DS              Discharge Instructions:  No heavy lifting, pushing, pulling greater than 10 pounds.  No driving up until 2 weeks after surgery and no longer taking narcotics.  Resume home diet as tolerated.  Continue incentive spirometer at least 4 times per day.  Remove dressing from post chest tube site after 48 hours, may shower and clean surgical sites with antibacterial soap or hydrogen peroxide, and apply gauze dressing or band-aid as needed for any drainage.  No dressing needed once no longer draining.          Follow-up Appointments  Future Appointments   Date Time Provider Department Center   1/27/2025  9:00 AM TIM [facility] 3 BH TIM MAMMO TIM   2/4/2025  9:45 AM Zurdo Stevenson MD [facility] NA TIM   3/14/2025  9:55 AM LAB MD  [facility] LAB NA  [facility]   3/14/2025 10:00 AM Juan Pablo Lewis MD [facility] ONC NA TIM   3/31/2025  9:30 AM Yissel Ennis APRN [facility] TIM   4/16/2025  9:00 AM Hans Diaz MD [facility] PC FLKNB TIM     Additional Instructions for the Follow-ups that You Need to Schedule       XR Chest 2 View    Feb 04, 2025 (Approximate)      Exam reason: post-op   Release to patient: Routine Release                Test Results Pending at Discharge  Pending Labs       Order Current Status    Tissue Pathology Exam In process            For any questions regarding patient's stay, please refer to patient's chart.    Gillian Jurado DNP, APRN  [facility]  01/22/25  11:30 EST            Electronically signed by Zurod Stevenson MD at 01/22/25 2677        Discharge Order (From admission, onward)       Start     Ordered    01/22/25 1122  Discharge patient  Once        Expected Discharge Date: 01/22/25   Discharge Disposition: Home or Self Care   Physician of Record for Attribution - Please select from Treatment Team: DOMINIQUE RUSHING [[id number]]   Review needed by CMO to determine Physician of Record: No      Question Answer Comment   Physician of Record for Attribution - Please select from Treatment Team DOMINIQUE RUSHING    Review needed by CMO to determine Physician of Record No        01/22/25 1121

## 2025-01-23 NOTE — OUTREACH NOTE
Call Center TCM Note      Flowsheet Row Responses   [facility] patient discharged from? [facility]   Does the patient have one of the following disease processes/diagnoses(primary or secondary)? Cardiothoracic surgery   TCM attempt successful? Yes   Call start time 0848   Call end time 0853   Discharge diagnosis Non-small cell lung cancer, RIGHT THORACOSCOPY WITH RIGHT LOWER LOBE SEGMENTECTOMY   Person spoke with today (if not patient) and relationship Patient   Meds reviewed with patient/caregiver? Yes   Does the patient have all medications related to this admission filled (includes all antibiotics, pain medications, cardiac medications, etc.) Yes   Prescription comments START taking:  acetaminophen (TYLENOL)  gabapentin (NEURONTIN)  oxyCODONE (ROXICODONE)   Is the patient taking all medications as directed (includes completed medication regime)? Yes   Comments Patient wants to fu with specialty at this time.   Does the patient have an appointment with their PCP within 7-14 days of discharge? Other   Nursing Interventions Routed TCM call to PCP office, Patient desires to follow up with specialty only   Has home health visited the patient within 72 hours of discharge? N/A   Psychosocial issues? No   Did the patient receive a copy of their discharge instructions? Yes   Nursing interventions Reviewed instructions with patient   What is the patient's perception of their health status since discharge? Improving   Nursing interventions Nurse provided patient education   Is the patient /caregiver able to teach back basic post-op care? Use a clean wash cloth and antibacterial bar or liquid soap to clean incisions, Hold pillow to support chest when coughing, Practice cough and deep breath every 4 hours while awake   Is the patient/caregiver able to teach back signs and symptoms of incisional infection? Increased redness, swelling or pain at the incisonal site, Increased drainage or bleeding, Incisional warmth, Fever, Pus  or odor from incision   Is the patient/caregiver able to teach back steps to recovery at home? Set small, achievable goals for return to baseline health, Rest and rebuild strength, gradually increase activity   Is the patient/caregiver able to teach back the hierarchy of who to call/visit for symptoms/problems? PCP, Specialist, Home health nurse, Urgent Care, ED, 911 Yes   Additional teach back comments Discharge Instructions:  No heavy lifting, pushing, pulling greater than 10 pounds.  No driving up until 2 weeks after surgery and no longer taking narcotics.  Resume home diet as tolerated.  Continue incentive spirometer at least 4 times per day.  Remove dressing from post chest tube site after 48 hours, may shower and clean surgical sites with antibacterial soap or hydrogen peroxide, and apply gauze dressing or band-aid as needed for any drainage.  No dressing needed once no longer draining.   TCM call completed? Yes   Wrap up additional comments Patient reports doing well. No s/s of infection noted. No concerns or questions.   Call end time 0853   Would this patient benefit from a Referral to [facility] Social Work? No   Is the patient interested in additional calls from an ambulatory ? No            Carol Perez RN    1/23/2025, 08:54 EST

## 2025-01-26 NOTE — OP NOTE
OPERATIVE NOTE     Date of procedure: 1/20/2025     Patient name: Neva Kurtz  MRN: [id number]    Pre OP diagnosis:    Non-small cell lung cancer    Adenocarcinoma of lower lobe of right lung    Post OP diagnosis:  Same as above    Procedure performed:   Flexible Bronchoscopy.   Robot assisted Thoracoscopic Right Lower Lobe Superior Segmentectomy (S6).  Systematic Mediastinal Lymph node dissection.   Intercostal Nerve Block.     Synoptic portion:  Intent: curative  Surgical procedure performed:  Excision or resection of less than one lobe, NOS - Segmental resection (including lingulectomy)  Mediastinal lymph node stations resected:  2R Upper Paratracheal (right), 4R Lower Paratracheal (right), 7 Subcarinal, 8 Paraesophageal (below danyelle), and 9 Pulmonary ligament  Hilar lymph node stations resected:  10 Hilar, 11 Interlobar, and 12 Lobar    Indications:   Neva Kurtz is a 64-year-old female who has significant medical problems as mentioned in the medical chart.      She was referred for a follow-up following the discovery of an abnormality on her chest CT during a hospital admission for kidney-related sepsis last year. A repeat low-dose chest CT conducted a few weeks ago revealed changes in the previously identified abnormality. She has no history of cancer. She is capable of walking a block without experiencing shortness of breath and independently manages her daily activities, including driving and grocery shopping. She has no history of chest surgery, heart attack, or stroke. She has a scheduled iron CT scan on Friday. During her hospital stay last year, she experienced shortness of breath, prompting the initial chest CT. She also had pneumonia at the time, which required hospitalization.     She has a history of kidney infection, which led to sepsis. She had a uterine prolapse that was compressing both ureters, leading to hydronephrosis. This necessitated the placement of stents in both ureters, a  hysterectomy, and bladder repair. She developed heart failure due to fluid overload from the sepsis treatment. She was hospitalized for 3 days initially, discharged, and then readmitted for another 3 days due to the discovery of a clotting disorder. Her heart function has since returned to normal, as confirmed by an echocardiogram and stress test.     She is currently on Eliquis twice daily for a blood clotting disorder, specifically antiphospholipid antibodies. She has previously discontinued this medication for procedures and was bridged with Lovenox. She is under the care of Dr. Lewis in hematology.    On 1/7/2025, I performed robotic navigational bronchoscopy and biopsy of right lower lobe nodule.  The biopsy revealed adenocarcinoma.  She had adequate lung function to tolerate surgical resection.  The risk and benefit of sublobar resection and lobectomy were discussed in detail.  The patient agreed and signed the consent form.    Findings:  No evidence of pleural implants or effusion.   The area of concern was palpated in the superior segment of the right lower lobe after resection.  There was small air leak at the end of procedure.    Surgeon: Zurdo Stevenson MD     Assistants: Elfego Norton CSA was responsible for performing the following activities: Retraction, Suction, Irrigation, Suturing, Closing, Placing Dressing, and Held/Positioned Camera and their skilled assistance was necessary for the success of this case.    Anesthesia: General endotracheal - Double lumen tube administered by Anesthesiologist: Vignesh Warren MD  CRNA: Danny Mckinley CRNA    ASA Class: 3    Procedure Details   On 1/26/2025, the patient was brought to the operating room and placed supine on the operating table.  Following an uneventful induction of general anesthesia, the patient was intubated with a double-lumen endotracheal tube without incident.  Appropriate placement was confirmed with the pediatric bronchoscope.  A radial  arterial line and additional peripheral IVs were placed by the Anesthesia team. Gramajo catheter was inserted.  Pneumatic compression devices placed to the lower extremities.  Patient was repositioned in the right lateral decubitus position.  The table was jackknifed. All bony prominences were well padded.  The placement of endotracheal tube was confirmed again after positioning with the pediatric bronchoscope. The patient received intravenous antibiotic prophylaxis. The right chest was prepped and draped in usual sterile fashion.  Prior to beginning the operation, a time-out was conducted with all members of the surgical team present.      Following right lung isolation, I began by making an 8 mm transverse incision in the seventh intercostal space in the left mid chest.  The pleural space was entered without incident using blunt tonsil dissection.  A trocar was inserted. After confirming safe pleural entry, carbon dioxide insufflation to 8 mm Hg was utilized.  Next, I placed my additional robotic ports.  After infiltrating the pleura and skin with local anesthetic, I placed an 8 mm port 4 cm anterior to the spinous process, approximately in the sixth and seventh intercostal space.  A 12 mm port was placed midway between the arm 2 and arm 4 ports.  The 12 mm port was placed anteriorly in the confluence of the diaphragm at the sixth intercostal space.  A 12 mm assistant Air Seal® trocar was placed at the confluence of the diaphragm triangulated between the arm 1 and arm 2 ports.  The robot was docked in a standard fashion.  All instruments were inserted under direct vision. The chest was examined.  There was no pleural effusion or obvious pleural disease.      I began by taking down the inferior pulmonary ligament. Level 9R lymph node was identified and sent for histopathology.  Level 8R lymph node was identified and sent for histopathology.  I retracted the lung anteriorly to expose the posterior hilum.  I continued  the pleural dissection posteriorly up to the azygous vein.  I encountered 10 R lymph node which I sent for histopathology.  The level 7 lymph nodes were removed and sent for histopathology.  11 R superior sump lymph node was removed and sent for histopathology.  I retract the lung inferiorly and continued the pleural dissection superiorly.  An 11 R lymph node between the right upper lobe bronchus and pulmonary artery branches to the upper lobe was removed.  I dissected all the fat pad in the triangular area between SVC, azygos vein and trachea.  This fat pad contained level 2R and 4R multiple lymph nodes and were sent for histopathology.  I then retracted lung posteriorly and continued the anterior hilar dissection by taking down the pleura.  I identified the superior pulmonary vein with branches to right upper lobe and middle lobe.  Then I turned my attention to expose the pulmonary artery between the fissure.  The major fissure was partially complete and minor fissure was incomplete.  I found a safe window over the pulmonary artery and divided the major fissure using blue load stapler. This exposed the pulmonary artery proximally and distally.  The superior segmental artery (S6) was circumferentially dissected and transected using vascular load stapler.  I then transected the right lower lobe superior segmental vein (V6) using white load stapler.  The superior segmental bronchus (B6) was circumferentially dissected and clamped. The lung was inflated to ensure the remainder of the lower lobe ventilation was not compromised. The bronchus was transected using blue load stapler. ICG was given intravenously to demarcate the line of viability. The S6 parenchyma was stapled using multiple blue load stapler.     Intercostal nerve block from 4th-10th ribs was performed using 1.3 % liposomal bupivacaine and 0.25% Marcaine. Using an Endo Catch bag that was introduced through the anteriormost port, I retrieved the specimen.   The specimen was inspected the back table.  The nodule was palpated in the specimen and was grossly away from the resection margin.  The robot was undocked and I irrigated the chest cavity with 0.9% normal saline and suction till it was clear. Hemostasis was achieved. A 24 Fr chest tube was introduced through the anterior and inferior most port and directed posterior to the hilum and towards the apex of the pleural space. The remaining lung was inflated under direct visualization. The chest tube was secured to the skin using # 2 Ethibond suture. The port sites were closed in layers.  The fascia of the 12 mm port sites was closed using 0-Vicryl suture.  The subcutaneous tissues were closed using 2-0 Vicryl suture.  Skin incisions were closed using 4-0 Monocryl subcuticular sutures and skin glue were applied.      The sponge, needle, and instrument counts were correct at the end of the operation.  The patient was awakened from anesthesia, was extubated without incident, and was transported to the Post Anesthesia Care Unit in stable condition.    Estimated Blood Loss:  minimal           Drains: 24 Fr chest tube on - 20 suction                 Specimens:   ID Type Source Tests Collected by Time   A : Level 11R Lymph Node #1 Tissue Lymph Node TISSUE PATHOLOGY EXAM Zurdo Stevenson MD 1/20/2025 1639   B : LEVEL 12R LYMPH NODE #1 Tissue Lymph Node TISSUE PATHOLOGY EXAM Zurdo Stevenson MD 1/20/2025 1655   C : LEVEL 8R LYMPH NODE Tissue Lymph Node TISSUE PATHOLOGY EXAM Zurdo Stevenson MD 1/20/2025 1657   D : LEVEL 9R LYMPH NODE Tissue Lymph Node TISSUE PATHOLOGY EXAM Zurdo Stevenson MD 1/20/2025 1657   E : LEVEL 10R LYMPH NODE #1 Tissue Lymph Node TISSUE PATHOLOGY EXAM Zurdo Stevenson MD 1/20/2025 1658   F : LEVEL 11R LYMPH NODE #2 Tissue Lymph Node TISSUE PATHOLOGY EXAM Zurdo Stevenson MD 1/20/2025 1659   G : LEVEL 7R LYMPH NODE Tissue Lymph Node TISSUE PATHOLOGY EXAM Zurdo Stveenson MD 1/20/2025 1707   H : LEVEL 11R LYMPH NODE #3 Tissue Lymph  Node TISSUE PATHOLOGY EXAM Zurdo Stevenson MD 1/20/2025 1708   I : LEVEL 2R 4R LYMPH NODE PACKET Tissue Lymph Node TISSUE PATHOLOGY EXAM Zurdo Stevenson MD 1/20/2025 1714   J : LEVEL 12R LYMPH NODE #2 Tissue Lymph Node TISSUE PATHOLOGY EXAM Zurdo Stevenson MD 1/20/2025 1730   K : LEVEL 12R LYMPH NODE #3 Tissue Lymph Node TISSUE PATHOLOGY EXAM Zurdo Stevenson MD 1/20/2025 1731   L : LEVEL 11R LYMPH NODE #4 Tissue Lymph Node TISSUE PATHOLOGY EXAM Zurdo Stevenson MD 1/20/2025 1734   M : RIGHT LOWER LOBE SUPERIOR SEGMENT Tissue Lung, Right Lower Lobe TISSUE PATHOLOGY EXAM Zurdo Stveenson MD 1/20/2025 1746              Implants:   Implant Name Type Inv. Item Serial No.  Lot No. LRB No. Used Action   HEMOST ABS SURGICEL ORIG 2X14IN STRL - [id number] Implant HEMOST ABS SURGICEL ORIG 2X14IN STRL  [employer]  [employer] J [employer] J [id number] Right 2 Implanted   RELOAD STPLR SUREFORM 45 DAVINCI/X/XI 6ROW 2.5 WHT 1P/U - [id number] Implant RELOAD STPLR SUREFORM 45 DAVINCI/X/XI 6ROW 2.5 WHT 1P/U  INTUITIVE SURGICAL [id number] Right 1 Implanted   RELOAD STPLR SUREFORM 45 DAVINCI/X/XI 6ROW 3.5 ADONIS 1P/U - [id number] Implant RELOAD STPLR SUREFORM 45 DAVINCI/X/XI 6ROW 3.5 ADONIS 1P/U  INTUITIVE SURGICAL [id number] Right 5 Implanted   RELOAD STPLR SUREFORM 45 DAVINCI/X/XI 6ROW 2.5 WHT 1P/U - [id number] Implant RELOAD STPLR SUREFORM 45 DAVINCI/X/XI 6ROW 2.5 WHT 1P/U  INTUITIVE SURGICAL [id number] Right 1 Implanted   RELOAD STPLR SUREFORM 45 DAVINCI/X/XI 6ROW 3.5 ADONIS 1P/U - [id number] Implant RELOAD STPLR SUREFORM 45 DAVINCI/X/XI 6ROW 3.5 ADONIS 1P/U  INTUITIVE SURGICAL [id number] Right 2 Implanted   SEAL HEMO SURG PATITO/AH ABS/PWDR 3GM - [id number] Implant SEAL HEMO SURG PATITO/AH ABS/PWDR 3GM  [employer] HEMOSTAT[employer] [id number] Right 1 Implanted              Complications: None           Disposition: PACU - hemodynamically stable.           Condition: Stable     Zurdo Stevenson MD   Thoracic Surgeon  [facility]

## 2025-01-27 RX ORDER — ATORVASTATIN CALCIUM 20 MG/1
20 TABLET, FILM COATED ORAL DAILY
Qty: 90 TABLET | Refills: 1 | Status: SHIPPED | OUTPATIENT
Start: 2025-01-27

## 2025-01-28 LAB
FUNGUS WND CULT: NORMAL
MYCOBACTERIUM SPEC CULT: NORMAL
NIGHT BLUE STAIN TISS: NORMAL

## 2025-01-31 ENCOUNTER — READMISSION MANAGEMENT (OUTPATIENT)
Dept: CALL CENTER | Facility: HOSPITAL | Age: 65
End: 2025-01-31
Payer: COMMERCIAL

## 2025-01-31 NOTE — OUTREACH NOTE
CT Surgery Week 2 Survey      Flowsheet Row Responses   [facility] patient discharged from? [facility]   Does the patient have one of the following disease processes/diagnoses(primary or secondary)? Cardiothoracic surgery   Week 2 attempt successful? Yes   Call start time 1718   Call end time 1723   Discharge diagnosis Non-small cell lung cancer, RIGHT THORACOSCOPY WITH RIGHT LOWER LOBE SEGMENTECTOMY   Person spoke with today (if not patient) and relationship Patient   Meds reviewed with patient/caregiver? Yes   Does the patient have all medications related to this admission filled (includes all antibiotics, pain medications, cardiac medications, etc.) Yes   Is the patient taking all medications as directed (includes completed medication regime)? Yes   Does the patient have a primary care provider?  Yes   Does the patient have an appointment scheduled with their C/T surgeon? Yes  [2/4  945am]   Has the patient kept scheduled appointments due by today? N/A   Has home health visited the patient within 72 hours of discharge? N/A   Psychosocial issues? No   Did the patient receive a copy of their discharge instructions? Yes   Nursing interventions Reviewed instructions with patient   What is the patient's perception of their health status since discharge? Improving   Nursing interventions Reassured on normal signs of recovery   Is the patient/caregiver able to teach back signs and symptoms of incisional infection? Increased redness, swelling or pain at the incisonal site, Increased drainage or bleeding, Incisional warmth, Pus or odor from incision, Fever  [Patient reports that her daughter is a nurse and has been checking her incisions.]   Is the patient/caregiver able to teach back steps to recovery at home? Set small, achievable goals for return to baseline health, Rest and rebuild strength, gradually increase activity   If the patient is a current smoker, are they able to teach back resources for cessation? Not a  smoker   Is the patient/caregiver able to teach back the hierarchy of who to call/visit for symptoms/problems? PCP, Specialist, Home health nurse, Urgent Care, ED, 911 Yes   Week 2 call completed? Yes   Revoked No further contact(revokes)-requires comment   Is the patient interested in additional calls from an ambulatory ? No   Would this patient benefit from a Referral to [facility]? No   Graduated/Revoked comments No further calls needed as daughter is a nurse.   Call end time 1723            VIRGIE CANAS - Registered Nurse

## 2025-02-03 ENCOUNTER — TELEPHONE (OUTPATIENT)
Dept: SURGERY | Facility: CLINIC | Age: 65
End: 2025-02-03
Payer: COMMERCIAL

## 2025-02-04 ENCOUNTER — HOSPITAL ENCOUNTER (OUTPATIENT)
Dept: GENERAL RADIOLOGY | Facility: HOSPITAL | Age: 65
Discharge: HOME | End: 2025-02-04
Admitting: NURSE PRACTITIONER
Payer: COMMERCIAL

## 2025-02-04 ENCOUNTER — OFFICE VISIT (OUTPATIENT)
Dept: SURGERY | Facility: CLINIC | Age: 65
End: 2025-02-04
Payer: COMMERCIAL

## 2025-02-04 ENCOUNTER — PATIENT OUTREACH (OUTPATIENT)
Dept: ONCOLOGY | Facility: CLINIC | Age: 65
End: 2025-02-04
Payer: COMMERCIAL

## 2025-02-04 VITALS
WEIGHT: 216.3 LBS | SYSTOLIC BLOOD PRESSURE: 125 MMHG | BODY MASS INDEX: 32.89 KG/M2 | RESPIRATION RATE: 18 BRPM | DIASTOLIC BLOOD PRESSURE: 88 MMHG | HEART RATE: 79 BPM | OXYGEN SATURATION: 97 %

## 2025-02-04 DIAGNOSIS — C34.90 NON-SMALL CELL LUNG CANCER, UNSPECIFIED LATERALITY: ICD-10-CM

## 2025-02-04 DIAGNOSIS — C34.90 NON-SMALL CELL LUNG CANCER, UNSPECIFIED LATERALITY: Primary | ICD-10-CM

## 2025-02-04 LAB
FUNGUS WND CULT: NORMAL
MYCOBACTERIUM SPEC CULT: NORMAL
NIGHT BLUE STAIN TISS: NORMAL

## 2025-02-04 PROCEDURE — 99024 POSTOP FOLLOW-UP VISIT: CPT | Performed by: SURGERY

## 2025-02-04 PROCEDURE — 71046 X-RAY EXAM CHEST 2 VIEWS: CPT

## 2025-02-04 NOTE — PROGRESS NOTES
THORACIC SURGERY CLINIC CONSULT NOTE    REASON FOR CONSULT: Right lower lobe well-differentiated adenocarcinoma s/p robot-assisted right lower lobe superior segmentectomy    Subjective   HISTORY OF PRESENTING ILLNESS:   Neva Kurtz is a 64 y.o. female who has significant medical problems as mentioned in the medical chart.     History of Present Illness  She was referred for a follow-up following the discovery of an abnormality on her chest CT during a hospital admission for kidney-related sepsis last year. A repeat low-dose chest CT conducted a few weeks ago revealed changes in the previously identified abnormality. She has no history of cancer. She is capable of walking a block without experiencing shortness of breath and independently manages her daily activities, including driving and grocery shopping. She has no history of chest surgery, heart attack, or stroke. She has a scheduled iron CT scan on Friday. During her hospital stay last year, she experienced shortness of breath, prompting the initial chest CT. She also had pneumonia at the time, which required hospitalization.     She has a history of kidney infection, which led to sepsis. She had a uterine prolapse that was compressing both ureters, leading to hydronephrosis. This necessitated the placement of stents in both ureters, a hysterectomy, and bladder repair. She developed heart failure due to fluid overload from the sepsis treatment. She was hospitalized for 3 days initially, discharged, and then readmitted for another 3 days due to the discovery of a clotting disorder. Her heart function has since returned to normal, as confirmed by an echocardiogram and stress test.     She is currently on Eliquis twice daily for a blood clotting disorder, specifically antiphospholipid antibodies. She has previously discontinued this medication for procedures and was bridged with Lovenox. She is under the care of Dr. Lewis in hematology.     On 1/7/2025, I  performed robotic navigational bronchoscopy and biopsy of right lower lobe nodule.  The biopsy revealed adenocarcinoma.  She had adequate lung function to tolerate surgical resection.  On 1/20/2025, she underwent robot-assisted right lower lobe superior segmentectomy.  She tolerated the procedure well.  There were no intraoperative or immediate postoperative complications.  She was discharged home in stable condition.  The final pathology confirmed well-differentiated adenocarcinoma.  The resection margins were negative for invasive cancer.    She came to clinic for follow-up visit.  She reports a general sense of well-being, with minimal discomfort experienced post-surgery. Her respiratory function remains unimpaired. A chest radiograph was performed today. She has abstained from analgesics, relying solely on gabapentin for pain management. She has adopted the practice of not wearing a brassiere at home to prevent friction against the surgical incision. She expresses concern regarding the need for continued lifting restrictions. She ceased tobacco use approximately 1 year ago.    Past Medical History:   Diagnosis Date    CHF (congestive heart failure)     DVT (deep venous thrombosis)     Left upper extremity    Elevated cholesterol     Hypertension     Prolapsed uterus     SVT (supraventricular tachycardia)        Past Surgical History:   Procedure Laterality Date    ARM THROMBECTOMY/EMBOLECTOMY Left 11/21/2023    Procedure: UPPER EXTREMITY THROMBECTOMY/EMBOLECTOMY;  Surgeon: Canelo Blackwell II, MD;  Location: [facility] MAIN OR;  Service: Vascular;  Laterality: Left;    BREAST BIOPSY      BRONCHOSCOPY WITH ION ROBOTIC ASSIST N/A 1/7/2025    Procedure: BRONCHOSCOPY WITH ION ROBOT WITH FINE NEEDLE ASPIRATION X1 AREA AND CRYO BIOPSY X1 AREA AND BRONCHIALVEOLAR LAVAGE OF RIGHT LOWER LOBE;  Surgeon: Zurdo Stevenson MD;  Location: [facility] ENDOSCOPY;  Service: Robotics - Pulmonary;  Laterality: N/A;  POST: LUNG NODULE     CYSTOSCOPY W/ URETERAL STENT REMOVAL      CYSTOSCOPY, URETEROSCOPY, RETROGRADE PYELOGRAM, STENT INSERTION Bilateral 2023    Procedure: CYSTOSCOPY URETEROSCOPY RETROGRADE PYELOGRAM STENT INSERTION;  Surgeon: Alan Rodriguez MD;  Location: [facility] MAIN OR;  Service: Urology;  Laterality: Bilateral;    HYSTERECTOMY  2024    LOBECTOMY Right 2025    Procedure: RIGHT THORACOSCOPY WITH RIGHT LOWER LOBE SEGMENTECTOMY WITH DAVINCI ROBOT, FLEXIBLE BROCHOSCOPY AND LYMPH NODE DISSECTION;  Surgeon: Zurdo Stevenson MD;  Location: [facility] MAIN OR;  Service: Robotics - DaVinci;  Laterality: Right;       Family History   Problem Relation Age of Onset    Hyperlipidemia Mother     Diabetes Mother     Cancer Mother     Other Father        Social History     Socioeconomic History    Marital status: Single   Tobacco Use    Smoking status: Former     Current packs/day: 0.00     Average packs/day: 1 pack/day for 40.0 years (40.0 ttl pk-yrs)     Types: Cigarettes     Start date: 1983     Quit date: 2023     Years since quittin.2     Passive exposure: Never    Smokeless tobacco: Never   Vaping Use    Vaping status: Never Used   Substance and Sexual Activity    Alcohol use: Never    Drug use: Never    Sexual activity: Defer         Current Outpatient Medications:     acetaminophen (TYLENOL) 500 MG tablet, Take 2 tablets by mouth 3 times a day for 14 days., Disp: 84 tablet, Rfl: 0    apixaban (ELIQUIS) 5 MG tablet tablet, Take 1 tablet by mouth Every 12 (Twelve) Hours. Indications: Other - full anticoagulation, Disp: , Rfl:     aspirin 81 MG EC tablet, Take 1 tablet by mouth Daily., Disp: 30 tablet, Rfl: 0    atorvastatin (LIPITOR) 20 MG tablet, TAKE 1 TABLET BY MOUTH EVERY DAY, Disp: 90 tablet, Rfl: 1    cetirizine (zyrTEC) 10 MG tablet, Take 1 tablet by mouth Every Evening., Disp: , Rfl:     Farxiga 10 MG tablet, TAKE 1 TABLET BY MOUTH DAILY, Disp: 90 tablet, Rfl: 3    furosemide (LASIX) 20 MG tablet, Take 1  tablet by mouth Daily., Disp: 30 tablet, Rfl: 11    gabapentin (NEURONTIN) 300 MG capsule, Take 1 capsule by mouth 3 (Three) Times a Day for 30 days., Disp: 90 capsule, Rfl: 0    losartan (COZAAR) 25 MG tablet, TAKE 1 TABLET BY MOUTH EVERY DAY, Disp: 90 tablet, Rfl: 0    metoprolol succinate XL (TOPROL-XL) 25 MG 24 hr tablet, Take 1 tablet by mouth Daily. (Patient taking differently: Take 1 tablet by mouth Every Evening.), Disp: 30 tablet, Rfl: 11    potassium chloride 10 MEQ CR tablet, TAKE 1 TABLET BY MOUTH TWICE A DAY, Disp: 180 tablet, Rfl: 2     Allergies   Allergen Reactions    Other Other (See Comments)     tetramycin    Tetracyclines & Related Swelling             Objective    OBJECTIVE:     VITAL SIGNS:  /88 (BP Location: Right arm, Patient Position: Sitting, Cuff Size: Adult)   Pulse 79   Resp 18   Wt 98.1 kg (216 lb 4.8 oz)   SpO2 97%   BMI 32.89 kg/m²     PHYSICAL EXAM:  Normal appearance.   Head is normocephalic.   Nose appears normal.   No obvious deformity of the mouth and throat.  Conjunctivae normal.   Heart rate and rhythm is normal.  Pulmonary effort is normal.  Incision clean, dry, intact.  Moving all 4 extremities.  Extremities warm.  No focal deficit present.   Alert and oriented to person, place, and time.     RESULTS REVIEW:  I have reviewed the patient's all relevant laboratory and imaging findings.     Assessment & Plan    ASSESSMENT & PLAN:  Neva Kurtz is a 64 y.o. female with significant medical conditions as mentioned above presented to my clinic.    Assessment & Plan  1. Stage 1 lung cancer.  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 The tumor is well-differentiated, and all surgical margins are negative for cancer, indicating complete resection. A total of 12 lymph nodes were examined, all of which were negative, supporting a stage 1 lung cancer diagnosis. The prognosis is favorable, with a 90%  chance of survival without a cancer diagnosis over the next 5 years. The chest x-ray shows a small amount of fluid, which is not concerning and is expected to improve over time. She is advised to avoid smoking to reduce the risk of cancer recurrence. A CT scan will be scheduled in 3 months to monitor for any changes. She is advised to gradually increase her lifting capacity, starting with 10 pounds and progressing to 20 pounds over the next 2 weeks.    Follow-up  The patient will follow up in 3 months.    I discussed the patients findings and my recommendations with the patient/family/caregiver. The patient/family/caregiver was given adequate time to ask questions and all questions were answered to patient satisfaction. Thank you for this consult and allowing us to participate in the care of your patient.      Zurdo Stevenson MD  Thoracic Surgeon  [facility] [place]        Dictated utilizing Dragon dictation    I spent 30 minutes caring for Neva on this date of service. This time includes time spent by me in the following activities:preparing for the visit, reviewing tests, obtaining and/or reviewing a separately obtained history, performing a medically appropriate examination and/or evaluation, counseling and educating the patient/family/caregiver, ordering medications, tests, or procedures, referring and communicating with other health care professionals , documenting information in the medical record, independently interpreting results and communicating that information with the patient/family/caregiver, and care coordination and more than half the time was spent in direct face to face evaluation and decision making.     Patient or patient representative verbalized consent for the use of Ambient Listening during the visit with  Zurdo Stevenson MD for chart documentation. 2/4/2025  18:00 EST

## 2025-02-05 NOTE — SIGNIFICANT NOTE
I accompanied patient and daughter to Dr. Stevenson's office visit.     Dr. Stevenson reviewed current symptoms, assessed incisions and discussed pathology. All questions answered and will continue surveillance.

## 2025-02-07 ENCOUNTER — HOSPITAL ENCOUNTER (OUTPATIENT)
Dept: MAMMOGRAPHY | Facility: HOSPITAL | Age: 65
Discharge: HOME | End: 2025-02-07
Admitting: INTERNAL MEDICINE
Payer: COMMERCIAL

## 2025-02-07 DIAGNOSIS — Z12.31 ENCOUNTER FOR SCREENING MAMMOGRAM FOR MALIGNANT NEOPLASM OF BREAST: ICD-10-CM

## 2025-02-07 PROCEDURE — 77067 SCR MAMMO BI INCL CAD: CPT

## 2025-02-07 PROCEDURE — 77063 BREAST TOMOSYNTHESIS BI: CPT

## 2025-02-11 LAB
MYCOBACTERIUM SPEC CULT: NORMAL
NIGHT BLUE STAIN TISS: NORMAL

## 2025-02-18 LAB
MYCOBACTERIUM SPEC CULT: NORMAL
NIGHT BLUE STAIN TISS: NORMAL

## 2025-03-10 RX ORDER — FUROSEMIDE 20 MG/1
20 TABLET ORAL 2 TIMES DAILY
Qty: 180 TABLET | Refills: 0 | Status: SHIPPED | OUTPATIENT
Start: 2025-03-10

## 2025-03-13 NOTE — PROGRESS NOTES
HEMATOLOGY ONCOLOGY OUTPATIENT FOLLOW UP       Patient name: Neva Kurtz  : 1960  MRN: [id number]  Primary Care Physician: Hans Diaz MD  Referring Physician: Hans Diaz MD  Reason For Consult:         History of Present Illness:    Neva Kurtz is a 64 y.o. female who presented to [facility] on 2023 with complaints of shortness of breath.  Past medical history of SVT.  Patient also had a recent history of discharge from the hospital on 2023 after treatment for UTI.  Work-up revealed CHF and also severe right hydroureteronephrosis.  Cardiology was consulted for the acute exacerbation of CHF and neurology saw the patient for her bilateral hydronephrosis and did a bilateral stent placement.  It was also recommended that she follow-up with a gynecologist for hysterectomy and suspension of the vaginal vault due to right hydronephrosis secondary to ureteral kinking due to severe uterine prolapse.  It was in the process of being discharged from the hospital on 2023 when she complained of severe acute left arm pain after her IV site was removed.  Evaluation revealed acute limb ischemia due to an occluded ulnar artery.  She was initiated on heparin and underwent a left arm thrombectomy on 2023.  Since that time she has been transitioned from heparin to Eliquis for discharge home.  Cardiology is still followed for possible cardioembolic work-up and plans to discharge home on an ambulatory cardiac monitor for atrial fibrillation surveillance.     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    23 - Hb 12.7  plt 511    24 - APLS testing with positive IgM for anticardiolipin and anti beta-2  glycoprotein, negative for lupus anticoagulant     5/1/24 - hysterectomy and prolapse repair. No post op complications.  5/29/24 - APLS persistent postitive IgM and beta-2 ddimer 0.84, no LA     12/13/24: Patient seen today for follow-up visit.  She is currently on long-term anticoagulation for hypercoagulable disorder and history of left upper extremity DVT.  Previously being seen by Dr. Cervantes in this office.  Patient clinically doing well at this time.  Denied any new complaints.  Good tolerance compliance to Eliquis.    12/12/2024 CT chest without contrast  Impression:  Stable 1.9 cm thick walled cavitary nodule in the right lower lobe. Other 3 to 5 mm groundglass nodules in the upper lobes are also unchanged. No new pulmonary nodules are seen. No significant change from 10/30/2024.    12/12/2024 PET/CT  Impression:   1. There is no abnormal hypermetabolic activity within the neck, chest, abdomen or pelvis.  2. 1.9 cm thick-walled cavitary nodule in the right lower lobe is not FDG avid. Continued CT chest surveillance imaging is suggested.  3. Incidental note of vallecular cyst, 3.5 cm left ovarian cyst..    1/7/2025 patient underwent bronchoscopy and biopsy of right lower lobe lung nodule.  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  Final pathology confirmed well-differentiated adenocarcinoma and the resection margins were negative for invasive cancer ([id number]).       Subjective:  3/14/2025: Patient seen today for routine follow-up.  She reports overall doing well and has no new concerns or complaints.  She was recently diagnosed with stage I lung cancer and had complete resection in January 2025.  Surveillance scans are expected for May 2025.  She continues Eliquis with good compliance and good tolerance.  She has no bleeding concerns  today.        Past Medical History:   Diagnosis Date    CHF (congestive heart failure)     DVT (deep venous thrombosis)     Left upper extremity    Elevated cholesterol     Hypertension     Prolapsed uterus     SVT (supraventricular tachycardia)        Past Surgical History:   Procedure Laterality Date    ARM THROMBECTOMY/EMBOLECTOMY Left 11/21/2023    Procedure: UPPER EXTREMITY THROMBECTOMY/EMBOLECTOMY;  Surgeon: Canelo Blackwell II, MD;  Location: [facility] MAIN OR;  Service: Vascular;  Laterality: Left;    BREAST BIOPSY      BRONCHOSCOPY WITH ION ROBOTIC ASSIST N/A 1/7/2025    Procedure: BRONCHOSCOPY WITH ION ROBOT WITH FINE NEEDLE ASPIRATION X1 AREA AND CRYO BIOPSY X1 AREA AND BRONCHIALVEOLAR LAVAGE OF RIGHT LOWER LOBE;  Surgeon: Zurdo Stevenson MD;  Location: [facility] ENDOSCOPY;  Service: Robotics - Pulmonary;  Laterality: N/A;  POST: LUNG NODULE    CYSTOSCOPY W/ URETERAL STENT REMOVAL  2024    CYSTOSCOPY, URETEROSCOPY, RETROGRADE PYELOGRAM, STENT INSERTION Bilateral 11/19/2023    Procedure: CYSTOSCOPY URETEROSCOPY RETROGRADE PYELOGRAM STENT INSERTION;  Surgeon: Alan Rodriguez MD;  Location: [facility] MAIN OR;  Service: Urology;  Laterality: Bilateral;    HYSTERECTOMY  05/01/2024    LOBECTOMY Right 1/20/2025    Procedure: RIGHT THORACOSCOPY WITH RIGHT LOWER LOBE SEGMENTECTOMY WITH DAVINCI ROBOT, FLEXIBLE BROCHOSCOPY AND LYMPH NODE DISSECTION;  Surgeon: Zurdo Stevenson MD;  Location: [facility] MAIN OR;  Service: Robotics - DaVinci;  Laterality: Right;         Current Outpatient Medications:     apixaban (ELIQUIS) 5 MG tablet tablet, Take 1 tablet by mouth Every 12 (Twelve) Hours. Indications: Other - full anticoagulation, Disp: , Rfl:     aspirin 81 MG EC tablet, Take 1 tablet by mouth Daily., Disp: 30 tablet, Rfl: 0    atorvastatin (LIPITOR) 20 MG tablet, TAKE 1 TABLET BY MOUTH EVERY DAY, Disp: 90 tablet, Rfl: 1    cetirizine (zyrTEC) 10 MG tablet, Take 1 tablet by mouth Every Evening., Disp: , Rfl:     Farxiga 10 MG tablet,  TAKE 1 TABLET BY MOUTH DAILY, Disp: 90 tablet, Rfl: 3    furosemide (LASIX) 20 MG tablet, TAKE 1 TABLET BY MOUTH TWICE A DAY, Disp: 180 tablet, Rfl: 0    losartan (COZAAR) 25 MG tablet, TAKE 1 TABLET BY MOUTH EVERY DAY, Disp: 90 tablet, Rfl: 0    metoprolol succinate XL (TOPROL-XL) 25 MG 24 hr tablet, Take 1 tablet by mouth Daily. (Patient taking differently: Take 1 tablet by mouth Every Evening.), Disp: 30 tablet, Rfl: 11    potassium chloride 10 MEQ CR tablet, TAKE 1 TABLET BY MOUTH TWICE A DAY, Disp: 180 tablet, Rfl: 2    gabapentin (NEURONTIN) 300 MG capsule, Take 1 capsule by mouth 3 (Three) Times a Day for 30 days., Disp: 90 capsule, Rfl: 0    Allergies   Allergen Reactions    Other Other (See Comments)     tetramycin    Tetracyclines & Related Swelling       Family History   Problem Relation Age of Onset    Breast cancer Mother     Hyperlipidemia Mother     Diabetes Mother     Cancer Mother        Cancer-related family history includes Breast cancer in her mother; Cancer in her mother.    Social History     Tobacco Use    Smoking status: Former     Current packs/day: 0.00     Average packs/day: 1 pack/day for 40.0 years (40.0 ttl pk-yrs)     Types: Cigarettes     Start date: 1983     Quit date: 2023     Years since quittin.3     Passive exposure: Never    Smokeless tobacco: Never   Vaping Use    Vaping status: Never Used   Substance Use Topics    Alcohol use: Never    Drug use: Never     Social History     Social History Narrative    Works for [employer]. Retiring 2024.           ROS:   Review of Systems   Constitutional: Negative.    HENT: Negative.     Eyes: Negative.    Respiratory: Negative.     Cardiovascular: Negative.    Gastrointestinal: Negative.    Endocrine: Negative.    Genitourinary: Negative.    Musculoskeletal: Negative.    Neurological: Negative.    Hematological: Negative.    Psychiatric/Behavioral: Negative.         Objective:  Vital signs:  Vitals:    25 0955   BP:  "115/80   Pulse: 84   Temp: 97.8 °F (36.6 °C)   SpO2: 99%   Weight: 96.6 kg (213 lb)   Height: 172.7 cm (67.99\")   PainSc: 0-No pain             Body mass index is 32.39 kg/m².  ECOG  (0) Fully active, able to carry on all predisease performance without restriction    Physical Exam:   Physical Exam  Constitutional:       Appearance: Normal appearance.   HENT:      Head: Normocephalic and atraumatic.   Eyes:      Extraocular Movements: Extraocular movements intact.      Pupils: Pupils are equal, round, and reactive to light.   Cardiovascular:      Rate and Rhythm: Normal rate and regular rhythm.      Pulses: Normal pulses.      Heart sounds: No murmur heard.  Pulmonary:      Effort: Pulmonary effort is normal.      Breath sounds: Normal breath sounds.   Abdominal:      General: There is no distension.      Palpations: Abdomen is soft. There is no mass.      Tenderness: There is no abdominal tenderness.   Musculoskeletal:         General: Normal range of motion.      Cervical back: Normal range of motion and neck supple.   Skin:     General: Skin is warm.   Neurological:      General: No focal deficit present.      Mental Status: She is alert.   Psychiatric:         Mood and Affect: Mood normal.         Lab Results - Last 18 Months   Lab Units 03/14/25  0952 01/21/25  0104 01/20/25  1844   WBC 10*3/mm3 6.15 12.60* 9.51   HEMOGLOBIN g/dL 14.8 12.9 13.5   HEMATOCRIT % 45.2 39.9 42.4   PLATELETS 10*3/mm3 404 372 412   MCV fL 97.0 94.8 97.0     Lab Results - Last 18 Months   Lab Units 01/21/25  0104 01/20/25  1844 01/07/25  1103 11/19/23  2318 11/19/23  0207 11/14/23  1014 11/13/23  1307   SODIUM mmol/L 136 139 141   < > 139   < > 135*   POTASSIUM mmol/L 4.3 5.2 4.1   < > 4.0   < > 3.8   CHLORIDE mmol/L 104 106 107   < > 102   < > 102   CO2 mmol/L 21.9* 20.9* 24.7   < > 23.0   < > 19.0*   BUN mg/dL 21 19 18   < > 13   < > 12   CREATININE mg/dL 1.01* 1.11* 0.85   < > 1.10*   < > 0.94   CALCIUM mg/dL 8.8 9.2 9.6   < > 9.4   " "< > 8.8   BILIRUBIN mg/dL  --   --  0.3  --  0.3  --  0.4   ALK PHOS U/L  --   --  99  --  92  --  96   ALT (SGPT) U/L  --   --  19  --  10  --  9   AST (SGOT) U/L  --   --  24  --  13  --  15   GLUCOSE mg/dL 269* 106* 105*   < > 152*   < > 122*    < > = values in this interval not displayed.       Lab Results   Component Value Date    GLUCOSE 269 (H) 01/21/2025    BUN 21 01/21/2025    CREATININE 1.01 (H) 01/21/2025    BCR 20.8 01/21/2025    K 4.3 01/21/2025    CO2 21.9 (L) 01/21/2025    CALCIUM 8.8 01/21/2025    ALBUMIN 4.1 01/07/2025    AST 24 01/07/2025    ALT 19 01/07/2025       Lab Results - Last 18 Months   Lab Units 01/14/25  1006 01/07/25  1103 04/16/24  1034 11/22/23  1104 11/22/23  0427   INR  1.08 0.98 1.2  --  0.95   APTT s  --   --  39.1* 27.6* 26.5*       Lab Results   Component Value Date    IRON 131 12/04/2024    TIBC 384 12/04/2024    FERRITIN 72.20 12/04/2024       No results found for: \"FOLATE\"    No results found for: \"OCCULTBLD\"    No results found for: \"RETICCTPCT\"  Lab Results   Component Value Date    YRNBAFGE27 728 11/22/2023     No results found for: \"SPEP\", \"UPEP\"  No results found for: \"LDH\", \"URICACID\"  No results found for: \"ZACHARY\", \"RF\", \"SEDRATE\"  No results found for: \"FIBRINOGEN\", \"HAPTOGLOBIN\"  Lab Results   Component Value Date    PTT 39.1 (H) 04/16/2024    INR 1.08 01/14/2025     No results found for: \"\"  No results found for: \"CEA\"  No components found for: \"CA-19-9\"  No results found for: \"PSA\"  No results found for: \"SEDRATE\"    Assessment & Plan       Patient is a 64-year-old female admitted for shortness of breath and diagnosed with acute exacerbation of CHF also with a history of recent UTI and found to have bilateral hydronephrosis status post stent placement with urology.  Discharged home developed acute left upper extremity pain and was found to have acute limb ischemia due to a brachial artery occlusion.  Now status post left upper extremity thrombectomy and on " anticoagulation.  We are asked to see to evaluate for possible hypercoagulable disorders.     Brachial artery thrombosis/antiphospholipid syndrome  Status post left arm thrombectomy on 11/21/2023  Heparin transitioned to Eliquis for discharge  Cardiology is sending home on ambulatory event monitor to evaluate for atrial fibrillation  Family history of blood clots in her father  Patient has stopped smoking.   2/3 criteria positive for APLS  Continue ELIQUIS, now repeat APLS positive hence confirmatory  High risk for future VTE ddimer persistently high, continue Eliquis 5mg BID. D dimer was normal in December, repeat is pending.   -Will recheck APLS profile on follow up     Normocytic anemia  Baseline hemoglobin from 11/13/2023 11.3 g/dL  Iron 36, iron sat 11%, TIBC 320, ferritin 248.90  B12 728  Start oral ferrous sulphate every other day.  -Iron panel showed improvement. Continue oral iron. Hb is WNL today. 14.5  Drop Likely related to recent hysterectomy improved from what it was in the hospital at 9.  -Repeat CBC today with resolution of anemia.     Thrombocytosis:  Resolved. Likely reactive due to surgery and PAVEL.    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  The tumor was 2 cm in greatest dimension.  Final pathology consistent with well-differentiated invasive mucinous adenocarcinoma.  Surgical margins were negative for cancer indicating complete resection.  Total of 12 lymph nodes were examined, all of which were negative.  -She is following with Dr. Stevenson  -Surveillance scans planned for May 2025.     3 month follow up with Dr. Lewis, sooner if condition indicates       Thank you very much for providing the opportunity to participate in this patient’s care. Please do not hesitate to call if there are any other questions.    Time spent on encounter including record review, history taking, exam, discussion,  counseling and documentation at: 30 minutes

## 2025-03-14 ENCOUNTER — OFFICE VISIT (OUTPATIENT)
Dept: ONCOLOGY | Facility: CLINIC | Age: 65
End: 2025-03-14
Payer: COMMERCIAL

## 2025-03-14 ENCOUNTER — LAB (OUTPATIENT)
Dept: LAB | Facility: HOSPITAL | Age: 65
End: 2025-03-14
Payer: COMMERCIAL

## 2025-03-14 VITALS
OXYGEN SATURATION: 99 % | HEART RATE: 84 BPM | DIASTOLIC BLOOD PRESSURE: 80 MMHG | HEIGHT: 68 IN | SYSTOLIC BLOOD PRESSURE: 115 MMHG | TEMPERATURE: 97.8 F | BODY MASS INDEX: 32.28 KG/M2 | WEIGHT: 213 LBS

## 2025-03-14 DIAGNOSIS — I74.2 BRACHIAL ARTERY THROMBOSIS: ICD-10-CM

## 2025-03-14 DIAGNOSIS — I74.2 BRACHIAL ARTERY THROMBOSIS: Primary | ICD-10-CM

## 2025-03-14 DIAGNOSIS — C34.31 ADENOCARCINOMA OF LOWER LOBE OF RIGHT LUNG: ICD-10-CM

## 2025-03-14 DIAGNOSIS — Z79.01 CURRENT USE OF LONG TERM ANTICOAGULATION: ICD-10-CM

## 2025-03-14 DIAGNOSIS — C34.90 NON-SMALL CELL LUNG CANCER, UNSPECIFIED LATERALITY: Primary | ICD-10-CM

## 2025-03-14 DIAGNOSIS — D64.9 ANEMIA, UNSPECIFIED TYPE: ICD-10-CM

## 2025-03-14 LAB
BASOPHILS # BLD AUTO: 0.08 10*3/MM3 (ref 0–0.2)
BASOPHILS NFR BLD AUTO: 1.3 % (ref 0–1.5)
D DIMER PPP FEU-MCNC: 0.42 MCGFEU/ML (ref 0–0.63)
DEPRECATED RDW RBC AUTO: 49.3 FL (ref 37–54)
EOSINOPHIL # BLD AUTO: 0.15 10*3/MM3 (ref 0–0.4)
EOSINOPHIL NFR BLD AUTO: 2.4 % (ref 0.3–6.2)
ERYTHROCYTE [DISTWIDTH] IN BLOOD BY AUTOMATED COUNT: 14.3 % (ref 12.3–15.4)
FERRITIN SERPL-MCNC: 81.1 NG/ML (ref 13–150)
HCT VFR BLD AUTO: 45.2 % (ref 34–46.6)
HGB BLD-MCNC: 14.8 G/DL (ref 12–15.9)
HOLD SPECIMEN: NORMAL
IRON 24H UR-MRATE: 87 MCG/DL (ref 37–145)
IRON SATN MFR SERPL: 24 % (ref 20–50)
LYMPHOCYTES # BLD AUTO: 1.68 10*3/MM3 (ref 0.7–3.1)
LYMPHOCYTES NFR BLD AUTO: 27.3 % (ref 19.6–45.3)
MCH RBC QN AUTO: 31.8 PG (ref 26.6–33)
MCHC RBC AUTO-ENTMCNC: 32.7 G/DL (ref 31.5–35.7)
MCV RBC AUTO: 97 FL (ref 79–97)
MONOCYTES # BLD AUTO: 0.62 10*3/MM3 (ref 0.1–0.9)
MONOCYTES NFR BLD AUTO: 10.1 % (ref 5–12)
NEUTROPHILS NFR BLD AUTO: 3.62 10*3/MM3 (ref 1.7–7)
NEUTROPHILS NFR BLD AUTO: 58.9 % (ref 42.7–76)
PLATELET # BLD AUTO: 404 10*3/MM3 (ref 140–450)
PMV BLD AUTO: 8.4 FL (ref 6–12)
RBC # BLD AUTO: 4.66 10*6/MM3 (ref 3.77–5.28)
TIBC SERPL-MCNC: 361 MCG/DL (ref 298–536)
TRANSFERRIN SERPL-MCNC: 242 MG/DL (ref 200–360)
WBC NRBC COR # BLD AUTO: 6.15 10*3/MM3 (ref 3.4–10.8)

## 2025-03-14 PROCEDURE — 85025 COMPLETE CBC W/AUTO DIFF WBC: CPT

## 2025-03-14 PROCEDURE — 84466 ASSAY OF TRANSFERRIN: CPT | Performed by: STUDENT IN AN ORGANIZED HEALTH CARE EDUCATION/TRAINING PROGRAM

## 2025-03-14 PROCEDURE — 85379 FIBRIN DEGRADATION QUANT: CPT | Performed by: STUDENT IN AN ORGANIZED HEALTH CARE EDUCATION/TRAINING PROGRAM

## 2025-03-14 PROCEDURE — 83540 ASSAY OF IRON: CPT | Performed by: STUDENT IN AN ORGANIZED HEALTH CARE EDUCATION/TRAINING PROGRAM

## 2025-03-14 PROCEDURE — 36415 COLL VENOUS BLD VENIPUNCTURE: CPT

## 2025-03-14 PROCEDURE — 82728 ASSAY OF FERRITIN: CPT | Performed by: STUDENT IN AN ORGANIZED HEALTH CARE EDUCATION/TRAINING PROGRAM

## 2025-03-17 LAB
B2 GLYCOPROT1 IGA SER-ACNC: <9 GPI IGA UNITS (ref 0–25)
B2 GLYCOPROT1 IGG SER-ACNC: <9 GPI IGG UNITS (ref 0–20)
B2 GLYCOPROT1 IGM SER-ACNC: 42 GPI IGM UNITS (ref 0–32)

## 2025-03-18 NOTE — PROGRESS NOTES
Cardiology Office Follow Up Visit      Primary Care Provider:  Hans Diaz MD    Reason for f/u:     6 month F/u      Subjective       History of Present Illness       Neva Kurtz is a 64 y.o. female seen in clinic today for continued cardiac care of chronic systolic heart failure and PSVT.     Patient has a past cardiac history of chronic systolic heart failure and PSVT.  She quit smoking 11/2023.  2D echo 11/2023 showed an EF of 45% with grade 1 diastolic dysfunction and mild MR.  Nuclear stress test 1/2024 showed no ischemia.  EF = 54%.  PMH includes HTN, HLD, reformed tobacco dependence, bilateral hydronephrosis s/p uretal stenting, brachial thrombus s/p brachial thrombectomy 11/2023 (anticoagulated with Eliquis).  Ambulatory cardiac monitor 11/2023 showed an episode of PSVT but no afib. Hemoncology work-up revealed antiphospholipid Ig followed by Dr. Bravo.  Work-up for lung nodule revealed right non-small cell lung cancer s/p resection 1/2025.  Repeat pre-op 2D echo 1/2025 showed an EF 56-60% (now recovered) with grade 1 diastolic dysfunction and no significant VHD.    Last lipids 10/2024: , HDL 46, , .    Patient reports doing well since her surgery in January.  She has started walking again and though she does not yet have her past stamina, she is up to 1/2 mile walks.  She denies shortness of breath.  She denies any lower extremity edema.  She wishes to come off the diuretics.  She is planned for repeat labs including lipids with her PCP in the near future.      ASSESSMENT/PLAN:      Diagnoses and all orders for this visit:    1. Chronic HFrEF (heart failure with reduced ejection fraction) (Primary)    2. PSVT (paroxysmal supraventricular tachycardia)    3. Primary hypertension    4. Hyperlipidemia, unspecified hyperlipidemia type            MEDICAL DECISION MAKING:    Patient appears compensated with respect to volume.  Most recent 2D echo in January shows a recovered EF with  diastolic dysfunction.  Patient is interested in coming off of diuretics.  We discussed a trial of stopping her Lasix and potassium with close daily weight monitoring as well as ongoing daily sodium/fluid restriction.  Otherwise patient appears cardiovascular stable today.  BP is well-controlled.        RTC 6 months or sooner as needed.    Past Medical History:   Diagnosis Date    CHF (congestive heart failure)     DVT (deep venous thrombosis)     Left upper extremity    Elevated cholesterol     Hypertension     Prolapsed uterus     SVT (supraventricular tachycardia)        Past Surgical History:   Procedure Laterality Date    ARM THROMBECTOMY/EMBOLECTOMY Left 11/21/2023    Procedure: UPPER EXTREMITY THROMBECTOMY/EMBOLECTOMY;  Surgeon: Canelo Blackwell II, MD;  Location: [facility] MAIN OR;  Service: Vascular;  Laterality: Left;    BREAST BIOPSY      BRONCHOSCOPY WITH ION ROBOTIC ASSIST N/A 1/7/2025    Procedure: BRONCHOSCOPY WITH ION ROBOT WITH FINE NEEDLE ASPIRATION X1 AREA AND CRYO BIOPSY X1 AREA AND BRONCHIALVEOLAR LAVAGE OF RIGHT LOWER LOBE;  Surgeon: Zurdo Stevenson MD;  Location: [facility] ENDOSCOPY;  Service: Robotics - Pulmonary;  Laterality: N/A;  POST: LUNG NODULE    CYSTOSCOPY W/ URETERAL STENT REMOVAL  2024    CYSTOSCOPY, URETEROSCOPY, RETROGRADE PYELOGRAM, STENT INSERTION Bilateral 11/19/2023    Procedure: CYSTOSCOPY URETEROSCOPY RETROGRADE PYELOGRAM STENT INSERTION;  Surgeon: Alan Rodriguez MD;  Location: [facility] MAIN OR;  Service: Urology;  Laterality: Bilateral;    HYSTERECTOMY  05/01/2024    LOBECTOMY Right 1/20/2025    Procedure: RIGHT THORACOSCOPY WITH RIGHT LOWER LOBE SEGMENTECTOMY WITH TapHomeINCI ROBOT, FLEXIBLE BROCHOSCOPY AND LYMPH NODE DISSECTION;  Surgeon: Zurdo Stevenson MD;  Location: [facility] MAIN OR;  Service: Robotics - DaVinci;  Laterality: Right;         Current Outpatient Medications:     apixaban (ELIQUIS) 5 MG tablet tablet, Take 1 tablet by mouth Every 12 (Twelve) Hours. Indications: Other - full  "anticoagulation, Disp: , Rfl:     aspirin 81 MG EC tablet, Take 1 tablet by mouth Daily., Disp: 30 tablet, Rfl: 0    atorvastatin (LIPITOR) 20 MG tablet, TAKE 1 TABLET BY MOUTH EVERY DAY, Disp: 90 tablet, Rfl: 1    cetirizine (zyrTEC) 10 MG tablet, Take 1 tablet by mouth Every Evening., Disp: , Rfl:     Farxiga 10 MG tablet, TAKE 1 TABLET BY MOUTH DAILY, Disp: 90 tablet, Rfl: 3    furosemide (LASIX) 20 MG tablet, Take 1 tablet by mouth Daily., Disp: , Rfl:     gabapentin (NEURONTIN) 300 MG capsule, Take 1 capsule by mouth 3 (Three) Times a Day for 30 days., Disp: 90 capsule, Rfl: 0    losartan (COZAAR) 25 MG tablet, TAKE 1 TABLET BY MOUTH EVERY DAY, Disp: 90 tablet, Rfl: 0    metoprolol succinate XL (TOPROL-XL) 25 MG 24 hr tablet, Take 1 tablet by mouth Daily. (Patient taking differently: Take 1 tablet by mouth Every Evening.), Disp: 30 tablet, Rfl: 11    potassium chloride 10 MEQ CR tablet, Take 1 tablet by mouth Daily., Disp: , Rfl:     Social History     Socioeconomic History    Marital status: Single   Tobacco Use    Smoking status: Former     Current packs/day: 0.00     Average packs/day: 1 pack/day for 40.0 years (40.0 ttl pk-yrs)     Types: Cigarettes     Start date: 1983     Quit date: 2023     Years since quittin.4     Passive exposure: Never    Smokeless tobacco: Never   Vaping Use    Vaping status: Never Used   Substance and Sexual Activity    Alcohol use: Never    Drug use: Never    Sexual activity: Defer       Family History   Problem Relation Age of Onset    Breast cancer Mother     Hyperlipidemia Mother     Diabetes Mother     Cancer Mother        The following portions of the patient's history were reviewed and updated as appropriate: allergies, current medications, past family history, past medical history, past social history, past surgical history and problem list.    ROS  /75   Pulse 85   Ht 172.5 cm (67.9\")   Wt 98.2 kg (216 lb 9.6 oz)   SpO2 99%   BMI 33.03 kg/m² " .  Objective     Physical Exam    Physical Exam:  Neuro:  CV:  Resp:  GI:  Ext:  Pysch: AAOx3, no gross deficits  S1S2 RRR, no murmur  Non-labored, CTA  BS+, abd soft  Pedal pulses palp, no edema  Calm and cooperative       Procedures

## 2025-03-31 ENCOUNTER — OFFICE VISIT (OUTPATIENT)
Dept: CARDIOLOGY | Facility: CLINIC | Age: 65
End: 2025-03-31
Payer: COMMERCIAL

## 2025-03-31 VITALS
BODY MASS INDEX: 32.83 KG/M2 | WEIGHT: 216.6 LBS | OXYGEN SATURATION: 99 % | DIASTOLIC BLOOD PRESSURE: 75 MMHG | HEIGHT: 68 IN | HEART RATE: 85 BPM | SYSTOLIC BLOOD PRESSURE: 117 MMHG

## 2025-03-31 DIAGNOSIS — E78.5 HYPERLIPIDEMIA, UNSPECIFIED HYPERLIPIDEMIA TYPE: ICD-10-CM

## 2025-03-31 DIAGNOSIS — I50.22 CHRONIC HFREF (HEART FAILURE WITH REDUCED EJECTION FRACTION): Primary | ICD-10-CM

## 2025-03-31 DIAGNOSIS — I10 PRIMARY HYPERTENSION: ICD-10-CM

## 2025-03-31 DIAGNOSIS — I47.10 PSVT (PAROXYSMAL SUPRAVENTRICULAR TACHYCARDIA): ICD-10-CM

## 2025-03-31 PROCEDURE — 99213 OFFICE O/P EST LOW 20 MIN: CPT | Performed by: NURSE PRACTITIONER

## 2025-03-31 RX ORDER — FUROSEMIDE 20 MG/1
20 TABLET ORAL DAILY
COMMUNITY
Start: 2025-03-31

## 2025-03-31 RX ORDER — POTASSIUM CHLORIDE 750 MG/1
10 TABLET, EXTENDED RELEASE ORAL DAILY
COMMUNITY
Start: 2025-03-31

## 2025-04-07 RX ORDER — METOPROLOL SUCCINATE 25 MG/1
25 TABLET, EXTENDED RELEASE ORAL
Qty: 90 TABLET | Refills: 1 | Status: SHIPPED | OUTPATIENT
Start: 2025-04-07

## 2025-04-10 ENCOUNTER — PATIENT MESSAGE (OUTPATIENT)
Dept: FAMILY MEDICINE CLINIC | Facility: CLINIC | Age: 65
End: 2025-04-10

## 2025-04-14 RX ORDER — LOSARTAN POTASSIUM 25 MG/1
25 TABLET ORAL DAILY
Qty: 90 TABLET | Refills: 1 | Status: SHIPPED | OUTPATIENT
Start: 2025-04-14

## 2025-04-16 ENCOUNTER — OFFICE VISIT (OUTPATIENT)
Dept: FAMILY MEDICINE CLINIC | Facility: CLINIC | Age: 65
End: 2025-04-16
Payer: COMMERCIAL

## 2025-04-16 VITALS
WEIGHT: 216 LBS | BODY MASS INDEX: 32.74 KG/M2 | SYSTOLIC BLOOD PRESSURE: 142 MMHG | OXYGEN SATURATION: 98 % | RESPIRATION RATE: 18 BRPM | HEIGHT: 68 IN | DIASTOLIC BLOOD PRESSURE: 92 MMHG | HEART RATE: 76 BPM

## 2025-04-16 DIAGNOSIS — R76.0 ANTIPHOSPHOLIPID ANTIBODY POSITIVE: ICD-10-CM

## 2025-04-16 DIAGNOSIS — C34.91 NON-SMALL CELL CANCER OF RIGHT LUNG: Primary | ICD-10-CM

## 2025-04-16 DIAGNOSIS — Z87.891 HISTORY OF TOBACCO USE: ICD-10-CM

## 2025-04-16 DIAGNOSIS — I10 PRIMARY HYPERTENSION: ICD-10-CM

## 2025-04-16 NOTE — PATIENT INSTRUCTIONS
No labs today    Medications:  Continue current medications as prescribed    Monitor and record home blood pressures    Monitor weight and lower extremity swelling    Follow up with specialists as scheduled    Follow up chest imaging per thoracic surgery    Encourage healthy diet and exercise    Encourage continued smoking cessation    Follow up in 6 months for preventative care visit

## 2025-04-16 NOTE — PROGRESS NOTES
Chief Complaint  Hypertension    HPI:    Neva Kurtz presents to [facility]    Patient is a 64-year-old female presenting for follow-up.    Adenocarcinoma of the right lower lung lobe  Patient with previously noted pulmonary nodule on low-dose CT chest imaging that was performed 10/31/2024.  Repeat CT chest on 12/12/2024 showed stable 1.9 cm nodule in the right lower lobe.  She was evaluated by cardiothoracic surgery and recommended lung nodule biopsy.  Biopsy positive for well-differentiated adenocarcinoma.  Patient underwent robotic assisted right lower lobe superior segmentectomy.  Patient tolerated procedure well without complications.  Final pathology confirmed well-differentiated adenocarcinoma with resected margins negative for invasive cancer.  Most recently seen by cardiothoracic surgery 2/4/2025 and commended 3-month surveillance.    After the procedure, patient overall feels that she is doing really well. She has minor residual numbness of the chest wall that is improving. Denies cough, shortness of breath. Prior tobacco use but has not been smoking for almost two years.     Antiphospholipid syndrome  Initially presented as brachial artery thrombosis.  Underwent left arm thrombectomy November 2023.  Patient meets criteria for APLS.  Chronically on Eliquis for anticoagulation.  No evidence of recurrent clots.  Tolerating anticoagulation well without recent bleeding or bruising.    Hypertension  Blood pressure has overall been really good on home readings and also in cardiology office.  Compliant with medications including losartan 25 mg daily and metoprolol 25 mg daily.  Recently had Lasix discontinued by cardiology and tolerating well without significant increase in weight or swelling.      Review of Systems:  ROS negative unless otherwise noted in HPI above.    Past Medical History:   Diagnosis Date    CHF (congestive heart failure)     DVT (deep venous thrombosis)   "   Left upper extremity    Elevated cholesterol     Hypertension     Prolapsed uterus     SVT (supraventricular tachycardia)          Current Outpatient Medications:     apixaban (ELIQUIS) 5 MG tablet tablet, Take 1 tablet by mouth Every 12 (Twelve) Hours. Indications: Other - full anticoagulation, Disp: , Rfl:     aspirin 81 MG EC tablet, Take 1 tablet by mouth Daily., Disp: 30 tablet, Rfl: 0    atorvastatin (LIPITOR) 20 MG tablet, TAKE 1 TABLET BY MOUTH EVERY DAY, Disp: 90 tablet, Rfl: 1    cetirizine (zyrTEC) 10 MG tablet, Take 1 tablet by mouth Every Evening., Disp: , Rfl:     Farxiga 10 MG tablet, TAKE 1 TABLET BY MOUTH DAILY, Disp: 90 tablet, Rfl: 3    losartan (COZAAR) 25 MG tablet, TAKE 1 TABLET BY MOUTH EVERY DAY, Disp: 90 tablet, Rfl: 1    metoprolol succinate XL (TOPROL-XL) 25 MG 24 hr tablet, TAKE 1 TABLET BY MOUTH EVERY DAY, Disp: 90 tablet, Rfl: 1    gabapentin (NEURONTIN) 300 MG capsule, Take 1 capsule by mouth 3 (Three) Times a Day for 30 days., Disp: 90 capsule, Rfl: 0    Social History     Socioeconomic History    Marital status: Single   Tobacco Use    Smoking status: Former     Current packs/day: 0.00     Average packs/day: 1 pack/day for 40.0 years (40.0 ttl pk-yrs)     Types: Cigarettes     Start date: 1983     Quit date: 2023     Years since quittin.4     Passive exposure: Never    Smokeless tobacco: Never   Vaping Use    Vaping status: Never Used   Substance and Sexual Activity    Alcohol use: Never    Drug use: Never    Sexual activity: Defer        Objective   Vital Signs:  /92   Pulse 76   Resp 18   Ht 172.5 cm (67.9\")   Wt 98 kg (216 lb)   SpO2 98%   BMI 32.94 kg/m²   Estimated body mass index is 32.94 kg/m² as calculated from the following:    Height as of this encounter: 172.5 cm (67.9\").    Weight as of this encounter: 98 kg (216 lb).    Physical Exam:  General: Well-appearing patient, no apparent distress  HEENT: No posterior pharynx erythema, no tonsillar " erythema or exudates  Neck: No cervical lymphadenopathy  Cardiac: Regular rate and rhythm, normal S1/S2, no murmur, rubs or gallops, no lower extremity edema  Lungs: Clear to auscultation bilaterally, no crackles or wheezes  Skin: No significant rashes or lesions  MSK: Grossly normal tone and strength  Neuro: Alert and oriented x3, CN II-XII grossly intact  Psych: Appropriate mood and affect    Assessment and Plan:    (C34.91) Non-small cell cancer of right lung  (Z87.891) History of tobacco use  Assessment: Patient overall doing well status post right lower lobe superior segmentectomy by cardiothoracic surgery for well-differentiated adenocarcinoma.  Lymph nodes and margins negative for spread.  Patient overall doing well status post procedure without complications.  Follow-up already scheduled with cardiothoracic surgery/oncology and undergoing surveillance every 3 months.  Plan:  - Follow-up with thoracic surgery and oncology as scheduled  - CT chest for surveillance per thoracic surgery  - Continue smoking cessation    (R76.0) Antiphospholipid antibody positive  Assessment: Follows with hematology.  Previously has met criteria for APS and overall doing well on apixaban for anticoagulation without evidence of recurrent clots.  Anticipate lifelong anticoagulation.  Plan:  - Continue Eliquis as prescribed  - Follow-up with hematology as scheduled    (I10) Primary hypertension  Assessment: Blood pressure generally well-controlled despite elevation in clinic.  Obtaining additional home blood pressure readings before considering adjustments antihypertensive medications.  Discussed  importance of healthy diet and exercise.  Plan:  - Routine labs up-to-date  - Continue current medications as prescribed  - Intermittently monitor home blood pressures and follow up if elevated  - Consider increasing losartan if persistently elevated  - Encourage healthy diet and exercise      Patient was given instructions and counseling  regarding her condition or for health maintenance advice. Please see specific information pulled into the AVS if appropriate.       Dr Hans Diaz   Internal Medicine Physician  [facility]  [address], [address]  [place] [address]

## 2025-04-18 DIAGNOSIS — I74.2 BRACHIAL ARTERY THROMBOSIS: ICD-10-CM

## 2025-04-23 DIAGNOSIS — I74.2 BRACHIAL ARTERY THROMBOSIS: ICD-10-CM

## 2025-04-23 NOTE — TELEPHONE ENCOUNTER
Rx Refill Note  Requested Prescriptions     Pending Prescriptions Disp Refills    apixaban (ELIQUIS) 5 MG tablet tablet       Sig: Take 1 tablet by mouth Every 12 (Twelve) Hours. Indications: Other - full anticoagulation      Last office visit with prescribing clinician: 12/13/2024   Last telemedicine visit with prescribing clinician: Visit date not found   Next office visit with prescribing clinician: 6/16/2025                         Would you like a call back once the refill request has been completed: [] Yes [] No    If the office needs to give you a call back, can they leave a voicemail: [] Yes [] No    mEely Haas Rep  04/23/25, 10:50 EDT

## 2025-04-25 ENCOUNTER — TELEPHONE (OUTPATIENT)
Dept: ONCOLOGY | Facility: CLINIC | Age: 65
End: 2025-04-25
Payer: COMMERCIAL

## 2025-04-25 DIAGNOSIS — I74.2 BRACHIAL ARTERY THROMBOSIS: ICD-10-CM

## 2025-04-25 RX ORDER — ENOXAPARIN SODIUM 150 MG/ML
1 INJECTION SUBCUTANEOUS EVERY 12 HOURS SCHEDULED
Qty: 2.8 ML | Refills: 0 | Status: SHIPPED | OUTPATIENT
Start: 2025-04-25 | End: 2025-04-27

## 2025-04-25 NOTE — TELEPHONE ENCOUNTER
Spoke to patient and informed her Dr Lewis has cleared her to stop Eliquis 1-2 days prior to colonoscopy but will need bridging with lovenox. Patient stated she has used lovenox in the past and has no questions.

## 2025-05-05 ENCOUNTER — HOSPITAL ENCOUNTER (OUTPATIENT)
Dept: CT IMAGING | Facility: HOSPITAL | Age: 65
Discharge: HOME | End: 2025-05-05
Admitting: SURGERY
Payer: COMMERCIAL

## 2025-05-05 DIAGNOSIS — C34.90 NON-SMALL CELL LUNG CANCER, UNSPECIFIED LATERALITY: ICD-10-CM

## 2025-05-05 PROCEDURE — 71250 CT THORAX DX C-: CPT

## 2025-05-06 ENCOUNTER — PATIENT OUTREACH (OUTPATIENT)
Dept: ONCOLOGY | Facility: CLINIC | Age: 65
End: 2025-05-06
Payer: COMMERCIAL

## 2025-05-06 ENCOUNTER — OFFICE VISIT (OUTPATIENT)
Dept: SURGERY | Facility: CLINIC | Age: 65
End: 2025-05-06
Payer: COMMERCIAL

## 2025-05-06 VITALS
SYSTOLIC BLOOD PRESSURE: 152 MMHG | OXYGEN SATURATION: 98 % | DIASTOLIC BLOOD PRESSURE: 90 MMHG | WEIGHT: 216.3 LBS | BODY MASS INDEX: 32.78 KG/M2 | HEIGHT: 68 IN

## 2025-05-06 DIAGNOSIS — C34.90 NON-SMALL CELL LUNG CANCER, UNSPECIFIED LATERALITY: Primary | ICD-10-CM

## 2025-05-06 PROCEDURE — 99214 OFFICE O/P EST MOD 30 MIN: CPT | Performed by: SURGERY

## 2025-05-06 NOTE — SIGNIFICANT NOTE
I accompanied patient to Dr. Stevenson's office visit.     Dr. Stevenson reviewed scan images and will repeat scan in 3 months. Referral to ENT placed.     Angie Arroyo  Lung Nurse Navigator   [facility]  [phone]  [email or web address]     no

## 2025-05-14 NOTE — PROGRESS NOTES
THORACIC SURGERY CLINIC CONSULT NOTE    REASON FOR CONSULT: Right lower lobe well-differentiated adenocarcinoma s/p robot-assisted right lower lobe superior segmentectomy     Subjective   HISTORY OF PRESENTING ILLNESS:   Neva Kurtz is a 65 y.o. female who has significant medical problems as mentioned in the medical chart.     History of Present Illness  She was referred for a follow-up following the discovery of an abnormality on her chest CT during a hospital admission for kidney-related sepsis last year. A repeat low-dose chest CT conducted a few weeks ago revealed changes in the previously identified abnormality. She has no history of cancer. She is capable of walking a block without experiencing shortness of breath and independently manages her daily activities, including driving and grocery shopping. She has no history of chest surgery, heart attack, or stroke. She has a scheduled iron CT scan on Friday. During her hospital stay last year, she experienced shortness of breath, prompting the initial chest CT. She also had pneumonia at the time, which required hospitalization.     She has a history of kidney infection, which led to sepsis. She had a uterine prolapse that was compressing both ureters, leading to hydronephrosis. This necessitated the placement of stents in both ureters, a hysterectomy, and bladder repair. She developed heart failure due to fluid overload from the sepsis treatment. She was hospitalized for 3 days initially, discharged, and then readmitted for another 3 days due to the discovery of a clotting disorder. Her heart function has since returned to normal, as confirmed by an echocardiogram and stress test.     She is currently on Eliquis twice daily for a blood clotting disorder, specifically antiphospholipid antibodies. She has previously discontinued this medication for procedures and was bridged with Lovenox. She is under the care of Dr. Lewis in hematology.     On 1/7/2025, I  performed robotic navigational bronchoscopy and biopsy of right lower lobe nodule.  The biopsy revealed adenocarcinoma.  She had adequate lung function to tolerate surgical resection.  On 1/20/2025, she underwent robot-assisted right lower lobe superior segmentectomy.  She tolerated the procedure well.  There were no intraoperative or immediate postoperative complications.  She was discharged home in stable condition.  The final pathology confirmed well-differentiated adenocarcinoma.  The resection margins were negative for invasive cancer.    CT scan of the chest at 3 months interval revealed stable findings.  There is no concern for recurrent disease.     She came to clinic for follow-up visit. She reports no new health concerns within the past 3 months. She has not sought consultation with an ENT specialist and has no issues related to her vocal cords. She has a history of smoking but ceased this habit about 1.5 years ago.    Past Medical History:   Diagnosis Date    CHF (congestive heart failure)     DVT (deep venous thrombosis)     Left upper extremity    Elevated cholesterol     Hypertension     Prolapsed uterus     SVT (supraventricular tachycardia)        Past Surgical History:   Procedure Laterality Date    ABLATION OF DYSRHYTHMIC FOCUS  Around 30 yrs ago    For svt    ARM THROMBECTOMY/EMBOLECTOMY Left 11/21/2023    Procedure: UPPER EXTREMITY THROMBECTOMY/EMBOLECTOMY;  Surgeon: Canelo Blackwell II, MD;  Location: [facility] MAIN OR;  Service: Vascular;  Laterality: Left;    BREAST BIOPSY      BRONCHOSCOPY WITH ION ROBOTIC ASSIST N/A 01/07/2025    Procedure: BRONCHOSCOPY WITH ION ROBOT WITH FINE NEEDLE ASPIRATION X1 AREA AND CRYO BIOPSY X1 AREA AND BRONCHIALVEOLAR LAVAGE OF RIGHT LOWER LOBE;  Surgeon: Zurdo Stevenson MD;  Location: [facility] ENDOSCOPY;  Service: Robotics - Pulmonary;  Laterality: N/A;  POST: LUNG NODULE    CARDIAC CATHETERIZATION  Around 30 yrs ago    CYSTOSCOPY W/ URETERAL STENT REMOVAL  2024     CYSTOSCOPY, URETEROSCOPY, RETROGRADE PYELOGRAM, STENT INSERTION Bilateral 2023    Procedure: CYSTOSCOPY URETEROSCOPY RETROGRADE PYELOGRAM STENT INSERTION;  Surgeon: Alan Rodriguez MD;  Location: [facility] MAIN OR;  Service: Urology;  Laterality: Bilateral;    HYSTERECTOMY  2024    LOBECTOMY Right 2025    Procedure: RIGHT THORACOSCOPY WITH RIGHT LOWER LOBE SEGMENTECTOMY WITH DAVINCI ROBOT, FLEXIBLE BROCHOSCOPY AND LYMPH NODE DISSECTION;  Surgeon: Zurdo Stevenson MD;  Location: [facility] MAIN OR;  Service: Robotics - DaVinci;  Laterality: Right;       Family History   Problem Relation Age of Onset    Breast cancer Mother     Hyperlipidemia Mother     Diabetes Mother     Cancer Mother     Hypertension Mother     Hypertension Father     Hypertension Brother        Social History     Socioeconomic History    Marital status: Single   Tobacco Use    Smoking status: Former     Current packs/day: 0.00     Average packs/day: 1 pack/day for 40.0 years (40.0 ttl pk-yrs)     Types: Cigarettes     Start date: 1983     Quit date: 2023     Years since quittin.5     Passive exposure: Never    Smokeless tobacco: Never   Vaping Use    Vaping status: Never Used   Substance and Sexual Activity    Alcohol use: Never    Drug use: Never    Sexual activity: Defer         Current Outpatient Medications:     apixaban (ELIQUIS) 5 MG tablet tablet, Take 1 tablet by mouth Every 12 (Twelve) Hours. Indications: Other - full anticoagulation, Disp: 180 tablet, Rfl: 0    aspirin 81 MG EC tablet, Take 1 tablet by mouth Daily., Disp: 30 tablet, Rfl: 0    atorvastatin (LIPITOR) 20 MG tablet, TAKE 1 TABLET BY MOUTH EVERY DAY, Disp: 90 tablet, Rfl: 1    cetirizine (zyrTEC) 10 MG tablet, Take 1 tablet by mouth Every Evening., Disp: , Rfl:     Farxiga 10 MG tablet, TAKE 1 TABLET BY MOUTH DAILY, Disp: 90 tablet, Rfl: 3    losartan (COZAAR) 25 MG tablet, TAKE 1 TABLET BY MOUTH EVERY DAY, Disp: 90 tablet, Rfl: 1    metoprolol succinate XL  "(TOPROL-XL) 25 MG 24 hr tablet, TAKE 1 TABLET BY MOUTH EVERY DAY, Disp: 90 tablet, Rfl: 1    gabapentin (NEURONTIN) 300 MG capsule, Take 1 capsule by mouth 3 (Three) Times a Day for 30 days., Disp: 90 capsule, Rfl: 0     Allergies   Allergen Reactions    Other Other (See Comments)     tetramycin    Tetracyclines & Related Swelling             Objective    OBJECTIVE:     VITAL SIGNS:  /90   Ht 172.7 cm (68\")   Wt 98.1 kg (216 lb 4.8 oz)   SpO2 98%   BMI 32.89 kg/m²     PHYSICAL EXAM:  Normal appearance.   Head is normocephalic.   Nose appears normal.   No obvious deformity of the mouth and throat.  Conjunctivae normal.   Heart rate and rhythm is normal.  Pulmonary effort is normal.   Moving all 4 extremities.  Extremities warm.  No focal deficit present.   Alert and oriented to person, place, and time.     RESULTS REVIEW:  I have reviewed the patient's all relevant laboratory and imaging findings.     Assessment & Plan    ASSESSMENT & PLAN:  Neva Kurtz is a 65 y.o. female with significant medical conditions as mentioned above presented to my clinic.    Assessment & Plan  1.  Stage I right lower lobe non-small cell lung cancer s/p superior segmentectomy  A recent CT scan revealed a small nodule in the lower part of the lung, which may be benign. The final radiology report is pending. A follow-up CT scan will be scheduled in 3 months. If the nodule resolves, subsequent scans will be conducted every 6 months. If the nodule enlarges, a biopsy may be necessary.    2.  Vocal cord polyp.  A polyp was noted on the right aspect during intubation. This is likely benign and unrelated to her previous stage I cancer. A referral to an ENT specialist will be made for further evaluation to ensure there are no concerning findings.    Follow-up  The patient will follow up in 3 months.    I discussed the patients findings and my recommendations with the patient/family/caregiver. The patient/family/caregiver was given " adequate time to ask questions and all questions were answered to patient satisfaction. Thank you for this consult and allowing us to participate in the care of your patient.      Zurdo Stevenson MD  Thoracic Surgeon  [facility]        Dictated utilizing Dragon dictation    I spent 30 minutes caring for Neva on this date of service. This time includes time spent by me in the following activities:preparing for the visit, reviewing tests, obtaining and/or reviewing a separately obtained history, performing a medically appropriate examination and/or evaluation, counseling and educating the patient/family/caregiver, ordering medications, tests, or procedures, referring and communicating with other health care professionals , documenting information in the medical record, independently interpreting results and communicating that information with the patient/family/caregiver, and care coordination and more than half the time was spent in direct face to face evaluation and decision making.     Patient or patient representative verbalized consent for the use of Ambient Listening during the visit with  Zurdo Stevenson MD for chart documentation. 5/14/2025  07:43 EDT

## 2025-06-09 ENCOUNTER — TELEPHONE (OUTPATIENT)
Dept: ONCOLOGY | Facility: CLINIC | Age: 65
End: 2025-06-09
Payer: COMMERCIAL

## 2025-06-09 NOTE — TELEPHONE ENCOUNTER
Hub staff attempted to follow warm transfer process and was unsuccessful     Caller: YOHANA    Relationship to patient:     Best call back number: [phone]    Patient is needing: YOHANA IS NEEDING A CALL BACK ASAP    PATIENT IS HAVING SURGERY TOMORROW AND DOES NOT KNOW WHEN TO STOP HER LOVENOX

## 2025-06-09 NOTE — TELEPHONE ENCOUNTER
Spoke with patient, she is having her colonoscopy tomorrow and didn't know when her last dose of lovenox should be. Informed patient her last dose would be this evening with at least 12 hours before her procedure. Patient voiced understanding

## 2025-06-10 ENCOUNTER — OFFICE (OUTPATIENT)
Dept: URBAN - METROPOLITAN AREA PATHOLOGY 19 | Facility: PATHOLOGY | Age: 65
End: 2025-06-10
Payer: COMMERCIAL

## 2025-06-10 DIAGNOSIS — Z12.11 ENCOUNTER FOR SCREENING FOR MALIGNANT NEOPLASM OF COLON: ICD-10-CM

## 2025-06-10 DIAGNOSIS — D12.8 BENIGN NEOPLASM OF RECTUM: ICD-10-CM

## 2025-06-10 PROBLEM — Z83.710 FAMILY HISTORY OF ADENOMATOUS AND SERRATED POLYPS: Status: ACTIVE | Noted: 2025-06-10

## 2025-06-10 PROBLEM — K62.1 RECTAL POLYP: Status: ACTIVE | Noted: 2025-06-10

## 2025-06-10 PROCEDURE — 88305 TISSUE EXAM BY PATHOLOGIST: CPT | Performed by: PATHOLOGY

## 2025-06-11 NOTE — PROGRESS NOTES
HEMATOLOGY ONCOLOGY OUTPATIENT FOLLOW UP       Patient name: Neva Kurtz  : 1960  MRN: [id number]  Primary Care Physician: Hans Diaz MD  Referring Physician: No ref. provider found  Reason For Consult:         History of Present Illness:    Neva Kurtz is a 65 y.o. female who presented to [facility] on 2023 with complaints of shortness of breath.  Past medical history of SVT.  Patient also had a recent history of discharge from the hospital on 2023 after treatment for UTI.  Work-up revealed CHF and also severe right hydroureteronephrosis.  Cardiology was consulted for the acute exacerbation of CHF and neurology saw the patient for her bilateral hydronephrosis and did a bilateral stent placement.  It was also recommended that she follow-up with a gynecologist for hysterectomy and suspension of the vaginal vault due to right hydronephrosis secondary to ureteral kinking due to severe uterine prolapse.  It was in the process of being discharged from the hospital on 2023 when she complained of severe acute left arm pain after her IV site was removed.  Evaluation revealed acute limb ischemia due to an occluded ulnar artery.  She was initiated on heparin and underwent a left arm thrombectomy on 2023.  Since that time she has been transitioned from heparin to Eliquis for discharge home.  Cardiology is still followed for possible cardioembolic work-up and plans to discharge home on an ambulatory cardiac monitor for atrial fibrillation surveillance.     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    23 - Hb 12.7  plt 511    24 - APLS testing with positive IgM for anticardiolipin and anti beta-2  glycoprotein, negative for lupus anticoagulant     5/1/24 - hysterectomy and prolapse repair. No post op complications.  5/29/24 - APLS persistent postitive IgM and beta-2 ddimer 0.84, no LA     12/13/24: Patient seen today for follow-up visit.  She is currently on long-term anticoagulation for hypercoagulable disorder and history of left upper extremity DVT.  Previously being seen by Dr. Cervantes in this office.  Patient clinically doing well at this time.  Denied any new complaints.  Good tolerance compliance to Eliquis.    12/12/2024 CT chest without contrast  Impression:  Stable 1.9 cm thick walled cavitary nodule in the right lower lobe. Other 3 to 5 mm groundglass nodules in the upper lobes are also unchanged. No new pulmonary nodules are seen. No significant change from 10/30/2024.    12/12/2024 PET/CT  Impression:   1. There is no abnormal hypermetabolic activity within the neck, chest, abdomen or pelvis.  2. 1.9 cm thick-walled cavitary nodule in the right lower lobe is not FDG avid. Continued CT chest surveillance imaging is suggested.  3. Incidental note of vallecular cyst, 3.5 cm left ovarian cyst..    1/7/2025 patient underwent bronchoscopy and biopsy of right lower lobe lung nodule.  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  Final pathology confirmed well-differentiated adenocarcinoma and the resection margins were negative for invasive cancer ([id number]).      3/14/2025: Patient seen today for routine follow-up.  She reports overall doing well and has no new concerns or complaints.  She was recently diagnosed with stage I lung cancer and had complete resection in January 2025.  Surveillance scans are expected for May 2025.  She continues Eliquis with good compliance and good tolerance.  She has no bleeding concerns today.    5/8/25: CT  Chest WO contrast:  Impression:  1. Resection of malignant right lower lobe pulmonary nodule with right lower lobe superior segmentectomy.  2. No findings of recurrent malignancy or metastatic disease in the chest.  3. Coronary artery calcifications and additional chronic findings    Subjective:  6/16/2025: Patient seen today for follow-up visit.  Clinically doing well since last visit.  No new complaints reported.  Recent restaging CT chest is negative for recurrent/metastatic disease.  Tolerating Eliquis well without any side effects at present.        Past Medical History:   Diagnosis Date    CHF (congestive heart failure)     DVT (deep venous thrombosis)     Left upper extremity    Elevated cholesterol     Hypertension     Prolapsed uterus     SVT (supraventricular tachycardia)        Past Surgical History:   Procedure Laterality Date    ABLATION OF DYSRHYTHMIC FOCUS  Around 30 yrs ago    For svt    ARM THROMBECTOMY/EMBOLECTOMY Left 11/21/2023    Procedure: UPPER EXTREMITY THROMBECTOMY/EMBOLECTOMY;  Surgeon: Canelo Blackwell II, MD;  Location: [facility] MAIN OR;  Service: Vascular;  Laterality: Left;    BREAST BIOPSY      BRONCHOSCOPY WITH ION ROBOTIC ASSIST N/A 01/07/2025    Procedure: BRONCHOSCOPY WITH ION ROBOT WITH FINE NEEDLE ASPIRATION X1 AREA AND CRYO BIOPSY X1 AREA AND BRONCHIALVEOLAR LAVAGE OF RIGHT LOWER LOBE;  Surgeon: Zurdo Stevenson MD;  Location: [facility] ENDOSCOPY;  Service: Robotics - Pulmonary;  Laterality: N/A;  POST: LUNG NODULE    CARDIAC CATHETERIZATION  Around 30 yrs ago    CYSTOSCOPY W/ URETERAL STENT REMOVAL  2024    CYSTOSCOPY, URETEROSCOPY, RETROGRADE PYELOGRAM, STENT INSERTION Bilateral 11/19/2023    Procedure: CYSTOSCOPY URETEROSCOPY RETROGRADE PYELOGRAM STENT INSERTION;  Surgeon: Alan Rodriguez MD;  Location: [facility] MAIN OR;  Service: Urology;  Laterality: Bilateral;    HYSTERECTOMY  05/01/2024    LOBECTOMY Right 01/20/2025    Procedure: RIGHT THORACOSCOPY WITH RIGHT LOWER LOBE  SEGMENTECTOMY WITH DAVINCI ROBOT, FLEXIBLE BROCHOSCOPY AND LYMPH NODE DISSECTION;  Surgeon: Zurdo Stevenson MD;  Location: [facility] MAIN OR;  Service: Robotics - DaVinci;  Laterality: Right;         Current Outpatient Medications:     apixaban (ELIQUIS) 5 MG tablet tablet, Take 1 tablet by mouth Every 12 (Twelve) Hours. Indications: Other - full anticoagulation, Disp: 180 tablet, Rfl: 0    aspirin 81 MG EC tablet, Take 1 tablet by mouth Daily., Disp: 30 tablet, Rfl: 0    atorvastatin (LIPITOR) 20 MG tablet, TAKE 1 TABLET BY MOUTH EVERY DAY, Disp: 90 tablet, Rfl: 1    cetirizine (zyrTEC) 10 MG tablet, Take 1 tablet by mouth Every Evening., Disp: , Rfl:     Farxiga 10 MG tablet, TAKE 1 TABLET BY MOUTH DAILY, Disp: 90 tablet, Rfl: 3    gabapentin (NEURONTIN) 300 MG capsule, Take 1 capsule by mouth 3 (Three) Times a Day for 30 days., Disp: 90 capsule, Rfl: 0    losartan (COZAAR) 25 MG tablet, TAKE 1 TABLET BY MOUTH EVERY DAY, Disp: 90 tablet, Rfl: 1    metoprolol succinate XL (TOPROL-XL) 25 MG 24 hr tablet, TAKE 1 TABLET BY MOUTH EVERY DAY, Disp: 90 tablet, Rfl: 1    Allergies   Allergen Reactions    Other Other (See Comments)     tetramycin    Tetracyclines & Related Swelling       Family History   Problem Relation Age of Onset    Breast cancer Mother     Hyperlipidemia Mother     Diabetes Mother     Cancer Mother     Hypertension Mother     Hypertension Father     Hypertension Brother        Cancer-related family history includes Breast cancer in her mother; Cancer in her mother.    Social History     Tobacco Use    Smoking status: Former     Current packs/day: 0.00     Average packs/day: 1 pack/day for 40.0 years (40.0 ttl pk-yrs)     Types: Cigarettes     Start date: 1983     Quit date: 2023     Years since quittin.6     Passive exposure: Never    Smokeless tobacco: Never   Vaping Use    Vaping status: Never Used   Substance Use Topics    Alcohol use: Never    Drug use: Never     Social History     Social  History Narrative    Works for [employer]. Retiring April 1, 2024.           ROS:   Review of Systems   Constitutional: Negative.    HENT: Negative.     Eyes: Negative.    Respiratory: Negative.     Cardiovascular: Negative.    Gastrointestinal: Negative.    Endocrine: Negative.    Genitourinary: Negative.    Musculoskeletal: Negative.    Neurological: Negative.    Hematological: Negative.    Psychiatric/Behavioral: Negative.         Objective:  Vital signs:  There were no vitals filed for this visit.            There is no height or weight on file to calculate BMI.  ECOG  (0) Fully active, able to carry on all predisease performance without restriction    Physical Exam:   Physical Exam  Constitutional:       Appearance: Normal appearance.   HENT:      Head: Normocephalic and atraumatic.   Eyes:      Extraocular Movements: Extraocular movements intact.      Pupils: Pupils are equal, round, and reactive to light.   Cardiovascular:      Rate and Rhythm: Normal rate and regular rhythm.      Pulses: Normal pulses.      Heart sounds: No murmur heard.  Pulmonary:      Effort: Pulmonary effort is normal.      Breath sounds: Normal breath sounds.   Abdominal:      General: There is no distension.      Palpations: Abdomen is soft. There is no mass.      Tenderness: There is no abdominal tenderness.   Musculoskeletal:         General: Normal range of motion.      Cervical back: Normal range of motion and neck supple.   Skin:     General: Skin is warm.   Neurological:      General: No focal deficit present.      Mental Status: She is alert.   Psychiatric:         Mood and Affect: Mood normal.         Lab Results - Last 18 Months   Lab Units 03/14/25  0952 01/21/25 0104 01/20/25  1844   WBC 10*3/mm3 6.15 12.60* 9.51   HEMOGLOBIN g/dL 14.8 12.9 13.5   HEMATOCRIT % 45.2 39.9 42.4   PLATELETS 10*3/mm3 404 372 412   MCV fL 97.0 94.8 97.0     Lab Results - Last 18 Months   Lab Units 01/21/25  0104 01/20/25  1844 01/07/25  1103   SODIUM  "mmol/L 136 139 141   POTASSIUM mmol/L 4.3 5.2 4.1   CHLORIDE mmol/L 104 106 107   CO2 mmol/L 21.9* 20.9* 24.7   BUN mg/dL 21 19 18   CREATININE mg/dL 1.01* 1.11* 0.85   CALCIUM mg/dL 8.8 9.2 9.6   BILIRUBIN mg/dL  --   --  0.3   ALK PHOS U/L  --   --  99   ALT (SGPT) U/L  --   --  19   AST (SGOT) U/L  --   --  24   GLUCOSE mg/dL 269* 106* 105*       Lab Results   Component Value Date    GLUCOSE 269 (H) 01/21/2025    BUN 21 01/21/2025    CREATININE 1.01 (H) 01/21/2025    BCR 20.8 01/21/2025    K 4.3 01/21/2025    CO2 21.9 (L) 01/21/2025    CALCIUM 8.8 01/21/2025    ALBUMIN 4.1 01/07/2025    AST 24 01/07/2025    ALT 19 01/07/2025       Lab Results - Last 18 Months   Lab Units 01/14/25  1006 01/07/25  1103 04/16/24  1034   INR  1.08 0.98 1.2   APTT s  --   --  39.1*       Lab Results   Component Value Date    IRON 87 03/14/2025    TIBC 361 03/14/2025    FERRITIN 81.10 03/14/2025       No results found for: \"FOLATE\"    No results found for: \"OCCULTBLD\"    No results found for: \"RETICCTPCT\"  Lab Results   Component Value Date    BUVVFMUL15 728 11/22/2023     No results found for: \"SPEP\", \"UPEP\"  No results found for: \"LDH\", \"URICACID\"  No results found for: \"ZACHARY\", \"RF\", \"SEDRATE\"  No results found for: \"FIBRINOGEN\", \"HAPTOGLOBIN\"  Lab Results   Component Value Date    PTT 39.1 (H) 04/16/2024    INR 1.08 01/14/2025     No results found for: \"\"  No results found for: \"CEA\"  No components found for: \"CA-19-9\"  No results found for: \"PSA\"  No results found for: \"SEDRATE\"    Assessment & Plan       Patient is a 64-year-old female admitted for shortness of breath and diagnosed with acute exacerbation of CHF also with a history of recent UTI and found to have bilateral hydronephrosis status post stent placement with urology.  Discharged home developed acute left upper extremity pain and was found to have acute limb ischemia due to a brachial artery occlusion.  Now status post left upper extremity thrombectomy and on " anticoagulation.  We are asked to see to evaluate for possible hypercoagulable disorders.     Brachial artery thrombosis/antiphospholipid syndrome  Status post left arm thrombectomy on 11/21/2023  Heparin transitioned to Eliquis for discharge  Cardiology is sending home on ambulatory event monitor to evaluate for atrial fibrillation  Family history of blood clots in her father  Patient has stopped smoking.   2/3 criteria positive for APLS  Continue ELIQUIS, now repeat APLS positive hence confirmatory  High risk for future VTE ddimer persistently high, continue Eliquis 5mg BID.   -Has persistently positive anticardiolipin Ab consistent with APLS.     Normocytic anemia  Baseline hemoglobin from 11/13/2023 11.3 g/dL  Iron 36, iron sat 11%, TIBC 320, ferritin 248.90  B12 728  Start oral ferrous sulphate every other day.  -Iron panel showed improvement. Continue oral iron. Hb is WNL today. 14.5  Drop Likely related to recent hysterectomy improved from what it was in the hospital at 9.  -6/16/25: CBC is WNL Hb 14.9     Thrombocytosis:  Resolved. Likely reactive due to surgery and PAVEL.    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  The tumor was 2 cm in greatest dimension.  Final pathology consistent with well-differentiated invasive mucinous adenocarcinoma.  Surgical margins were negative for cancer indicating complete resection.  Total of 12 lymph nodes were examined, all of which were negative.  -She is following with Dr. Stevenson  -CT chest from May 2025 is [id number] as above     6 month follow up with repeat labs, sooner if condition indicates       Thank you very much for providing the opportunity to participate in this patient’s care. Please do not hesitate to call if there are any other questions.

## 2025-06-16 ENCOUNTER — LAB (OUTPATIENT)
Dept: LAB | Facility: HOSPITAL | Age: 65
End: 2025-06-16
Payer: COMMERCIAL

## 2025-06-16 ENCOUNTER — OFFICE VISIT (OUTPATIENT)
Dept: ONCOLOGY | Facility: CLINIC | Age: 65
End: 2025-06-16
Payer: COMMERCIAL

## 2025-06-16 VITALS
SYSTOLIC BLOOD PRESSURE: 149 MMHG | HEIGHT: 68 IN | DIASTOLIC BLOOD PRESSURE: 91 MMHG | BODY MASS INDEX: 32.74 KG/M2 | WEIGHT: 216 LBS | HEART RATE: 71 BPM | OXYGEN SATURATION: 98 %

## 2025-06-16 DIAGNOSIS — I74.2 BRACHIAL ARTERY THROMBOSIS: ICD-10-CM

## 2025-06-16 DIAGNOSIS — D64.9 ANEMIA, UNSPECIFIED TYPE: Primary | ICD-10-CM

## 2025-06-16 DIAGNOSIS — C34.31 ADENOCARCINOMA OF LOWER LOBE OF RIGHT LUNG: ICD-10-CM

## 2025-06-16 DIAGNOSIS — Z79.01 CURRENT USE OF LONG TERM ANTICOAGULATION: ICD-10-CM

## 2025-06-16 DIAGNOSIS — D64.9 ANEMIA, UNSPECIFIED TYPE: ICD-10-CM

## 2025-06-16 LAB
ALBUMIN SERPL-MCNC: 4.3 G/DL (ref 3.5–5.2)
ALBUMIN/GLOB SERPL: 1.7 G/DL
ALP SERPL-CCNC: 111 U/L (ref 39–117)
ALT SERPL W P-5'-P-CCNC: 23 U/L (ref 1–33)
ANION GAP SERPL CALCULATED.3IONS-SCNC: 11.1 MMOL/L (ref 5–15)
AST SERPL-CCNC: 16 U/L (ref 1–32)
BASOPHILS # BLD AUTO: 0.06 10*3/MM3 (ref 0–0.2)
BASOPHILS NFR BLD AUTO: 1 % (ref 0–1.5)
BILIRUB SERPL-MCNC: 0.3 MG/DL (ref 0–1.2)
BUN SERPL-MCNC: 20.3 MG/DL (ref 8–23)
BUN/CREAT SERPL: 22.6 (ref 7–25)
CALCIUM SPEC-SCNC: 9.8 MG/DL (ref 8.6–10.5)
CHLORIDE SERPL-SCNC: 104 MMOL/L (ref 98–107)
CO2 SERPL-SCNC: 25.9 MMOL/L (ref 22–29)
CREAT SERPL-MCNC: 0.9 MG/DL (ref 0.57–1)
D DIMER PPP FEU-MCNC: 0.29 MCGFEU/ML (ref 0–0.64)
DEPRECATED RDW RBC AUTO: 48.4 FL (ref 37–54)
EGFRCR SERPLBLD CKD-EPI 2021: 71.5 ML/MIN/1.73
EOSINOPHIL # BLD AUTO: 0.22 10*3/MM3 (ref 0–0.4)
EOSINOPHIL NFR BLD AUTO: 3.6 % (ref 0.3–6.2)
ERYTHROCYTE [DISTWIDTH] IN BLOOD BY AUTOMATED COUNT: 14 % (ref 12.3–15.4)
FERRITIN SERPL-MCNC: 96 NG/ML (ref 13–150)
GLOBULIN UR ELPH-MCNC: 2.5 GM/DL
GLUCOSE SERPL-MCNC: 97 MG/DL (ref 65–99)
HCT VFR BLD AUTO: 46 % (ref 34–46.6)
HGB BLD-MCNC: 14.9 G/DL (ref 12–15.9)
IRON 24H UR-MRATE: 96 MCG/DL (ref 37–145)
IRON SATN MFR SERPL: 27 % (ref 20–50)
LYMPHOCYTES # BLD AUTO: 2.24 10*3/MM3 (ref 0.7–3.1)
LYMPHOCYTES NFR BLD AUTO: 36.5 % (ref 19.6–45.3)
MCH RBC QN AUTO: 31.3 PG (ref 26.6–33)
MCHC RBC AUTO-ENTMCNC: 32.4 G/DL (ref 31.5–35.7)
MCV RBC AUTO: 96.6 FL (ref 79–97)
MONOCYTES # BLD AUTO: 0.77 10*3/MM3 (ref 0.1–0.9)
MONOCYTES NFR BLD AUTO: 12.5 % (ref 5–12)
NEUTROPHILS NFR BLD AUTO: 2.85 10*3/MM3 (ref 1.7–7)
NEUTROPHILS NFR BLD AUTO: 46.4 % (ref 42.7–76)
PLATELET # BLD AUTO: 389 10*3/MM3 (ref 140–450)
PMV BLD AUTO: 8.3 FL (ref 6–12)
POTASSIUM SERPL-SCNC: 4.3 MMOL/L (ref 3.5–5.2)
PROT SERPL-MCNC: 6.8 G/DL (ref 6–8.5)
RBC # BLD AUTO: 4.76 10*6/MM3 (ref 3.77–5.28)
SODIUM SERPL-SCNC: 141 MMOL/L (ref 136–145)
TIBC SERPL-MCNC: 358 MCG/DL (ref 298–536)
TRANSFERRIN SERPL-MCNC: 240 MG/DL (ref 200–360)
WBC NRBC COR # BLD AUTO: 6.14 10*3/MM3 (ref 3.4–10.8)

## 2025-06-16 PROCEDURE — 83540 ASSAY OF IRON: CPT | Performed by: PHYSICIAN ASSISTANT

## 2025-06-16 PROCEDURE — 85025 COMPLETE CBC W/AUTO DIFF WBC: CPT

## 2025-06-16 PROCEDURE — 36415 COLL VENOUS BLD VENIPUNCTURE: CPT

## 2025-06-16 PROCEDURE — 82728 ASSAY OF FERRITIN: CPT | Performed by: PHYSICIAN ASSISTANT

## 2025-06-16 PROCEDURE — 80053 COMPREHEN METABOLIC PANEL: CPT | Performed by: PHYSICIAN ASSISTANT

## 2025-06-16 PROCEDURE — 85379 FIBRIN DEGRADATION QUANT: CPT | Performed by: PHYSICIAN ASSISTANT

## 2025-06-16 PROCEDURE — 84466 ASSAY OF TRANSFERRIN: CPT | Performed by: PHYSICIAN ASSISTANT

## 2025-06-16 RX ORDER — FUROSEMIDE 20 MG/1
TABLET ORAL
COMMUNITY
End: 2025-06-16

## 2025-06-16 RX ORDER — POTASSIUM CHLORIDE 750 MG/1
TABLET, EXTENDED RELEASE ORAL
COMMUNITY
End: 2025-06-16

## 2025-06-16 RX ORDER — POLYETHYLENE GLYCOL 3350 17 G/17G
POWDER, FOR SOLUTION ORAL
COMMUNITY
Start: 2025-05-12 | End: 2025-06-16

## 2025-06-16 RX ORDER — ENOXAPARIN SODIUM 150 MG/ML
INJECTION SUBCUTANEOUS
COMMUNITY
End: 2025-06-16

## 2025-06-17 LAB
CARDIOLIPIN IGG SER IA-ACNC: <9 GPL U/ML (ref 0–14)
CARDIOLIPIN IGM SER IA-ACNC: 64 MPL U/ML (ref 0–12)

## 2025-06-18 LAB
APTT SCREEN TO CONFIRM RATIO: 0.98 RATIO (ref 0–1.34)
CONFIRM APTT/NORMAL: 37.5 SEC (ref 0–47.6)
LA 2 SCREEN W REFLEX-IMP: NORMAL
SCREEN APTT: 37.9 SEC (ref 0–43.5)
SCREEN DRVVT: 39.4 SEC (ref 0–47)
THROMBIN TIME: 19.8 SEC (ref 0–23)

## 2025-07-27 DIAGNOSIS — I74.2 BRACHIAL ARTERY THROMBOSIS: ICD-10-CM

## 2025-07-28 RX ORDER — APIXABAN 5 MG/1
TABLET, FILM COATED ORAL
Qty: 180 TABLET | Refills: 0 | Status: SHIPPED | OUTPATIENT
Start: 2025-07-28

## 2025-07-31 RX ORDER — ATORVASTATIN CALCIUM 10 MG/1
10 TABLET, FILM COATED ORAL DAILY
Qty: 90 TABLET | Refills: 1 | Status: SHIPPED | OUTPATIENT
Start: 2025-07-31

## 2025-07-31 RX ORDER — ATORVASTATIN CALCIUM 20 MG/1
20 TABLET, FILM COATED ORAL DAILY
Qty: 90 TABLET | Refills: 1 | Status: SHIPPED | OUTPATIENT
Start: 2025-07-31

## 2025-08-11 ENCOUNTER — HOSPITAL ENCOUNTER (OUTPATIENT)
Dept: CT IMAGING | Facility: HOSPITAL | Age: 65
Discharge: HOME | End: 2025-08-11
Admitting: SURGERY
Payer: COMMERCIAL

## 2025-08-11 DIAGNOSIS — C34.90 NON-SMALL CELL LUNG CANCER, UNSPECIFIED LATERALITY: ICD-10-CM

## 2025-08-11 PROCEDURE — 71250 CT THORAX DX C-: CPT

## (undated) DEVICE — UNDRGLV SURG BIOGEL PIMICROINDICATOR SYNTH SZ7.5PF STRL PR/2

## (undated) DEVICE — OASIS DRAIN, SINGLE, INLINE & ATS COMPATIBLE: Brand: OASIS

## (undated) DEVICE — IRRIGATOR BULB ASEPTO 60CC STRL

## (undated) DEVICE — PENCL HND ROCKRSWTCH HOLSTR EZ CLEAN TP CRD 10FT

## (undated) DEVICE — COLUMN DRAPE

## (undated) DEVICE — MICRO TIP WIPE: Brand: DEVON

## (undated) DEVICE — Device: Brand: ION

## (undated) DEVICE — VASCULAR CDS: Brand: MEDLINE INDUSTRIES, INC.

## (undated) DEVICE — VISION PROBE ADAPTER AND SUCTION ADAPTER

## (undated) DEVICE — C-ARM: Brand: UNBRANDED

## (undated) DEVICE — CATHETER,URETHRAL,REDRUBBER,STRL,12FR: Brand: MEDLINE INDUSTRIES, INC.

## (undated) DEVICE — EXTENSION SET, MALE LUER LOCK ADAPTER WITH RETRACTABLE COLLAR

## (undated) DEVICE — SEAL

## (undated) DEVICE — SYR LL 3CC

## (undated) DEVICE — SUT PROLN 6/0 BV1 D/A 30IN 8709H

## (undated) DEVICE — APPL HEMO SURG ARISTA/AH/FLEXITIP XL 38CM

## (undated) DEVICE — SUREFORM 45 CURVED-TIP: Brand: SUREFORM

## (undated) DEVICE — SUT ETHIB 1 CT1 30IN  X425H

## (undated) DEVICE — PATIENT RETURN ELECTRODE, SINGLE-USE, CONTACT QUALITY MONITORING, ADULT, WITH 9FT CORD, FOR PATIENTS WEIGING OVER 33LBS. (15KG): Brand: MEGADYNE

## (undated) DEVICE — GLV SURG SIGNATURE ESSENTIAL PF LTX SZ7

## (undated) DEVICE — ANTIBACTERIAL UNDYED BRAIDED (POLYGLACTIN 910), SYNTHETIC ABSORBABLE SUTURE: Brand: COATED VICRYL

## (undated) DEVICE — CANNULA SEAL

## (undated) DEVICE — SUT ANTIB MONOCRYL PLS 5/0 18IN VIL

## (undated) DEVICE — THE STERILE CAMERA HANDLE COVER IS FOR USE WITH THE STERIS SURGICAL LIGHTING AND VISUALIZATION SYSTEMS.

## (undated) DEVICE — CATHETER: Brand: ION

## (undated) DEVICE — VISION PROBE: Brand: ION

## (undated) DEVICE — APPL CHLORAPREP HI/LITE TINTED 10.5ML ORNG

## (undated) DEVICE — PK CYSTO 50

## (undated) DEVICE — GOWN,REINFRCE,POLY,SIRUS,BREATH SLV,XXLG: Brand: MEDLINE

## (undated) DEVICE — SPNG LAP CIGARETTE KITTNER 5MM STRL PK/5

## (undated) DEVICE — KT SURG TURNOVER 050

## (undated) DEVICE — SYR LL TP 10ML STRL

## (undated) DEVICE — SWIVEL CONNECTOR

## (undated) DEVICE — RADIFOCUS GLIDEWIRE: Brand: GLIDEWIRE

## (undated) DEVICE — FOGARTY THRU-LUMEN EMBOLECTOMY CATHETER 4F 80CM: Brand: FOGARTY

## (undated) DEVICE — DRAPE,HAND,STERILE: Brand: MEDLINE

## (undated) DEVICE — SLV SCD CALF HEMOFORCE DVT THERP REPROC MD

## (undated) DEVICE — SPNG LAP PREWSH SFTPK 18X18IN STRL PK/5

## (undated) DEVICE — SOL IRRG H2O BG 3000ML STRL

## (undated) DEVICE — TOWEL,OR,DSP,ST,WHITE,DLX,4/PK,20PK/CS: Brand: MEDLINE

## (undated) DEVICE — BG RETRV TISS SUPERBAG INTRO RIP/STOP NLY 12MM 750ML LG

## (undated) DEVICE — MARKR SKIN W/RULR 2TP

## (undated) DEVICE — GLIDESHEATH SLENDER STAINLESS STEEL KIT: Brand: GLIDESHEATH SLENDER

## (undated) DEVICE — FOGARTY ARTERIAL EMBOLECTOMY CATHETER 3F 40CM: Brand: FOGARTY

## (undated) DEVICE — BAPTIST FLOYD BRONCHOSCOPY: Brand: MEDLINE INDUSTRIES, INC.

## (undated) DEVICE — FOGARTY ARTERIAL EMBOLECTOMY CATHETER 4F 40CM: Brand: FOGARTY

## (undated) DEVICE — TROC BLADLES ANCHORPORT/OPTI LP 12X120MM 1P/U

## (undated) DEVICE — GLV SURG BIOGEL LTX PF 7 1/2

## (undated) DEVICE — SOL NACL 0.9PCT 1000ML

## (undated) DEVICE — 2, DISPOSABLE SUCTION/IRRIGATOR WITH DISPOSABLE TIP: Brand: STRYKEFLOW

## (undated) DEVICE — CVR HNDL LT SURG ACCSSRY BLU STRL

## (undated) DEVICE — PK CHST THORACOSCOPY 50

## (undated) DEVICE — SOL NS 500ML

## (undated) DEVICE — RADIFOCUS GLIDECATH: Brand: GLIDECATH

## (undated) DEVICE — NDL INJ UROL WILLIAMS CYSTO 3.7F 23G 8MM

## (undated) DEVICE — 1LYRTR 16FR10ML100%SIL UMS SNP: Brand: MEDLINE INDUSTRIES, INC.

## (undated) DEVICE — RADIFOCUS GLIDEWIRE ADVANTAGE GUIDEWIRE: Brand: GLIDEWIRE ADVANTAGE

## (undated) DEVICE — KIT,ANTI FOG,W/SPONGE & FLUID,SOFT PACK: Brand: MEDLINE

## (undated) DEVICE — ADHS SKIN SURG TISS VISC PREMIERPRO EXOFIN HI/VISC 1ML

## (undated) DEVICE — GLV SURG PREMIERPRO ORTHO LTX PF SZ8 BRN

## (undated) DEVICE — COVADERM: Brand: DEROYAL

## (undated) DEVICE — BIOPSY NEEDLE, 21G: Brand: FLEXISION

## (undated) DEVICE — GLV SURG BIOGEL LTX PF 8

## (undated) DEVICE — ST ACC MICROPUNCTURE STFF .018 ECHO/PLAT/TP 4F/10CM 21G/7CM

## (undated) DEVICE — PREP SPRY PVPI 10P 2OZ

## (undated) DEVICE — LAPAROVUE VISIBILITY SYSTEM LAPAROSCOPIC SOLUTIONS: Brand: LAPAROVUE

## (undated) DEVICE — ST TBG AIRSEAL BIF FLTR W/ACT/CHARCOAL/FLTR

## (undated) DEVICE — NITINOL WIRE WITH HYDROPHILIC TIP: Brand: SENSOR

## (undated) DEVICE — ADHS SKIN PREMIERPRO EXOFIN TOPICAL HI/VISC .5ML

## (undated) DEVICE — DBD-DRAPE,LAP,CHOLE,W/TROUGHS,STERILE: Brand: MEDLINE

## (undated) DEVICE — Device: Brand: ERBE

## (undated) DEVICE — THORACIC CATHETER,STRAIGHT: Brand: ARGYLE

## (undated) DEVICE — DRAPE,UTILITY,XL,4/PK,STERILE: Brand: MEDLINE

## (undated) DEVICE — BLADELESS OBTURATOR: Brand: WECK VISTA

## (undated) DEVICE — SOLUTION,WATER,IRRIGATION,1000ML,STERILE: Brand: MEDLINE

## (undated) DEVICE — TB PROSHIELD PROTECT PLSTC CLR

## (undated) DEVICE — ELECTRD BLD EZ CLN MOD 6.5IN

## (undated) DEVICE — CATHETER GUIDE

## (undated) DEVICE — ARM DRAPE

## (undated) DEVICE — SOL IRRIG NACL 1000ML

## (undated) DEVICE — RADIFOCUS TORQUE DEVICE MULTI-TORQUE VISE: Brand: RADIFOCUS TORQUE DEVICE

## (undated) DEVICE — GOWN,REINF,POLY,ECL,PP SLV,XXL: Brand: MEDLINE

## (undated) DEVICE — THE STERILE LIGHT HANDLE COVER IS USED WITH STERIS SURGICAL LIGHTING AND VISUALIZATION SYSTEMS.